# Patient Record
Sex: FEMALE | Race: WHITE | NOT HISPANIC OR LATINO | Employment: OTHER | URBAN - METROPOLITAN AREA
[De-identification: names, ages, dates, MRNs, and addresses within clinical notes are randomized per-mention and may not be internally consistent; named-entity substitution may affect disease eponyms.]

---

## 2017-10-30 ENCOUNTER — RECORDS - HEALTHEAST (OUTPATIENT)
Dept: LAB | Facility: CLINIC | Age: 57
End: 2017-10-30

## 2017-10-30 LAB
CHOLEST SERPL-MCNC: 169 MG/DL
FASTING STATUS PATIENT QL REPORTED: ABNORMAL
HDLC SERPL-MCNC: 45 MG/DL
LDLC SERPL CALC-MCNC: 106 MG/DL
TRIGL SERPL-MCNC: 90 MG/DL

## 2017-11-07 LAB
BKR LAB AP ABNORMAL BLEEDING: NO
BKR LAB AP BIRTH CONTROL/HORMONES: NORMAL
BKR LAB AP CERVICAL APPEARANCE: NORMAL
BKR LAB AP GYN ADEQUACY: NORMAL
BKR LAB AP GYN INTERPRETATION: NORMAL
BKR LAB AP GYN OTHER FINDINGS: NORMAL
BKR LAB AP HPV REFLEX: NO
BKR LAB AP LMP: 2000
BKR LAB AP PATIENT STATUS: NORMAL
BKR LAB AP PREVIOUS ABNORMAL: NORMAL
BKR LAB AP PREVIOUS NORMAL: 2014
HIGH RISK?: NO
PATH REPORT.COMMENTS IMP SPEC: NORMAL
RESULT FLAG (HE HISTORICAL CONVERSION): NORMAL

## 2018-09-14 ENCOUNTER — HOSPITAL ENCOUNTER (OUTPATIENT)
Dept: ULTRASOUND IMAGING | Facility: CLINIC | Age: 58
Discharge: HOME | End: 2018-09-14
Attending: FAMILY MEDICINE

## 2018-09-14 ENCOUNTER — RECORDS - HEALTHEAST (OUTPATIENT)
Dept: ADMINISTRATIVE | Facility: OTHER | Age: 58
End: 2018-09-14

## 2018-09-14 DIAGNOSIS — M79.89 LEFT LEG SWELLING: ICD-10-CM

## 2018-09-14 DIAGNOSIS — M79.605 LEFT LEG PAIN: ICD-10-CM

## 2018-09-24 ENCOUNTER — AMBULATORY - HEALTHEAST (OUTPATIENT)
Dept: LAB | Facility: HOSPITAL | Age: 58
End: 2018-09-24

## 2018-09-24 ENCOUNTER — HOSPITAL ENCOUNTER (OUTPATIENT)
Dept: PHYSICAL MEDICINE AND REHAB | Facility: CLINIC | Age: 58
Discharge: HOME | End: 2018-09-24
Attending: PHYSICAL MEDICINE & REHABILITATION

## 2018-09-24 DIAGNOSIS — M53.3 SACROILIAC JOINT PAIN: ICD-10-CM

## 2018-09-24 DIAGNOSIS — M25.562 LEFT KNEE PAIN: ICD-10-CM

## 2018-09-24 DIAGNOSIS — M79.18 MYOFASCIAL PAIN: ICD-10-CM

## 2018-09-24 DIAGNOSIS — M54.50 LUMBAR SPINE PAIN: ICD-10-CM

## 2018-09-24 LAB
BASOPHILS # BLD AUTO: 0 THOU/UL (ref 0–0.2)
BASOPHILS NFR BLD AUTO: 0 % (ref 0–2)
C REACTIVE PROTEIN LHE: 1.1 MG/DL (ref 0–0.8)
EOSINOPHIL # BLD AUTO: 0 THOU/UL (ref 0–0.4)
EOSINOPHIL NFR BLD AUTO: 0 % (ref 0–6)
ERYTHROCYTE [DISTWIDTH] IN BLOOD BY AUTOMATED COUNT: 13.1 % (ref 11–14.5)
ERYTHROCYTE [SEDIMENTATION RATE] IN BLOOD BY WESTERGREN METHOD: 14 MM/HR (ref 0–20)
HCT VFR BLD AUTO: 42.6 % (ref 35–47)
HGB BLD-MCNC: 14.3 G/DL (ref 12–16)
LYMPHOCYTES # BLD AUTO: 1.2 THOU/UL (ref 0.8–4.4)
LYMPHOCYTES NFR BLD AUTO: 14 % (ref 20–40)
MCH RBC QN AUTO: 30.2 PG (ref 27–34)
MCHC RBC AUTO-ENTMCNC: 33.6 G/DL (ref 32–36)
MCV RBC AUTO: 90 FL (ref 80–100)
MONOCYTES # BLD AUTO: 0.2 THOU/UL (ref 0–0.9)
MONOCYTES NFR BLD AUTO: 2 % (ref 2–10)
NEUTROPHILS # BLD AUTO: 7.1 THOU/UL (ref 2–7.7)
NEUTROPHILS NFR BLD AUTO: 84 % (ref 50–70)
PLATELET # BLD AUTO: 252 THOU/UL (ref 140–440)
PMV BLD AUTO: 10.4 FL (ref 8.5–12.5)
RBC # BLD AUTO: 4.74 MILL/UL (ref 3.8–5.4)
URATE SERPL-MCNC: 3.9 MG/DL (ref 2–7.5)
WBC: 8.5 THOU/UL (ref 4–11)

## 2018-09-24 RX ORDER — ASPIRIN 325 MG
325 TABLET ORAL DAILY
Status: SHIPPED | COMMUNITY
Start: 2018-09-24 | End: 2023-04-06

## 2018-09-24 RX ORDER — LOSARTAN POTASSIUM AND HYDROCHLOROTHIAZIDE 12.5; 5 MG/1; MG/1
TABLET ORAL
Status: SHIPPED | COMMUNITY
Start: 2018-04-23 | End: 2024-05-01

## 2018-09-24 RX ORDER — ATORVASTATIN CALCIUM 20 MG/1
20 TABLET, FILM COATED ORAL EVERY MORNING
Status: SHIPPED | COMMUNITY
Start: 2018-08-02 | End: 2024-05-01

## 2018-09-25 ENCOUNTER — AMBULATORY - HEALTHEAST (OUTPATIENT)
Dept: PHYSICAL MEDICINE AND REHAB | Facility: CLINIC | Age: 58
End: 2018-09-25

## 2018-09-25 ENCOUNTER — COMMUNICATION - HEALTHEAST (OUTPATIENT)
Dept: PHYSICAL MEDICINE AND REHAB | Facility: CLINIC | Age: 58
End: 2018-09-25

## 2018-09-25 DIAGNOSIS — M53.3 SACROILIAC JOINT PAIN: ICD-10-CM

## 2018-09-25 DIAGNOSIS — M25.562 LEFT KNEE PAIN: ICD-10-CM

## 2018-09-26 ENCOUNTER — RECORDS - HEALTHEAST (OUTPATIENT)
Dept: LAB | Facility: CLINIC | Age: 58
End: 2018-09-26

## 2018-09-27 LAB — BNP SERPL-MCNC: 30 PG/ML (ref 0–93)

## 2018-10-01 ENCOUNTER — OFFICE VISIT - HEALTHEAST (OUTPATIENT)
Dept: PHYSICAL THERAPY | Facility: REHABILITATION | Age: 58
End: 2018-10-01

## 2018-10-01 DIAGNOSIS — M79.671 BILATERAL FOOT PAIN: ICD-10-CM

## 2018-10-01 DIAGNOSIS — M79.672 BILATERAL FOOT PAIN: ICD-10-CM

## 2018-10-01 DIAGNOSIS — M54.50 CHRONIC RIGHT-SIDED LOW BACK PAIN WITHOUT SCIATICA: ICD-10-CM

## 2018-10-01 DIAGNOSIS — G89.29 UPPER BACK PAIN, CHRONIC: ICD-10-CM

## 2018-10-01 DIAGNOSIS — G89.29 CHRONIC RIGHT-SIDED LOW BACK PAIN WITHOUT SCIATICA: ICD-10-CM

## 2018-10-01 DIAGNOSIS — M54.9 UPPER BACK PAIN, CHRONIC: ICD-10-CM

## 2018-10-01 DIAGNOSIS — M79.662 PAIN OF LEFT LOWER LEG: ICD-10-CM

## 2018-10-10 ENCOUNTER — OFFICE VISIT - HEALTHEAST (OUTPATIENT)
Dept: PHYSICAL THERAPY | Facility: REHABILITATION | Age: 58
End: 2018-10-10

## 2018-10-10 DIAGNOSIS — M79.662 PAIN OF LEFT LOWER LEG: ICD-10-CM

## 2018-10-10 DIAGNOSIS — M54.9 UPPER BACK PAIN, CHRONIC: ICD-10-CM

## 2018-10-10 DIAGNOSIS — G89.29 CHRONIC RIGHT-SIDED LOW BACK PAIN WITHOUT SCIATICA: ICD-10-CM

## 2018-10-10 DIAGNOSIS — M79.671 BILATERAL FOOT PAIN: ICD-10-CM

## 2018-10-10 DIAGNOSIS — G89.29 UPPER BACK PAIN, CHRONIC: ICD-10-CM

## 2018-10-10 DIAGNOSIS — M79.672 BILATERAL FOOT PAIN: ICD-10-CM

## 2018-10-10 DIAGNOSIS — M54.50 CHRONIC RIGHT-SIDED LOW BACK PAIN WITHOUT SCIATICA: ICD-10-CM

## 2018-10-17 ENCOUNTER — OFFICE VISIT - HEALTHEAST (OUTPATIENT)
Dept: PHYSICAL THERAPY | Facility: REHABILITATION | Age: 58
End: 2018-10-17

## 2018-10-17 DIAGNOSIS — M54.50 CHRONIC RIGHT-SIDED LOW BACK PAIN WITHOUT SCIATICA: ICD-10-CM

## 2018-10-17 DIAGNOSIS — M79.662 PAIN OF LEFT LOWER LEG: ICD-10-CM

## 2018-10-17 DIAGNOSIS — G89.29 UPPER BACK PAIN, CHRONIC: ICD-10-CM

## 2018-10-17 DIAGNOSIS — M54.9 UPPER BACK PAIN, CHRONIC: ICD-10-CM

## 2018-10-17 DIAGNOSIS — M79.672 BILATERAL FOOT PAIN: ICD-10-CM

## 2018-10-17 DIAGNOSIS — G89.29 CHRONIC RIGHT-SIDED LOW BACK PAIN WITHOUT SCIATICA: ICD-10-CM

## 2018-10-17 DIAGNOSIS — M79.671 BILATERAL FOOT PAIN: ICD-10-CM

## 2018-10-24 ENCOUNTER — OFFICE VISIT - HEALTHEAST (OUTPATIENT)
Dept: PHYSICAL THERAPY | Facility: REHABILITATION | Age: 58
End: 2018-10-24

## 2018-10-24 DIAGNOSIS — G89.29 UPPER BACK PAIN, CHRONIC: ICD-10-CM

## 2018-10-24 DIAGNOSIS — M54.50 CHRONIC RIGHT-SIDED LOW BACK PAIN WITHOUT SCIATICA: ICD-10-CM

## 2018-10-24 DIAGNOSIS — M54.9 UPPER BACK PAIN, CHRONIC: ICD-10-CM

## 2018-10-24 DIAGNOSIS — M79.672 BILATERAL FOOT PAIN: ICD-10-CM

## 2018-10-24 DIAGNOSIS — M79.671 BILATERAL FOOT PAIN: ICD-10-CM

## 2018-10-24 DIAGNOSIS — G89.29 CHRONIC RIGHT-SIDED LOW BACK PAIN WITHOUT SCIATICA: ICD-10-CM

## 2018-10-24 DIAGNOSIS — M79.662 PAIN OF LEFT LOWER LEG: ICD-10-CM

## 2018-10-31 ENCOUNTER — OFFICE VISIT - HEALTHEAST (OUTPATIENT)
Dept: PHYSICAL THERAPY | Facility: REHABILITATION | Age: 58
End: 2018-10-31

## 2018-10-31 DIAGNOSIS — M79.662 PAIN OF LEFT LOWER LEG: ICD-10-CM

## 2018-10-31 DIAGNOSIS — M54.50 CHRONIC RIGHT-SIDED LOW BACK PAIN WITHOUT SCIATICA: ICD-10-CM

## 2018-10-31 DIAGNOSIS — M54.9 UPPER BACK PAIN, CHRONIC: ICD-10-CM

## 2018-10-31 DIAGNOSIS — G89.29 CHRONIC RIGHT-SIDED LOW BACK PAIN WITHOUT SCIATICA: ICD-10-CM

## 2018-10-31 DIAGNOSIS — G89.29 UPPER BACK PAIN, CHRONIC: ICD-10-CM

## 2018-12-17 ENCOUNTER — RECORDS - HEALTHEAST (OUTPATIENT)
Dept: LAB | Facility: CLINIC | Age: 58
End: 2018-12-17

## 2018-12-17 LAB
ANION GAP SERPL CALCULATED.3IONS-SCNC: 11 MMOL/L (ref 5–18)
BUN SERPL-MCNC: 15 MG/DL (ref 8–22)
CALCIUM SERPL-MCNC: 10.4 MG/DL (ref 8.5–10.5)
CHLORIDE BLD-SCNC: 104 MMOL/L (ref 98–107)
CHOLEST SERPL-MCNC: 199 MG/DL
CO2 SERPL-SCNC: 28 MMOL/L (ref 22–31)
CREAT SERPL-MCNC: 0.86 MG/DL (ref 0.6–1.1)
ERYTHROCYTE [SEDIMENTATION RATE] IN BLOOD BY WESTERGREN METHOD: 14 MM/HR (ref 0–20)
FASTING STATUS PATIENT QL REPORTED: YES
GFR SERPL CREATININE-BSD FRML MDRD: >60 ML/MIN/1.73M2
GLUCOSE BLD-MCNC: 128 MG/DL (ref 70–125)
HDLC SERPL-MCNC: 41 MG/DL
LDLC SERPL CALC-MCNC: 131 MG/DL
LIPASE SERPL-CCNC: 10 U/L (ref 0–52)
POTASSIUM BLD-SCNC: 4.3 MMOL/L (ref 3.5–5)
SODIUM SERPL-SCNC: 143 MMOL/L (ref 136–145)
TRIGL SERPL-MCNC: 137 MG/DL

## 2018-12-18 ENCOUNTER — RECORDS - HEALTHEAST (OUTPATIENT)
Dept: LAB | Facility: CLINIC | Age: 58
End: 2018-12-18

## 2018-12-19 LAB — HBA1C MFR BLD: 6.5 % (ref 4.2–6.1)

## 2019-06-05 ENCOUNTER — RECORDS - HEALTHEAST (OUTPATIENT)
Dept: LAB | Facility: CLINIC | Age: 59
End: 2019-06-05

## 2019-06-05 LAB
CHOLEST SERPL-MCNC: 161 MG/DL
FASTING STATUS PATIENT QL REPORTED: YES
HDLC SERPL-MCNC: 44 MG/DL
LDLC SERPL CALC-MCNC: 83 MG/DL
TRIGL SERPL-MCNC: 170 MG/DL

## 2019-07-10 ENCOUNTER — HOSPITAL ENCOUNTER (OUTPATIENT)
Dept: MAMMOGRAPHY | Facility: CLINIC | Age: 59
Discharge: HOME | End: 2019-07-10
Attending: FAMILY MEDICINE

## 2019-07-10 DIAGNOSIS — Z12.31 VISIT FOR SCREENING MAMMOGRAM: ICD-10-CM

## 2019-07-11 ENCOUNTER — RECORDS - HEALTHEAST (OUTPATIENT)
Dept: RADIOLOGY | Facility: CLINIC | Age: 59
End: 2019-07-11

## 2019-07-11 ENCOUNTER — RECORDS - HEALTHEAST (OUTPATIENT)
Dept: ADMINISTRATIVE | Facility: OTHER | Age: 59
End: 2019-07-11

## 2019-07-17 ENCOUNTER — HOSPITAL ENCOUNTER (OUTPATIENT)
Dept: MAMMOGRAPHY | Facility: CLINIC | Age: 59
Discharge: HOME | End: 2019-07-17
Attending: FAMILY MEDICINE

## 2019-07-17 DIAGNOSIS — N64.89 BREAST ASYMMETRY: ICD-10-CM

## 2019-08-14 ENCOUNTER — TRANSFERRED RECORDS (OUTPATIENT)
Dept: PULMONOLOGY | Facility: OTHER | Age: 59
End: 2019-08-14

## 2020-06-15 ENCOUNTER — RECORDS - HEALTHEAST (OUTPATIENT)
Dept: LAB | Facility: CLINIC | Age: 60
End: 2020-06-15

## 2020-06-15 ENCOUNTER — AMBULATORY - HEALTHEAST (OUTPATIENT)
Dept: VASCULAR SURGERY | Facility: CLINIC | Age: 60
End: 2020-06-15

## 2020-06-15 ENCOUNTER — AMBULATORY - HEALTHEAST (OUTPATIENT)
Dept: PHYSICAL MEDICINE AND REHAB | Facility: CLINIC | Age: 60
End: 2020-06-15

## 2020-06-15 ENCOUNTER — RECORDS - HEALTHEAST (OUTPATIENT)
Dept: ADMINISTRATIVE | Facility: OTHER | Age: 60
End: 2020-06-15

## 2020-06-15 DIAGNOSIS — M54.50 LUMBAR SPINE PAIN: ICD-10-CM

## 2020-06-15 DIAGNOSIS — I73.9 PERIPHERAL VASCULAR DISEASE, UNSPECIFIED (H): ICD-10-CM

## 2020-06-15 LAB
ANION GAP SERPL CALCULATED.3IONS-SCNC: 10 MMOL/L (ref 5–18)
BUN SERPL-MCNC: 14 MG/DL (ref 8–22)
CALCIUM SERPL-MCNC: 9.5 MG/DL (ref 8.5–10.5)
CHLORIDE BLD-SCNC: 105 MMOL/L (ref 98–107)
CHOLEST SERPL-MCNC: 168 MG/DL
CO2 SERPL-SCNC: 27 MMOL/L (ref 22–31)
CREAT SERPL-MCNC: 0.77 MG/DL (ref 0.6–1.1)
FASTING STATUS PATIENT QL REPORTED: ABNORMAL
GFR SERPL CREATININE-BSD FRML MDRD: >60 ML/MIN/1.73M2
GLUCOSE BLD-MCNC: 130 MG/DL (ref 70–125)
HDLC SERPL-MCNC: 39 MG/DL
LDLC SERPL CALC-MCNC: 100 MG/DL
POTASSIUM BLD-SCNC: 3.7 MMOL/L (ref 3.5–5)
SODIUM SERPL-SCNC: 142 MMOL/L (ref 136–145)
TRIGL SERPL-MCNC: 147 MG/DL

## 2020-06-19 ENCOUNTER — AMBULATORY - HEALTHEAST (OUTPATIENT)
Dept: VASCULAR SURGERY | Facility: CLINIC | Age: 60
End: 2020-06-19

## 2020-06-19 DIAGNOSIS — I73.9 PVD (PERIPHERAL VASCULAR DISEASE) (H): ICD-10-CM

## 2020-06-22 ENCOUNTER — HOSPITAL ENCOUNTER (OUTPATIENT)
Dept: PHYSICAL MEDICINE AND REHAB | Facility: CLINIC | Age: 60
Discharge: HOME | End: 2020-06-22
Attending: PHYSICAL MEDICINE & REHABILITATION

## 2020-06-22 ENCOUNTER — RECORDS - HEALTHEAST (OUTPATIENT)
Dept: VASCULAR ULTRASOUND | Facility: CLINIC | Age: 60
End: 2020-06-22

## 2020-06-22 DIAGNOSIS — Z98.1 S/P LUMBAR FUSION: ICD-10-CM

## 2020-06-22 DIAGNOSIS — M54.50 LUMBAR SPINE PAIN: ICD-10-CM

## 2020-06-22 DIAGNOSIS — I73.9 PERIPHERAL VASCULAR DISEASE, UNSPECIFIED (H): ICD-10-CM

## 2020-06-22 DIAGNOSIS — M79.18 GLUTEAL PAIN: ICD-10-CM

## 2020-06-22 RX ORDER — IPRATROPIUM BROMIDE AND ALBUTEROL SULFATE 2.5; .5 MG/3ML; MG/3ML
3 SOLUTION RESPIRATORY (INHALATION) EVERY 6 HOURS PRN
Status: SHIPPED | COMMUNITY
Start: 2020-03-18 | End: 2024-10-04

## 2020-06-30 ENCOUNTER — OFFICE VISIT - HEALTHEAST (OUTPATIENT)
Dept: VASCULAR SURGERY | Facility: CLINIC | Age: 60
End: 2020-06-30

## 2020-06-30 DIAGNOSIS — I73.9 PVD (PERIPHERAL VASCULAR DISEASE) (H): ICD-10-CM

## 2020-07-10 ENCOUNTER — HOSPITAL ENCOUNTER (OUTPATIENT)
Dept: MRI IMAGING | Facility: HOSPITAL | Age: 60
Discharge: HOME | End: 2020-07-10
Attending: PHYSICAL MEDICINE & REHABILITATION

## 2020-07-10 ENCOUNTER — COMMUNICATION - HEALTHEAST (OUTPATIENT)
Dept: PHYSICAL MEDICINE AND REHAB | Facility: CLINIC | Age: 60
End: 2020-07-10

## 2020-07-10 DIAGNOSIS — Z98.1 S/P LUMBAR FUSION: ICD-10-CM

## 2020-07-10 DIAGNOSIS — M54.50 LUMBAR SPINE PAIN: ICD-10-CM

## 2020-07-10 DIAGNOSIS — M79.18 GLUTEAL PAIN: ICD-10-CM

## 2020-07-13 ENCOUNTER — COMMUNICATION - HEALTHEAST (OUTPATIENT)
Dept: PHYSICAL MEDICINE AND REHAB | Facility: CLINIC | Age: 60
End: 2020-07-13

## 2020-07-13 DIAGNOSIS — M54.16 LUMBAR RADICULOPATHY: ICD-10-CM

## 2020-07-13 RX ORDER — NABUMETONE 500 MG/1
TABLET, FILM COATED ORAL
Qty: 120 TABLET | Refills: 0 | Status: SHIPPED | OUTPATIENT
Start: 2020-07-13 | End: 2021-11-01

## 2020-07-17 ENCOUNTER — HOSPITAL ENCOUNTER (OUTPATIENT)
Dept: CT IMAGING | Facility: HOSPITAL | Age: 60
Discharge: HOME | End: 2020-07-17
Attending: SURGERY

## 2020-07-17 ENCOUNTER — OFFICE VISIT - HEALTHEAST (OUTPATIENT)
Dept: VASCULAR SURGERY | Facility: CLINIC | Age: 60
End: 2020-07-17

## 2020-07-17 DIAGNOSIS — I73.9 PVD (PERIPHERAL VASCULAR DISEASE) (H): ICD-10-CM

## 2020-07-17 LAB
CREAT BLD-MCNC: 0.8 MG/DL (ref 0.6–1.1)
GFR SERPL CREATININE-BSD FRML MDRD: >60 ML/MIN/1.73M2

## 2020-07-17 ASSESSMENT — MIFFLIN-ST. JEOR: SCORE: 1453.54

## 2020-07-21 ENCOUNTER — HOSPITAL ENCOUNTER (OUTPATIENT)
Dept: PHYSICAL MEDICINE AND REHAB | Facility: CLINIC | Age: 60
Discharge: HOME | End: 2020-07-21
Attending: PAIN MEDICINE

## 2020-07-21 DIAGNOSIS — M54.16 LUMBAR RADICULOPATHY: ICD-10-CM

## 2020-08-17 ENCOUNTER — HOSPITAL ENCOUNTER (OUTPATIENT)
Dept: PHYSICAL MEDICINE AND REHAB | Facility: CLINIC | Age: 60
Discharge: HOME | End: 2020-08-17
Attending: PHYSICAL MEDICINE & REHABILITATION

## 2020-08-17 DIAGNOSIS — M79.18 MYOFASCIAL PAIN: ICD-10-CM

## 2020-08-17 DIAGNOSIS — M79.662 PAIN IN BOTH LOWER LEGS: ICD-10-CM

## 2020-08-17 DIAGNOSIS — M51.26 LUMBAR DISC HERNIATION: ICD-10-CM

## 2020-08-17 DIAGNOSIS — M79.661 PAIN IN BOTH LOWER LEGS: ICD-10-CM

## 2020-08-17 DIAGNOSIS — M53.3 SACROILIAC JOINT PAIN: ICD-10-CM

## 2020-08-17 DIAGNOSIS — Z98.1 S/P LUMBAR FUSION: ICD-10-CM

## 2020-08-17 DIAGNOSIS — M79.18 GLUTEAL PAIN: ICD-10-CM

## 2020-08-17 DIAGNOSIS — M54.16 LUMBAR RADICULOPATHY: ICD-10-CM

## 2020-08-21 ENCOUNTER — COMMUNICATION - HEALTHEAST (OUTPATIENT)
Dept: PHYSICAL MEDICINE AND REHAB | Facility: CLINIC | Age: 60
End: 2020-08-21

## 2020-08-31 ENCOUNTER — OFFICE VISIT - HEALTHEAST (OUTPATIENT)
Dept: PHYSICAL THERAPY | Facility: CLINIC | Age: 60
End: 2020-08-31

## 2020-08-31 DIAGNOSIS — M54.50 CHRONIC RIGHT-SIDED LOW BACK PAIN WITHOUT SCIATICA: ICD-10-CM

## 2020-08-31 DIAGNOSIS — M79.18 MYOFASCIAL PAIN SYNDROME: ICD-10-CM

## 2020-08-31 DIAGNOSIS — G89.29 CHRONIC RIGHT-SIDED LOW BACK PAIN WITHOUT SCIATICA: ICD-10-CM

## 2020-09-02 ENCOUNTER — OFFICE VISIT - HEALTHEAST (OUTPATIENT)
Dept: PHYSICAL THERAPY | Facility: CLINIC | Age: 60
End: 2020-09-02

## 2020-09-02 DIAGNOSIS — M79.18 MYOFASCIAL PAIN SYNDROME: ICD-10-CM

## 2020-09-02 DIAGNOSIS — M54.50 CHRONIC RIGHT-SIDED LOW BACK PAIN WITHOUT SCIATICA: ICD-10-CM

## 2020-09-02 DIAGNOSIS — G89.29 CHRONIC RIGHT-SIDED LOW BACK PAIN WITHOUT SCIATICA: ICD-10-CM

## 2020-09-10 ENCOUNTER — OFFICE VISIT - HEALTHEAST (OUTPATIENT)
Dept: PHYSICAL THERAPY | Facility: CLINIC | Age: 60
End: 2020-09-10

## 2020-09-10 DIAGNOSIS — G89.29 CHRONIC RIGHT-SIDED LOW BACK PAIN WITHOUT SCIATICA: ICD-10-CM

## 2020-09-10 DIAGNOSIS — M54.50 CHRONIC RIGHT-SIDED LOW BACK PAIN WITHOUT SCIATICA: ICD-10-CM

## 2020-09-10 DIAGNOSIS — M79.18 MYOFASCIAL PAIN SYNDROME: ICD-10-CM

## 2020-09-16 ENCOUNTER — OFFICE VISIT - HEALTHEAST (OUTPATIENT)
Dept: PHYSICAL THERAPY | Facility: CLINIC | Age: 60
End: 2020-09-16

## 2020-09-16 DIAGNOSIS — G89.29 CHRONIC RIGHT-SIDED LOW BACK PAIN WITHOUT SCIATICA: ICD-10-CM

## 2020-09-16 DIAGNOSIS — M54.50 CHRONIC RIGHT-SIDED LOW BACK PAIN WITHOUT SCIATICA: ICD-10-CM

## 2020-09-16 DIAGNOSIS — M79.18 MYOFASCIAL PAIN SYNDROME: ICD-10-CM

## 2020-09-17 ENCOUNTER — HOSPITAL ENCOUNTER (OUTPATIENT)
Dept: PHYSICAL MEDICINE AND REHAB | Facility: CLINIC | Age: 60
Discharge: HOME | End: 2020-09-17
Attending: NURSE PRACTITIONER

## 2020-09-17 DIAGNOSIS — Z98.1 S/P LUMBAR FUSION: ICD-10-CM

## 2020-09-17 DIAGNOSIS — M54.16 LUMBAR RADICULOPATHY: ICD-10-CM

## 2020-09-17 DIAGNOSIS — M53.3 SACROILIAC JOINT PAIN: ICD-10-CM

## 2020-09-17 DIAGNOSIS — M51.26 LUMBAR DISC HERNIATION: ICD-10-CM

## 2020-09-17 DIAGNOSIS — M79.18 GLUTEAL PAIN: ICD-10-CM

## 2020-09-17 DIAGNOSIS — M79.18 RIGHT BUTTOCK PAIN: ICD-10-CM

## 2020-09-17 DIAGNOSIS — M53.3 SACROILIAC JOINT DYSFUNCTION: ICD-10-CM

## 2020-09-21 ENCOUNTER — OFFICE VISIT - HEALTHEAST (OUTPATIENT)
Dept: PHYSICAL THERAPY | Facility: CLINIC | Age: 60
End: 2020-09-21

## 2020-09-21 DIAGNOSIS — M79.18 MYOFASCIAL PAIN SYNDROME: ICD-10-CM

## 2020-09-21 DIAGNOSIS — M54.50 CHRONIC RIGHT-SIDED LOW BACK PAIN WITHOUT SCIATICA: ICD-10-CM

## 2020-09-21 DIAGNOSIS — G89.29 CHRONIC RIGHT-SIDED LOW BACK PAIN WITHOUT SCIATICA: ICD-10-CM

## 2020-09-24 ENCOUNTER — OFFICE VISIT - HEALTHEAST (OUTPATIENT)
Dept: PHYSICAL THERAPY | Facility: CLINIC | Age: 60
End: 2020-09-24

## 2020-09-24 DIAGNOSIS — G89.29 CHRONIC RIGHT-SIDED LOW BACK PAIN WITHOUT SCIATICA: ICD-10-CM

## 2020-09-24 DIAGNOSIS — M54.50 CHRONIC RIGHT-SIDED LOW BACK PAIN WITHOUT SCIATICA: ICD-10-CM

## 2020-09-24 DIAGNOSIS — M79.18 MYOFASCIAL PAIN SYNDROME: ICD-10-CM

## 2020-09-30 ENCOUNTER — HOSPITAL ENCOUNTER (OUTPATIENT)
Dept: PHYSICAL MEDICINE AND REHAB | Facility: CLINIC | Age: 60
Discharge: HOME | End: 2020-09-30
Attending: PAIN MEDICINE

## 2020-09-30 DIAGNOSIS — M53.3 SACROILIAC JOINT DYSFUNCTION: ICD-10-CM

## 2020-09-30 DIAGNOSIS — M79.18 RIGHT BUTTOCK PAIN: ICD-10-CM

## 2020-09-30 DIAGNOSIS — M53.3 SACROILIAC JOINT PAIN: ICD-10-CM

## 2020-09-30 DIAGNOSIS — M79.18 GLUTEAL PAIN: ICD-10-CM

## 2020-10-05 ENCOUNTER — OFFICE VISIT - HEALTHEAST (OUTPATIENT)
Dept: PHYSICAL THERAPY | Facility: CLINIC | Age: 60
End: 2020-10-05

## 2020-10-05 DIAGNOSIS — M54.50 CHRONIC RIGHT-SIDED LOW BACK PAIN WITHOUT SCIATICA: ICD-10-CM

## 2020-10-05 DIAGNOSIS — M79.18 MYOFASCIAL PAIN SYNDROME: ICD-10-CM

## 2020-10-05 DIAGNOSIS — G89.29 CHRONIC RIGHT-SIDED LOW BACK PAIN WITHOUT SCIATICA: ICD-10-CM

## 2020-10-09 ENCOUNTER — OFFICE VISIT - HEALTHEAST (OUTPATIENT)
Dept: PHYSICAL THERAPY | Facility: CLINIC | Age: 60
End: 2020-10-09

## 2020-10-09 DIAGNOSIS — M54.50 CHRONIC RIGHT-SIDED LOW BACK PAIN WITHOUT SCIATICA: ICD-10-CM

## 2020-10-09 DIAGNOSIS — G89.29 CHRONIC RIGHT-SIDED LOW BACK PAIN WITHOUT SCIATICA: ICD-10-CM

## 2020-10-09 DIAGNOSIS — M79.18 MYOFASCIAL PAIN SYNDROME: ICD-10-CM

## 2020-10-12 ENCOUNTER — RECORDS - HEALTHEAST (OUTPATIENT)
Dept: VASCULAR ULTRASOUND | Facility: CLINIC | Age: 60
End: 2020-10-12

## 2020-10-12 DIAGNOSIS — I73.9 PERIPHERAL VASCULAR DISEASE, UNSPECIFIED (H): ICD-10-CM

## 2020-10-16 ENCOUNTER — COMMUNICATION - HEALTHEAST (OUTPATIENT)
Dept: VASCULAR SURGERY | Facility: CLINIC | Age: 60
End: 2020-10-16

## 2020-10-16 ENCOUNTER — OFFICE VISIT - HEALTHEAST (OUTPATIENT)
Dept: VASCULAR SURGERY | Facility: CLINIC | Age: 60
End: 2020-10-16

## 2020-10-16 DIAGNOSIS — I73.9 PVD (PERIPHERAL VASCULAR DISEASE) (H): ICD-10-CM

## 2020-10-16 DIAGNOSIS — Z20.822 COVID-19 RULED OUT: ICD-10-CM

## 2020-10-20 ENCOUNTER — OFFICE VISIT - HEALTHEAST (OUTPATIENT)
Dept: PHYSICAL THERAPY | Facility: REHABILITATION | Age: 60
End: 2020-10-20

## 2020-10-20 DIAGNOSIS — M79.662 PAIN OF LEFT LOWER LEG: ICD-10-CM

## 2020-10-20 DIAGNOSIS — I73.9 PVD (PERIPHERAL VASCULAR DISEASE) (H): ICD-10-CM

## 2020-10-20 DIAGNOSIS — M54.50 CHRONIC RIGHT-SIDED LOW BACK PAIN WITHOUT SCIATICA: ICD-10-CM

## 2020-10-20 DIAGNOSIS — M79.672 BILATERAL FOOT PAIN: ICD-10-CM

## 2020-10-20 DIAGNOSIS — G62.9 PERIPHERAL NEUROPATHY: ICD-10-CM

## 2020-10-20 DIAGNOSIS — M54.9 UPPER BACK PAIN, CHRONIC: ICD-10-CM

## 2020-10-20 DIAGNOSIS — M79.671 BILATERAL FOOT PAIN: ICD-10-CM

## 2020-10-20 DIAGNOSIS — M79.18 MYOFASCIAL PAIN SYNDROME: ICD-10-CM

## 2020-10-20 DIAGNOSIS — G89.29 UPPER BACK PAIN, CHRONIC: ICD-10-CM

## 2020-10-20 DIAGNOSIS — G89.29 CHRONIC RIGHT-SIDED LOW BACK PAIN WITHOUT SCIATICA: ICD-10-CM

## 2020-10-20 DIAGNOSIS — J44.9 COPD (CHRONIC OBSTRUCTIVE PULMONARY DISEASE) (H): ICD-10-CM

## 2020-10-23 ENCOUNTER — COMMUNICATION - HEALTHEAST (OUTPATIENT)
Dept: VASCULAR SURGERY | Facility: CLINIC | Age: 60
End: 2020-10-23

## 2020-10-27 ENCOUNTER — OFFICE VISIT - HEALTHEAST (OUTPATIENT)
Dept: PHYSICAL THERAPY | Facility: REHABILITATION | Age: 60
End: 2020-10-27

## 2020-10-27 DIAGNOSIS — M54.50 CHRONIC RIGHT-SIDED LOW BACK PAIN WITHOUT SCIATICA: ICD-10-CM

## 2020-10-27 DIAGNOSIS — M54.9 UPPER BACK PAIN, CHRONIC: ICD-10-CM

## 2020-10-27 DIAGNOSIS — G89.29 UPPER BACK PAIN, CHRONIC: ICD-10-CM

## 2020-10-27 DIAGNOSIS — M79.672 BILATERAL FOOT PAIN: ICD-10-CM

## 2020-10-27 DIAGNOSIS — G89.29 CHRONIC RIGHT-SIDED LOW BACK PAIN WITHOUT SCIATICA: ICD-10-CM

## 2020-10-27 DIAGNOSIS — G62.9 PERIPHERAL NEUROPATHY: ICD-10-CM

## 2020-10-27 DIAGNOSIS — J44.9 COPD (CHRONIC OBSTRUCTIVE PULMONARY DISEASE) (H): ICD-10-CM

## 2020-10-27 DIAGNOSIS — M79.18 MYOFASCIAL PAIN SYNDROME: ICD-10-CM

## 2020-10-27 DIAGNOSIS — I73.9 PVD (PERIPHERAL VASCULAR DISEASE) (H): ICD-10-CM

## 2020-10-27 DIAGNOSIS — M79.671 BILATERAL FOOT PAIN: ICD-10-CM

## 2020-10-27 DIAGNOSIS — M79.662 PAIN OF LEFT LOWER LEG: ICD-10-CM

## 2020-11-03 ENCOUNTER — COMMUNICATION - HEALTHEAST (OUTPATIENT)
Dept: PHYSICAL THERAPY | Facility: CLINIC | Age: 60
End: 2020-11-03

## 2020-11-03 ENCOUNTER — OFFICE VISIT - HEALTHEAST (OUTPATIENT)
Dept: PHYSICAL THERAPY | Facility: REHABILITATION | Age: 60
End: 2020-11-03

## 2020-11-03 DIAGNOSIS — G89.29 UPPER BACK PAIN, CHRONIC: ICD-10-CM

## 2020-11-03 DIAGNOSIS — M79.671 BILATERAL FOOT PAIN: ICD-10-CM

## 2020-11-03 DIAGNOSIS — J44.9 COPD (CHRONIC OBSTRUCTIVE PULMONARY DISEASE) (H): ICD-10-CM

## 2020-11-03 DIAGNOSIS — M79.662 PAIN OF LEFT LOWER LEG: ICD-10-CM

## 2020-11-03 DIAGNOSIS — G62.9 PERIPHERAL NEUROPATHY: ICD-10-CM

## 2020-11-03 DIAGNOSIS — M79.672 BILATERAL FOOT PAIN: ICD-10-CM

## 2020-11-03 DIAGNOSIS — M79.18 MYOFASCIAL PAIN SYNDROME: ICD-10-CM

## 2020-11-03 DIAGNOSIS — G89.29 CHRONIC RIGHT-SIDED LOW BACK PAIN WITHOUT SCIATICA: ICD-10-CM

## 2020-11-03 DIAGNOSIS — M54.50 CHRONIC RIGHT-SIDED LOW BACK PAIN WITHOUT SCIATICA: ICD-10-CM

## 2020-11-03 DIAGNOSIS — M54.9 UPPER BACK PAIN, CHRONIC: ICD-10-CM

## 2020-11-03 DIAGNOSIS — I73.9 PVD (PERIPHERAL VASCULAR DISEASE) (H): ICD-10-CM

## 2020-11-07 ENCOUNTER — AMBULATORY - HEALTHEAST (OUTPATIENT)
Dept: FAMILY MEDICINE | Facility: CLINIC | Age: 60
End: 2020-11-07

## 2020-11-07 DIAGNOSIS — Z20.822 COVID-19 RULED OUT: ICD-10-CM

## 2020-11-09 ENCOUNTER — COMMUNICATION - HEALTHEAST (OUTPATIENT)
Dept: SCHEDULING | Facility: CLINIC | Age: 60
End: 2020-11-09

## 2020-11-09 ENCOUNTER — COMMUNICATION - HEALTHEAST (OUTPATIENT)
Dept: VASCULAR SURGERY | Facility: CLINIC | Age: 60
End: 2020-11-09

## 2020-11-09 DIAGNOSIS — Z20.822 COVID-19 RULED OUT: ICD-10-CM

## 2020-11-09 DIAGNOSIS — I73.9 PVD (PERIPHERAL VASCULAR DISEASE) (H): ICD-10-CM

## 2020-11-11 ENCOUNTER — HOSPITAL ENCOUNTER (OUTPATIENT)
Dept: INTERVENTIONAL RADIOLOGY/VASCULAR | Facility: CLINIC | Age: 60
Discharge: HOME | End: 2020-11-11
Attending: SURGERY

## 2020-11-11 DIAGNOSIS — I73.9 PERIPHERAL VASCULAR DISEASE, UNSPECIFIED (H): ICD-10-CM

## 2020-11-11 LAB
CREAT SERPL-MCNC: 0.77 MG/DL (ref 0.6–1.1)
GFR SERPL CREATININE-BSD FRML MDRD: >60 ML/MIN/1.73M2
HGB BLD-MCNC: 14.1 G/DL (ref 12–16)
INR PPP: 0.99 (ref 0.9–1.1)
PLATELET # BLD AUTO: 250 THOU/UL (ref 140–440)
POTASSIUM BLD-SCNC: 3.6 MMOL/L (ref 3.5–5)

## 2020-11-11 ASSESSMENT — MIFFLIN-ST. JEOR: SCORE: 1445.21

## 2020-11-17 ENCOUNTER — OFFICE VISIT - HEALTHEAST (OUTPATIENT)
Dept: PHYSICAL THERAPY | Facility: REHABILITATION | Age: 60
End: 2020-11-17

## 2020-11-17 DIAGNOSIS — M54.50 CHRONIC RIGHT-SIDED LOW BACK PAIN WITHOUT SCIATICA: ICD-10-CM

## 2020-11-17 DIAGNOSIS — G89.29 CHRONIC RIGHT-SIDED LOW BACK PAIN WITHOUT SCIATICA: ICD-10-CM

## 2020-11-17 DIAGNOSIS — M79.18 MYOFASCIAL PAIN SYNDROME: ICD-10-CM

## 2020-11-17 DIAGNOSIS — M54.9 UPPER BACK PAIN, CHRONIC: ICD-10-CM

## 2020-11-17 DIAGNOSIS — G89.29 UPPER BACK PAIN, CHRONIC: ICD-10-CM

## 2020-11-17 DIAGNOSIS — M79.671 BILATERAL FOOT PAIN: ICD-10-CM

## 2020-11-17 DIAGNOSIS — M79.672 BILATERAL FOOT PAIN: ICD-10-CM

## 2020-11-17 DIAGNOSIS — G62.9 PERIPHERAL NEUROPATHY: ICD-10-CM

## 2020-11-17 DIAGNOSIS — J44.9 COPD (CHRONIC OBSTRUCTIVE PULMONARY DISEASE) (H): ICD-10-CM

## 2020-11-17 DIAGNOSIS — I73.9 PVD (PERIPHERAL VASCULAR DISEASE) (H): ICD-10-CM

## 2020-11-17 DIAGNOSIS — M79.662 PAIN OF LEFT LOWER LEG: ICD-10-CM

## 2020-11-21 ENCOUNTER — AMBULATORY - HEALTHEAST (OUTPATIENT)
Dept: FAMILY MEDICINE | Facility: CLINIC | Age: 60
End: 2020-11-21

## 2020-11-21 DIAGNOSIS — I73.9 PVD (PERIPHERAL VASCULAR DISEASE) (H): ICD-10-CM

## 2020-11-21 DIAGNOSIS — Z20.822 COVID-19 RULED OUT: ICD-10-CM

## 2020-11-23 ENCOUNTER — COMMUNICATION - HEALTHEAST (OUTPATIENT)
Dept: SCHEDULING | Facility: CLINIC | Age: 60
End: 2020-11-23

## 2020-11-23 ENCOUNTER — COMMUNICATION - HEALTHEAST (OUTPATIENT)
Dept: VASCULAR SURGERY | Facility: CLINIC | Age: 60
End: 2020-11-23

## 2020-11-25 ENCOUNTER — HOSPITAL ENCOUNTER (OUTPATIENT)
Dept: INTERVENTIONAL RADIOLOGY/VASCULAR | Facility: CLINIC | Age: 60
Discharge: HOME | End: 2020-11-25
Attending: SURGERY | Admitting: SURGERY

## 2020-11-25 ENCOUNTER — AMBULATORY - HEALTHEAST (OUTPATIENT)
Dept: VASCULAR SURGERY | Facility: CLINIC | Age: 60
End: 2020-11-25

## 2020-11-25 DIAGNOSIS — I73.9 PVD (PERIPHERAL VASCULAR DISEASE) (H): ICD-10-CM

## 2020-11-25 LAB
CREAT SERPL-MCNC: 0.73 MG/DL (ref 0.6–1.1)
GFR SERPL CREATININE-BSD FRML MDRD: >60 ML/MIN/1.73M2
GLUCOSE BLDC GLUCOMTR-MCNC: 101 MG/DL (ref 70–139)
GLUCOSE BLDC GLUCOMTR-MCNC: 128 MG/DL (ref 70–139)
HGB BLD-MCNC: 14.4 G/DL (ref 12–16)
INR PPP: 0.99 (ref 0.9–1.1)
PLATELET # BLD AUTO: 261 THOU/UL (ref 140–440)
POTASSIUM BLD-SCNC: 3.7 MMOL/L (ref 3.5–5)

## 2020-11-25 ASSESSMENT — MIFFLIN-ST. JEOR: SCORE: 1460.91

## 2020-11-27 ENCOUNTER — OFFICE VISIT - HEALTHEAST (OUTPATIENT)
Dept: PHYSICAL THERAPY | Facility: REHABILITATION | Age: 60
End: 2020-11-27

## 2020-11-27 DIAGNOSIS — M54.50 CHRONIC RIGHT-SIDED LOW BACK PAIN WITHOUT SCIATICA: ICD-10-CM

## 2020-11-27 DIAGNOSIS — M54.9 UPPER BACK PAIN, CHRONIC: ICD-10-CM

## 2020-11-27 DIAGNOSIS — M79.18 MYOFASCIAL PAIN SYNDROME: ICD-10-CM

## 2020-11-27 DIAGNOSIS — G89.29 CHRONIC RIGHT-SIDED LOW BACK PAIN WITHOUT SCIATICA: ICD-10-CM

## 2020-11-27 DIAGNOSIS — M79.662 PAIN OF LEFT LOWER LEG: ICD-10-CM

## 2020-11-27 DIAGNOSIS — J44.9 COPD (CHRONIC OBSTRUCTIVE PULMONARY DISEASE) (H): ICD-10-CM

## 2020-11-27 DIAGNOSIS — I73.9 PVD (PERIPHERAL VASCULAR DISEASE) (H): ICD-10-CM

## 2020-11-27 DIAGNOSIS — M79.671 BILATERAL FOOT PAIN: ICD-10-CM

## 2020-11-27 DIAGNOSIS — M79.672 BILATERAL FOOT PAIN: ICD-10-CM

## 2020-11-27 DIAGNOSIS — G62.9 PERIPHERAL NEUROPATHY: ICD-10-CM

## 2020-11-27 DIAGNOSIS — G89.29 UPPER BACK PAIN, CHRONIC: ICD-10-CM

## 2020-11-29 ENCOUNTER — HOSPITAL ENCOUNTER (OUTPATIENT)
Dept: CT IMAGING | Facility: CLINIC | Age: 60
Discharge: HOME | End: 2020-11-29
Attending: SURGERY

## 2020-11-29 DIAGNOSIS — I73.9 PVD (PERIPHERAL VASCULAR DISEASE) (H): ICD-10-CM

## 2020-11-30 ENCOUNTER — AMBULATORY - HEALTHEAST (OUTPATIENT)
Dept: VASCULAR SURGERY | Facility: CLINIC | Age: 60
End: 2020-11-30

## 2020-11-30 DIAGNOSIS — I73.9 PVD (PERIPHERAL VASCULAR DISEASE) (H): ICD-10-CM

## 2020-12-02 ENCOUNTER — RECORDS - HEALTHEAST (OUTPATIENT)
Dept: VASCULAR ULTRASOUND | Facility: CLINIC | Age: 60
End: 2020-12-02

## 2020-12-02 DIAGNOSIS — I73.9 PERIPHERAL VASCULAR DISEASE, UNSPECIFIED (H): ICD-10-CM

## 2020-12-04 ENCOUNTER — OFFICE VISIT - HEALTHEAST (OUTPATIENT)
Dept: VASCULAR SURGERY | Facility: CLINIC | Age: 60
End: 2020-12-04

## 2020-12-04 ENCOUNTER — COMMUNICATION - HEALTHEAST (OUTPATIENT)
Dept: PHYSICAL THERAPY | Facility: CLINIC | Age: 60
End: 2020-12-04

## 2020-12-04 ENCOUNTER — COMMUNICATION - HEALTHEAST (OUTPATIENT)
Dept: VASCULAR SURGERY | Facility: CLINIC | Age: 60
End: 2020-12-04

## 2020-12-04 DIAGNOSIS — Z20.822 COVID-19 RULED OUT: ICD-10-CM

## 2020-12-04 DIAGNOSIS — I73.9 PVD (PERIPHERAL VASCULAR DISEASE) (H): ICD-10-CM

## 2020-12-09 ENCOUNTER — RECORDS - HEALTHEAST (OUTPATIENT)
Dept: VASCULAR ULTRASOUND | Facility: CLINIC | Age: 60
End: 2020-12-09

## 2020-12-09 DIAGNOSIS — I73.9 PERIPHERAL VASCULAR DISEASE, UNSPECIFIED (H): ICD-10-CM

## 2020-12-12 ENCOUNTER — AMBULATORY - HEALTHEAST (OUTPATIENT)
Dept: FAMILY MEDICINE | Facility: CLINIC | Age: 60
End: 2020-12-12

## 2020-12-12 DIAGNOSIS — Z20.822 COVID-19 RULED OUT: ICD-10-CM

## 2020-12-13 LAB
SARS-COV-2 PCR COMMENT: NORMAL
SARS-COV-2 RNA SPEC QL NAA+PROBE: NEGATIVE
SARS-COV-2 VIRUS SPECIMEN SOURCE: NORMAL

## 2020-12-14 ENCOUNTER — COMMUNICATION - HEALTHEAST (OUTPATIENT)
Dept: SCHEDULING | Facility: CLINIC | Age: 60
End: 2020-12-14

## 2020-12-14 ENCOUNTER — COMMUNICATION - HEALTHEAST (OUTPATIENT)
Dept: VASCULAR SURGERY | Facility: CLINIC | Age: 60
End: 2020-12-14

## 2020-12-16 ENCOUNTER — HOSPITAL ENCOUNTER (OUTPATIENT)
Dept: INTERVENTIONAL RADIOLOGY/VASCULAR | Facility: CLINIC | Age: 60
Discharge: HOME | End: 2020-12-16
Attending: SURGERY | Admitting: SURGERY

## 2020-12-16 DIAGNOSIS — I73.9 PVD (PERIPHERAL VASCULAR DISEASE) (H): ICD-10-CM

## 2020-12-16 LAB
CREAT SERPL-MCNC: 0.69 MG/DL (ref 0.6–1.1)
FASTING STATUS PATIENT QL REPORTED: NORMAL
GFR SERPL CREATININE-BSD FRML MDRD: >60 ML/MIN/1.73M2
GLUCOSE BLD-MCNC: 122 MG/DL (ref 70–125)
GLUCOSE BLDC GLUCOMTR-MCNC: 101 MG/DL (ref 70–139)
HGB BLD-MCNC: 13.1 G/DL (ref 12–16)
INR PPP: 1.09 (ref 0.9–1.1)
PLATELET # BLD AUTO: 241 THOU/UL (ref 140–440)
POTASSIUM BLD-SCNC: 3.5 MMOL/L (ref 3.5–5)

## 2020-12-16 ASSESSMENT — MIFFLIN-ST. JEOR: SCORE: 1462.27

## 2020-12-18 ENCOUNTER — COMMUNICATION - HEALTHEAST (OUTPATIENT)
Dept: VASCULAR SURGERY | Facility: CLINIC | Age: 60
End: 2020-12-18

## 2020-12-18 ENCOUNTER — AMBULATORY - HEALTHEAST (OUTPATIENT)
Dept: VASCULAR SURGERY | Facility: CLINIC | Age: 60
End: 2020-12-18

## 2020-12-18 ENCOUNTER — COMMUNICATION - HEALTHEAST (OUTPATIENT)
Dept: PHYSICAL THERAPY | Facility: CLINIC | Age: 60
End: 2020-12-18

## 2020-12-18 DIAGNOSIS — I73.9 PVD (PERIPHERAL VASCULAR DISEASE) (H): ICD-10-CM

## 2020-12-29 ENCOUNTER — COMMUNICATION - HEALTHEAST (OUTPATIENT)
Dept: VASCULAR SURGERY | Facility: CLINIC | Age: 60
End: 2020-12-29

## 2020-12-30 ENCOUNTER — RECORDS - HEALTHEAST (OUTPATIENT)
Dept: VASCULAR ULTRASOUND | Facility: CLINIC | Age: 60
End: 2020-12-30

## 2020-12-30 DIAGNOSIS — I73.9 PERIPHERAL VASCULAR DISEASE, UNSPECIFIED (H): ICD-10-CM

## 2021-01-04 ENCOUNTER — OFFICE VISIT - HEALTHEAST (OUTPATIENT)
Dept: VASCULAR SURGERY | Facility: CLINIC | Age: 61
End: 2021-01-04

## 2021-01-04 DIAGNOSIS — I73.9 PVD (PERIPHERAL VASCULAR DISEASE) (H): ICD-10-CM

## 2021-01-08 ENCOUNTER — OFFICE VISIT - HEALTHEAST (OUTPATIENT)
Dept: PHYSICAL THERAPY | Facility: REHABILITATION | Age: 61
End: 2021-01-08

## 2021-01-08 ENCOUNTER — RECORDS - HEALTHEAST (OUTPATIENT)
Dept: LAB | Facility: CLINIC | Age: 61
End: 2021-01-08

## 2021-01-08 DIAGNOSIS — M54.50 CHRONIC RIGHT-SIDED LOW BACK PAIN WITHOUT SCIATICA: ICD-10-CM

## 2021-01-08 DIAGNOSIS — M79.18 MYOFASCIAL PAIN SYNDROME: ICD-10-CM

## 2021-01-08 DIAGNOSIS — G89.29 UPPER BACK PAIN, CHRONIC: ICD-10-CM

## 2021-01-08 DIAGNOSIS — G62.9 PERIPHERAL NEUROPATHY: ICD-10-CM

## 2021-01-08 DIAGNOSIS — I73.9 PVD (PERIPHERAL VASCULAR DISEASE) (H): ICD-10-CM

## 2021-01-08 DIAGNOSIS — M54.9 UPPER BACK PAIN, CHRONIC: ICD-10-CM

## 2021-01-08 DIAGNOSIS — J44.9 COPD (CHRONIC OBSTRUCTIVE PULMONARY DISEASE) (H): ICD-10-CM

## 2021-01-08 DIAGNOSIS — G89.29 CHRONIC RIGHT-SIDED LOW BACK PAIN WITHOUT SCIATICA: ICD-10-CM

## 2021-01-08 DIAGNOSIS — M79.662 PAIN OF LEFT LOWER LEG: ICD-10-CM

## 2021-01-08 DIAGNOSIS — M79.671 BILATERAL FOOT PAIN: ICD-10-CM

## 2021-01-08 DIAGNOSIS — M79.672 BILATERAL FOOT PAIN: ICD-10-CM

## 2021-01-11 LAB — BACTERIA SPEC CULT: ABNORMAL

## 2021-01-15 ENCOUNTER — OFFICE VISIT - HEALTHEAST (OUTPATIENT)
Dept: PHYSICAL THERAPY | Facility: REHABILITATION | Age: 61
End: 2021-01-15

## 2021-01-15 DIAGNOSIS — M79.18 MYOFASCIAL PAIN SYNDROME: ICD-10-CM

## 2021-01-15 DIAGNOSIS — J44.9 COPD (CHRONIC OBSTRUCTIVE PULMONARY DISEASE) (H): ICD-10-CM

## 2021-01-15 DIAGNOSIS — I73.9 PVD (PERIPHERAL VASCULAR DISEASE) (H): ICD-10-CM

## 2021-01-15 DIAGNOSIS — G89.29 CHRONIC RIGHT-SIDED LOW BACK PAIN WITHOUT SCIATICA: ICD-10-CM

## 2021-01-15 DIAGNOSIS — M79.671 BILATERAL FOOT PAIN: ICD-10-CM

## 2021-01-15 DIAGNOSIS — G89.29 UPPER BACK PAIN, CHRONIC: ICD-10-CM

## 2021-01-15 DIAGNOSIS — M79.662 PAIN OF LEFT LOWER LEG: ICD-10-CM

## 2021-01-15 DIAGNOSIS — M54.9 UPPER BACK PAIN, CHRONIC: ICD-10-CM

## 2021-01-15 DIAGNOSIS — G62.9 PERIPHERAL NEUROPATHY: ICD-10-CM

## 2021-01-15 DIAGNOSIS — M54.50 CHRONIC RIGHT-SIDED LOW BACK PAIN WITHOUT SCIATICA: ICD-10-CM

## 2021-01-15 DIAGNOSIS — M79.672 BILATERAL FOOT PAIN: ICD-10-CM

## 2021-01-22 ENCOUNTER — OFFICE VISIT - HEALTHEAST (OUTPATIENT)
Dept: PHYSICAL THERAPY | Facility: REHABILITATION | Age: 61
End: 2021-01-22

## 2021-01-22 DIAGNOSIS — M54.50 CHRONIC RIGHT-SIDED LOW BACK PAIN WITHOUT SCIATICA: ICD-10-CM

## 2021-01-22 DIAGNOSIS — G89.29 CHRONIC RIGHT-SIDED LOW BACK PAIN WITHOUT SCIATICA: ICD-10-CM

## 2021-01-22 DIAGNOSIS — M79.671 BILATERAL FOOT PAIN: ICD-10-CM

## 2021-01-22 DIAGNOSIS — M79.18 MYOFASCIAL PAIN SYNDROME: ICD-10-CM

## 2021-01-22 DIAGNOSIS — J44.9 COPD (CHRONIC OBSTRUCTIVE PULMONARY DISEASE) (H): ICD-10-CM

## 2021-01-22 DIAGNOSIS — G62.9 PERIPHERAL NEUROPATHY: ICD-10-CM

## 2021-01-22 DIAGNOSIS — I73.9 PVD (PERIPHERAL VASCULAR DISEASE) (H): ICD-10-CM

## 2021-01-22 DIAGNOSIS — M79.662 PAIN OF LEFT LOWER LEG: ICD-10-CM

## 2021-01-22 DIAGNOSIS — M54.9 UPPER BACK PAIN, CHRONIC: ICD-10-CM

## 2021-01-22 DIAGNOSIS — M79.672 BILATERAL FOOT PAIN: ICD-10-CM

## 2021-01-22 DIAGNOSIS — G89.29 UPPER BACK PAIN, CHRONIC: ICD-10-CM

## 2021-01-29 ENCOUNTER — OFFICE VISIT - HEALTHEAST (OUTPATIENT)
Dept: PHYSICAL THERAPY | Facility: REHABILITATION | Age: 61
End: 2021-01-29

## 2021-01-29 DIAGNOSIS — G89.29 UPPER BACK PAIN, CHRONIC: ICD-10-CM

## 2021-01-29 DIAGNOSIS — G62.9 PERIPHERAL NEUROPATHY: ICD-10-CM

## 2021-01-29 DIAGNOSIS — M54.9 UPPER BACK PAIN, CHRONIC: ICD-10-CM

## 2021-01-29 DIAGNOSIS — M79.662 PAIN OF LEFT LOWER LEG: ICD-10-CM

## 2021-01-29 DIAGNOSIS — M54.50 CHRONIC RIGHT-SIDED LOW BACK PAIN WITHOUT SCIATICA: ICD-10-CM

## 2021-01-29 DIAGNOSIS — M79.18 MYOFASCIAL PAIN SYNDROME: ICD-10-CM

## 2021-01-29 DIAGNOSIS — J44.9 COPD (CHRONIC OBSTRUCTIVE PULMONARY DISEASE) (H): ICD-10-CM

## 2021-01-29 DIAGNOSIS — G89.29 CHRONIC RIGHT-SIDED LOW BACK PAIN WITHOUT SCIATICA: ICD-10-CM

## 2021-01-29 DIAGNOSIS — M79.672 BILATERAL FOOT PAIN: ICD-10-CM

## 2021-01-29 DIAGNOSIS — I73.9 PVD (PERIPHERAL VASCULAR DISEASE) (H): ICD-10-CM

## 2021-01-29 DIAGNOSIS — M79.671 BILATERAL FOOT PAIN: ICD-10-CM

## 2021-02-04 ENCOUNTER — RECORDS - HEALTHEAST (OUTPATIENT)
Dept: VASCULAR ULTRASOUND | Facility: CLINIC | Age: 61
End: 2021-02-04

## 2021-02-04 ENCOUNTER — OFFICE VISIT - HEALTHEAST (OUTPATIENT)
Dept: VASCULAR SURGERY | Facility: CLINIC | Age: 61
End: 2021-02-04

## 2021-02-04 DIAGNOSIS — I73.9 PERIPHERAL VASCULAR DISEASE, UNSPECIFIED (H): ICD-10-CM

## 2021-02-04 DIAGNOSIS — I73.9 PVD (PERIPHERAL VASCULAR DISEASE) (H): ICD-10-CM

## 2021-02-04 RX ORDER — DULOXETIN HYDROCHLORIDE 60 MG/1
60 CAPSULE, DELAYED RELEASE ORAL DAILY
Status: SHIPPED | COMMUNITY
Start: 2021-02-04

## 2021-03-02 ENCOUNTER — COMMUNICATION - HEALTHEAST (OUTPATIENT)
Dept: PHYSICAL THERAPY | Facility: CLINIC | Age: 61
End: 2021-03-02

## 2021-03-24 ENCOUNTER — OFFICE VISIT - HEALTHEAST (OUTPATIENT)
Dept: PHYSICAL THERAPY | Facility: REHABILITATION | Age: 61
End: 2021-03-24

## 2021-03-24 DIAGNOSIS — J44.9 COPD (CHRONIC OBSTRUCTIVE PULMONARY DISEASE) (H): ICD-10-CM

## 2021-03-24 DIAGNOSIS — M54.9 UPPER BACK PAIN, CHRONIC: ICD-10-CM

## 2021-03-24 DIAGNOSIS — M54.50 CHRONIC RIGHT-SIDED LOW BACK PAIN WITHOUT SCIATICA: ICD-10-CM

## 2021-03-24 DIAGNOSIS — M79.672 BILATERAL FOOT PAIN: ICD-10-CM

## 2021-03-24 DIAGNOSIS — G89.29 CHRONIC RIGHT-SIDED LOW BACK PAIN WITHOUT SCIATICA: ICD-10-CM

## 2021-03-24 DIAGNOSIS — G62.9 PERIPHERAL NEUROPATHY: ICD-10-CM

## 2021-03-24 DIAGNOSIS — G89.29 UPPER BACK PAIN, CHRONIC: ICD-10-CM

## 2021-03-24 DIAGNOSIS — I73.9 PVD (PERIPHERAL VASCULAR DISEASE) (H): ICD-10-CM

## 2021-03-24 DIAGNOSIS — M79.18 MYOFASCIAL PAIN SYNDROME: ICD-10-CM

## 2021-03-24 DIAGNOSIS — M79.662 PAIN OF LEFT LOWER LEG: ICD-10-CM

## 2021-03-24 DIAGNOSIS — M79.671 BILATERAL FOOT PAIN: ICD-10-CM

## 2021-04-06 ENCOUNTER — OFFICE VISIT - HEALTHEAST (OUTPATIENT)
Dept: PHYSICAL THERAPY | Facility: REHABILITATION | Age: 61
End: 2021-04-06

## 2021-04-06 DIAGNOSIS — M79.18 MYOFASCIAL PAIN SYNDROME: ICD-10-CM

## 2021-04-06 DIAGNOSIS — G89.29 UPPER BACK PAIN, CHRONIC: ICD-10-CM

## 2021-04-06 DIAGNOSIS — I73.9 PVD (PERIPHERAL VASCULAR DISEASE) (H): ICD-10-CM

## 2021-04-06 DIAGNOSIS — M54.9 UPPER BACK PAIN, CHRONIC: ICD-10-CM

## 2021-04-06 DIAGNOSIS — G62.9 PERIPHERAL NEUROPATHY: ICD-10-CM

## 2021-04-06 DIAGNOSIS — M79.671 BILATERAL FOOT PAIN: ICD-10-CM

## 2021-04-06 DIAGNOSIS — M79.672 BILATERAL FOOT PAIN: ICD-10-CM

## 2021-04-06 DIAGNOSIS — J44.9 COPD (CHRONIC OBSTRUCTIVE PULMONARY DISEASE) (H): ICD-10-CM

## 2021-04-06 DIAGNOSIS — M79.662 PAIN OF LEFT LOWER LEG: ICD-10-CM

## 2021-04-06 DIAGNOSIS — M54.50 CHRONIC RIGHT-SIDED LOW BACK PAIN WITHOUT SCIATICA: ICD-10-CM

## 2021-04-06 DIAGNOSIS — G89.29 CHRONIC RIGHT-SIDED LOW BACK PAIN WITHOUT SCIATICA: ICD-10-CM

## 2021-04-13 ENCOUNTER — OFFICE VISIT - HEALTHEAST (OUTPATIENT)
Dept: PHYSICAL THERAPY | Facility: REHABILITATION | Age: 61
End: 2021-04-13

## 2021-04-13 DIAGNOSIS — M79.662 PAIN OF LEFT LOWER LEG: ICD-10-CM

## 2021-04-13 DIAGNOSIS — M54.50 CHRONIC RIGHT-SIDED LOW BACK PAIN WITHOUT SCIATICA: ICD-10-CM

## 2021-04-13 DIAGNOSIS — J44.9 COPD (CHRONIC OBSTRUCTIVE PULMONARY DISEASE) (H): ICD-10-CM

## 2021-04-13 DIAGNOSIS — G89.29 UPPER BACK PAIN, CHRONIC: ICD-10-CM

## 2021-04-13 DIAGNOSIS — M79.18 MYOFASCIAL PAIN SYNDROME: ICD-10-CM

## 2021-04-13 DIAGNOSIS — M79.671 BILATERAL FOOT PAIN: ICD-10-CM

## 2021-04-13 DIAGNOSIS — I73.9 PVD (PERIPHERAL VASCULAR DISEASE) (H): ICD-10-CM

## 2021-04-13 DIAGNOSIS — M54.9 UPPER BACK PAIN, CHRONIC: ICD-10-CM

## 2021-04-13 DIAGNOSIS — G62.9 PERIPHERAL NEUROPATHY: ICD-10-CM

## 2021-04-13 DIAGNOSIS — M79.672 BILATERAL FOOT PAIN: ICD-10-CM

## 2021-04-13 DIAGNOSIS — G89.29 CHRONIC RIGHT-SIDED LOW BACK PAIN WITHOUT SCIATICA: ICD-10-CM

## 2021-04-21 ENCOUNTER — OFFICE VISIT - HEALTHEAST (OUTPATIENT)
Dept: PHYSICAL THERAPY | Facility: REHABILITATION | Age: 61
End: 2021-04-21

## 2021-04-21 DIAGNOSIS — G89.29 CHRONIC RIGHT-SIDED LOW BACK PAIN WITHOUT SCIATICA: ICD-10-CM

## 2021-04-21 DIAGNOSIS — G62.9 PERIPHERAL NEUROPATHY: ICD-10-CM

## 2021-04-21 DIAGNOSIS — M79.662 PAIN OF LEFT LOWER LEG: ICD-10-CM

## 2021-04-21 DIAGNOSIS — M79.18 MYOFASCIAL PAIN SYNDROME: ICD-10-CM

## 2021-04-21 DIAGNOSIS — M79.671 BILATERAL FOOT PAIN: ICD-10-CM

## 2021-04-21 DIAGNOSIS — M79.672 BILATERAL FOOT PAIN: ICD-10-CM

## 2021-04-21 DIAGNOSIS — M54.50 CHRONIC RIGHT-SIDED LOW BACK PAIN WITHOUT SCIATICA: ICD-10-CM

## 2021-04-21 DIAGNOSIS — M54.9 UPPER BACK PAIN, CHRONIC: ICD-10-CM

## 2021-04-21 DIAGNOSIS — G89.29 UPPER BACK PAIN, CHRONIC: ICD-10-CM

## 2021-04-21 DIAGNOSIS — J44.9 COPD (CHRONIC OBSTRUCTIVE PULMONARY DISEASE) (H): ICD-10-CM

## 2021-04-21 DIAGNOSIS — I73.9 PVD (PERIPHERAL VASCULAR DISEASE) (H): ICD-10-CM

## 2021-04-26 ENCOUNTER — OFFICE VISIT - HEALTHEAST (OUTPATIENT)
Dept: PHYSICAL THERAPY | Facility: REHABILITATION | Age: 61
End: 2021-04-26

## 2021-04-26 DIAGNOSIS — G89.29 CHRONIC RIGHT-SIDED LOW BACK PAIN WITHOUT SCIATICA: ICD-10-CM

## 2021-04-26 DIAGNOSIS — J44.9 COPD (CHRONIC OBSTRUCTIVE PULMONARY DISEASE) (H): ICD-10-CM

## 2021-04-26 DIAGNOSIS — M79.671 BILATERAL FOOT PAIN: ICD-10-CM

## 2021-04-26 DIAGNOSIS — M79.18 MYOFASCIAL PAIN SYNDROME: ICD-10-CM

## 2021-04-26 DIAGNOSIS — M79.672 BILATERAL FOOT PAIN: ICD-10-CM

## 2021-04-26 DIAGNOSIS — M54.50 CHRONIC RIGHT-SIDED LOW BACK PAIN WITHOUT SCIATICA: ICD-10-CM

## 2021-04-26 DIAGNOSIS — M54.9 UPPER BACK PAIN, CHRONIC: ICD-10-CM

## 2021-04-26 DIAGNOSIS — I73.9 PVD (PERIPHERAL VASCULAR DISEASE) (H): ICD-10-CM

## 2021-04-26 DIAGNOSIS — M79.662 PAIN OF LEFT LOWER LEG: ICD-10-CM

## 2021-04-26 DIAGNOSIS — G62.9 PERIPHERAL NEUROPATHY: ICD-10-CM

## 2021-04-26 DIAGNOSIS — G89.29 UPPER BACK PAIN, CHRONIC: ICD-10-CM

## 2021-05-03 ENCOUNTER — OFFICE VISIT - HEALTHEAST (OUTPATIENT)
Dept: VASCULAR SURGERY | Facility: CLINIC | Age: 61
End: 2021-05-03

## 2021-05-03 ENCOUNTER — RECORDS - HEALTHEAST (OUTPATIENT)
Dept: VASCULAR ULTRASOUND | Facility: CLINIC | Age: 61
End: 2021-05-03

## 2021-05-03 DIAGNOSIS — I73.9 PVD (PERIPHERAL VASCULAR DISEASE) (H): ICD-10-CM

## 2021-05-03 DIAGNOSIS — I73.9 PERIPHERAL VASCULAR DISEASE, UNSPECIFIED (H): ICD-10-CM

## 2021-05-03 RX ORDER — CLOPIDOGREL BISULFATE 75 MG/1
75 TABLET ORAL DAILY
Qty: 90 TABLET | Refills: 1 | Status: SHIPPED | OUTPATIENT
Start: 2021-05-03 | End: 2022-06-21

## 2021-05-07 ENCOUNTER — OFFICE VISIT - HEALTHEAST (OUTPATIENT)
Dept: PHYSICAL THERAPY | Facility: REHABILITATION | Age: 61
End: 2021-05-07

## 2021-05-07 DIAGNOSIS — G89.29 UPPER BACK PAIN, CHRONIC: ICD-10-CM

## 2021-05-07 DIAGNOSIS — G89.29 CHRONIC RIGHT-SIDED LOW BACK PAIN WITHOUT SCIATICA: ICD-10-CM

## 2021-05-07 DIAGNOSIS — I73.9 PVD (PERIPHERAL VASCULAR DISEASE) (H): ICD-10-CM

## 2021-05-07 DIAGNOSIS — M79.671 BILATERAL FOOT PAIN: ICD-10-CM

## 2021-05-07 DIAGNOSIS — G62.9 PERIPHERAL NEUROPATHY: ICD-10-CM

## 2021-05-07 DIAGNOSIS — M79.662 PAIN OF LEFT LOWER LEG: ICD-10-CM

## 2021-05-07 DIAGNOSIS — J44.9 COPD (CHRONIC OBSTRUCTIVE PULMONARY DISEASE) (H): ICD-10-CM

## 2021-05-07 DIAGNOSIS — M79.672 BILATERAL FOOT PAIN: ICD-10-CM

## 2021-05-07 DIAGNOSIS — M79.18 MYOFASCIAL PAIN SYNDROME: ICD-10-CM

## 2021-05-07 DIAGNOSIS — M54.50 CHRONIC RIGHT-SIDED LOW BACK PAIN WITHOUT SCIATICA: ICD-10-CM

## 2021-05-07 DIAGNOSIS — M54.9 UPPER BACK PAIN, CHRONIC: ICD-10-CM

## 2021-05-14 ENCOUNTER — OFFICE VISIT - HEALTHEAST (OUTPATIENT)
Dept: PHYSICAL THERAPY | Facility: REHABILITATION | Age: 61
End: 2021-05-14

## 2021-05-14 DIAGNOSIS — M79.18 MYOFASCIAL PAIN SYNDROME: ICD-10-CM

## 2021-05-14 DIAGNOSIS — I73.9 PVD (PERIPHERAL VASCULAR DISEASE) (H): ICD-10-CM

## 2021-05-14 DIAGNOSIS — G89.29 CHRONIC RIGHT-SIDED LOW BACK PAIN WITHOUT SCIATICA: ICD-10-CM

## 2021-05-14 DIAGNOSIS — G89.29 UPPER BACK PAIN, CHRONIC: ICD-10-CM

## 2021-05-14 DIAGNOSIS — M54.50 CHRONIC RIGHT-SIDED LOW BACK PAIN WITHOUT SCIATICA: ICD-10-CM

## 2021-05-14 DIAGNOSIS — M79.662 PAIN OF LEFT LOWER LEG: ICD-10-CM

## 2021-05-14 DIAGNOSIS — M79.671 BILATERAL FOOT PAIN: ICD-10-CM

## 2021-05-14 DIAGNOSIS — M54.9 UPPER BACK PAIN, CHRONIC: ICD-10-CM

## 2021-05-14 DIAGNOSIS — J44.9 COPD (CHRONIC OBSTRUCTIVE PULMONARY DISEASE) (H): ICD-10-CM

## 2021-05-14 DIAGNOSIS — M79.672 BILATERAL FOOT PAIN: ICD-10-CM

## 2021-05-14 DIAGNOSIS — G62.9 PERIPHERAL NEUROPATHY: ICD-10-CM

## 2021-05-27 VITALS
DIASTOLIC BLOOD PRESSURE: 62 MMHG | RESPIRATION RATE: 16 BRPM | HEART RATE: 76 BPM | TEMPERATURE: 98.3 F | SYSTOLIC BLOOD PRESSURE: 120 MMHG

## 2021-06-02 VITALS — WEIGHT: 207 LBS

## 2021-06-04 VITALS
HEART RATE: 80 BPM | TEMPERATURE: 98.4 F | WEIGHT: 194.4 LBS | BODY MASS INDEX: 31.24 KG/M2 | DIASTOLIC BLOOD PRESSURE: 62 MMHG | SYSTOLIC BLOOD PRESSURE: 108 MMHG | HEIGHT: 66 IN | RESPIRATION RATE: 18 BRPM

## 2021-06-05 VITALS — BODY MASS INDEX: 31.69 KG/M2 | WEIGHT: 197.2 LBS | HEIGHT: 66 IN

## 2021-06-05 VITALS
BODY MASS INDEX: 31.97 KG/M2 | SYSTOLIC BLOOD PRESSURE: 126 MMHG | WEIGHT: 196.6 LBS | TEMPERATURE: 98.3 F | HEART RATE: 72 BPM | DIASTOLIC BLOOD PRESSURE: 80 MMHG

## 2021-06-05 VITALS — WEIGHT: 196.9 LBS | HEIGHT: 66 IN | BODY MASS INDEX: 31.64 KG/M2

## 2021-06-05 VITALS — BODY MASS INDEX: 32.66 KG/M2 | HEIGHT: 65 IN | WEIGHT: 196.06 LBS

## 2021-06-09 NOTE — PROGRESS NOTES
"Annette Shore is a 60 y.o. female who is being evaluated via a billable video visit.      The patient has been notified of following:     \"This video visit will be conducted via a call between you and your physician/provider. We have found that certain health care needs can be provided without the need for an in-person physical exam.  This service lets us provide the care you need with a video conversation.  If a prescription is necessary we can send it directly to your pharmacy.  If lab work is needed we can place an order for that and you can then stop by our lab to have the test done at a later time.    Video visits are billed at different rates depending on your insurance coverage. Please reach out to your insurance provider with any questions.    If during the course of the call the physician/provider feels a video visit is not appropriate, you will not be charged for this service.\"    Patient has given verbal consent to a Video visit? Yes  How would you like to obtain your AVS? AVS Preference: Mail a copy.  Patient would like the video invitation sent by: Text to cell phone: [phone]  Will anyone else be joining your video visit? No        Consult. Dr. Hamilton ([facility]) referring. US comp 6/22.  PAD, leg pain with walking, tingling, hx of aortofem bypass at . Smoker-smokes almost 1ppd. On asa, statin.      Video-Visit Details    Video Start Time: 9:40 AM  Type of service:  Video Visit    Video End Time (time video stopped): 10 AM  Originating Location (pt. Location): Home    Distant Location (provider location):  [facility]      Platform used for Video Visit: [facility]    VASCULAR SURGERY CLINIC CONSULTATION    VASCULAR SURGEON: Av Diaz MD    LOCATION:  [facility]    Annette Shore   Medical Record #:  [id number]  YOB: 1960  Age:  60 y.o.     Date of Service: 6/30/2020    PRIMARY CARE PROVIDER: Jo Hamilton MD      Reason for visit: " Bilateral lower extremity pain at rest.    IMPRESSION: Peripheral vascular disease with subsequent bilateral lower extremity rest pain.  Patient is status post open bifemoral bypass over 10 years ago.    RECOMMENDATION/RISKS: We will proceed with a CTA and a runoff to evaluate the anatomy.  Further recommendations to follow.    HPI:  Annette Shore is a 60 y.o. female who was seen today in consultation for bilateral lower extremity pain at rest.  She states that this pain wakes her up at night persistently.  She can only walk 100 to 200 yards without stopping.  She states that this problem has been getting progressively worse.  Denies any nonhealing wounds.  Patient has a history of aortobifemoral bypass many years back done at [facility].  Also states that she has had multiple endovascular interventions afterwards.  Admits to smoking half pack a day.  Denies any alcohol or drug abuse.  Denies any significant allergies to medications.  She describes her pain as combination of muscle cramps and sharp/shooting pain down to her feet.  States that sometimes this pain is positional.    REVIEW OF SYSTEMS:    A 12 point ROS was reviewed and is negative except for bilateral lower extremity pain.     PHH:    Past Medical History:   Diagnosis Date     COPD (chronic obstructive pulmonary disease) (H)      Depression      Hyperlipidemia      Hypertension      PAD (peripheral artery disease) (H)      Peripheral neuropathy           Past Surgical History:   Procedure Laterality Date     AORTA - FEMORAL ARTERY BYPASS GRAFT       HYSTEROSCOPY W/ ENDOMETRIAL ABLATION       spine fusion       tubal ligation         ALLERGIES:  Bupropion    MEDS:    Current Outpatient Medications:      ARIPiprazole (ABILIFY) 5 MG tablet, Take 5 mg by mouth., Disp: , Rfl:      aspirin 325 MG tablet, Take 325 mg by mouth., Disp: , Rfl:      atorvastatin (LIPITOR) 20 MG tablet, , Disp: , Rfl:      DULoxetine (CYMBALTA) 60 MG capsule, 60mg in am and  30mg at bedtime, Disp: , Rfl:      ipratropium-albuteroL (DUO-NEB) 0.5-2.5 mg/3 mL nebulizer, 3 mL, Disp: , Rfl:      losartan-hydrochlorothiazide (HYZAAR) 50-12.5 mg per tablet, Take 1 tablet by mouth daily, Disp: , Rfl:      tiotropium-olodaterol 2.5-2.5 mcg/actuation Mist, Inhale 2 puffs., Disp: , Rfl:     SOCIAL HABITS:    Social History     Tobacco Use   Smoking Status Current Every Day Smoker     Types: Cigarettes   Smokeless Tobacco Never Used       Social History     Substance and Sexual Activity   Alcohol Use No       Social History     Substance and Sexual Activity   Drug Use Not on file       FAMILY HISTORY:    Family History   Problem Relation Age of Onset     Diabetes Mother      Hypertension Mother      Cancer Father      Heart disease Father      Diabetes Father        PE:  There were no vitals taken for this visit.  Wt Readings from Last 1 Encounters:   05/27/19 190 lb (86.2 kg)     There is no height or weight on file to calculate BMI.    EXAM:     Unable to examine the patient due to COVID-19 restrictions.  Video  visit.      DIAGNOSTIC STUDIES:     Images:  Us Arterial Pressures With Exercise Legs Bilateral    Result Date: 6/23/2020  RESTING ANKLE-BRACHIAL INDICES (TOBIAS'S) AND SEGMENTAL PRESSURES Indication: Surveillance of bilateral PAD, AO-bifem BPG >10 y/o Previous: none History: Current Smoker, Hypertension, Diabetic, Hyperlipidemia, PAD, Angioplasty, Vascular Surgery and Claudication  SEGMENTAL ARTERIAL PRESSURE FINDINGS:         Right:  mmHg  Index    Brachial: 123   High Thigh: 126 1.02 Low Thigh: 97 0.79            Calf: 72 0.59 Ankle-(PT): 70 0.57 Ankle-(DP): 69 0.56          Digit: 43 0.35              Left: mmHg Index    Brachial: 122  High Thigh: 99 0.80 Low Thigh: 92 0.75            Calf: 72 0.59 Ankle (PT): 71 0.58 Ankle (DP): 57 0.46           Digit: 30 0.24 Resting ankle-brachial index of 0.57 on the right. Toe Pressures of 43 mmHg and TBI of 0.35 .35. Resting ankle-brachial index  of 0.58 on the left. Toe Pressures of 30 mmHg and TBI of 0.24. WAVEFORMS: The right dorsalis pedis and posterior tibial arteries show monophasic waveforms. The left dorsalis pedis and posterior tibial arteries show monophasic waveforms. Comments:      1. RIGHT LOWER EXTREMITY: Abnormal ankle-brachial index of 0.57 with abnormal toe pressures of 40 mmHg.  Note that this is concerning for wound healing.  The segmental pressures suggest SFA disease as well as popliteal disease  2. LEFT LOWER EXTREMITY: Abnormal ankle-brachial index of 0.58 with severely abnormal toe pressures of 30 mmHg which is concerning for wound healing.  Segmental pressures suggest inflow disease and possibly additional multilevel disease.       I personally reviewed the images and my interpretation is moderate peripheral vascular disease with ABIs 0.58 bilaterally.  Toe pressures are low.    LABS:      Sodium   Date Value Ref Range Status   06/15/2020 142 136 - 145 mmol/L Final   05/27/2019 142 136 - 145 mmol/L Final   12/17/2018 143 136 - 145 mmol/L Final     Potassium   Date Value Ref Range Status   06/15/2020 3.7 3.5 - 5.0 mmol/L Final   05/27/2019 3.3 (L) 3.5 - 5.0 mmol/L Final   12/17/2018 4.3 3.5 - 5.0 mmol/L Final     Chloride   Date Value Ref Range Status   06/15/2020 105 98 - 107 mmol/L Final   05/27/2019 105 98 - 107 mmol/L Final   12/17/2018 104 98 - 107 mmol/L Final     BUN   Date Value Ref Range Status   06/15/2020 14 8 - 22 mg/dL Final   05/27/2019 16 8 - 22 mg/dL Final   12/17/2018 15 8 - 22 mg/dL Final     Creatinine   Date Value Ref Range Status   06/15/2020 0.77 0.60 - 1.10 mg/dL Final   05/27/2019 0.84 0.60 - 1.10 mg/dL Final   12/17/2018 0.86 0.60 - 1.10 mg/dL Final     Hemoglobin   Date Value Ref Range Status   05/27/2019 13.3 12.0 - 16.0 g/dL Final   03/16/2019 13.9 12.0 - 16.0 g/dL Final   12/09/2018 14.4 12.0 - 16.0 g/dL Final     Platelets   Date Value Ref Range Status   05/27/2019 252 140 - 440 [facility] Final   03/16/2019  240 140 - 440 thou/uL Final   12/09/2018 245 140 - 440 thou/uL Final     BNP   Date Value Ref Range Status   09/26/2018 30 0 - 93 pg/mL Final     INR   Date Value Ref Range Status   05/27/2019 1.02 0.90 - 1.10 Final       Total time spent 20 minutes face to face with patient with more than 50% time spent in counseling and coordination of care.    Mehdi Gilliland MD  VASCULAR SURGERY

## 2021-06-09 NOTE — TELEPHONE ENCOUNTER
"PSP: Alexx Norman,    Last clinic visit:  6/22/20 video visit  Reason for call: worsening pain  Clinical information:  Call this AM \"I am in so much pain. I can't hold out much longer. I may need to go to the ER. They gave a shot the last time this happened and within 15 minutes the pain was getting better.\" Patient describes pain as throughout her low back area, into the right hip and \"covers my butt. During the night my feet felt like ice and they were cold, then my knees felt hot and then my butt was cold.\"   Patient states it is very painful trying to move her bowels. \"I am kind of constipated.\" She has not been taking anything for this. Pain is a 4/10 when just laying here, but 8-9/10 when getting up to do anything. I'm not even doing my nebs like I should; just going to the bathroom and back to bed.\"  Advice given to patient: Encouraged pt to alternate 2 Extra Strength Tylenol tablets with 400-600 mg Ibuprofen with food every 8 hours as she has not been taking anything for the pain. Also encouraged to increase her fluid intake for GI motility. Also encouraged her to take the scheduled nebs.  Provider to address: Request for something for pain. Also see results of MRI.  "

## 2021-06-09 NOTE — PROGRESS NOTES
Peripheral vascular disease with subsequent bilateral lower extremity rest pain.  Patient is status post open bifemoral bypass over 10 years ago. Here to discuss CTA runoff results. Asa, statin. Smoker 1ppd.

## 2021-06-09 NOTE — TELEPHONE ENCOUNTER
Patient called clinic with pain management concerns.    Results given and explained. Discussed the recommendations of Alexx Norman DO.     Explained PSP will be notified of the update/concern. Patient will be called if any further recommendations. Stated understanding.

## 2021-06-09 NOTE — TELEPHONE ENCOUNTER
----- Message from Alexx Norman DO sent at 7/10/2020  4:32 PM CDT -----  MRI lumbar spine reviewed showing degenerative changes above and below the fusion.  The most significant finding is a right sided disc bulge at L5-S1 which could contact the right L5 nerve be causing some of her right gluteal pain.    Options include starting physical therapy or she can also follow-up with me.  I encouraged her to follow-up after she sees vascular so we have full picture of her vascular and neurologic status.

## 2021-06-09 NOTE — PATIENT INSTRUCTIONS - HE
Ultrasound    Thank you for choosing [facility] as partners in your care.  Please read the following information before your appointment.  Feel free to ask questions.    An ultrasound is an exam that uses sound waves to create pictures.  The special camera that takes the pictures is called a transducer.  An ultrasound technologist will perform the exam under the direction of a physician.    Preparation  Ultrasound preps vary.  If you are scheduled for an Aorta Ultrasound, please do not eat or drink anything 8 hours before your exam.  You may take daily medications with a small sip of water.  There is no preparation required for any of the other ultrasound exams performed at [facility].    The Examination  You may or may not need to change clothes for your exam.  The technologist will explain the exam to you.  He or she will ask you a few questions and answer any questions you may have.    You will lie on a table and a gel will be applied to your skin.  A small handheld instrument called a transducer will be moved across the area we are looking at while pictures are taken.  Also, your exam may include measuring your blood pressure on your arms, legs and/or toes.    When the Exam Is Completed  Your physician will receive the results of the exam and explain them to you.  You may return to your normal diet and activities unless otherwise directed by your doctor.  Feel free to ask questions if something is not clear to you.  We welcome comments.    At [facility], we are dedicated to providing the best possible care.  Thank you for taking time to read these instructions.  We hope your experience is as pleasant as possible.      You are scheduled for the following exam(s):    []Carotid Duplex:  Evaluates the carotid arteries in the neck that feed the brain with blood.  Gel is applied to the skin of the neck.  A transducer will be placed on the gel covered areas to obtain images and evaluate  and listen to the blood flow in the arteries.  This exam takes approximately 30 minutes.    []Venous Duplex:  Evaluates the veins that carry blood to the heart from the arms and/or legs.  Gel is applied to the skin of the arms and/or legs.  A transducer will be placed on the gel covered areas to obtain images and evaluate flow in the veins.  This exam takes approximately 30 minutes per arm/leg.    [x]Arterial Duplex:  Evaluates the arteries that feed the arms and legs with blood.  Gel is applied to the skin of the arms and/or legs.  A transducer will be placed on the gel covered areas to obtain images and listen to the blood flow in the arms and/or legs.  This exam takes approximately 30 minutes per arm/leg.    [x]TOBIAS or Segmental Pressures:  Ultrasound and blood pressure cuffs are used to evaluate the arteries that supply the arms and legs with blood.  Several blood pressure cuffs will be place on your arms and legs.  When inflated, the cuffs will provide blood pressure readings that are used to determine where blockages in the arteries may be.  You may be asked to do a moderate level of exercise during this exam.  This exam takes approximately 30-60 minutes.    []Aorta:  Evaluates the abdominal aorta for blockages and aneurysm.  Gel is applied to the stomach.  A transducer will be placed on the gel covered areas to obtain images of the aorta.  This exam takes approximately 30 minutes.    Prep instructions:  Do not eat or drink anything 8 hours before procedure.  You may take daily medications with a small sip of water.       Home

## 2021-06-09 NOTE — PROGRESS NOTES
VASCULAR SURGERY PROGRESS NOTE    VASCULAR SURGEON: Mehdi Gilliland MD, RPVI     LOCATION:  [facility]    Annette Shore   Medical Record #:  [id number]  YOB: 1960  Age:  60 y.o.     Date of Service: 7/17/2020    PRIMARY CARE PROVIDER: Jo Hamilton MD      Reason for visit: Follow-up to discuss CTA results    IMPRESSION: Severe back pain with radiation to bilateral hips and thighs.  Hyper sensation involving bilateral ankles.  There is possible claudication bilateral lower extremities under the shadow of severe neurogenic pain.    RECOMMENDATION/RISKS: At this point I would recommend evaluation by spine surgeon to see if there is any intervention needed for her degenerative spinal disease.  Patient scheduled to see a spine surgeon next week for potential injection.  Based on patient presentation and her complaints the back pain is the primary issue for now.  The aortobifemoral bypass is widely open and I do think that the neck pain is related to aortoiliac disease.  The hyper sensation of her skin over bilateral lower extremities is also most likely neurogenic.  I will follow-up in 3 months with ABIs and ultrasound.    HPI:  Annette Shore is a 60 y.o. female who was seen today for follow-up regarding CTA results.  Patient symptoms are unchanged.  She denies any new complaints.    REVIEW OF SYSTEMS:    A 12 point ROS was reviewed and is negative except bilateral lower extremity pain.  Back pain    PHH:    Past Medical History:   Diagnosis Date     COPD (chronic obstructive pulmonary disease) (H)      Depression      Hyperlipidemia      Hypertension      PAD (peripheral artery disease) (H)      Peripheral neuropathy           Past Surgical History:   Procedure Laterality Date     AORTA - FEMORAL ARTERY BYPASS GRAFT       HYSTEROSCOPY W/ ENDOMETRIAL ABLATION       spine fusion       tubal ligation         ALLERGIES:  Bupropion    MEDS:    Current Outpatient Medications:       "aspirin 325 MG tablet, Take 325 mg by mouth., Disp: , Rfl:      atorvastatin (LIPITOR) 20 MG tablet, , Disp: , Rfl:      DULoxetine (CYMBALTA) 60 MG capsule, 60mg in am and 30mg at bedtime, Disp: , Rfl:      ipratropium-albuteroL (DUO-NEB) 0.5-2.5 mg/3 mL nebulizer, 3 mL, Disp: , Rfl:      losartan-hydrochlorothiazide (HYZAAR) 50-12.5 mg per tablet, Take 1 tablet by mouth daily, Disp: , Rfl:      nabumetone (RELAFEN) 500 MG tablet, Take 1-2 tablets twice a day as needed for pain., Disp: 120 tablet, Rfl: 0  No current facility-administered medications for this visit.     SOCIAL HABITS:    Social History     Tobacco Use   Smoking Status Current Every Day Smoker     Packs/day: 1.00     Types: Cigarettes     Start date: 7/17/1970   Smokeless Tobacco Never Used       Social History     Substance and Sexual Activity   Alcohol Use No       Social History     Substance and Sexual Activity   Drug Use Not on file       FAMILY HISTORY:    Family History   Problem Relation Age of Onset     Diabetes Mother      Hypertension Mother      Cancer Father      Heart disease Father      Diabetes Father        PE:  /62   Pulse 80   Temp 98.4  F (36.9  C)   Resp 18   Ht 5' 6\" (1.676 m)   Wt 194 lb 6.4 oz (88.2 kg)   BMI 31.38 kg/m    Wt Readings from Last 1 Encounters:   07/17/20 194 lb 6.4 oz (88.2 kg)     Body mass index is 31.38 kg/m .    EXAM:  GENERAL: This is a well-developed 60 y.o. female who appears her stated age  EYES: Grossly normal.  MOUTH: Buccal mucosa normal   CARDIAC:  Normal S1 and S2, no Murmur  CHEST/LUNG:  Clear lung fields bilaterally  GASTROINTESINAL (ABDOMEN):Soft, non-tender, B/S present, no pulsatile mass   MUSCULOSKELETAL: Grossly normal and both lower extremities are intact.  HEME/LYMPH: No lymphedema  NEUROLOGIC: Focally intact, Alert and oriented x 3.   PSYCH: appropriate affect  INTEGUMENT: No open lesions or ulcers  Pulse Exam:   Monophasic signal present bilaterally.  No signs of nonhealing " wounds.        DIAGNOSTIC STUDIES:     Images:  Mr Lumbar Spine Without Contrast    Result Date: 7/10/2020  EXAM: MR LUMBAR SPINE WO CONTRAST LOCATION: [facility] DATE/TIME: 7/10/2020 7:15 AM INDICATION: Back pain or radiculopathy, > 6 weeks. Low back pain/right gluteal pain. Status post fusion. Evaluate for stenosis. COMPARISON: 9/24/2018 plain films and 7/26/2011 CT. TECHNIQUE: Without IV contrast FINDINGS: Nomenclature is based on 5 lumbar type vertebral bodies. Normal vertebral body heights and marrow signal. 2 mm degenerative retrolisthesis of L3 on L4. 1 mm degenerative retrolisthesis of L2 on L3. Normal distal spinal cord and cauda equina with conus medullaris at L1. Postop changes of aortoiliac bypass graft. Moderate atrophy of the posterior paraspinal musculature, extending caudally from the level of the fusion. Unremarkable visualized bony pelvis. T12-L1: Normal disc height and signal. No herniation. Normal facets. No spinal canal or neural foraminal stenosis. L1-L2: Moderate loss of T2 disc signal and moderate loss of interspace height with mild adjacent Modic type I endplate degenerative changes. There is mild broad-based annular bulge and mild facet arthropathy. No central spinal canal or neural foraminal stenosis. L2-L3: Moderate loss of T2 disc signal, moderate loss of interspace height with mild adjacent Modic type II endplate changes. There is mild broad-based annular bulge and mild facet arthropathy. No central spinal canal or neural foraminal stenosis. L3-L4: Moderate loss of T2 disc signal and moderate loss of interspace height. There is a moderate broad-based annular bulge and mild facet arthropathy. Very mild central spinal canal stenosis and moderate bilateral neural foraminal stenosis. L4-L5: Status post L4-L5 posterior instrumented fusion with susan and pedicle screw fixation and interspace bone graft. This fusion appears solid. Status post decompressive laminectomies. Central spinal  canal and both neural foramina are widely patent. L5-S1: Normal T2 disc signal with preservation of the interspace height. There is mild broad-based annular bulge slightly eccentric to the right with mild facet arthropathy. No central spinal canal stenosis. There is moderate right-sided neural foraminal  stenosis. Left neural foramen is widely patent.     1.  Status post L4-L5 interbody fusion which appears solid. 2.  Moderate right-sided neural foraminal stenosis at L5-S1. 3.  At L3-L4, there is a very mild central spinal canal stenosis and moderate bilateral neural foraminal stenosis.    Us Arterial Pressures With Exercise Legs Bilateral    Result Date: 6/23/2020  RESTING ANKLE-BRACHIAL INDICES (TOBIAS'S) AND SEGMENTAL PRESSURES Indication: Surveillance of bilateral PAD, AO-bifem BPG >10 y/o Previous: none History: Current Smoker, Hypertension, Diabetic, Hyperlipidemia, PAD, Angioplasty, Vascular Surgery and Claudication  SEGMENTAL ARTERIAL PRESSURE FINDINGS:         Right:  mmHg  Index    Brachial: 123   High Thigh: 126 1.02 Low Thigh: 97 0.79            Calf: 72 0.59 Ankle-(PT): 70 0.57 Ankle-(DP): 69 0.56          Digit: 43 0.35              Left: mmHg Index    Brachial: 122  High Thigh: 99 0.80 Low Thigh: 92 0.75            Calf: 72 0.59 Ankle (PT): 71 0.58 Ankle (DP): 57 0.46           Digit: 30 0.24 Resting ankle-brachial index of 0.57 on the right. Toe Pressures of 43 mmHg and TBI of 0.35 .35. Resting ankle-brachial index of 0.58 on the left. Toe Pressures of 30 mmHg and TBI of 0.24. WAVEFORMS: The right dorsalis pedis and posterior tibial arteries show monophasic waveforms. The left dorsalis pedis and posterior tibial arteries show monophasic waveforms. Comments:      1. RIGHT LOWER EXTREMITY: Abnormal ankle-brachial index of 0.57 with abnormal toe pressures of 40 mmHg.  Note that this is concerning for wound healing.  The segmental pressures suggest SFA disease as well as popliteal disease  2. LEFT LOWER  EXTREMITY: Abnormal ankle-brachial index of 0.58 with severely abnormal toe pressures of 30 mmHg which is concerning for wound healing.  Segmental pressures suggest inflow disease and possibly additional multilevel disease.       I personally reviewed the images and my interpretation is bilateral SFA occlusions with reconstitution of distal popliteal arteries bilaterally.  Limited runof seen on CTA which  could be due to either small vessel caliber or poor contrast timing..    LABS:      Sodium   Date Value Ref Range Status   06/15/2020 142 136 - 145 mmol/L Final   05/27/2019 142 136 - 145 mmol/L Final   12/17/2018 143 136 - 145 mmol/L Final     Potassium   Date Value Ref Range Status   06/15/2020 3.7 3.5 - 5.0 mmol/L Final   05/27/2019 3.3 (L) 3.5 - 5.0 mmol/L Final   12/17/2018 4.3 3.5 - 5.0 mmol/L Final     Chloride   Date Value Ref Range Status   06/15/2020 105 98 - 107 mmol/L Final   05/27/2019 105 98 - 107 mmol/L Final   12/17/2018 104 98 - 107 mmol/L Final     BUN   Date Value Ref Range Status   06/15/2020 14 8 - 22 mg/dL Final   05/27/2019 16 8 - 22 mg/dL Final   12/17/2018 15 8 - 22 mg/dL Final     Creatinine   Date Value Ref Range Status   06/15/2020 0.77 0.60 - 1.10 mg/dL Final   05/27/2019 0.84 0.60 - 1.10 mg/dL Final   12/17/2018 0.86 0.60 - 1.10 mg/dL Final     Hemoglobin   Date Value Ref Range Status   05/27/2019 13.3 12.0 - 16.0 g/dL Final   03/16/2019 13.9 12.0 - 16.0 g/dL Final   12/09/2018 14.4 12.0 - 16.0 g/dL Final     Platelets   Date Value Ref Range Status   05/27/2019 252 140 - 440 thou/uL Final   03/16/2019 240 140 - 440 thou/uL Final   12/09/2018 245 140 - 440 thou/uL Final     BNP   Date Value Ref Range Status   09/26/2018 30 0 - 93 pg/mL Final     INR   Date Value Ref Range Status   05/27/2019 1.02 0.90 - 1.10 Final       Total time spent 20 minutes face to face with patient with more than 50% time spent in counseling and coordination of care.    Mehdi Gilliland MD,  RPVI  VASCULAR SURGERY

## 2021-06-09 NOTE — TELEPHONE ENCOUNTER
Call to patient to discuss options. Patient reports she has been on high doses of Gabapentin in the past without relief. She is willing to try Nabumetone. She is aware she should avoid Aleve, Advil. Ibuprofen or Motrin with this medication. Order prepped for PSP's review.     She would also like to get scheduled for injection. Injection requirements reviewed with patient. Stated understanding. Order placed in Epic.Transferred to scheduling to make appointment.

## 2021-06-09 NOTE — PROGRESS NOTES
"Annette Shore is a 60 y.o. female who is being evaluated via a billable video visit.      The patient has been notified of following:     \"This video visit will be conducted via a call between you and your physician/provider. We have found that certain health care needs can be provided without the need for an in-person physical exam.  This service lets us provide the care you need with a video conversation.  If a prescription is necessary we can send it directly to your pharmacy.  If lab work is needed we can place an order for that and you can then stop by our lab to have the test done at a later time.    Video visits are billed at different rates depending on your insurance coverage. Please reach out to your insurance provider with any questions.    If during the course of the call the physician/provider feels a video visit is not appropriate, you will not be charged for this service.\"    Patient has given verbal consent to a Video visit? Yes    How would you like to obtain your AVS? AVS Preference: Mail a copy.  Patient would like the video invitation sent by: Text to cell phone: [phone]    Will anyone else be joining your video visit? No        Video Start Time: 10:07 AM    Additional provider notes:   Assessment/Plan:      Diagnoses and all orders for this visit:    Lumbar spine pain  -     MR Lumbar Spine Without Contrast; Future; Expected date: 06/22/2020  -     baclofen (LIORESAL) 10 MG tablet; Take 0.5-1 tablets (5-10 mg total) by mouth 3 (three) times a day as needed (for muscle spasms).  Dispense: 30 tablet; Refill: 0    Gluteal pain  -     MR Lumbar Spine Without Contrast; Future; Expected date: 06/22/2020  -     baclofen (LIORESAL) 10 MG tablet; Take 0.5-1 tablets (5-10 mg total) by mouth 3 (three) times a day as needed (for muscle spasms).  Dispense: 30 tablet; Refill: 0    S/P lumbar fusion  -     MR Lumbar Spine Without Contrast; Future; Expected date: 06/22/2020        Assessment: Pleasant 60 " y.o. female with a past medical history of COPD, hyperlipidemia, hypertension, peripheral vascular disease status post femoropopliteal bypass after failed stenting, and polyneuropathy, as well as previous lumbar fusion with:    1.  Chronic worsening low back pain over the past month into the right gluteal region greater than left small likely related to proximal lumbar radicular pain.  She is a status post L4-5 fusion and I question degenerative changes or stenosis above or below that level.    Discussion:    1.  We discussed the diagnosis and treatment options.  Discussed options of imaging versus therapy versus medications.  We have no imaging of the lumbar spine other than prior CT scan of the abdomen and pelvis.    2.  MRI lumbar spine to evaluate for progression/spinal stenosis above or below her prior fusion.    3.  Will provide short course of baclofen to try to assist with myofascial pain.    4.  Follow-up with recommendations over the phone after MRI and will schedule follow-up if needed.    It was our pleasure caring for your patient today, if there any questions or concerns please do not hesitate to contact us.      Subjective:   Patient ID: Annette Shore is a 60 y.o. female.    History of Present Illness: *Patient presents for evaluation of right-sided low back pain gluteal pain.  Was last seen in clinic 2 years ago.  Had physical therapy at that time which did help her low back pain.  Has had a few episodes of pain but really over the past month this is worsened.  She has never gotten rid of her pain but is slowly progressed since last visit.  1 month ago without any new injury began having severe pain low back right gluteal region.  Pain is an 8/10 today worse with sitting or laying on her back.  Better with putting pillows behind her back.  Worse overnight and first thing in the morning getting out of bed.  Seems to be better with rest in general.  She has no groin pain but her gluteal region and low  back feels swollen.  She also has severe pain with sneezing.    She also has chronic vascular issues and has a vascular study pending.      Imaging: None available    Review of Systems: Feels weak in her back.  She has headaches.  She has no bowel or bladder incontinence.  No urinary retention.  No fevers, unintentional weight loss, balance changes  frequent falling, difficulty swallowing, or coordination difficulties.      Past Medical History:   Diagnosis Date     COPD (chronic obstructive pulmonary disease) (H)      Depression      Hyperlipidemia      Hypertension      PAD (peripheral artery disease) (H)      Peripheral neuropathy        The following portions of the patient's history were reviewed and updated as appropriate: allergies, current medications, past family history, past medical history, past social history, past surgical history and problem list.           Objective:   Physical Exam:    There were no vitals filed for this visit.  There is no height or weight on file to calculate BMI.      General:  Well-appearing female in no acute distress.  Obese, pleasant,   cooperative, and interactive throughout the examination and interview.  HEENT: Head atraumatic normocephalic, sclera clear.  Skin: No rashes or lesions seen over the face.  Respirations unlabored.  MSK: Gait is nonantalgic.  Able to heel-toe stand without difficulty.    Range of motion: Appears to have full range of motion of the lumbar spine in flexion       Neurologic exam: Mental status: Patient is alert and oriented with normal affect.  Attention, knowledge, memory, and language are intact.  Normal coordination throughout the examination.     Manual muscle testing :    Appears to have at least antigravity strength in the bilateral ankle dorsiflexors, EHL and anterior plantar flexors.           Video-Visit Details    Type of service:  Video Visit    Video End Time (time video stopped): 10:29 AM  Originating Location (pt. Location):  Home    Distant Location (provider location):  [facility]     Platform used for Video Visit: Betsy Norman DO

## 2021-06-09 NOTE — TELEPHONE ENCOUNTER
MRI was reviewed and she does have nerve compression on the right at L5-S1 level due to disc bulge which could be contributing to her pain.  We could trial gabapentin at this time as well as a prescription anti-inflammatory medication to see if we get relief today of her pain.  It would be reasonable to trial a Right L5-S1 transforaminal epidural steroid injection as well since her pain is severe.  If she is okay with these we can place the order for gabapentin 300 mg to titrate up to 3 tablets 3 times daily as well as nabumetone 500 mg 1-2 tablets twice daily as needed for pain, she would need to stop taking ibuprofen as nabumetone and ibuprofen are both NSAIDs.  She could continue taking Tylenol however as well as needed.

## 2021-06-09 NOTE — TELEPHONE ENCOUNTER
"Patient calling to report she is no longer taking Baclofen. \"I tried it for 3 days. All I did was sleep. I would get up in the morning , have a cup of coffee and then go back to bed. I did also note some itching in the palms of my hands. Rash appeared after a scratched at it.\"    Patient states she had her MRI this AM. Wondering about need for another appointment. Explained we would be calling with results and recommendations once reviewed.     Also wanted Alexx Norman, DO to know she is seeing Vascular MD next Friday and having a CTA same day.   "

## 2021-06-09 NOTE — TELEPHONE ENCOUNTER
Per Hattie Renner, CNP: MRI was reviewed and she does have nerve compression on the right at L5-S1 level due to disc bulge which could be contributing to her pain.  We could trial gabapentin at this time as well as a prescription anti-inflammatory medication to see if we get relief today of her pain.  It would be reasonable to trial a Right L5-S1 transforaminal epidural steroid injection as well since her pain is severe.  If she is okay with these we can place the order for gabapentin 300 mg to titrate up to 3 tablets 3 times daily as well as nabumetone 500 mg 1-2 tablets twice daily as needed for pain, she would need to stop taking ibuprofen as nabumetone and ibuprofen are both NSAIDs.  She could continue taking Tylenol however as well as needed.

## 2021-06-09 NOTE — PATIENT INSTRUCTIONS - HE
1.  We will get an MRI of your lumbar spine to evaluate the area above and below your prior fusion and evaluate for cause of your pain.    2.  Try baclofen up to 3 times a day as needed for muscle pain.    3.  We will call you with further recommendations after your MRIs been completed.

## 2021-06-09 NOTE — PATIENT INSTRUCTIONS - HE
Please follow up two to four weeks post procedure with Dr Norman to evaluate you plan of care.       DISCHARGE INSTRUCTIONS    During office hours (8:00 a.m.- 4:00 p.m.) questions or concerns may be answered  by calling [facility] Center Navigation Nurses at  [phone].  Messages received after hours will be returned the following business day.      In the case of an emergency, please dial 911 or seek assistance at the nearest Emergency Room/Urgent Care facility.     All Patients:    ? You may experience an increase in your symptoms for the first 2 days (It may take anywhere between 2 days- 2 weeks for the steroid to have maximum effect).    ? You may use ice on the injection site, as frequently as 20 minutes each hour if needed.    ? You may take your pain medicine.    ? You may continue taking your regular medication after your injection. If you have had a Medial Branch Block you may resume pain medication once your pain diary is completed.    ? You may shower. No swimming, tub bath or hot tub for 48 hours.  You may remove your bandaid/bandage as soon as you are home.    ? You may resume light activities, as tolerated.    ? Resume your usual diet as tolerated.    ? It is strongly advised that you do not drive for 1-3 hours post injection.    ? If you have had oral sedation:  Do not drive for 8 hours post injection.      ? If you have had IV sedation:  Do not drive for 24 hours post injection.  Do not operate hazardous machinery or make important personal/business decisions for 24 hours.      POSSIBLE STEROID SIDE EFFECTS (If steroid/cortisone was used for your procedure)    -If you experience these symptoms, it should only last for a short period      Swelling of the legs                Skin redness (flushing)       Mouth (oral) irritation     Blood sugar (glucose) levels              Sweats                      Mood changes    Headache    Sleeplessness         POSSIBLE PROCEDURE SIDE EFFECTS  -Call the Spine  Center if you are concerned    Increased Pain             Increased numbness/tingling        Nausea/Vomiting            Bruising/bleeding at site        Redness or swelling                                                Difficulty walking        Weakness             Fever greater than 100.5    *In the event of a severe headache after an epidural steroid injection that is relieved by lying down, please call the [facility] to speak with a clinical staff member*

## 2021-06-10 NOTE — TELEPHONE ENCOUNTER
Phone call to patient to discuss. She will not take the Lyrica any more. She will monitor her symptoms to see if improvement. Encouraged pt to call nurse navigation line if questions or concerns arise. Stated understanding.     Lyrica marked as an allergy in chart.

## 2021-06-10 NOTE — PATIENT INSTRUCTIONS - HE
1. Trial physical therapy for your gluteal pain    2. Try lyrica for your leg and gluteal pain    3. Your lower leg pain is likely not coming from your back.    4. Follow up 4 weeks with Hattie lezama

## 2021-06-10 NOTE — TELEPHONE ENCOUNTER
Patient calling to report she is experiencing itching on her ankles and feet and now today her hands. This began after starting the Lyrica. She has taken 1 every HS for last 3 nights. Instructed to not take any more. Explained PSP will be notified of the update/concern. Patient will be called if any further recommendations. Stated understanding.      She has been using Hydrocortisone cream as needed since yesterday. She does find this helpful.

## 2021-06-10 NOTE — PROGRESS NOTES
Assessment/Plan:      Diagnoses and all orders for this visit:    Gluteal pain  -     Ambulatory referral to Physical Therapy  -     pregabalin (LYRICA) 25 MG capsule; Take 1 capsule (25 mg total) by mouth 2 (two) times a day. 1 tab at bedtime x 3 days.  Then may increase to 1 cap  Twice daily.  Dispense: 60 capsule; Refill: 2    Sacroiliac joint pain  -     Ambulatory referral to Physical Therapy    Lumbar radiculopathy  -     Ambulatory referral to Physical Therapy  -     pregabalin (LYRICA) 25 MG capsule; Take 1 capsule (25 mg total) by mouth 2 (two) times a day. 1 tab at bedtime x 3 days.  Then may increase to 1 cap  Twice daily.  Dispense: 60 capsule; Refill: 2    Lumbar disc herniation    Myofascial pain    S/P lumbar fusion    Pain in both lower legs  -     pregabalin (LYRICA) 25 MG capsule; Take 1 capsule (25 mg total) by mouth 2 (two) times a day. 1 tab at bedtime x 3 days.  Then may increase to 1 cap  Twice daily.  Dispense: 60 capsule; Refill: 2        Assessment: Pleasant 60 y.o. female with a past medical history of COPD, hyperlipidemia, hypertension, peripheral vascular disease status post femoropopliteal bypass after failed stenting, and polyneuropathy, as well as previous lumbar fusion with:        1.  Improved but persistent right gluteal pain over the SI joint proximal gluteal region.  She has had improvement following lumbar epidural 50%.  This pain is likely related to proximal radicular pain from moderate foraminal stenosis on the right with a right disc bulge in the foramen at L5-S1.  Could also be SI mediated as she does have tenderness over the SI joint and myofascial he mediated as she has tenderness over the right gluteus medius.  She is status post L4-5 fusion which appears solid.  She has a moderate foraminal stenosis bilaterally above the fusion but this would only cause radicular pain in L3 distribution which she is not experiencing.    She has bilateral lower extremity pain in the  calves and distal legs along with intermittently in the anterior thighs nondermatomal..  She has a history of vascular disease and I suspect that her leg cramping and pain is related to vascular disease with superimposed neuropathy.I do not appreciate any stenosis in the lumbar spine that would be responsible for bilateral distal lower extremity pain.      Discussion:    1.  We discussed the diagnosis and treatment options.  We discussed medication options along with therapy and injections.  She is not interested in further injections although a right SI joint injection could be tried.    2.  Start physical therapy for SI stabilization and gluteal strengthening.  Nerve glides can also be helpful.  Order provided.    3.  Trial Lyrica 25 mg twice daily.  This can be slowly increased over time even up to 3 times a day which we can work with her on to see if we can help with some of her neuropathic pain from neuropathy along with any leg cramping from vascular phenomenon.    4.  Follow-up 3 to 4 weeks with Hattie Renner who can monitor dosage of Lyrica along with potentially Order a right SI joint injection if the pain returns or worsens.      30 minutes were spent with this patient in addition to any procedure with greater than 50% in counseling and coordination of care.        It was our pleasure caring for your patient today, if there any questions or concerns please do not hesitate to contact us.      Subjective:   Patient ID: Annette Shore is a 60 y.o. female.    History of Present Illness: Patient presents for follow-up of low back pain and bilateral lower extremity pain right greater than left.  Most significant pain is in low back pain lumbosacral junction right PSIS SI joint to the right gluteal region.  This pain is fairly constant.  50% improved following lumbar epidural steroid injection on the right TFESI L5-S1.  Pain is a 9/10 at worst 3/10 today 0/10 at best.  Feels some swelling along the right  gluteal region.  Worse with rolling over in bed in certain positions.  Better with ice.     She also has pain and throbbing in the low back only twice since the lumbar epidural.  The lower extremity pain however has been severe not related to the epidural.  She has pain and cramping with a spiderweb type pain throughout the anterior thighs and medial thighs.  She does get pain down the back of the legs in the calves and into the feet with any activity.  She has been on duloxetine 60 mg in the morning 30 mg at night.  Has had high doses of gabapentin in the past which has not been helpful.  Taking nabumetone 500 mg twice a day.  This is helpful but causes her to be drowsy and she does take naps.    She tells me that she was seen by vascular surgery since last visit.  She tells me that she does have peripheral arterial disease and I reviewed the CT scan showing occlusion of the superficial femoral arteries.  Her impression is that her back and leg symptoms are related to her low back.  This was the impression after seeing vascular surgery.    Imaging: MRI report and images were personally reviewed and discussed with the patient.  A plastic model was utilized during the discussion.  MRI of the lumbar spine personally reviewed.  L4-5 interbody fusion is solid.  Moderate right sided foraminal stenosis L5-S1 related to a disc bulge.  No left foraminal stenosis at L5-S1.  L4-5 shows no central canal or foraminal stenosis.  L3-4 moderate disc height loss of broad-based disc bulge mild central stenosis moderate foraminal stenosis bilaterally.  L2-3 no central canal or foraminal stenosis.    CT abdomen reveals patent aorto bifemoral bypass graft.  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    Abnormal ankle-brachial indexes bilaterally suggestive of inflow disease on the left with possible additional multilevel disease and suggestive of SFA disease on the right as well as popliteal  disease.    Review of Systems: *Pertinent positives: Feels numb tingling weakness in the lower extremities pain worse at night.  Has had some pain with bowel movements and constipation..  Pertinent negatives:    No bowel or bladder incontinence.  No urinary retention.  No fevers, unintentional weight loss, balance changes, headaches, frequent falling, difficulty swallowing,        Past Medical History:   Diagnosis Date     COPD (chronic obstructive pulmonary disease) (H)      Depression      Hyperlipidemia      Hypertension      PAD (peripheral artery disease) (H)      Peripheral neuropathy        The following portions of the patient's history were reviewed and updated as appropriate: allergies, current medications, past family history, past medical history, past social history, past surgical history and problem list.           Objective:   Physical Exam:    Vitals:    08/17/20 0743   BP: 127/64   Pulse: 84     There is no height or weight on file to calculate BMI.      General: Alert and oriented with normal affect. Attention, knowledge, memory, and language are intact. No acute distress.   Eyes: Sclerae are clear.  Respirations: Unlabored. CV: No lower extremity edema.  Skin: No rashes seen.    Gait:  Nonantalgic.  Decreased lumbar flexion finger to floor testing.  Tenderness over the right gluteal tissues gluteus medius SI joint.  Sensation is mildly decreased light touch bilateral lateral malleolus.  Reflexes are   negative Hoffmans. 2+ patellar and 0 Achilles      Manual muscle testing reveals:  Right /Left out of 5     5/5 hip flexors  5/5 knee flexors  5/5 knee extensors  5/5 ankle plantar flexors  5/5 ankle dorsiflexors  5/5  EHL

## 2021-06-10 NOTE — TELEPHONE ENCOUNTER
I agree with stopping Lyrica and monitoring of symptoms/side effects improved.  If not she should contact us

## 2021-06-11 NOTE — PROGRESS NOTES
[facility] Rehabilitation Daily Progress     Patient Name: Annette Shore  Date: 9/10/2020  Date of Initial Evaluation: 8/31/2020  Visit #: 3/16  Referral Diagnosis: Gluteal pain  Referring provider: Alexx Norman DO  Visit Diagnosis:     ICD-10-CM    1. Chronic right-sided low back pain without sciatica  M54.5     G89.29    2. Myofascial pain syndrome  M79.18      Patient is a 60 y.o. female that presents with signs and symptoms consistent with R gluteal pain with SIJ pain secondary to foraminal stenosis from L5-S1 disc bulge, h/o lumbar fusion. Patient demonstrates impairments including decreased lumbar range of motion and flexibility, with poor core stability and postural awareness, myofascial restrictions and TTP at/around R PSIS and gluteals, leading to impaired functional mobility. Patient's functional limitations include sitting or standing in one position for too long, walking longer distances, and rolling over in bed without increased pain or hypersensitivity. Today patient responded well to patient education, therapeutic exercise and use of TENS unit - patient didn't find immediate relief but was able to perform all exercises without increased pain symptoms while trialing TENS - will hold off on MEDX for right now until patient's hyperalgesia of R low back reduces enough to sit at roller pads.     Assessment:     HEP/POC compliance is  good .     Patient with pain coming into clinic that responded favorably to manual therapy. She has a lot of muscle and myofascial tightness, likely contributing to her SI and radicular pain. The patient had improved gait, transitional movements, and posture following session. Will continue to gradually work from passive to active treatment, including eventual Medx.    Goal Status:  Pt. will be independent with home exercise program in : 6 weeks    Pt will: be able to walk for longer than 10 minutes without increased low back symptoms by 6 weeks.  Pt will: be able  to sit in a stationary position without increased R glute/low back symptoms by 6 weeks.  Pt will: be able to have a bowel movement without increased low back pain by 6 weeks.      Plan / Patient Education:     Continue with initial plan of care.    Plan for next visit: review HEP, possible use of TENS again? Eventual transfer to MEDX - might not be appropriate right at this time as roller pads will hit at her sensitive spot and most likely increase her pain symptoms, progress to core/glute/low back strengthening HEP, sit to stands and mobility exercises for pain relief, TNE and manual therapy, HF stretching    Subjective:     Pain Rating: 3 today  Patient reports over the weekend she was in so much pain from being constipated. Patient can't really find a comfortable position when she sleeps, she ends up sleeping on her stomach. Patient feeling like her organs sit on more of her left side, it looks and feels like everything else has moved to the left.     Objective:     Slow, painful, guarded transitional movements.  Tenderness bilateral lumbar paraspinals, QL, and gluts.   Improved upright posture and gait following MT.    Exercises:  Exercise #1: supine knee rocks - 20 reps  Comment #1: SKTC, piriformis and lumbar rotation stretch - hold 30 sec  Exercise #2: supine nerve glides - 10 reps  Comment #2: seated hamstring stretch - hold 30 sec      Treatment Today     TREATMENT MINUTES COMMENTS   Evaluation     Self-care/ Home management     Manual therapy 15 Prone with pillows under trunk-  STM and MFR to lumbar paraspinals, QL, gluts bilaterally. Gentle offloading at pelvis.   Neuromuscular Re-education 10 Discussion of TNE   Therapeutic Activity     Therapeutic Exercises 5 Verbal review HEP  Nustep 3 minutes   Gait training     Modality__________________ Not done today Patient supine with bi set up on R gluteal and lumbar paraspinals, level 16-19, patient performed exercises during stimulation              Total 30     Blank areas are intentional and mean the treatment did not include these items.       Jordyn Ruiz, PT  9/10/2020

## 2021-06-11 NOTE — PROGRESS NOTES
[facility] Rehabilitation Daily Progress     Patient Name: Annette Shore  Date: 9/16/2020  Date of Initial Evaluation: 8/31/2020  Visit #: 3/16  Referral Diagnosis: Gluteal pain  Referring provider: Alexx Norman DO  Visit Diagnosis:     ICD-10-CM    1. Chronic right-sided low back pain without sciatica  M54.5     G89.29    2. Myofascial pain syndrome  M79.18    3. Upper back pain, chronic  M54.9     G89.29      Patient is a 60 y.o. female that presents with signs and symptoms consistent with R gluteal pain with SIJ pain secondary to foraminal stenosis from L5-S1 disc bulge, h/o lumbar fusion. Patient demonstrates impairments including decreased lumbar range of motion and flexibility, with poor core stability and postural awareness, myofascial restrictions and TTP at/around R PSIS and gluteals, leading to impaired functional mobility. Patient's functional limitations include sitting or standing in one position for too long, walking longer distances, and rolling over in bed without increased pain or hypersensitivity. Today patient responded well to patient education, therapeutic exercise and use of TENS unit - patient didn't find immediate relief but was able to perform all exercises without increased pain symptoms while trialing TENS - will hold off on MEDX for right now until patient's hyperalgesia of R low back reduces enough to sit at roller pads.     Assessment:     HEP/POC compliance is  good .     Patient slowly improving pain, wanting to trial Medx today. She was tested and shows below average strength per age matched norms with a high flexion to extension ratio. The patient did tolerate both testing and dynamic exercise well. The patient will be appropriate for use of Medx and will initiate rotary torso. Due to constipation and abdominal pain contributing to symptoms, PT will discuss case with a PT trained in visceral therapy to determine if this would be something they could treat as well.      Goal Status:  Pt. will be independent with home exercise program in : 6 weeks    Pt will: be able to walk for longer than 10 minutes without increased low back symptoms by 6 weeks.  Pt will: be able to sit in a stationary position without increased R glute/low back symptoms by 6 weeks.  Pt will: be able to have a bowel movement without increased low back pain by 6 weeks.      Plan / Patient Education:     Continue with initial plan of care.    Plan for next visit: review HEP, possible use of TENS again? Eventual transfer to MEDX - might not be appropriate right at this time as roller pads will hit at her sensitive spot and most likely increase her pain symptoms, progress to core/glute/low back strengthening HEP, sit to stands and mobility exercises for pain relief, TNE and manual therapy, HF stretching    PT to consult with visceral PT to discuss symptoms with constipation.    Subjective:     Pain Rating: 3 today  Patient reports over the weekend she was in so much pain from being constipated. Patient can't really find a comfortable position when she sleeps, she ends up sleeping on her stomach. Patient feeling like her organs sit on more of her left side, it looks and feels like everything else has moved to the left.     Was really sore after having a bowel movement yesterday. Having continued issues with constipation. Feeling relatively good tough and wants to push it.    Objective:     Lumbar Medx:  Enter Week / Visit #: Wk 1 V 1  Weight (lbs): 148# Max 87# Ave 70# Ex  Reps (#): 16  Time: 145  ROM (degrees): 0-48  Pain: Worked/sore  Flex:Ext ratio: 3.52:1    Exercises:  Exercise #1: supine knee rocks - 20 reps  Comment #1: SKTC, piriformis and lumbar rotation stretch - hold 30 sec  Exercise #2: supine nerve glides - 10 reps  Comment #2: seated hamstring stretch - hold 30 sec      Treatment Today     TREATMENT MINUTES COMMENTS   Evaluation     Self-care/ Home management     Manual therapy  Prone with pillows  under trunk-  STM and MFR to lumbar paraspinals, QL, gluts bilaterally. Gentle offloading at pelvis.   Neuromuscular Re-education  Discussion of TNE   Therapeutic Activity     Therapeutic Exercises 17 Verbal review HEP  Treadmill 3 minutes   Gait training     Modality__________________ Not done today Patient supine with bi set up on R gluteal and lumbar paraspinals, level 16-19, patient performed exercises during stimulation   Physical performance testing. 10 Medx Testing         Total 27    Blank areas are intentional and mean the treatment did not include these items.       Aryan SHELDON, PT  9/16/2020

## 2021-06-11 NOTE — PROGRESS NOTES
Assessment:     Diagnoses and all orders for this visit:    Lumbar radiculopathy    Lumbar disc herniation    S/P lumbar fusion    Gluteal pain  -     OPS Joint Injection Sacroiliac Joint Unilateral; Future; Expected date: 09/17/2020    Sacroiliac joint pain  -     OPS Joint Injection Sacroiliac Joint Unilateral; Future; Expected date: 09/17/2020    Sacroiliac joint dysfunction  -     OPS Joint Injection Sacroiliac Joint Unilateral; Future; Expected date: 09/17/2020    Right buttock pain  -     OPS Joint Injection Sacroiliac Joint Unilateral; Future; Expected date: 09/17/2020       Annette Shore is a 60 y.o. y.o. female with past medical history significant for COPD, hyperlipidemia, hypertension, peripheral vascular disease status post femoral-popliteal bypass after failed stenting, probably neuropathy as well as history of lumbar fusion who presents today for follow-up regarding:    -Right lumbar radiculitis with 50% relief with right L5-S1 TFESI.  MRI does show L5-S1 disc bulge with right foraminal stenosis.  Patient is neurologically intact on exam.  Working currently with [employer] program for intensive core strengthening.  There is bilateral nerve compression at L3-4 however pain currently is not radiating in an L3 dermatomal pattern.    -Bilateral gluteal pain over the SI proximal joint, increased pain with SI provocative maneuvers indicating sacroiliac joint dysfunction/pain as well.    -Dr. Norman primary spine provider    Plan:     A shared decision making plan was used. The patient's values and choices were respected. Prior medical records from 8/17/2020 to current were reviewed today. The following represents what was discussed and decided upon by the provider and the patient.        -DIAGNOSTIC TESTS: Images were personally reviewed and interpreted.   --Lumbar spine MRI 7/10/2020 shows post L4-5 spinal fusion which appears solid.  Moderate right foraminal stenosis L5-S1.  Very mild spinal stenosis with  moderate bilateral foraminal stenosis L3-4.    -INTERVENTIONS: Ordered a right sacroiliac joint steroid injection with Dr. Avila to see if we get further relief of her right buttock pain that localizes to the SI joints.  She received about 50% relief with right L5-S1 TFESI.    -MEDICATIONS: Encourage patient to continue with nabumetone as needed for pain and inflammation.  -Patient did not tolerate Lyrica therefore has discontinued this medication.  Discussed side effects of medications and proper use. Patient verbalized understanding.    -PHYSICAL THERAPY: Encourage patient to continue with home exercises from physical therapy as well as ongoing MedX program which she is tolerating and doing well with.  Discussed the importance of core strengthening, ROM, stretching exercises with the patient and how each of these entities is important in decreasing pain.  Explained to the patient that the purpose of physical therapy is to teach the patient a home exercise program.  These exercises need to be performed every day in order to decrease pain and prevent future occurrences of pain.        -PATIENT EDUCATION:  22 minutes of total visit time was spent face to face with the patient today, 60 % of the visit was spent on counseling, education, and coordinating care.   -5 minutes spent outside of visit time, non-face-to-face time, reviewing chart.  -Today we also discussed the issues related to the current COVID-19 pandemic, the pros and cons of the current treatment plan, the CDC guidelines such as social distancing, washing the hands, and covering the cough.    -FOLLOW UP: Follow-up for injection with Dr. Davila 2 weeks postinjection  Advised to contact clinic if symptoms worsen or change.    Subjective:     Annette Shore is a 60 y.o. female who presents today for follow-up regarding still improvements in right low back pain rating into the right posterior thigh and posterior calf with overall 50% relief with  lumbar ANTONIO on the right at L5-S1.  However her pain is most intense currently in the right buttock and is currently a 2/10 at its best but significant at times at its worst.  She is doing well with the MedX program and is encouraged by the room she has for improvement in her strength.  However the buttock pain is limiting her she feels at this time.  Worse in general at bedtime with position changes as well as generalized activity.  Patient denies numbness or tingling sensations, denies lower extremity weakness, denies left-sided symptoms, denies bowel or bladder loss control, denies saddle anesthesia.    Treatment to Date: History of lumbar fusion.  Physical therapy [facility] x3 sessions 9/16/2020 chronic right LBP    Right L5-S1 TFESI 7/21/2020 with with 50% relief.  Preprocedure pain 4/10, post 0/10.    Medications:  Nabumetone 500 mg  Cymbalta     Lyrica 25 mg - itching SE so DCd    Patient Active Problem List   Diagnosis     COPD (chronic obstructive pulmonary disease) (H)     Family history of diabetes mellitus     Foot pain     Idiopathic sensorimotor axonal neuropathy     Laryngitis, chronic     Major depressive disorder, recurrent episode, moderate (H)     Migraine without aura     Mixed hyperlipidemia     Obstructive sleep apnea     Other somatoform disorders     PVD (peripheral vascular disease) (H)     Screening for cervical cancer     Tobacco use disorder     Vitamin B12 deficiency       Current Outpatient Medications on File Prior to Encounter   Medication Sig Dispense Refill     aspirin 325 MG tablet Take 325 mg by mouth.       atorvastatin (LIPITOR) 20 MG tablet Take 20 mg by mouth every morning.        DULoxetine (CYMBALTA) 60 MG capsule 60mg in am and 30mg at bedtime       ipratropium-albuteroL (DUO-NEB) 0.5-2.5 mg/3 mL nebulizer 3 mL as needed.        losartan-hydrochlorothiazide (HYZAAR) 50-12.5 mg per tablet Take 1 tablet by mouth daily       nabumetone (RELAFEN) 500 MG tablet Take 1-2 tablets  twice a day as needed for pain. 120 tablet 0     [DISCONTINUED] pregabalin (LYRICA) 25 MG capsule Take 1 capsule (25 mg total) by mouth 2 (two) times a day. 1 tab at bedtime x 3 days.  Then may increase to 1 cap  Twice daily. 60 capsule 2     No current facility-administered medications on file prior to encounter.        Allergies   Allergen Reactions     Lyrica [Pregabalin] Itching     Bupropion Itching       Past Medical History:   Diagnosis Date     COPD (chronic obstructive pulmonary disease) (H)      Depression      Hyperlipidemia      Hypertension      PAD (peripheral artery disease) (H)      Peripheral neuropathy         Review of Systems  ROS:  Specifically negative for bowel/bladder dysfunction, balance changes, headache, dizziness, foot drop, fevers, chills, appetite changes, nausea/vomiting, unexplained weight loss. Otherwise 13 systems reviewed are negative. Please see the patient's intake questionnaire from today for details.    Reviewed Social, Family, Past Medical and Past Surgical history with patient, no significant changes noted since prior visit.     Objective:     /84 (Patient Site: Right Arm, Patient Position: Sitting)     PHYSICAL EXAMINATION:    --CONSTITUTIONAL: Well developed, well nourished, healthy appearing individual.  --PSYCHIATRIC: Appropriate mood and affect. No difficulty interacting due to temper, social withdrawal, or memory issues.  --SKIN: Lumbar region is dry and intact.   --RESPIRATORY: Normal rhythm and effort. No abnormal accessory muscle breathing patterns noted.   --MUSCULOSKELETAL:  Normal lumbar lordosis noted, no lateral shift.  --GROSS MOTOR: Easily arises from a seated position. Gait is non-antalgic  --LUMBAR SPINE:  Inspection reveals no evidence of deformity. Range of motion is not limited in lumbar flexion, extension, lateral rotation. No tenderness to palpation lumbar spine. Straight leg raising in the supine position is negative to radicular pain. Sciatic  notch non-tender on the left and significantly tender on the right.   --SACROILIAC JOINT: Positive right distraction.  Positive right Sonal's with reproduction of pain to affected extremity. One Finger point test positive right.  --LOWER EXTREMITY MOTOR TESTING:  Plantar flexion left 5/5, right 5/5   Dorsiflexion left 5/5, right 5/5   Great toe MTP extension left 5/5, right 5/5  Knee flexion left 5/5, right 5/5  Knee extension left 5/5, right 5/5   Hip flexion left 5/5, right 5/5  Hip abduction left 5/5, right 5/5  Hip adduction left 5/5, right 5/5   --HIPS: Full range of motion bilaterally.   --NEUROLOGIC: Bilateral patellar and achilles reflexes are physiologic and symmetric. Sensation to light touch is intact in the bilateral L4, L5, and S1 dermatomes.    RESULTS:   Imaging: Lumbar spine imaging was reviewed today. The images were shown to the patient and the findings were explained using a spine model.      MR LUMBAR SPINE WO CONTRAST  LOCATION: [facility]  DATE/TIME: 7/10/2020   INDICATION: Back pain or radiculopathy, > 6 weeks. Low back pain/right gluteal pain. Status post fusion. Evaluate for stenosis.  COMPARISON: 9/24/2018 plain films and 7/26/2011 CT.  TECHNIQUE: Without IV contrast  FINDINGS: Nomenclature is based on 5 lumbar type vertebral bodies. Normal vertebral body heights and marrow signal. 2 mm degenerative retrolisthesis of L3 on L4. 1 mm degenerative retrolisthesis of L2 on L3. Normal distal spinal cord and cauda equina   with conus medullaris at L1. Postop changes of aortoiliac bypass graft. Moderate atrophy of the posterior paraspinal musculature, extending caudally from the level of the fusion. Unremarkable visualized bony pelvis.  T12-L1: Normal disc height and signal. No herniation. Normal facets. No spinal canal or neural foraminal stenosis.   L1-L2: Moderate loss of T2 disc signal and moderate loss of interspace height with mild adjacent Modic type I endplate degenerative changes.  There is mild broad-based annular bulge and mild facet arthropathy. No central spinal canal or neural foraminal stenosis.  L2-L3: Moderate loss of T2 disc signal, moderate loss of interspace height with mild adjacent Modic type II endplate changes. There is mild broad-based annular bulge and mild facet arthropathy. No central spinal canal or neural foraminal stenosis.   L3-L4: Moderate loss of T2 disc signal and moderate loss of interspace height. There is a moderate broad-based annular bulge and mild facet arthropathy. Very mild central spinal canal stenosis and moderate bilateral neural foraminal stenosis.  L4-L5: Status post L4-L5 posterior instrumented fusion with susan and pedicle screw fixation and interspace bone graft. This fusion appears solid. Status post decompressive laminectomies. Central spinal canal and both neural foramina are widely patent.  L5-S1: Normal T2 disc signal with preservation of the interspace height. There is mild broad-based annular bulge slightly eccentric to the right with mild facet arthropathy. No central spinal canal stenosis. There is moderate right-sided neural foraminal stenosis. Left neural foramen is widely patent.  IMPRESSION:   1.  Status post L4-L5 interbody fusion which appears solid.  2.  Moderate right-sided neural foraminal stenosis at L5-S1.  3.  At L3-L4, there is a very mild central spinal canal stenosis and moderate bilateral neural foraminal stenosis.

## 2021-06-11 NOTE — PATIENT INSTRUCTIONS - HE
~Please call Nurse Navigation line [phone] with any questions or concerns about your treatment plan, if symptoms worsen and you would like to be seen urgently, or if you have problems controlling bladder and bowel function.

## 2021-06-11 NOTE — PATIENT INSTRUCTIONS - HE
DISCHARGE INSTRUCTIONS    During office hours (8:00 a.m.- 4:00 p.m.) questions or concerns may be answered  by calling [facility] Center Navigation Nurses at  [phone].  Messages received after hours will be returned the following business day.      In the case of an emergency, please dial 911 or seek assistance at the nearest Emergency Room/Urgent Care facility.     All Patients:    ? You may experience an increase in your symptoms for the first 2 days (It may take anywhere between 2 days- 2 weeks for the steroid to have maximum effect).    ? You may use ice on the injection site, as frequently as 20 minutes each hour if needed.    ? You may take your pain medicine.    ? You may continue taking your regular medication after your injection. If you have had a Medial Branch Block you may resume pain medication once your pain diary is completed.    ? You may shower. No swimming, tub bath or hot tub for 48 hours.  You may remove your bandaid/bandage as soon as you are home.    ? You may resume light activities, as tolerated.    ? Resume your usual diet as tolerated.    ? It is strongly advised that you do not drive for 1-3 hours post injection.    ? If you have had oral sedation:  Do not drive for 8 hours post injection.      ? If you have had IV sedation:  Do not drive for 24 hours post injection.  Do not operate hazardous machinery or make important personal/business decisions for 24 hours.      POSSIBLE STEROID SIDE EFFECTS (If steroid/cortisone was used for your procedure)    -If you experience these symptoms, it should only last for a short period      Swelling of the legs                Skin redness (flushing)       Mouth (oral) irritation     Blood sugar (glucose) levels              Sweats                      Mood changes    Headache    Sleeplessness         POSSIBLE PROCEDURE SIDE EFFECTS  -Call the Spine Center if you are concerned    Increased Pain             Increased numbness/tingling         Nausea/Vomiting            Bruising/bleeding at site        Redness or swelling                                                Difficulty walking        Weakness             Fever greater than 100.5    *In the event of a severe headache after an epidural steroid injection that is relieved by lying down, please call the [facility] to speak with a clinical staff member*

## 2021-06-11 NOTE — PROGRESS NOTES
[facility] Rehabilitation Daily Progress     Patient Name: Annette Shore  Date: 9/2/2020  Date of Initial Evaluation: 8/31/2020  Visit #: 2/16  PTA visit #:  -  Referral Diagnosis: Gluteal pain  Referring provider: Alexx Norman DO  Visit Diagnosis:     ICD-10-CM    1. Chronic right-sided low back pain without sciatica  M54.5     G89.29    2. Myofascial pain syndrome  M79.18      Patient is a 60 y.o. female that presents with signs and symptoms consistent with R gluteal pain with SIJ pain secondary to foraminal stenosis from L5-S1 disc bulge, h/o lumbar fusion. Patient demonstrates impairments including decreased lumbar range of motion and flexibility, with poor core stability and postural awareness, myofascial restrictions and TTP at/around R PSIS and gluteals, leading to impaired functional mobility. Patient's functional limitations include sitting or standing in one position for too long, walking longer distances, and rolling over in bed without increased pain or hypersensitivity. Today patient responded well to patient education, therapeutic exercise and use of TENS unit - patient didn't find immediate relief but was able to perform all exercises without increased pain symptoms while trialing TENS - will hold off on MEDX for right now until patient's hyperalgesia of R low back reduces enough to sit at roller pads.     Assessment:     HEP/POC compliance is  good .     Patient with pain coming into clinic that responded favorably to manual therapy. She has a lot of muscle and myofascial tightness, likely contributing to her SI and radicular pain. The patient had improved gait, transitional movements, and posture following session. Will continue to gradually work from passive to active treatment, including eventual Medx.    Goal Status:  Pt. will be independent with home exercise program in : 6 weeks    Pt will: be able to walk for longer than 10 minutes without increased low back symptoms by 6  weeks.  Pt will: be able to sit in a stationary position without increased R glute/low back symptoms by 6 weeks.  Pt will: be able to have a bowel movement without increased low back pain by 6 weeks.      Plan / Patient Education:     Continue with initial plan of care.    Plan for next visit: review HEP, possible use of TENS again? Eventual transfer to MEDX - might not be appropriate right at this time as roller pads will hit at her sensitive spot and most likely increase her pain symptoms, progress to core/glute/low back strengthening HEP, sit to stands and mobility exercises for pain relief, TNE and manual therapy, HF stretching    Subjective:     Pain Ratin    Tens seemed to help while on, not sure if it helped after. Very tender and sensitive in low back and gluts.     Objective:     Slow, painful, guarded transitional movements.  Tenderness bilateral lumbar paraspinals, QL, and gluts.   Improved upright posture and gait following MT.    Exercises:  Exercise #1: supine knee rocks - 20 reps  Comment #1: SKTC, piriformis and lumbar rotation stretch - hold 30 sec  Exercise #2: supine nerve glides - 10 reps  Comment #2: seated hamstring stretch - hold 30 sec      Treatment Today     TREATMENT MINUTES COMMENTS   Evaluation     Self-care/ Home management     Manual therapy 25 Prone with pillows under trunk-  STM and MFR to lumbar paraspinals, QL, gluts bilaterally. Gentle offloading at pelvis.   Neuromuscular Re-education     Therapeutic Activity     Therapeutic Exercises 5 Verbal review HEP   Gait training     Modality__________________ Not done today Patient supine with bi set up on R gluteal and lumbar paraspinals, level 16-19, patient performed exercises during stimulation              Total 30    Blank areas are intentional and mean the treatment did not include these items.       Aryan SHELDON, PT  2020

## 2021-06-11 NOTE — PROGRESS NOTES
[facility] Rehabilitation Daily Progress     Patient Name: Annette Shore  Date: 9/24/2020  Date of Initial Evaluation: 8/31/2020  Visit #: 6/16  Referral Diagnosis: Gluteal pain  Referring provider: Alexx Norman DO  Visit Diagnosis:     ICD-10-CM    1. Chronic right-sided low back pain without sciatica  M54.5     G89.29    2. Myofascial pain syndrome  M79.18      Patient is a 60 y.o. female that presents with signs and symptoms consistent with R gluteal pain with SIJ pain secondary to foraminal stenosis from L5-S1 disc bulge, h/o lumbar fusion. Patient demonstrates impairments including decreased lumbar range of motion and flexibility, with poor core stability and postural awareness, myofascial restrictions and TTP at/around R PSIS and gluteals, leading to impaired functional mobility. Patient's functional limitations include sitting or standing in one position for too long, walking longer distances, and rolling over in bed without increased pain or hypersensitivity. Today patient responded well to patient education, therapeutic exercise and use of TENS unit - patient didn't find immediate relief but was able to perform all exercises without increased pain symptoms while trialing TENS - will hold off on MEDX for right now until patient's hyperalgesia of R low back reduces enough to sit at roller pads.     Assessment:     HEP/POC compliance is  good .     Patient slowly improving pain, wanting to trial Medx today, however was not able to tolerate today. She was tested and shows below average strength per age matched norms with a high flexion to extension ratio. The patient did tolerate both testing and dynamic exercise well. The patient will be appropriate for use of Medx and will initiate rotary torso. Due to constipation and abdominal pain contributing to symptoms, PT will discuss case with a PT trained in visceral therapy to determine if this would be something they could treat as well. PT trialed  manual therapy today to help decrease pain symptoms. Patient will schedule with visceral therapist to address constipation concerns.     Goal Status:  Pt. will be independent with home exercise program in : 6 weeks    Pt will: be able to walk for longer than 10 minutes without increased low back symptoms by 6 weeks.  Pt will: be able to sit in a stationary position without increased R glute/low back symptoms by 6 weeks.  Pt will: be able to have a bowel movement without increased low back pain by 6 weeks.      Plan / Patient Education:     Continue with initial plan of care.    Plan for next visit: review HEP, possible use of TENS again, progress to core/glute/low back strengthening HEP, sit to stands and mobility exercises for pain relief, TNE and manual therapy, HF stretching    PT to consult with visceral PT to discuss symptoms with constipation.    Subjective:     Pain Rating: 3 today  Patient pretty sore after last visit. Patient tried MedX today, had to hold off today due to increased pain at R low lumbar spine.     Was really sore after having a bowel movement yesterday. Having continued issues with constipation. Feeling relatively good tough and wants to push it.    Objective:     Lumbar Medx:  Enter Week / Visit #: W1V2  Weight (lbs): 73#  Reps (#): 15  Time: 126s  ROM (degrees): 0-48  Pain: Worked/sore  Flex:Ext ratio: 3.52:1    Exercises:  Exercise #1: supine knee rocks - 20 reps  Comment #1: SKTC, piriformis and lumbar rotation stretch - hold 30 sec  Exercise #2: supine nerve glides - 10 reps  Comment #2: seated hamstring stretch - hold 30 sec  Exercise #3: rotary torso - 90s, starting to L, 20#  Comment #3: supine bridge with hip adduction - 10 reps  Exercise #4: sidelying clamshells - 15 reps max      Treatment Today     TREATMENT MINUTES COMMENTS   Evaluation     Self-care/ Home management     Manual therapy 20 SL MFR of R gluteal, ITB, piriformis and distal QL   Neuromuscular Re-education  Discussion  of TNE   Therapeutic Activity     Therapeutic Exercises 10 Verbal review HEP  Nustep 3 minutes  Attempted MedX   Gait training     Modality__________________ Not done today Patient supine with bi set up on R gluteal and lumbar paraspinals, level 16-19, patient performed exercises during stimulation   Physical performance testing.  Medx Testing         Total 30    Blank areas are intentional and mean the treatment did not include these items.       Jordyn Ruiz, PT  9/24/2020

## 2021-06-11 NOTE — PROGRESS NOTES
[facility] Rehabilitation Daily Progress     Patient Name: Annette Shore  Date: 9/21/2020  Date of Initial Evaluation: 8/31/2020  Visit #: 5/16  Referral Diagnosis: Gluteal pain  Referring provider: Alexx Norman DO  Visit Diagnosis:     ICD-10-CM    1. Chronic right-sided low back pain without sciatica  M54.5     G89.29    2. Myofascial pain syndrome  M79.18      Patient is a 60 y.o. female that presents with signs and symptoms consistent with R gluteal pain with SIJ pain secondary to foraminal stenosis from L5-S1 disc bulge, h/o lumbar fusion. Patient demonstrates impairments including decreased lumbar range of motion and flexibility, with poor core stability and postural awareness, myofascial restrictions and TTP at/around R PSIS and gluteals, leading to impaired functional mobility. Patient's functional limitations include sitting or standing in one position for too long, walking longer distances, and rolling over in bed without increased pain or hypersensitivity. Today patient responded well to patient education, therapeutic exercise and use of TENS unit - patient didn't find immediate relief but was able to perform all exercises without increased pain symptoms while trialing TENS - will hold off on MEDX for right now until patient's hyperalgesia of R low back reduces enough to sit at roller pads.     Assessment:     HEP/POC compliance is  good .     Patient slowly improving pain, wanting to trial Medx today. She was tested and shows below average strength per age matched norms with a high flexion to extension ratio. The patient did tolerate both testing and dynamic exercise well. The patient will be appropriate for use of Medx and will initiate rotary torso. Due to constipation and abdominal pain contributing to symptoms, PT will discuss case with a PT trained in visceral therapy to determine if this would be something they could treat as well.     Goal Status:  Pt. will be independent with home  exercise program in : 6 weeks    Pt will: be able to walk for longer than 10 minutes without increased low back symptoms by 6 weeks.  Pt will: be able to sit in a stationary position without increased R glute/low back symptoms by 6 weeks.  Pt will: be able to have a bowel movement without increased low back pain by 6 weeks.      Plan / Patient Education:     Continue with initial plan of care.    Plan for next visit: review HEP, possible use of TENS again, progress to core/glute/low back strengthening HEP, sit to stands and mobility exercises for pain relief, TNE and manual therapy, HF stretching    PT to consult with visceral PT to discuss symptoms with constipation.    Subjective:     Pain Rating: 3 today  Patient felt really good after last visit, she is feeling like she had more energy.       Patient reports over the weekend she was in so much pain from being constipated. Patient can't really find a comfortable position when she sleeps, she ends up sleeping on her stomach. Patient feeling like her organs sit on more of her left side, it looks and feels like everything else has moved to the left.     Was really sore after having a bowel movement yesterday. Having continued issues with constipation. Feeling relatively good tough and wants to push it.    Objective:     Lumbar Medx:  Enter Week / Visit #: W1V2  Weight (lbs): 73#  Reps (#): 15  Time: 126s  ROM (degrees): 0-48  Pain: Worked/sore  Flex:Ext ratio: 3.52:1    Exercises:  Exercise #1: supine knee rocks - 20 reps  Comment #1: SKTC, piriformis and lumbar rotation stretch - hold 30 sec  Exercise #2: supine nerve glides - 10 reps  Comment #2: seated hamstring stretch - hold 30 sec  Exercise #3: rotary torso - 90s, starting to L, 20#  Comment #3: supine bridge with hip adduction - 10 reps  Exercise #4: sidelying clamshells - 15 reps max      Treatment Today     TREATMENT MINUTES COMMENTS   Evaluation     Self-care/ Home management     Manual therapy  Prone with  pillows under trunk-  STM and MFR to lumbar paraspinals, QL, gluts bilaterally. Gentle offloading at pelvis.   Neuromuscular Re-education  Discussion of TNE   Therapeutic Activity     Therapeutic Exercises 27 Verbal review HEP  Nustep 3 minutes   Gait training     Modality__________________ Not done today Patient supine with bi set up on R gluteal and lumbar paraspinals, level 16-19, patient performed exercises during stimulation   Physical performance testing.  Medx Testing         Total 27    Blank areas are intentional and mean the treatment did not include these items.       Jordyn Ruiz, PT  9/21/2020

## 2021-06-12 NOTE — PROGRESS NOTES
[facility] Rehabilitation Daily Progress     Patient Name: Annette Shore  Date: 10/21/2020  Visit #:2  Referral Diagnosis: Gluteal Pain, SI pain, Lumbar Radicuolpathy  Referring provider: Dr. Alexx Norman  Visit Diagnosis:     ICD-10-CM    1. Chronic right-sided low back pain without sciatica  M54.5     G89.29    2. Myofascial pain syndrome  M79.18    3. Upper back pain, chronic  M54.9     G89.29    4. Pain of left lower leg  M79.662    5. Bilateral foot pain  M79.671     M79.672    6. PVD (peripheral vascular disease) (H)  I73.9    7. Peripheral neuropathy  G62.9    8. COPD (chronic obstructive pulmonary disease) (H)  J44.9      From 10/20/20 note:  Precaution: Abdominal and pelvic stents  Annette is a 60 year old female with severe sacral and bilateral leg pain, as well as severe abdominal pain with difficulty defecating. Patient is unable to give a time when this problem started due to multiple health issues occurring at the same time, but previous PT note lists 7/2020. She has severe PAD and also PN, with history of IBS. Patient has weakness of her core and LE's due to severe PAD and past history of what appears to be a type of slow transit disorder. Exam today shows limitation in trunk mobility diffusely, but no segmental LE numbness or weakness. Cranial fascial scanning does show abdominal and pelvic visceral and neural fascia dysfunction, which would correlate with pelvic floor dysfunction.  She did have US done 10/16/20 to left leg and is having angiogram and possible angioplasty on 11/11/20 which may change treatment and goal time. Past medical history significant for COPD, hyperlipidemia, hypertension, peripheral vascular disease status post femoral-popliteal bypass after failed stenting, and probable neuropathy. MRI does show L5-S1 disc bulge with right foraminal stenosis, and L3-4 foraminal stenosis. Patient is also participating in MedX program for core strengthening. Symptoms do not correlate  with a specific dermatomal numbness and weakness pattern, and the source of pain is complicated by PAD and PN which overlaps into the same area of pain as the LB.    Assessment:     PSIS is level today. Patient had increased lumbar pain since adding SI correction, and also we added lumbar extension biased exercises which she already knows reduces pain.  Patient is benefitting from skilled physical therapy and is making steady progress toward functional goals.    Goal Status:  Pt. will be independent with home exercise program in : 6 weeks  Pt. will have improved quality of sleep: in 12 weeks;Comment  Comment:: will wake 1x/night due to pain    Pt will: be able to walk for longer than 10 minutes without increased low back symptoms by 6 weeks.  Pt will: be able to sit in a stationary position without increased R glute/low back symptoms by 6 weeks.  Pt will: be able to have a bowel movement without increased low back pain by 6 weeks.  Pt will: have LBP less than 5/10 during defication, and will be able to deficate 3-4x/week in 12 or more weeks      Plan / Patient Education:     Continue with initial plan of care.  Start MT for abdominal and neural fascia of the abdomen and pelvis    Subjective:     Pain Ratin My stomach was horrible for a whole night after doing the exercises- and it felt raw. I have gas every time after doing the exercise. I have been using the ice more than I usually do, and I don't know what the reason is.    Objective:      Slight bruise look to lumbar spine scar.  PSIS is level and negative FB test  Patient able to do prone and extension exercises with decreased lumbar pain.      Treatment Today     TREATMENT MINUTES COMMENTS   Evaluation     Self-care/ Home management 25 Discussed lumbar disc pressures and use of lumbar roll   Manual therapy     Neuromuscular Re-education     Therapeutic Activity     Therapeutic Exercises 30 Reviewed SI correction and added standing and prone trunk arches  which she states already is doing with comfort. Reviewed gluteal stretch   Gait training     Modality__________________                Total 55    Blank areas are intentional and mean the treatment did not include these items.       Corrie Hernandez  10/21/2020

## 2021-06-12 NOTE — PROGRESS NOTES
[facility] Rehabilitation Daily Progress     Patient Name: Annette Shore  Date: 10/5/2020  Date of Initial Evaluation: 8/31/2020  Visit #: 7/16  Referral Diagnosis: Gluteal pain  Referring provider: Alexx Norman DO  Visit Diagnosis:     ICD-10-CM    1. Chronic right-sided low back pain without sciatica  M54.5     G89.29    2. Myofascial pain syndrome  M79.18      Patient is a 60 y.o. female that presents with signs and symptoms consistent with R gluteal pain with SIJ pain secondary to foraminal stenosis from L5-S1 disc bulge, h/o lumbar fusion. Patient demonstrates impairments including decreased lumbar range of motion and flexibility, with poor core stability and postural awareness, myofascial restrictions and TTP at/around R PSIS and gluteals, leading to impaired functional mobility. Patient's functional limitations include sitting or standing in one position for too long, walking longer distances, and rolling over in bed without increased pain or hypersensitivity. Today patient responded well to patient education, therapeutic exercise and use of TENS unit - patient didn't find immediate relief but was able to perform all exercises without increased pain symptoms while trialing TENS - will hold off on MEDX for right now until patient's hyperalgesia of R low back reduces enough to sit at roller pads.     Assessment:     HEP/POC compliance is  good .     Patient slowly improving pain, did not trial MedX today    Due to constipation and abdominal pain contributing to symptoms, PT will discuss case with a PT trained in visceral therapy to determine if this would be something they could treat as well. PT trialed manual therapy today to help decrease pain symptoms. Patient will schedule with visceral therapist to address constipation concerns.     Goal Status:  Pt. will be independent with home exercise program in : 6 weeks    Pt will: be able to walk for longer than 10 minutes without increased low back  symptoms by 6 weeks.  Pt will: be able to sit in a stationary position without increased R glute/low back symptoms by 6 weeks.  Pt will: be able to have a bowel movement without increased low back pain by 6 weeks.      Plan / Patient Education:     Continue with initial plan of care.    Plan for next visit: review HEP, possible use of TENS again, progress to core/glute/low back strengthening HEP, sit to stands and mobility exercises for pain relief, TNE and manual therapy, HF stretching    PT to consult with visceral PT to discuss symptoms with constipation.    Subjective:     Pain Rating: 3 today  Patient pretty sore after last visit. Patient tried MedX today, had to hold off today due to increased pain at R low lumbar spine.     Was really sore after having a bowel movement yesterday. Having continued issues with constipation. Feeling relatively good tough and wants to push it.    Objective:     Lumbar Medx:  Enter Week / Visit #: W1V2  Weight (lbs): 73#  Reps (#): 15  Time: 126s  ROM (degrees): 0-48  Pain: Worked/sore  Flex:Ext ratio: 3.52:1    Exercises:  Exercise #1: supine knee rocks - 20 reps  Comment #1: SKTC, piriformis and lumbar rotation stretch - hold 30 sec  Exercise #2: supine nerve glides - 10 reps  Comment #2: seated hamstring stretch - hold 30 sec  Exercise #3: rotary torso - 90s, starting to L, 20#  Comment #3: supine bridge with hip adduction - 10 reps  Exercise #4: sidelying clamshells - 15 reps max      Treatment Today     TREATMENT MINUTES COMMENTS   Evaluation     Self-care/ Home management     Manual therapy 20 SL MFR of R gluteal, ITB, piriformis and distal QL   Neuromuscular Re-education  Discussion of TNE   Therapeutic Activity     Therapeutic Exercises 10 Verbal review HEP  Nustep 3 minutes  Attempted MedX   Gait training     Modality__________________ Not done today Patient supine with bi set up on R gluteal and lumbar paraspinals, level 16-19, patient performed exercises during  stimulation   Physical performance testing.  Medx Testing         Total 30    Blank areas are intentional and mean the treatment did not include these items.       Jordyn Ruiz, PT  10/5/2020

## 2021-06-12 NOTE — PROGRESS NOTES
[facility] Rehabilitation Daily Progress     Patient Name: Annette Shore  Date: 10/27/2020  Visit #:3  Referral Diagnosis: Gluteal Pain, SI pain, Lumbar Radicuolpathy  Referring provider: Dr. Alexx Norman  Visit Diagnosis:     ICD-10-CM    1. Chronic right-sided low back pain without sciatica  M54.5     G89.29    2. Myofascial pain syndrome  M79.18    3. Upper back pain, chronic  M54.9     G89.29    4. Pain of left lower leg  M79.662    5. Bilateral foot pain  M79.671     M79.672    6. PVD (peripheral vascular disease) (H)  I73.9    7. Peripheral neuropathy  G62.9    8. COPD (chronic obstructive pulmonary disease) (H)  J44.9      From 10/20/20 note:  Precaution: Abdominal and pelvic stents  Annette is a 60 year old female with severe sacral and bilateral leg pain, as well as severe abdominal pain with difficulty defecating. Patient is unable to give a time when this problem started due to multiple health issues occurring at the same time, but previous PT note lists 7/2020. She has severe PAD and also PN, with history of IBS. Patient has weakness of her core and LE's due to severe PAD and past history of what appears to be a type of slow transit disorder. Exam today shows limitation in trunk mobility diffusely, but no segmental LE numbness or weakness. Cranial fascial scanning does show abdominal and pelvic visceral and neural fascia dysfunction, which would correlate with pelvic floor dysfunction.  She did have US done 10/16/20 to left leg and is having angiogram and possible angioplasty on 11/11/20 which may change treatment and goal time. Past medical history significant for COPD, hyperlipidemia, hypertension, peripheral vascular disease status post femoral-popliteal bypass after failed stenting, and probable neuropathy. MRI does show L5-S1 disc bulge with right foraminal stenosis, and L3-4 foraminal stenosis. Patient is also participating in MedX program for core strengthening. Symptoms do not correlate  with a specific dermatomal numbness and weakness pattern, and the source of pain is complicated by PAD and PN which overlaps into the same area of pain as the LB.    Assessment:     Patient having much more difficulty with visceral dysfunction/BM causing pain.   Patient is benefitting from skilled physical therapy and is making steady progress toward functional goals.    Goal Status:  Pt. will be independent with home exercise program in : 6 weeks  Pt. will have improved quality of sleep: in 12 weeks;Comment  Comment:: will wake 1x/night due to pain    Pt will: be able to walk for longer than 10 minutes without increased low back symptoms by 6 weeks.  Pt will: be able to sit in a stationary position without increased R glute/low back symptoms by 6 weeks.  Pt will: be able to have a bowel movement without increased low back pain by 6 weeks.  Pt will: have LBP less than 5/10 during defication, and will be able to deficate 3-4x/week in 12 or more weeks      Plan / Patient Education:     Continue with initial plan of care.  Continue MT for abdominal and neural fascia of the abdomen and pelvis- especially LS plexus- 95 N2    Subjective:     Pain Ratin Right buttock and tailbone.- it feels like it is going to explode. I have had a really bad week and cannot do the stomach exercises at all, I am trying to do the SI exercises but not as much as I should. The SI exercises used to help but they don't help anymore. I spent 2 days in bed this past weekend. I haven't had a BM for 3 days- sometimes it is really hard and some is like paste. This problem makes me not want to eat!    Objective:        PSIS is level and negative FB test  Cranial scan markedly positive entire abdomen and pelvic fascia    Treatment Today     TREATMENT MINUTES COMMENTS   Evaluation     Self-care/ Home management     Manual therapy 55 Supine MT/SCS to abdomen including: L MUL-V, L MR-V, GES-V, PYL-V, IVC-V, DJ-V, LOT-V, ANAL-V, L COCX-N, L RECT-N, R  PUD-N   Neuromuscular Re-education     Therapeutic Activity     Therapeutic Exercises     Gait training     Modality__________________                Total 55    Blank areas are intentional and mean the treatment did not include these items.       Corrie Hernandez  10/27/2020

## 2021-06-12 NOTE — TELEPHONE ENCOUNTER
Spoke with Rosa from IR to confirm left leg angio on 11/11/2020 at [facility] at 9am-11am

## 2021-06-12 NOTE — TELEPHONE ENCOUNTER
Pt called with questions about her upcoming angiogram.    Pt was wondering if it is just an angiogram or if Karlu would be stenting as well. Informed pt that according to Darshana's note, it is just the angiogram and no stents at this time.    Pt wanting her pharmacy changed to [facility] on [address]. Pharmacy updated    Pt was also wondering why she is having US and appt with Sergei Ornelas. Informed that after every angio we do a 2 week follow up with US of that leg and TOBIAS.

## 2021-06-12 NOTE — TELEPHONE ENCOUNTER
Patient reports that within 15 minutes of leaving PT this morning she was able to have a BM for the first time in 3 days, it was not as painful to defecate as she anticipated, and she feels abominable and back pain is much improved since BM.

## 2021-06-12 NOTE — TELEPHONE ENCOUNTER
Called pt and asked if she had any questions about upcoming angio. Pt was wondering if son should stay or leave after she is dropped off in the morning. Informed pt they would like family to drop off and come back when done.     Pt also wondering if she would get stents or not during angio, informed pt that she may or may not, unknown at this time. Pt also wondering if there is a limit to how many times an angio can be done through her ankle, informed her to ask Dr. Gilliland the day of angiogram.     No other questions at this time. Pt did start plavix and her covid is negative

## 2021-06-12 NOTE — PROGRESS NOTES
"Annette Shore is a 60 y.o. female who is being evaluated via a billable video visit.      The patient has been notified of following:     \"This video visit will be conducted via a call between you and your physician/provider. We have found that certain health care needs can be provided without the need for an in-person physical exam.  This service lets us provide the care you need with a video conversation.  If a prescription is necessary we can send it directly to your pharmacy.  If lab work is needed we can place an order for that and you can then stop by our lab to have the test done at a later time.    Video visits are billed at different rates depending on your insurance coverage. Please reach out to your insurance provider with any questions.    If during the course of the call the physician/provider feels a video visit is not appropriate, you will not be charged for this service.\"    Patient has given verbal consent to a Video visit? Yes  How would you like to obtain your AVS? AVS Preference: Mail a copy.      Video-Visit Details    Type of service:  Video Visit    Originating Location (pt. Location): Home    Distant Location (provider location):  [facility] [place]     Platform used for Video Visit: Doximity    3m f/u. US done on 10/12. PAD. She has had one spinal and hip injection. She stated that her leg pain is better. No vitals taken today.     Had a long discussion with Ms. Shore.  I saw her back in July when she was complaining of bilateral lower extremity severe claudication, lifestyle limiting.  The same time she was complaining of burning pain in bilateral feet and thighs which was most likely considered to be neurogenic.  She just had spinal injection which had helped with neurogenic component of her pain, however, she still has relatively pronounced claudication.  She can walk less than a block and she had quit her regular activities in the garden due to " claudication.  She is on aspirin, statin therapy.  Her blood pressure is relatively controlled.  Unfortunately, she continues to smoke a pack a day.  I encouraged her to quit or at least cut down on amount of cigarettes.  Given the failure of medical/exercise therapy and debilitating character of her claudication I would recommend an intervention.  She states that her left side is worse than right.  We will proceed with left extremity angiogram via pedal approach given the history of aortobifemoral bypass in the past.  We will also started on Plavix prior to the intervention.  Patient has a history of bilateral iliac stent placement which had failed over a short period of time.  I am wondering if she is resistant to Plavix.  So she will be started on Plavix 5 days prior to the procedure and will check Plavix resistance test on the day of the procedure.

## 2021-06-12 NOTE — PROGRESS NOTES
[facility] Rehabilitation Daily Progress     Patient Name: Annette Shore  Date: 10/9/2020  Date of Initial Evaluation: 8/31/2020  Visit #: 8/16  Referral Diagnosis: Gluteal pain  Referring provider: Alexx Norman DO  Visit Diagnosis:     ICD-10-CM    1. Chronic right-sided low back pain without sciatica  M54.5     G89.29    2. Myofascial pain syndrome  M79.18      Patient is a 60 y.o. female that presents with signs and symptoms consistent with R gluteal pain with SIJ pain secondary to foraminal stenosis from L5-S1 disc bulge, h/o lumbar fusion. Patient demonstrates impairments including decreased lumbar range of motion and flexibility, with poor core stability and postural awareness, myofascial restrictions and TTP at/around R PSIS and gluteals, leading to impaired functional mobility. Patient's functional limitations include sitting or standing in one position for too long, walking longer distances, and rolling over in bed without increased pain or hypersensitivity. Today patient responded well to patient education, therapeutic exercise and use of TENS unit - patient didn't find immediate relief but was able to perform all exercises without increased pain symptoms while trialing TENS - will hold off on MEDX for right now until patient's hyperalgesia of R low back reduces enough to sit at roller pads.     Assessment:     HEP/POC compliance is  good .     Patient slowly improving pain, did not trial MedX today, focused on continuation of manual therapy to reduced low back pain.    Due to constipation and abdominal pain contributing to symptoms, PT encouraged patient to see visceral therapist and she is set up for this. Will continue with current plan with manual therapy in the meantime.    Goal Status:  Pt. will be independent with home exercise program in : 6 weeks    Pt will: be able to walk for longer than 10 minutes without increased low back symptoms by 6 weeks.  Pt will: be able to sit in a  stationary position without increased R glute/low back symptoms by 6 weeks.  Pt will: be able to have a bowel movement without increased low back pain by 6 weeks.      Plan / Patient Education:     Continue with initial plan of care.    Plan for next visit: review HEP, possible use of TENS again, progress to core/glute/low back strengthening HEP, sit to stands and mobility exercises for pain relief, TNE and manual therapy, HF stretching    PT to consult with visceral PT to discuss symptoms with constipation.    Subjective:     Pain Rating: 3 today    Wedesday started having more abdominal discomfort and that really flared things up again. Yesterday things were just more sore. 1:30 this AM was having leg pain again. Right now back is not as bad.    Objective:     Lumbar Medx:  Enter Week / Visit #: W1V2  Weight (lbs): 73#  Reps (#): 15  Time: 126s  ROM (degrees): 0-48  Pain: Worked/sore  Flex:Ext ratio: 3.52:1    Exercises:  Exercise #1: supine knee rocks - 20 reps  Comment #1: SKTC, piriformis and lumbar rotation stretch - hold 30 sec  Exercise #2: supine nerve glides - 10 reps  Comment #2: seated hamstring stretch - hold 30 sec  Exercise #3: rotary torso - 90s, starting to L, 20#  Comment #3: supine bridge with hip adduction - 10 reps  Exercise #4: sidelying clamshells - 15 reps max      Treatment Today     TREATMENT MINUTES COMMENTS   Evaluation     Self-care/ Home management     Manual therapy 20 SL MFR of R gluteal, ITB, piriformis and distal QL   Neuromuscular Re-education  Discussion of TNE   Therapeutic Activity     Therapeutic Exercises 10 Verbal review HEP  Nustep 3 minutes  Attempted MedX   Gait training     Modality__________________ Not done today Patient supine with bi set up on R gluteal and lumbar paraspinals, level 16-19, patient performed exercises during stimulation   Physical performance testing.  Medx Testing         Total 30    Blank areas are intentional and mean the treatment did not include  these items.       Aryan SHELDON, PT  10/9/2020

## 2021-06-13 NOTE — TELEPHONE ENCOUNTER
Confirmed with Rosa from IR left leg angio 12/16 8am at [facility] with Dr. Gilliland

## 2021-06-13 NOTE — PROGRESS NOTES
[facility] Rehabilitation Daily Progress/Monthly Summary     Patient Name: Annette Shore  Date: 11/17/2020  Visit #:4  Referral Diagnosis: Gluteal Pain, SI pain, Lumbar Radicuolpathy  Referring provider: Dr. Alexx Norman  Visit Diagnosis:     ICD-10-CM    1. Chronic right-sided low back pain without sciatica  M54.5     G89.29    2. Myofascial pain syndrome  M79.18    3. Upper back pain, chronic  M54.9     G89.29    4. Pain of left lower leg  M79.662    5. Bilateral foot pain  M79.671     M79.672    6. PVD (peripheral vascular disease) (H)  I73.9    7. Peripheral neuropathy  G62.9    8. COPD (chronic obstructive pulmonary disease) (H)  J44.9      From 10/20/20 note:  Precaution: Abdominal and pelvic stents  Annette is a 60 year old female with severe sacral and bilateral leg pain, as well as severe abdominal pain with difficulty defecating. Patient is unable to give a time when this problem started due to multiple health issues occurring at the same time, but previous PT note lists 7/2020. She has severe PAD and also PN, with history of IBS. Patient has weakness of her core and LE's due to severe PAD and past history of what appears to be a type of slow transit disorder. Exam today shows limitation in trunk mobility diffusely, but no segmental LE numbness or weakness. Cranial fascial scanning does show abdominal and pelvic visceral and neural fascia dysfunction, which would correlate with pelvic floor dysfunction.  She did have US done 10/16/20 to left leg and is having angiogram and possible angioplasty on 11/11/20 which may change treatment and goal time. Past medical history significant for COPD, hyperlipidemia, hypertension, peripheral vascular disease status post femoral-popliteal bypass after failed stenting, and probable neuropathy. MRI does show L5-S1 disc bulge with right foraminal stenosis, and L3-4 foraminal stenosis. Patient is also participating in MedX program for core strengthening. Symptoms do  not correlate with a specific dermatomal numbness and weakness pattern, and the source of pain is complicated by PAD and PN which overlaps into the same area of pain as the LB.    Assessment:     Patient improved after last visit with BM and LBP  Patient is benefitting from skilled physical therapy and is making steady progress toward functional goals.    Goal Status:  Pt. will be independent with home exercise program in : 6 weeks  Pt. will have improved quality of sleep: in 12 weeks;Comment  Comment:: will wake 1x/night due to pain; Patient still wakes up alot, 4-5x/night largely due to pain    Pt will: be able to walk for longer than 10 minutes without increased low back symptoms by 6 weeks.: currently walks 5 minutes with 3-6/10 LBP  Pt will: be able to sit in a stationary position without increased R glute/low back symptoms by 6 weeks.  Pt will: be able to have a bowel movement without increased low back pain by 6 weeks.: currently relieved LBP after BM  Pt will: have LBP less than 5/10 during defication, and will be able to deficate 3-4x/week in 12 or more weeks: LBP during defication is 8-9/10, and decreases after to 4-5/10      Plan / Patient Education:     Continue with initial plan of care.  Continue MT for abdominal and neural fascia of the abdomen and pelvis- especially LS plexus- 95 N2    Subjective:     Pain Ratin/10 I am doing better. I ended up having a good BM after the last visit, and I felt so much better. I couldn't have the angiogram/angioplasty because I had to eat due to a bad stomach so they cancelled it until 20. I am decreasing smoking- now only 3-4 cigarettes per day.  My low back is feeling good for a change- able to get up and move easily. I have had an 8/10 in thee last 24 hours in my LB though, and I get shooting pain into the big toes of both feet when I sleep lasting 10-15 minutes.  My feet usually hurt, but they aren't too bad today- they also feel cold.  I still can't go  to the bathroom every day, and I haven't gone since 11/13/20 and it was small balls.  Off and on I get pain in my right ear, and it has been ringing. I was able to get some relief with direct pressure on the right.  I have used ice pack on my back and sacrum- it helps    Objective:      Trunk flexion is 12 inches fingertips to the floor ( gained 5 1/2 inches from evaluation)  Patient moving more easily sit to stand.  PSIS is level and negative FB test  Cranial scan + Neural and visceral  fascia    Treatment Today     TREATMENT MINUTES COMMENTS   Evaluation     Self-care/ Home management 15 Discussed use of magnesium to help bowels and pain level   Manual therapy 30 Supine MT/SCS to abdomen including: CRISTINA-V, R URT-V   Neuromuscular Re-education     Therapeutic Activity     Therapeutic Exercises 10 Brief discussion of exercises: she really likes counter back arches and they seem to help her pain   Gait training     Modality__________________                Total 55    Blank areas are intentional and mean the treatment did not include these items.       Corrie Hernandez  11/17/2020

## 2021-06-13 NOTE — TELEPHONE ENCOUNTER
Called to see if pt had any questions about upcoming angiogram. Pt wondering if ok to take a bath the night before. Informed pt ok to take bath. Reminded pt nothing to eat or drink after midnight the night before. No further questions

## 2021-06-13 NOTE — PRE-PROCEDURE
Procedure Name: Left lower extremity angiogram, possible intervention. Monitoring and sedation.  Date/Time: 12/16/2020 7:42 AM  Written consent obtained?: Yes  Risks and benefits: Risks, benefits and alternatives were discussed  Consent given by: patient  Expected level of sedation: moderate  ASA Class: Class 2- mild systemic disease, no acute problems, no functional limitations  Mallampati: Grade 1- soft palate, uvula, tonsillar pillars, and posterior pharyngeal wall visible and Grade 2- soft palate, base of uvula, tonsillar pillars, and portion of posterior pharyngeal wall visible  Patient states understanding of procedure being performed: Yes  Patient's understanding of procedure matches consent: Yes  Appropriately NPO: yes  Lungs: lungs clear with good breath sounds bilaterally  Heart: normal heart sounds and rate  History & Physical reviewed: History and physical reviewed and no updates needed  Statement of review: I have reviewed the lab findings, diagnostic data, medications, and the plan for sedation

## 2021-06-13 NOTE — BRIEF OP NOTE
[facility]    Brief Operative Note    Pre-operative diagnosis: PAD, L SFA occlusion  Post-operative diagnosis Same    Procedure: LLE angiogram with pedal access (posterior tibial)   Surgeon: Yusuf Gilliland  Anesthesia: Local, sedation  Estimated Blood Loss: 10cc    Drains:      Specimens: None  Findings:  Could not cannulate L limb of bypass ?occlusion    Plan:  F/U in 2 wks w/ CTA w/ runoff

## 2021-06-13 NOTE — PROGRESS NOTES
"Iglesia Sohre is a 60 y.o. female who is being evaluated via a billable video visit.      The patient has been notified of following:     \"This video visit will be conducted via a call between you and your physician/provider. We have found that certain health care needs can be provided without the need for an in-person physical exam.  This service lets us provide the care you need with a video conversation.  If a prescription is necessary we can send it directly to your pharmacy.  If lab work is needed we can place an order for that and you can then stop by our lab to have the test done at a later time.    Video visits are billed at different rates depending on your insurance coverage. Please reach out to your insurance provider with any questions.    If during the course of the call the physician/provider feels a video visit is not appropriate, you will not be charged for this service.\"    Patient has given verbal consent to a Video visit? Yes  How would you like to obtain your AVS? AVS Preference: Mail a copy.  If dropped by the video visit, the video invitation should be sent to: Text to cell phone: [phone]  Will anyone else be joining your video visit? No      Video Start Time: 1030am    Additional provider notes: 60-year-old female lifestyle limiting claudication left lower extremity.  Patient angiogram via pedal access.  Unfortunately, the angiogram was aborted due to concern of possible left limb of aortobifemoral bypass graft occlusion.  CTA was done and did show patent aortobifemoral graft.  At this point it would be safe to redo an angiogram but via radial approach.  We will order bilateral upper extremity ultrasound to make sure that the arterial tree is patent.  We will proceed with an angiogram soon as possible.      Video-Visit Details    Type of service:  Video Visit    Video End Time (time video stopped): 10:45 AM  Originating Location (pt. Location): Home    Distant Location (provider " location):  [facility] [place]     Platform used for Video Visit: Betsy

## 2021-06-13 NOTE — TELEPHONE ENCOUNTER
Reminded Iglesia of upcoming atngio 12/16 at 0800 at [facility]. Informed pt nothing to eat or drink after midnight 12/15. Pt states understanding. Pt wanting to let Dr. Gilliland know about 25 years ago she was diagnosed with RSDI (reflex sympathetic dystrophy) and had to get a shot 7 times to trick her blood flow into moving again and wanted to let Dr. Gilliland know. Messaged Dr. Gilliland via teams about Iglesia's concern. No further questions from Iglesia

## 2021-06-13 NOTE — PRE-PROCEDURE
Verbal consent obtained?: Yes  Written consent obtained?: Yes  Risks and benefits: Risks, benefits and alternatives were discussed  Consent given by: patient  Expected level of sedation: moderate  ASA Class: Class 2- mild systemic disease, no acute problems, no functional limitations  Mallampati: Grade 2- soft palate, base of uvula, tonsillar pillars, and portion of posterior pharyngeal wall visible  Patient states understanding of procedure being performed: Yes  Patient's understanding of procedure matches consent: Yes  Procedure consent matches procedure scheduled: Yes  Appropriately NPO: yes  Lungs: lungs clear with good breath sounds bilaterally  Heart: normal heart sounds and rate  History & Physical reviewed: History and physical reviewed and no updates needed  Statement of review: I have reviewed the lab findings, diagnostic data, medications, and the plan for sedation

## 2021-06-13 NOTE — PROGRESS NOTES
[facility] Rehabilitation Daily Progress     Patient Name: Annette Shore  Date: 11/17/2020  Visit #:5  Referral Diagnosis: Gluteal Pain, SI pain, Lumbar Radicuolpathy  Referring provider: Dr. Alexx Norman  Visit Diagnosis:     ICD-10-CM    1. Chronic right-sided low back pain without sciatica  M54.5     G89.29    2. Myofascial pain syndrome  M79.18    3. Upper back pain, chronic  M54.9     G89.29    4. Pain of left lower leg  M79.662    5. Bilateral foot pain  M79.671     M79.672    6. PVD (peripheral vascular disease) (H)  I73.9    7. Peripheral neuropathy  G62.9    8. COPD (chronic obstructive pulmonary disease) (H)  J44.9      From 10/20/20 note:  Precaution: Abdominal and pelvic stents  Annette is a 60 year old female with severe sacral and bilateral leg pain, as well as severe abdominal pain with difficulty defecating. Patient is unable to give a time when this problem started due to multiple health issues occurring at the same time, but previous PT note lists 7/2020. She has severe PAD and also PN, with history of IBS. Patient has weakness of her core and LE's due to severe PAD and past history of what appears to be a type of slow transit disorder. Exam today shows limitation in trunk mobility diffusely, but no segmental LE numbness or weakness. Cranial fascial scanning does show abdominal and pelvic visceral and neural fascia dysfunction, which would correlate with pelvic floor dysfunction.  She did have US done 10/16/20 to left leg and is having angiogram and possible angioplasty on 11/11/20 which may change treatment and goal time. Past medical history significant for COPD, hyperlipidemia, hypertension, peripheral vascular disease status post femoral-popliteal bypass after failed stenting, and probable neuropathy. MRI does show L5-S1 disc bulge with right foraminal stenosis, and L3-4 foraminal stenosis. Patient is also participating in [employer] program for core strengthening. Symptoms do not correlate  with a specific dermatomal numbness and weakness pattern, and the source of pain is complicated by PAD and PN which overlaps into the same area of pain as the LB.    Assessment:     Patient did fall on 20, and has been on Plavix for the past 3 weeks- unable to complete angioplasty to left LE. She did have an SI dysfunction- likely from her fall - which added to her pain.  Patient is benefitting from skilled physical therapy and is making steady progress toward functional goals.    Goal Status:  Pt. will be independent with home exercise program in : 6 weeks  Pt. will have improved quality of sleep: in 12 weeks;Comment  Comment:: will wake 1x/night due to pain; Patient still wakes up alot, 4-5x/night largely due to pain    Pt will: be able to walk for longer than 10 minutes without increased low back symptoms by 6 weeks.: currently walks 5 minutes with 3-6/10 LBP  Pt will: be able to sit in a stationary position without increased R glute/low back symptoms by 6 weeks.  Pt will: be able to have a bowel movement without increased low back pain by 6 weeks.: currently relieved LBP after BM  Pt will: have LBP less than 5/10 during defication, and will be able to deficate 3-4x/week in 12 or more weeks: LBP during defication is 8-9/10, and decreases after to 4-5/10      Plan / Patient Education:     Continue with initial plan of care.  Continue MT for abdominal and neural fascia of the abdomen and pelvis- especially LS plexus- 95 N2    Subjective:     Pain Ratin/10 LB, right buttock pain 1-2/10. They attempted to perform an angiography on 20 but stopped because of blockage in the left groin. He said I have changed so much since the last US on 10/12/20. They want to repeat the CT scan on 20,  ultrasound. Now he wants to go in through my arm. I fell on 20 walking [place], and fell down a 4 foot foot embankment on right hip. I am black and blue over the right outer hip..    I am still having trouble with BM, not as much pain, but it still takes awhile to evacuate (one hour).      Objective:      Right PSIS high, + right FB test  Cranial scan is + right pelvis/hip, general lower abdominal and pelvic viscera fascia, arterial fascia- especially LE's  3 inch area of bruising over left lateral trochanter.   Left foot is red, white foot color very pale- unable to palpate either Pedal pulse      From 11/17/20 note:  Trunk flexion is 12 inches fingertips to the floor ( gained 5 1/2 inches from evaluation)  Patient moving more easily sit to stand.  PSIS is level and negative FB test  Cranial scan + Neural and visceral  fascia    Treatment Today     TREATMENT MINUTES COMMENTS   Evaluation     Self-care/ Home management     Manual therapy 42 Corrected SI, right anterior dysfunction. Supine MT/SCS to Right pelvis and hip and abdomen including: R ALFC-LV, R DFEM-LV, R EIL-LV, R TLFC-LV, R DLFC-LV   Neuromuscular Re-education 12 Kinesiotape to left proximal lateral hip- X space correction, and basket weave. Patient instructed to remove tape with increased pain or skin irritation, but otherwise she can leave it on 2-3 days, and will remove it before US on Sunday 11/29.   Therapeutic Activity     Therapeutic Exercises     Gait training     Modality__________________                Total 54    Blank areas are intentional and mean the treatment did not include these items.       Corrie Hernandez  11/17/2020

## 2021-06-13 NOTE — TELEPHONE ENCOUNTER
Patient reports she had a pelvic angiogram on 12/16/20 and while the doctor was able to perform the balloon procendure he could not place a stent and will need to do a bypass. Pelvic US scheduled 1/4/21 and she intends to be in PT on 1/7/21

## 2021-06-13 NOTE — OR NURSING
Pt. admitted late in the admit process that she ate a biscuit at 0645 with her meds. MD in room, and will cancel the procedure today.

## 2021-06-13 NOTE — PROGRESS NOTES
"Report given to ANDI Todd  Sheath site assessed jointly  Site with gauze and kerlix. Dry and intact. Foot warm.  Pulses doppler.  /76   Pulse 64   Temp 98  F (36.7  C) (Oral)   Resp 27   Ht 5' 5.75\" (1.67 m)   Wt 196 lb 14.4 oz (89.3 kg)   SpO2 97%   BMI 32.02 kg/m          "

## 2021-06-13 NOTE — PRE-PROCEDURE
Procedure Name: Left lower extremity angiogram pedal approach, possible intervention. Monitoring and sedation.  Date/Time: 11/25/2020 10:47 AM  Risks and benefits: Risks, benefits and alternatives were discussed  Consent given by: patient  Expected level of sedation: moderate  ASA Class: Class 2- mild systemic disease, no acute problems, no functional limitations  Mallampati: Grade 2- soft palate, base of uvula, tonsillar pillars, and portion of posterior pharyngeal wall visible  Patient states understanding of procedure being performed: Yes  Patient's understanding of procedure matches consent: Yes  Procedure consent matches procedure scheduled: Yes  Appropriately NPO: yes  Lungs: lungs clear with good breath sounds bilaterally  Heart: normal heart sounds and rate  History & Physical reviewed: History and physical reviewed and no updates needed  Statement of review: I have reviewed the lab findings, diagnostic data, medications, and the plan for sedation

## 2021-06-13 NOTE — TELEPHONE ENCOUNTER
Patient states she needs to cancel PT in December due to work up by Vascular surgery as a result of PVD and possible repeat LE bypass. Patient has 2 appointments which she will call and cancel at our , and I suggested she schedule appointments in January.

## 2021-06-13 NOTE — TELEPHONE ENCOUNTER
Patient went to angio today but had eaten a biscuit this morning. Dr. Gilliland holding off on angio and will need to be rescheduled now.     Called patient and rescheduled it to 11/25/20. Patient apologized for eating this morning. She states that she was so nervous and got nauseous with taking her morning meds on a empty stomach that she forgot and had a biscuit. Advised it is ok to hold her medications the morning of procedure if she wanted.     Will mail out her appointments out to her as well.

## 2021-06-14 NOTE — PROGRESS NOTES
[facility] Rehabilitation Daily Progress     Patient Name: Iglesia Shore  Date: 1/8/2021  Visit #:8  Referral Diagnosis: Gluteal Pain, SI pain, Lumbar Radicuolpathy  Referring provider: Dr. Alexx Norman  Visit Diagnosis:     ICD-10-CM    1. Chronic right-sided low back pain without sciatica  M54.5     G89.29    2. Myofascial pain syndrome  M79.18    3. Upper back pain, chronic  M54.9     G89.29    4. Pain of left lower leg  M79.662    5. Bilateral foot pain  M79.671     M79.672    6. PVD (peripheral vascular disease) (H)  I73.9    7. Peripheral neuropathy  G62.9    8. COPD (chronic obstructive pulmonary disease) (H)  J44.9      From 10/20/20 note:  Precaution: Abdominal and pelvic stents  Iglesia is a 60 year old female with severe sacral and bilateral leg pain, as well as severe abdominal pain with difficulty defecating. Patient is unable to give a time when this problem started due to multiple health issues occurring at the same time, but previous PT note lists 7/2020. She has severe PAD and also PN, with history of IBS. Patient has weakness of her core and LE's due to severe PAD and past history of what appears to be a type of slow transit disorder. Exam today shows limitation in trunk mobility diffusely, but no segmental LE numbness or weakness. Cranial fascial scanning does show abdominal and pelvic visceral and neural fascia dysfunction, which would correlate with pelvic floor dysfunction.  She did have US done 10/16/20 to left leg and is having angiogram and possible angioplasty on 11/11/20 which may change treatment and goal time. Past medical history significant for COPD, hyperlipidemia, hypertension, peripheral vascular disease status post femoral-popliteal bypass after failed stenting, and probable neuropathy. MRI does show L5-S1 disc bulge with right foraminal stenosis, and L3-4 foraminal stenosis. Patient is also participating in [employer] program for core strengthening. Symptoms do not correlate  with a specific dermatomal numbness and weakness pattern, and the source of pain is complicated by PAD and PN which overlaps into the same area of pain as the LB.    Assessment:     Patient had a left pelvic/LE angioplasty on 20 - per surgeon too dangerous to put in stents and only had balloon done. Extensive bruising of left forearm access point, left inner thigh and calf pain during procedure.Pain has changed from shooting to a throbbing pain. Patient reports that PT has significantlly helped to reduce the constipation and LB/rectal pain she was experiencing.  Patient is benefitting from skilled physical therapy and is making steady progress toward functional goals.    Goal Status:  Pt. will have improved quality of sleep: in 12 weeks;Comment  Comment:: will wake 1x/night due to pain; Currently patient wakes up 2x/night, due to back and left leg pain and right gluteal.  She reports she moves alot at night , and does sleep during the day also    Pt will: be able to walk for longer than 10 minutes without increased low back symptoms by 6 weeks.: currently walks 5 minutes with 6-710 LBP and bilateral gluteal pain  Pt will: be able to have a bowel movement without increased low back pain by 6 weeks.: improved as noted above  Pt will: have LBP less than 5/10 during defecation, and will be able to deficate 3-4x/week in 12 weeks: currently the back pain is 2-4/10, and tearing pain  in rectal fissures has decreased to 2/10      Plan / Patient Education:     Continue with initial plan of care.   Treat distal perforators both LV and arerial fascia of LE'sespecially LS plexus- 95 N2, and for arterial fascia for shin, Check OV-A 79    Subjective:     Pain Ratin/10 LB, right buttock pain 2-4/10. My feet have been really sore/painful this week- 5/10 (no tingling, no throbbing or shooting) , and I think it is from the neuropathy and the weather. I have been able to have a BM 3x/day.  It often feels like my thigh and  shin bones are glass and will break- both legs    1/8/21 note:  They did a 2nd angiogram on the left groin through my left arm which took 2 1/2 hours and was very painful on 12/16/20. The left groin was 70% blocked and now after the angiogram it is 57% open. The want to wait to do the bypass until the emi closes again, with a Doppler US monthly to monitor.  I have been pretty good with the BM, and have been to do it with less pain (2/10).- still don't have a BM daily- every 3 days.  I am on an antibiotic, but had to switch to a different antibiotic    Objective:      Left LE is swollen and red, more than right  Cranial + LE arterial and LE neural fascia.- extremely tender anterior somatic neural fascia TP  She is limping today on the left foot walking into and out of treatment room.-before and after treatment    From 1/8/20 note:  Right PSIS high, + right FB test  Cranial scan is + right pelvis/hip, general lower abdominal and pelvic viscera fascia, arterial fascia- especially LE's  Left foot color is now pink entire foot and leg to the knee but is swollen. Patient now wears compression socks during the day.  Trunk flexion is 9 inches (was 11/17/20 12 inches fingertips to the floor , gained 5 1/2 inches from evaluation)  Patient moving more easily sit to stand., and is able to sit now in waiting room    Treatment Today     TREATMENT MINUTES COMMENTS   Evaluation     Self-care/ Home management     Manual therapy 55 Supine MT/SCS for abdomen and LE'sincluding: stacked anterior somatic nerves iincluding OBT-N, AOBT-N, SOBT-N, FEM-N, Bilat SAPH-N, R SAPH-N, L EPUD-A, L FEM-A   Neuromuscular Re-education     Therapeutic Activity     Therapeutic Exercises     Gait training     Modality__________________                Total 55    Blank areas are intentional and mean the treatment did not include these items.       Corrie Hernandez  1/8/2021

## 2021-06-14 NOTE — PROGRESS NOTES
"Iglesia Shore is a 60 y.o. female who is being evaluated via a billable video visit.      How would you like to obtain your AVS? Mail a copy.  If dropped from the video visit, the video invitation should be resent by: Text to cell phone: [phone]  Will anyone else be joining your video visit? No      Subjective     Iglesia Shore is 60 y.o. and presents to clinic today for the US 12/30 left angio 12/16. on ASA, statin, plavix. Smoker. Pt planning on quitting this Wednesday. Pt states bruising lasted a long time. Pt states middle 3 toes has bruising on left leg. Pt states has a lot of swelling and left leg is bigger than right leg. Pt states left shin is very sensitive. Pt has no shooting pains \"like I had before\"    Pt states urine smells funny since angio. Pt states no other urinary issues.       Objective       Vitals:  No vitals were obtained today due to virtual visit.      Video-Visit Details    Type of service:  Video Visit    Originating Location (pt. Location): Home    Distant Location (provider location):  [facility]     Platform used for Video Visit: [email or web address]  "

## 2021-06-14 NOTE — TELEPHONE ENCOUNTER
Patient continues to have pain, discoloration and swelling in lower leg and foot. She wanted to know if this is expected or a concern. Please call her [phone] to discuss.

## 2021-06-14 NOTE — TELEPHONE ENCOUNTER
Pt was updated that legs will be reviewed at her US tomorrow. She stated that she is having L foot swelling and throbbing of her foot. Pt has not been wearing her compression due to the swelling. She will apply them today. Also encouraged her to elevate when possible.

## 2021-06-14 NOTE — PROGRESS NOTES
[facility] Rehabilitation Daily Progress     Patient Name: Iglesia Shore  Date: 1/15/2021  Visit #:9  Referral Diagnosis: Gluteal Pain, SI pain, Lumbar Radicuolpathy  Referring provider: Dr. Alexx Norman  Visit Diagnosis:     ICD-10-CM    1. Chronic right-sided low back pain without sciatica  M54.5     G89.29    2. Myofascial pain syndrome  M79.18    3. Upper back pain, chronic  M54.9     G89.29    4. Pain of left lower leg  M79.662    5. Bilateral foot pain  M79.671     M79.672    6. PVD (peripheral vascular disease) (H)  I73.9    7. Peripheral neuropathy  G62.9    8. COPD (chronic obstructive pulmonary disease) (H)  J44.9      From 10/20/20 note:  Precaution: Abdominal and pelvic stents  Iglesia is a 60 year old female with severe sacral and bilateral leg pain, as well as severe abdominal pain with difficulty defecating. Patient is unable to give a time when this problem started due to multiple health issues occurring at the same time, but previous PT note lists 7/2020. She has severe PAD and also PN, with history of IBS. Patient has weakness of her core and LE's due to severe PAD and past history of what appears to be a type of slow transit disorder. Exam today shows limitation in trunk mobility diffusely, but no segmental LE numbness or weakness. Cranial fascial scanning does show abdominal and pelvic visceral and neural fascia dysfunction, which would correlate with pelvic floor dysfunction.  She did have US done 10/16/20 to left leg and is having angiogram and possible angioplasty on 11/11/20 which may change treatment and goal time. Past medical history significant for COPD, hyperlipidemia, hypertension, peripheral vascular disease status post femoral-popliteal bypass after failed stenting, and probable neuropathy. MRI does show L5-S1 disc bulge with right foraminal stenosis, and L3-4 foraminal stenosis. Patient is also participating in [employer] program for core strengthening. Symptoms do not correlate  with a specific dermatomal numbness and weakness pattern, and the source of pain is complicated by PAD and PN which overlaps into the same area of pain as the LB.    Assessment:     Patient had a left pelvic/LE angioplasty on 20 - per surgeon too dangerous to put in stents and only had balloon done. Extensive bruising of left forearm access point, left inner thigh and calf pain during procedure.Pain has changed from shooting to a throbbing pain. Patient reports that PT has significantlly helped to reduce the constipation and LB/rectal pain she was experiencing.  Patient is benefitting from skilled physical therapy and is making steady progress toward functional goals.    Goal Status:  Pt. will have improved quality of sleep: in 12 weeks;Comment  Comment:: will wake 1x/night due to pain; Currently patient wakes up 2x/night, due to back and left leg pain and right gluteal.  She reports she moves alot at night , and does sleep during the day also    Pt will: be able to walk for longer than 10 minutes without increased low back symptoms by 6 weeks.: currently walks 5 minutes with 6-710 LBP and bilateral gluteal pain  Pt will: be able to have a bowel movement without increased low back pain by 6 weeks.: improved as noted above  Pt will: have LBP less than 5/10 during defecation, and will be able to deficate 3-4x/week in 12 weeks: currently the back pain is 2-4/10, and tearing pain  in rectal fissures has decreased to 2/10      Plan / Patient Education:     Continue with initial plan of care.   Treat distal perforators both LV and arerial fascia of LE'sespecially LS plexus- 95 N2, and for SMED-A 59    Subjective:     Pain Ratin/10 LB, Pubic bone and perineal area. I had a UTI and they gave me an antibiotic, then they called to have me switch to a different antibiotic. This has caused a yeast infection! Since the first day of using Monistat  I have a skin rash on my neck and base of the skull. After the last  treatment I was really sore later in the day, but I have been doing really good since then. The feet are not really great, but not sore at the moment.  It often feels like my thigh and shin bones are glass and will break- both legs    1/8/21 note:  They did a 2nd angiogram on the left groin through my left arm which took 2 1/2 hours and was very painful on 12/16/20. The left groin was 70% blocked and now after the angiogram it is 57% open. The want to wait to do the bypass until the emi closes again, with a Doppler US monthly to monitor.  I have been pretty good with the BM, and have been to do it with less pain (2/10).- still don't have a BM daily- every 3 days.  I am on an antibiotic, but had to switch to a different antibiotic    Objective:      Mild redness on posterior neck and suboccipital area- mild little bumps at C1 area.  Left LE is swollen and red, more than right  Cranial + LE arterial and LE neural fascia.- extremely tender anterior somatic neural fascia TP  She is limping today on the left foot walking into and out of treatment room.-before and after treatment    From 1/8/20 note:  Right PSIS high, + right FB test  Cranial scan is + right pelvis/hip, general lower abdominal and pelvic viscera fascia, arterial fascia- especially LE's  Left foot color is now pink entire foot and leg to the knee but is swollen. Patient now wears compression socks during the day.  Trunk flexion is 9 inches (was 11/17/20 12 inches fingertips to the floor , gained 5 1/2 inches from evaluation)  Patient moving more easily sit to stand., and is able to sit now in waiting room    Treatment Today     TREATMENT MINUTES COMMENTS   Evaluation     Self-care/ Home management     Manual therapy 55 Supine MT/SCS for abdomen/pelvisand LE's including: Bilat OV-A, Bilat EPUD-A, Bilat IIL-A, Bilat REC-A,L REP-A   Neuromuscular Re-education     Therapeutic Activity     Therapeutic Exercises     Gait training     Modality__________________                 Total 55    Blank areas are intentional and mean the treatment did not include these items.       Corrie Hernandez  1/15/2021

## 2021-06-14 NOTE — PROGRESS NOTES
"VASCULAR SURGERY progress note via video visit s/p angiogram   VASCULAR SURGEON: Dr. Darshana Shore   Medical Record #:  [id number]    Date of Service: 1/4/2021    PRIMARY CARE PROVIDER: Jo Hamilton MD LoAnn M Silviano is a 60 y.o. female who was seen via video visit today s/p left lower extremity angiogram with successful balloon angioplasty of left SFA occlusion via radial approach, which was done for lifestyle limiting claudication specifically bilateral buttock pain along with thigh and calf pain.  Patient feels that her left side claudication may be slightly improved but she continues to have significant right buttock pain.  She is compliant with her aspirin, statin, and plavix, and importantly has decreased her smoking to 4 cigarettes a day and has a quit date of 1/6/20. She also notes that she has some mild bruising to her second toe and is intermittently \"achy\" - denies any purple or black color changes and denies any motor/sensory loss and feels this is improving with time. Denies calf pain or swelling, she does report some generalized swelling to the left leg.   Importantly she does have chronic back pain with hx of spinal injection and known neurogenic component to her pain which makes it difficult to treat and decipher cause of symptoms.  Her US today does show improvement in ABIs with left TOBIAS at 1.0 and toe pressure of 74, previously 0.50 and 62.      Patient also mentions she has noticed a malodor to her urine without any other s/s of urinary tract infection.     IMPRESSION/PLAN:    -Good initial outcome with improvement of symptoms and ABIs on the left leg.   -Continue aspirin and plavix  -Continues to have claudication on the right side and per patient discussion with Dr. Gilliland indicates likely angiointervention on the right at some point and possibly redo bypass if this intervention fails.    - Smoking cessation - patient has planned quit date of 1/6/21.   -Follow up with Dr" Tarbunou in 1 month with repeat imaging.     Follow up with PCP in regards to workup for potential UTI.   ______________________________________________________________________    Subjective:    ALLERGIES:  Lyrica [pregabalin] and Bupropion    MEDS:    Current Outpatient Medications:      aspirin 325 MG tablet, Take 325 mg by mouth daily. , Disp: , Rfl:      atorvastatin (LIPITOR) 20 MG tablet, Take 20 mg by mouth every morning. , Disp: , Rfl:      clopidogreL (PLAVIX) 75 mg tablet, Take 1 tablet (75 mg total) by mouth daily., Disp: 90 tablet, Rfl: 1     DULoxetine (CYMBALTA) 60 MG capsule, 60mg in at bedtime, Disp: , Rfl:      ipratropium-albuteroL (DUO-NEB) 0.5-2.5 mg/3 mL nebulizer, 3 mL as needed. , Disp: , Rfl:      losartan-hydrochlorothiazide (HYZAAR) 50-12.5 mg per tablet, Take 1 tablet by mouth daily, Disp: , Rfl:      nabumetone (RELAFEN) 500 MG tablet, Take 1-2 tablets twice a day as needed for pain., Disp: 120 tablet, Rfl: 0    REVIEW OF SYSTEMS:    A 12 point ROS was reviewed and except for what is listed in the HPI above, all others are negative    Objective:    PE:  There were no vitals taken for this visit.  Wt Readings from Last 1 Encounters:   12/16/20 197 lb 3.2 oz (89.4 kg)     There is no height or weight on file to calculate BMI.    EXAM:  Exam - limited d/t video visit  Left leg: patient able to show me via video visit - appears to have slight generalized edema, no redness noted, difficult to see discoloration/bruising patient described to 2nd toe as she states it already healing, motor function intact.     DIAGNOSTIC STUDIES:     Resting TOBIAS's              Right: mmHg Index       Brachial: 110     Ankle-(PT): 69 0.57   Ankle-(DP): 65 0.53             Digit: 44 0.36                      Left: mmHg Index       Brachial: 122     Ankle-(PT): 122 1.00   Ankle-(DP): 96 0.79             Digit: 74 0.61      Previous ABIs from 12/2/20, left leg 0.50 with toe pressures of 62.       LABS:      Sodium    Date Value Ref Range Status   06/15/2020 142 136 - 145 mmol/L Final   05/27/2019 142 136 - 145 mmol/L Final   12/17/2018 143 136 - 145 mmol/L Final     Potassium   Date Value Ref Range Status   12/16/2020 3.5 3.5 - 5.0 mmol/L Final   11/25/2020 3.7 3.5 - 5.0 mmol/L Final   11/11/2020 3.6 3.5 - 5.0 mmol/L Final     Chloride   Date Value Ref Range Status   06/15/2020 105 98 - 107 mmol/L Final   05/27/2019 105 98 - 107 mmol/L Final   12/17/2018 104 98 - 107 mmol/L Final     BUN   Date Value Ref Range Status   06/15/2020 14 8 - 22 mg/dL Final   05/27/2019 16 8 - 22 mg/dL Final   12/17/2018 15 8 - 22 mg/dL Final     Creatinine   Date Value Ref Range Status   12/16/2020 0.69 0.60 - 1.10 mg/dL Final   11/25/2020 0.73 0.60 - 1.10 mg/dL Final   11/11/2020 0.77 0.60 - 1.10 mg/dL Final     Hemoglobin   Date Value Ref Range Status   12/16/2020 13.1 12.0 - 16.0 g/dL Final   11/25/2020 14.4 12.0 - 16.0 g/dL Final   11/11/2020 14.1 12.0 - 16.0 g/dL Final     Platelets   Date Value Ref Range Status   12/16/2020 241 140 - 440 thou/uL Final   11/25/2020 261 140 - 440 thou/uL Final   11/11/2020 250 140 - 440 thou/uL Final     BNP   Date Value Ref Range Status   09/26/2018 30 0 - 93 pg/mL Final     INR   Date Value Ref Range Status   12/16/2020 1.09 0.90 - 1.10 Final   11/25/2020 0.99 0.90 - 1.10 Final   11/11/2020 0.99 0.90 - 1.10 Final       Total time spent 35 (15,25,35) minutes face to face with patient with more than 50% time spent in counseling and coordination of care.    Sergei Ornelas, CYNTHIA  VASCULAR SURGERY

## 2021-06-14 NOTE — PROGRESS NOTES
[facility] Rehabilitation Daily Progress/Monthly Summary    Patient Name: Iglesia Shore  Date: 1/22/2021  Visit #: [id number]  Referral Diagnosis: Gluteal Pain, SI pain, Lumbar Radicuolpathy  Referring provider: Dr. Alexx Norman  Visit Diagnosis:     ICD-10-CM    1. Chronic right-sided low back pain without sciatica  M54.5     G89.29    2. Myofascial pain syndrome  M79.18    3. Upper back pain, chronic  M54.9     G89.29    4. Pain of left lower leg  M79.662    5. Bilateral foot pain  M79.671     M79.672    6. PVD (peripheral vascular disease) (H)  I73.9    7. Peripheral neuropathy  G62.9    8. COPD (chronic obstructive pulmonary disease) (H)  J44.9      From 10/20/20 note:  Precaution: Abdominal and pelvic stents  Iglesia is a 60 year old female with severe sacral and bilateral leg pain, as well as severe abdominal pain with difficulty defecating. Patient is unable to give a time when this problem started due to multiple health issues occurring at the same time, but previous PT note lists 7/2020. She has severe PAD and also PN, with history of IBS. Patient has weakness of her core and LE's due to severe PAD and past history of what appears to be a type of slow transit disorder. Exam today shows limitation in trunk mobility diffusely, but no segmental LE numbness or weakness. Cranial fascial scanning does show abdominal and pelvic visceral and neural fascia dysfunction, which would correlate with pelvic floor dysfunction.  She did have US done 10/16/20 to left leg and is having angiogram and possible angioplasty on 11/11/20 which may change treatment and goal time. Past medical history significant for COPD, hyperlipidemia, hypertension, peripheral vascular disease status post femoral-popliteal bypass after failed stenting, and probable neuropathy. MRI does show L5-S1 disc bulge with right foraminal stenosis, and L3-4 foraminal stenosis. Patient is also participating in [employer] program for core strengthening. Symptoms  do not correlate with a specific dermatomal numbness and weakness pattern, and the source of pain is complicated by PAD and PN which overlaps into the same area of pain as the LB.    Assessment:     Patient has SI dysfunction again likely from stepping up on a chair 3 days ago. She has had increased right buttock pain and increased constipation the past week also.  Patient is benefitting from skilled physical therapy and is making steady progress toward functional goals.    Goal Status:  Pt. will have improved quality of sleep: in 12 weeks;Comment  Comment:: will wake 1x/night due to pain; Currently patient wakes up 2x/night, due to back and left leg pain and right gluteal.  She reports she moves alot at night , and does sleep during the day also    Pt will: be able to walk for longer than 10 minutes without increased low back symptoms by 6 weeks.: currently walks 5 minutes with 6-710 LBP and bilateral gluteal pain  Pt will: be able to have a bowel movement without increased low back pain by 6 weeks.: improved as noted above  Pt will: have LBP less than 5/10 during defecation, and will be able to deficate 3-4x/week in 12 weeks: currently the back pain is 2-4/10, and tearing pain  in rectal fissures has decreased to 2/10      Plan / Patient Education:     Continue with initial plan of care.   Check sacral neural fascia 48, and for SMED-A 59    Subjective:     Pain Ratin-6/10 LB, . The skin rash I was having at the base of the skull turned out to be lice- early stages. I have used treatment shampoo and coconut oil. My mother had lice and I probably got them from her one month ago. My UTI is better. I am more constipated, and feel more bloated.   I felt OK after last treatment. My right knee and right LB has been killing me    21 note:  They did a 2nd angiogram on the left groin through my left arm which took 2 1/2 hours and was very painful on 20. The left groin was 70% blocked and now after the  angiogram it is 57% open. The want to wait to do the bypass until the emi closes again, with a Doppler US monthly to monitor.  I have been pretty good with the BM, and have been to do it with less pain (2/10).- still don't have a BM daily- every 3 days.  I am on an antibiotic, but had to switch to a different antibiotic    Objective:      Patient is limping on the right leg  Right PSIS is high, and right +FB test  Mild redness on posterior neck and suboccipital area- mild little bumps at C1 area.  Left LE is swollen and red, more than right  Cranial + Bilateral pelvic visceral- severe sharp left groin pain during treatment of SIGL-V      From 1/8/20 note:  Right PSIS high, + right FB test  Cranial scan is + right pelvis/hip, general lower abdominal and pelvic viscera fascia, arterial fascia- especially LE's  Left foot color is now pink entire foot and leg to the knee but is swollen. Patient now wears compression socks during the day.  Trunk flexion is 9 inches (was 11/17/20 12 inches fingertips to the floor , gained 5 1/2 inches from evaluation)  Patient moving more easily sit to stand., and is able to sit now in waiting room    Treatment Today     TREATMENT MINUTES COMMENTS   Evaluation     Self-care/ Home management     Manual therapy 55 Supine MT/SCS for abdomen/pelvis and LE's including: MM energy to correct right ant SI- good correction obtained,SIGL-V, ICV-V, CECM-V, R SANDOVAL-V, R MCLUN-N, L ICLUN-N   Neuromuscular Re-education     Therapeutic Activity     Therapeutic Exercises     Gait training     Modality__________________                Total 55    Blank areas are intentional and mean the treatment did not include these items.       Corrie Hernandez  1/22/2021

## 2021-06-15 NOTE — PROGRESS NOTES
VASCULAR SURGERY PROGRESS NOTE    VASCULAR SURGEON: Mehdi Gilliland MD, RPVI     LOCATION:  [facility] [place]    Iglesia Shore   Medical Record #:  [id number]  YOB: 1960  Age:  61 y.o.     Date of Service: 2/4/2021    PRIMARY CARE PROVIDER: Jo Hamilton MD      Reason for visit: Follow-up    IMPRESSION: 61-year-old female status post left lower extremity angiogram via radial approach and balloon angioplasty of the SFA.  Patient is doing well and states that her symptoms have gotten better.  She still has neurogenic component of her bilateral lower extremity pain which is stable.  ABIs have improved dramatically compared to previous ABIs.  Ultrasound did show patent SFA with some increased velocities in the mid and proximal SFA    RECOMMENDATION/RISKS: To new aspirin and Plavix.  I do not think that intervention on the right side is necessary given the lack of the symptomatology.  I would like to survey this patient and follow-up in 3 months with repeat studies.    HPI:  Iglesia Shore is a 61 y.o. female who was seen today for follow-up.  Patient is doing well overall and denies any new complaints.  REVIEW OF SYSTEMS:    A 12 point ROS was reviewed and is negative except for burning sensation in bilateral lower extremities    PHH:    Past Medical History:   Diagnosis Date     Back pain     low back     COPD (chronic obstructive pulmonary disease) (H)      Depression      Diabetes mellitus (H)      Hyperlipidemia      Hypertension      PAD (peripheral artery disease) (H)      Peripheral neuropathy           Past Surgical History:   Procedure Laterality Date     AORTA - FEMORAL ARTERY BYPASS GRAFT       HYSTEROSCOPY W/ ENDOMETRIAL ABLATION       IR EXTREMITY ANGIOGRAM LEFT  11/25/2020     IR EXTREMITY ANGIOGRAM LEFT  12/16/2020     spine fusion       tubal ligation         ALLERGIES:  Lyrica [pregabalin] and Bupropion    MEDS:    Current Outpatient Medications:      aspirin 325 MG  tablet, Take 325 mg by mouth daily. , Disp: , Rfl:      atorvastatin (LIPITOR) 20 MG tablet, Take 20 mg by mouth every morning. , Disp: , Rfl:      clopidogreL (PLAVIX) 75 mg tablet, Take 1 tablet (75 mg total) by mouth daily., Disp: 90 tablet, Rfl: 1     DULoxetine (CYMBALTA) 60 MG capsule, 60mg in at bedtime, Disp: , Rfl:      ipratropium-albuteroL (DUO-NEB) 0.5-2.5 mg/3 mL nebulizer, 3 mL as needed. , Disp: , Rfl:      ipratropium-albuteroL (DUO-NEB) 0.5-2.5 mg/3 mL nebulizer, 3 mL, Disp: , Rfl:      losartan-hydrochlorothiazide (HYZAAR) 50-12.5 mg per tablet, Take 1 tablet by mouth daily, Disp: , Rfl:      nabumetone (RELAFEN) 500 MG tablet, Take 1-2 tablets twice a day as needed for pain., Disp: 120 tablet, Rfl: 0     clopidogreL (PLAVIX) 75 mg tablet, 1 tab(s), Disp: , Rfl:      DULoxetine (CYMBALTA) 60 MG capsule, 1 cap, Disp: , Rfl:      losartan-hydrochlorothiazide (HYZAAR) 50-12.5 mg per tablet, 1 tab(s), Disp: , Rfl:     SOCIAL HABITS:    Social History     Tobacco Use   Smoking Status Current Every Day Smoker     Types: Cigarettes     Start date: 7/17/1970   Smokeless Tobacco Never Used   Tobacco Comment    4 cigarettes a day       Social History     Substance and Sexual Activity   Alcohol Use No       Social History     Substance and Sexual Activity   Drug Use Not Currently       FAMILY HISTORY:    Family History   Problem Relation Age of Onset     Diabetes Mother      Hypertension Mother      Cancer Father      Heart disease Father      Diabetes Father        PE:  /62   Pulse 76   Temp 98.3  F (36.8  C)   Resp 16   Wt Readings from Last 1 Encounters:   12/16/20 197 lb 3.2 oz (89.4 kg)     There is no height or weight on file to calculate BMI.    EXAM:  GENERAL: This is a well-developed 61 y.o. female who appears her stated age  EYES: Grossly normal.  MOUTH: Buccal mucosa normal   CARDIAC:  Normal S1 and S2, no Murmur  CHEST/LUNG:  Clear lung fields bilaterally  GASTROINTESINAL (ABDOMEN):Soft,  non-tender, B/S present, no pulsatile mass   MUSCULOSKELETAL: Grossly normal and both lower extremities are intact.  HEME/LYMPH: No lymphedema  NEUROLOGIC: Focally intact, Alert and oriented x 3.   PSYCH: appropriate affect  INTEGUMENT: No open lesions or ulcers  Pulse Exam: Multiphasic signals present on the left.  Monophasic signals present on the right          DIAGNOSTIC STUDIES:     Images:  Us Tobias Doppler No Exercise, 1-2 Levels, Bilateral    Result Date: 2/4/2021  BILATERAL RESTING ANKLE-BRACHIAL INDICES (TOBIAS'S) (02/04/21) Indication: SURVEILLANCE  PVD, LEFT LEG PAIN AND SWELLING.  S/P LEFT SFA DISTAL TO PROXIMAL BALLOON ANGIOPLASTY 12/16/2020. HX: AORTA-BIFEMORAL BYPASS GRAFT (2/10/2006) Previous: 12/30/2020 History: Current Smoker, Hypertension, Diabetic, Hyperlipidemia, PAD, Angioplasty, Vascular Surgery, Rest Pain and Claudication  Resting TOBIAS's          Right: mmHg Index     Brachial: 128  Ankle-(PT): 90 0.69 Ankle-(DP): 87 0.66           Digit: 74 0.56               Left: mmHg Index     Brachial: 131  Ankle-(PT): 100 0.76 Ankle-(DP): 109 0.83           Digit: 89 0.68 Resting ankle-brachial index of 0.69 on the right. Toe Pressures of 74 mmHg and TBI of 0.56  Resting ankle-brachial index of 0.83 on the left. Toe Pressures of 89 mmHg and TBI of 0.68  WAVEFORMS: The right dorsalis pedis and posterior tibial arteries show monophasic waveforms. The left dorsalis pedis and posterior tibial arteries show monophasic waveforms. Comments:  Impression:  Right TOBIAS is  Abnormal with an TOBIAS of 0.69 indicating moderate PAD and Toe Pressures of 74 mmHg and TBI of 0.56 which carries a good potential for wound healing yet its abnormal. Left TOBIAS is Abnormal with an TOBIAS of 0.83 indicating moderate PAD and Toe Pressures of 89 mmHg and TBI of 0.68 which is considered almost normal with good potential of wound healing. Reference: Ankle Brachial Pressures TOBIAS Disease Category > 1.3  Likely vessel calcification with monophasic  waveforms, non-diagnostic 0.95-1.30 Normal with multiphasic waveforms 0.50-0.95 Single level disease 0.30-0.50 Multilevel disease < 0.30 Critical limb ischema Digit Pressures DBI Disease Category < 0.70 Normal > 0.70 Abnormal > 30 mmHg Potential wound healing < 30 mmHg Impaired wound healing Volume Plethysmography Recording (VPR) at all levels Normal Sharp systolic peak, fast upstroke, prominent dicrotic notch in wave Mild Sharp systolic peak, fast upstroke, absent dicrotic notch in wave Moderate Flattened systolic peak, slowed upstroke, absent dicrotic notch in wave Severe Low-amplitude wave with = upslope and down slope Occluded Flat Line     Us Arterial Legs Bilateral Complete    Result Date: 2/4/2021  Arterial Duplex Ultrasound (02/04/21) Lower Extremity Artery Evaluation Indication: SURVEILLANCE  PVD, LEFT LEG PAIN AND SWELLING.  S/P LEFT SFA DISTAL TO PROXIMAL BALLOON ANGIOPLASTY 12/16/2020. HX: AORTA-BIFEMORAL BYPASS GRAFT (2/10/2006) Previous: 12/30/2020 History: Current Smoker, Hypertension, Diabetic, Hyperlipidemia, PAD, Angioplasty, Vascular Surgery, Rest Pain and Claudication Technique: Duplex imaging is performed utilizing gray-scale, two-dimensional images, and color-flow imaging. Doppler waveform analysis and spectral Doppler imaging is also performed. LOWER EXTREMITY ARTERIAL DUPLEX EXAM WITH WAVEFORMS Right Leg:(cm/s) Location: Velocities Waveforms EIA:   3  B CFA:   48  B PFA:   119  B SFA Proximal:   82  M SFA Mid:   19  M SFA Distal:   30  M Popliteal Artery:   33  M PTA:   29   M DANK:   26  M DPA:   41  M Waveforms: T=Triphasic, M=Monophasic, B=Biphasic Left Leg:(cm/s) Location: Velocities Waveforms EIA: 69 B CFA:   245  M PFA:   203  B SFA Proximal:   228  M SFA Mid:   86  B SFA Distal:   102  B Popliteal Artery:   79  T PTA:   41   B DANK:   90  B DPA:   143  B Waveforms: T=Triphasic, M=Monophasic, B=Biphasic Stenotic Profile Ratio: 245 / 69 = 3.55 Comments: Patent aortobifemoral bypass  Impression: Right Lower Extremity: Patent aortobifemoral bypass, monophasic flow at the level of the proximal right SFA to the infrapopliteal vessels, no hemodynamically significant stenosis with diffuse atherosclerosis Left Lower Extremity: Patent aortobifemoral bypass with hemodynamically significant lesion at the left SFA with monophasic flow in the proximal SFA indicating and confirming hemodynamically significant lesion at the left SFA Infrapopliteal vessels showed biphasic flow. Reference: Category Normal 1-19% 20-49% 50-99% Occluded PSV <160 cm/sec without spectral broadening <160 cm/sec with spectral broadening Increased Increased Absent Flow Ratio N/A N/A < 2.0 >2.0 N/A Post-Stenotic Turbulence No No No Yes N/A       I personally reviewed the images and my interpretation is patent left SFA.  ABIs have improved.    LABS:      Sodium   Date Value Ref Range Status   06/15/2020 142 136 - 145 mmol/L Final   05/27/2019 142 136 - 145 mmol/L Final   12/17/2018 143 136 - 145 mmol/L Final     Potassium   Date Value Ref Range Status   12/16/2020 3.5 3.5 - 5.0 mmol/L Final   11/25/2020 3.7 3.5 - 5.0 mmol/L Final   11/11/2020 3.6 3.5 - 5.0 mmol/L Final     Chloride   Date Value Ref Range Status   06/15/2020 105 98 - 107 mmol/L Final   05/27/2019 105 98 - 107 mmol/L Final   12/17/2018 104 98 - 107 mmol/L Final     BUN   Date Value Ref Range Status   06/15/2020 14 8 - 22 mg/dL Final   05/27/2019 16 8 - 22 mg/dL Final   12/17/2018 15 8 - 22 mg/dL Final     Creatinine   Date Value Ref Range Status   12/16/2020 0.69 0.60 - 1.10 mg/dL Final   11/25/2020 0.73 0.60 - 1.10 mg/dL Final   11/11/2020 0.77 0.60 - 1.10 mg/dL Final     Hemoglobin   Date Value Ref Range Status   12/16/2020 13.1 12.0 - 16.0 g/dL Final   11/25/2020 14.4 12.0 - 16.0 g/dL Final   11/11/2020 14.1 12.0 - 16.0 g/dL Final     Platelets   Date Value Ref Range Status   12/16/2020 241 140 - 440 thou/uL Final   11/25/2020 261 140 - 440 thou/uL Final   11/11/2020  250 140 - 440 thou/uL Final     BNP   Date Value Ref Range Status   09/26/2018 30 0 - 93 pg/mL Final     INR   Date Value Ref Range Status   12/16/2020 1.09 0.90 - 1.10 Final   11/25/2020 0.99 0.90 - 1.10 Final   11/11/2020 0.99 0.90 - 1.10 Final       Total time spent 20 minutes face to face with patient with more than 50% time spent in counseling and coordination of care.    Mehdi Gilliland MD, [facility]  VASCULAR SURGERY

## 2021-06-15 NOTE — PATIENT INSTRUCTIONS - HE
Please continue your Aspirin and plavix      Ultrasound    Thank you for choosing [facility] as partners in your care.  Please read the following information before your appointment.  Feel free to ask questions.    An ultrasound is an exam that uses sound waves to create pictures.  The special camera that takes the pictures is called a transducer.  An ultrasound technologist will perform the exam under the direction of a physician.    Preparation  Ultrasound preps vary.  If you are scheduled for an Aorta Ultrasound, please do not eat or drink anything 8 hours before your exam.  You may take daily medications with a small sip of water.  There is no preparation required for any of the other ultrasound exams performed at [facility].    The Examination  You may or may not need to change clothes for your exam.  The technologist will explain the exam to you.  He or she will ask you a few questions and answer any questions you may have.    You will lie on a table and a gel will be applied to your skin.  A small handheld instrument called a transducer will be moved across the area we are looking at while pictures are taken.  Also, your exam may include measuring your blood pressure on your arms, legs and/or toes.    When the Exam Is Completed  Your physician will receive the results of the exam and explain them to you.  You may return to your normal diet and activities unless otherwise directed by your doctor.  Feel free to ask questions if something is not clear to you.  We welcome comments.    At [facility], we are dedicated to providing the best possible care.  Thank you for taking time to read these instructions.  We hope your experience is as pleasant as possible.      You are scheduled for the following exam(s):    []Carotid Duplex:  Evaluates the carotid arteries in the neck that feed the brain with blood.  Gel is applied to the skin of the neck.  A transducer will be placed on the gel  covered areas to obtain images and evaluate and listen to the blood flow in the arteries.  This exam takes approximately 30 minutes.    []Venous Duplex:  Evaluates the veins that carry blood to the heart from the arms and/or legs.  Gel is applied to the skin of the arms and/or legs.  A transducer will be placed on the gel covered areas to obtain images and evaluate flow in the veins.  This exam takes approximately 30 minutes per arm/leg.    [x]Arterial Duplex:  Evaluates the arteries that feed the arms and legs with blood.  Gel is applied to the skin of the arms and/or legs.  A transducer will be placed on the gel covered areas to obtain images and listen to the blood flow in the arms and/or legs.  This exam takes approximately 30 minutes per arm/leg.    [x]TOBIAS or Segmental Pressures:  Ultrasound and blood pressure cuffs are used to evaluate the arteries that supply the arms and legs with blood.  Several blood pressure cuffs will be place on your arms and legs.  When inflated, the cuffs will provide blood pressure readings that are used to determine where blockages in the arteries may be.  You may be asked to do a moderate level of exercise during this exam.  This exam takes approximately 30-60 minutes.    []Aorta:  Evaluates the abdominal aorta for blockages and aneurysm.  Gel is applied to the stomach.  A transducer will be placed on the gel covered areas to obtain images of the aorta.  This exam takes approximately 30 minutes.    Prep instructions:  Do not eat or drink anything 8 hours before procedure.  You may take daily medications with a small sip of water.

## 2021-06-15 NOTE — TELEPHONE ENCOUNTER
Called patient and left message that she had a PT appointment today. Told her of next PT appointment day and time, and left our  number to call with any questions.

## 2021-06-16 NOTE — PROGRESS NOTES
[facility] Daily Progress     Patient Name: Iglesia Shore  Date: 3/24/2021  Visit #: 11  Referral Diagnosis: Gluteal Pain, SI pain, Lumbar Radicuolpathy  Referring provider: Dr. Alexx Norman  Visit Diagnosis:     ICD-10-CM    1. Chronic right-sided low back pain without sciatica  M54.5     G89.29    2. Myofascial pain syndrome  M79.18    3. Upper back pain, chronic  M54.9     G89.29    4. Pain of left lower leg  M79.662    5. Bilateral foot pain  M79.671     M79.672    6. PVD (peripheral vascular disease) (H)  I73.9    7. Peripheral neuropathy  G62.9    8. COPD (chronic obstructive pulmonary disease) (H)  J44.9          Assessment:     From 10/20/20 note:  Precaution: Abdominal and pelvic stents  Iglesia is a 60 year old female with severe sacral and bilateral leg pain, as well as severe abdominal pain with difficulty defecating. Patient is unable to give a time when this problem started due to multiple health issues occurring at the same time, but previous PT note lists 7/2020. She has severe PAD and also PN, with history of IBS. Patient has weakness of her core and LE's due to severe PAD and past history of what appears to be a type of slow transit disorder. Exam today shows limitation in trunk mobility diffusely, but no segmental LE numbness or weakness. Cranial fascial scanning does show abdominal and pelvic visceral and neural fascia dysfunction, which would correlate with pelvic floor dysfunction.  She did have US done 10/16/20 to left leg and is having angiogram and possible angioplasty on 11/11/20 which may change treatment and goal time. Past medical history significant for COPD, hyperlipidemia, hypertension, peripheral vascular disease status post femoral-popliteal bypass after failed stenting, and probable neuropathy. MRI does show L5-S1 disc bulge with right foraminal stenosis, and L3-4 foraminal stenosis. Patient is also participating in [facility] program for core strengthening. Symptoms do not  correlate with a specific dermatomal numbness and weakness pattern, and the source of pain is complicated by PAD and PN which overlaps into the same area of pain as the LB.    HEP/POC compliance is  good .  Response to Intervention has had good response to previous treatment. She has regressed some in the last 2 months due to not receiving treatment.   Patient is appropriate to continue with skilled physical therapy intervention, as indicated by initial plan of care.    Goal Status:  Comment:: will wake 1x/night due to pain; Currently patient wakes up 2x/night, due to back and left leg pain and right gluteal.  She reports she moves alot at night , and does sleep during the day also    Pt will: be able to walk for longer than 10 minutes without increased low back symptoms by 6 weeks.: currently walks 5 minutes with 6-710 LBP and bilateral gluteal pain  Pt will: be able to have a bowel movement without increased low back pain by 6 weeks.: improved as noted above  Pt will: have LBP less than 5/10 during defecation, and will be able to deficate 3-4x/week in 12 weeks: currently the back pain is 2-4/10, and tearing pain  in rectal fissures has decreased to 2/10      Plan / Patient Education:     Continue with initial plan of care.  Progress with home program as tolerated.   Continue to assess and treat for multi system fascial dysfunction contributing to multiple pain and functional complaints.     Subjective:     Pain Ratin  Patient returns after about 7 weeks.   She reports bowel dysfunction. Reports bout of diarrhea about 1 1/2 weeks ago, then was unable to have a bowel movement until yesterday. Severe back/(R) post pelvic pain with bowel movement. History of anal fissures. She also reports pain along (R) mandible and jaw. She has been biting her cheek and tongue and bleeding. Using an OTC mouth guard. She reports benefit with PT interventions.       Objective:     Initial cranial scan is (+) for lower trunk  lymphatics and post visceral, sphincters.   Mod/major restrictions of colon, duodenum and sphincter motility.   Major tenderness of (B) SI joints, (R) post/lat rib cage.     Treatment Today     TREATMENT MINUTES COMMENTS   Evaluation     Self-care/ Home management     Manual therapy 55 Induction, indirect, direct techniques utilized as appropriate for optimal tissue release.   MFR/SCS - iliocecal valve, ICV-V, CRISTINA-V, DJ-V, pylorus, colon motility, duodenum motility, (R) LGI-LV, (R) MGI-LV, (R) UGI-LV   Neuromuscular Re-education     Therapeutic Activity     Therapeutic Exercises     Gait training     Modality__________________                Total 55    Blank areas are intentional and mean the treatment did not include these items.       Carmen Daugherty  3/24/2021

## 2021-06-16 NOTE — PROGRESS NOTES
[facility] Daily Progress     Patient Name: Iglesia Shore  Date: 4/26/2021  Visit #: 4/10+22 (cert to 7/3/21)  Referral Diagnosis: Gluteal Pain, SI pain, Lumbar Radicuolpathy  Referring provider: Dr. Alexx Norman  Visit Diagnosis:     ICD-10-CM    1. Chronic right-sided low back pain without sciatica  M54.5     G89.29    2. Myofascial pain syndrome  M79.18    3. Upper back pain, chronic  M54.9     G89.29    4. Pain of left lower leg  M79.662    5. Bilateral foot pain  M79.671     M79.672    6. PVD (peripheral vascular disease) (H)  I73.9    7. Peripheral neuropathy  G62.9    8. COPD (chronic obstructive pulmonary disease) (H)  J44.9      From 10/20/20 note:  Precaution: Abdominal and pelvic stents  Iglesia is a 60 year old female with severe sacral and bilateral leg pain, as well as severe abdominal pain with difficulty defecating. Patient is unable to give a time when this problem started due to multiple health issues occurring at the same time, but previous PT note lists 7/2020. She has severe PAD and also PN, with history of IBS. Patient has weakness of her core and LE's due to severe PAD and past history of what appears to be a type of slow transit disorder. Exam today shows limitation in trunk mobility diffusely, but no segmental LE numbness or weakness. Cranial fascial scanning does show abdominal and pelvic visceral and neural fascia dysfunction, which would correlate with pelvic floor dysfunction.  She did have US done 10/16/20 to left leg and is having angiogram and possible angioplasty on 11/11/20 which may change treatment and goal time. Past medical history significant for COPD, hyperlipidemia, hypertension, peripheral vascular disease status post femoral-popliteal bypass after failed stenting, and probable neuropathy. MRI does show L5-S1 disc bulge with right foraminal stenosis, and L3-4 foraminal stenosis. Patient is also participating in [facility] program for core strengthening.  Symptoms do not correlate with a specific dermatomal numbness and weakness pattern, and the source of pain is complicated by PAD and PN which overlaps into the same area of pain as the LB.    Assessment:     Patient feels sacral pain is better, and is having better bowel movements since last visit. She continues to have LE sharp pains which she feels is neuropathy. She has had more fatigue and slept all weekend.    Goal Status:  Comment:: will wake 1x/night due to pain in 12 weeks; Currently patient wakes up every 2 hours all night, unsure of the reason but sometimes due to pain from prolonged positions- discomfort,    Pt will: be able to walk for longer than 10 minutes without increased low back symptoms by 12 weeks.: currently walks 5 minutes with 6-7/10 bilateral buttock pain and leg pain ( more the right)  Pt will: be able to have a bowel movement without increased low back pain by 12 weeks:  LBP gets worse with BM- 6-9/10 but she hasn't passed out.  Pt will: have LBP less than 5/10 during defecation, and will be able to deficate 3-4x/week in 12 weeks: currently the back/sacral pain is 2-6/10. Patient has had continued problems with BM- longest has been 2 weeks. Bowel movements are stil very limited and 1-2x/week and small volume but formed.      Plan / Patient Education:     Continue with initial plan of care.  Progress with home program as tolerated.  Review Susan Petros Exercises in clinic- continue neural fascia and complete Pre ganglionic neural fascia SCT1-5 in sitting, remainder of Vagus nerves, and LE neurals    Subjective:     Pain Rating: 3 and it is mostly my feet. My back is feeling pretty good- sitting is not bad today. I spent the weekend in bed- I was so tired and had no energy. My left big toes has sharp pains- 8/10, and happens up to 4 x/day- I also get a sharp pain in the top of the right foot.  I notice a bruised feeling on my right tibia.  I had 2 good BM's since I saw you, and no pain with  BM.    Objective:     No noticeable bruising on the right tibia, but she does have rubor in both LE's when dependant. No heat or other sign of Cellulitis.  Cranial scan + in bilat feet and ankles, bilat Vagus nerve fascia      From 4/21/21 note:  PSIS level- right SI level.  Cranial scan is + right pelvis/hip, general lower abdominal and pelvic viscera fascia, arterial fascia- especially LE's  Left foot color is now pink entire foot and leg to the knee- mild swelling throughout limb.  Trunk flexion 11 1/4 inches (was 9 inches on 1/28/21,was 11/17/20 12 inches fingertips to the floor.  Hamstring length: Left 60 deg, right 35 deg and increased HS and buttock pain      Treatment Today     TREATMENT MINUTES COMMENTS   Evaluation     Self-care/ Home management     Manual therapy 30 Supine MT/SCS to abdomen and chest: Bilat VLUN3-N, R VLUN2-N, R VPHAR-N, stacked PGT2-T5-N   Neuromuscular Re-education     Therapeutic Activity     Therapeutic Exercises 25 Instructed pt in Buerger Petros exercises  Exercises:  Exercise #1: Supine knee rocks- daily  Comment #1: SKTC, piriformis, and lumbar rotation stretch  Exercise #2: Prone on elbows - 1 min daily  Comment #2: Seated HS stretch  Exercise #3: Buerger Petros or Ratschows exercises for LE- 15 min daily  Comment #3: Supine bridges      Gait training     Modality__________________                Total 55    Blank areas are intentional and mean the treatment did not include these items.       Corrie Hernandez  4/26/2021

## 2021-06-16 NOTE — PROGRESS NOTES
[facility] Daily Progress     Patient Name: Iglesia Shore  Date: 4/21/2021  Visit #: 13/10+12 (cert to 7/3/21)  Referral Diagnosis: Gluteal Pain, SI pain, Lumbar Radicuolpathy  Referring provider: Dr. Alexx Norman  Visit Diagnosis:     ICD-10-CM    1. Chronic right-sided low back pain without sciatica  M54.5     G89.29    2. Myofascial pain syndrome  M79.18    3. Upper back pain, chronic  M54.9     G89.29    4. Pain of left lower leg  M79.662    5. Bilateral foot pain  M79.671     M79.672    6. PVD (peripheral vascular disease) (H)  I73.9    7. Peripheral neuropathy  G62.9    8. COPD (chronic obstructive pulmonary disease) (H)  J44.9      From 10/20/20 note:  Precaution: Abdominal and pelvic stents  Iglesia is a 60 year old female with severe sacral and bilateral leg pain, as well as severe abdominal pain with difficulty defecating. Patient is unable to give a time when this problem started due to multiple health issues occurring at the same time, but previous PT note lists 7/2020. She has severe PAD and also PN, with history of IBS. Patient has weakness of her core and LE's due to severe PAD and past history of what appears to be a type of slow transit disorder. Exam today shows limitation in trunk mobility diffusely, but no segmental LE numbness or weakness. Cranial fascial scanning does show abdominal and pelvic visceral and neural fascia dysfunction, which would correlate with pelvic floor dysfunction.  She did have US done 10/16/20 to left leg and is having angiogram and possible angioplasty on 11/11/20 which may change treatment and goal time. Past medical history significant for COPD, hyperlipidemia, hypertension, peripheral vascular disease status post femoral-popliteal bypass after failed stenting, and probable neuropathy. MRI does show L5-S1 disc bulge with right foraminal stenosis, and L3-4 foraminal stenosis. Patient is also participating in [facility] program for core strengthening.  Symptoms do not correlate with a specific dermatomal numbness and weakness pattern, and the source of pain is complicated by PAD and PN which overlaps into the same area of pain as the LB.    Assessment:     Patient felt decreased sacral pain and feels like she could sit - 3-4/10.    Goal Status:  Comment:: will wake 1x/night due to pain in 12 weeks; Currently patient wakes up every 2 hours all night, unsure of the reason but sometimes due to pain from prolonged positions- discomfort,    Pt will: be able to walk for longer than 10 minutes without increased low back symptoms by 12 weeks.: currently walks 5 minutes with 6-7/10 bilateral buttock pain and leg pain ( more the right)  Pt will: be able to have a bowel movement without increased low back pain by 12 weeks:  LBP gets worse with BM- 6-9/10 but she hasn't passed out.  Pt will: have LBP less than 5/10 during defecation, and will be able to deficate 3-4x/week in 12 weeks: currently the back/sacral pain is 2-6/10. Patient has had continued problems with BM- longest has been 2 weeks. Bowel movements are stil very limited and 1-2x/week and small volume but formed.      Plan / Patient Education:     Continue with initial plan of care.  Progress with home program as tolerated.    Subjective:     Pain Ratin.5 Central sacrum. I am worse with sitting, 10 minutes. No radiation of pain into the legs. No numbness into the legs. My left big toes has sharp pains- 8/10, and happens up to 4 x/day.    Objective:       PSIS level- right SI level.  Cranial scan is + right pelvis/hip, general lower abdominal and pelvic viscera fascia, arterial fascia- especially LE's  Left foot color is now pink entire foot and leg to the knee- mild swelling throughout limb.  Trunk flexion 11 1/4 inches (was 9 inches on 21,was 20 12 inches fingertips to the floor.  Hamstring length: Left 60 deg, right 35 deg and increased HS and buttock pain      Treatment Today     TREATMENT MINUTES  COMMENTS   Evaluation     Self-care/ Home management     Manual therapy 55 Supine MT/SCS to abdomen and back including: L FOT10-V, R SANDOVAL-V, Edis MR-V, L MUL-V   Neuromuscular Re-education     Therapeutic Activity     Therapeutic Exercises     Gait training     Modality__________________                Total 55    Blank areas are intentional and mean the treatment did not include these items.       Corrie Hernandez  4/21/2021

## 2021-06-16 NOTE — PROGRESS NOTES
[facility] Daily Progress     Patient Name: Iglesia Shore  Date: 4/13/2021  Visit #: 13/10+12 (cert to 7/3/21)  Referral Diagnosis: Gluteal Pain, SI pain, Lumbar Radicuolpathy  Referring provider: Dr. Alexx Norman  Visit Diagnosis:     ICD-10-CM    1. Chronic right-sided low back pain without sciatica  M54.5     G89.29    2. Myofascial pain syndrome  M79.18    3. Upper back pain, chronic  M54.9     G89.29    4. Pain of left lower leg  M79.662    5. Bilateral foot pain  M79.671     M79.672    6. PVD (peripheral vascular disease) (H)  I73.9    7. Peripheral neuropathy  G62.9    8. COPD (chronic obstructive pulmonary disease) (H)  J44.9          Assessment:     From 10/20/20 note:  Precaution: Abdominal and pelvic stents  Iglesia is a 60 year old female with severe sacral and bilateral leg pain, as well as severe abdominal pain with difficulty defecating. Patient is unable to give a time when this problem started due to multiple health issues occurring at the same time, but previous PT note lists 7/2020. She has severe PAD and also PN, with history of IBS. Patient has weakness of her core and LE's due to severe PAD and past history of what appears to be a type of slow transit disorder. Exam today shows limitation in trunk mobility diffusely, but no segmental LE numbness or weakness. Cranial fascial scanning does show abdominal and pelvic visceral and neural fascia dysfunction, which would correlate with pelvic floor dysfunction.  She did have US done 10/16/20 to left leg and is having angiogram and possible angioplasty on 11/11/20 which may change treatment and goal time. Past medical history significant for COPD, hyperlipidemia, hypertension, peripheral vascular disease status post femoral-popliteal bypass after failed stenting, and probable neuropathy. MRI does show L5-S1 disc bulge with right foraminal stenosis, and L3-4 foraminal stenosis. Patient is also participating in [facility] program for core  strengthening. Symptoms do not correlate with a specific dermatomal numbness and weakness pattern, and the source of pain is complicated by PAD and PN which overlaps into the same area of pain as the LB.    HEP/POC compliance is  good .  Response to Intervention fair to good.    Patient is appropriate to continue with skilled physical therapy intervention, as indicated by initial plan of care.  Reassessment is ongoing and plan of care may be adjusted for patient needs and clinical presentation.     Goal Status:  Comment:: will wake 1x/night due to pain; Currently patient wakes up 2x/night, due to back and left leg pain and right gluteal.  She reports she moves alot at night , and does sleep during the day also  status: sleeping 2-3 hours at time    Pt will: be able to walk for longer than 10 minutes without increased low back symptoms by 6 weeks.: currently walks 5 minutes with 6-710 LBP and bilateral gluteal pain status: walking tolerance < 1 block  Pt will: be able to have a bowel movement without increased low back pain by 6 weeks.: improved as noted above  Pt will: have LBP less than 5/10 during defecation, and will be able to deficate 3-4x/weel in 12 weeks: currently the back pain is 2-4/10, and tearing pain  in rectal fissures has decreased to 2/10      Plan / Patient Education:     Continue with initial plan of care.  Progress with home program as tolerated.    Subjective:     Pain Ratin.5  Main complaint today is low back pain, mostly on the (R) post pelvis. Worse with sitting. Took several days for constipation to resolve. Has been eating an eggplant dish for the last few days, seems to have helped.    Objective:     Initial cranial scan is (+) for epidurals.   Tenderness of (R) lat thigh, (B) post sacrum.  Mod/major fascial restrictions of LS junction, sacrum, TL fascia.     Lumbar ROM:  Date:      *Indicate scale AROM AROM AROM   Lumbar Flexion 75-80%     Lumbar Extension 65-70%      Right Left Right  Left Right Left   Lumbar Sidebending         Lumbar Rotation         Thoracic Flexion      Thoracic Extension      Thoracic Sidebending         Thoracic Rotation                 Treatment Today     TREATMENT MINUTES COMMENTS   Evaluation     Self-care/ Home management     Manual therapy 55 Induction, indirect, direct techniques utilized as appropriate for optimal tissue release.   MFR/SCS - supine LS traction, (R) EPIL2-5-LV, (R) EPIS1-2-LV, (L) EPIS3-4-LV, sacral release, prone LS traction, (R) post pelvis, TL fascia,    Neuromuscular Re-education     Therapeutic Activity     Therapeutic Exercises     Gait training     Modality__________________                Total 55    Blank areas are intentional and mean the treatment did not include these items.       Carmen Daugherty  4/13/2021

## 2021-06-17 NOTE — PROGRESS NOTES
VASCULAR SURGERY PROGRESS NOTE    VASCULAR SURGEON: Mehdi Gilliland MD, RPVI     LOCATION:  [facility]    Iglesia Shore   Medical Record #:  [id number]  YOB: 1960  Age:  61 y.o.     Date of Service: 5/3/2021    PRIMARY CARE PROVIDER: Jo Hamilton MD      Reason for visit: Follow-up    IMPRESSION: 60-year-old female status post left lower extremity angiogram with SFA intervention including balloon angioplasty.  ABIs are slowly declining with normal toe pressures on the right and borderline toe pressures on the left.  Patient states that she still feels much better and the symptoms in the left lower extremity are relatively mild.  Continue best medical management therapy.  Follow-up in 6 months with repeat studies.  RECOMMENDATION/RISKS: Continue best medical management therapy and follow-up in 6 months with repeat vascular studies.    HPI:  Iglesia Shore is a 61 y.o. female who was seen today for follow-up.  Patient is doing well in all regards and denies any complaints.  She states that her left lower extremity is feeling much better.  She still has some residual neurogenic pain.    REVIEW OF SYSTEMS:    A 12 point ROS was reviewed and is negative except for bilateral extremity neurogenic pain    PHH:    Past Medical History:   Diagnosis Date     Back pain     low back     COPD (chronic obstructive pulmonary disease) (H)      Depression      Diabetes mellitus (H)      Hyperlipidemia      Hypertension      PAD (peripheral artery disease) (H)      Peripheral neuropathy           Past Surgical History:   Procedure Laterality Date     AORTA - FEMORAL ARTERY BYPASS GRAFT       HYSTEROSCOPY W/ ENDOMETRIAL ABLATION       IR EXTREMITY ANGIOGRAM LEFT  11/25/2020     IR EXTREMITY ANGIOGRAM LEFT  12/16/2020     spine fusion       tubal ligation         ALLERGIES:  Lyrica [pregabalin] and Bupropion    MEDS:    Current Outpatient Medications:      aspirin 325 MG tablet, Take 325 mg by  mouth daily. , Disp: , Rfl:      atorvastatin (LIPITOR) 20 MG tablet, Take 20 mg by mouth every morning. , Disp: , Rfl:      clopidogreL (PLAVIX) 75 mg tablet, Take 1 tablet (75 mg total) by mouth daily., Disp: 90 tablet, Rfl: 1     clopidogreL (PLAVIX) 75 mg tablet, 1 tab(s), Disp: , Rfl:      DULoxetine (CYMBALTA) 60 MG capsule, 60mg in at bedtime, Disp: , Rfl:      DULoxetine (CYMBALTA) 60 MG capsule, 1 cap, Disp: , Rfl:      ipratropium-albuteroL (DUO-NEB) 0.5-2.5 mg/3 mL nebulizer, 3 mL as needed. , Disp: , Rfl:      ipratropium-albuteroL (DUO-NEB) 0.5-2.5 mg/3 mL nebulizer, 3 mL, Disp: , Rfl:      losartan-hydrochlorothiazide (HYZAAR) 50-12.5 mg per tablet, Take 1 tablet by mouth daily, Disp: , Rfl:      losartan-hydrochlorothiazide (HYZAAR) 50-12.5 mg per tablet, 1 tab(s), Disp: , Rfl:      nabumetone (RELAFEN) 500 MG tablet, Take 1-2 tablets twice a day as needed for pain., Disp: 120 tablet, Rfl: 0    SOCIAL HABITS:    Social History     Tobacco Use   Smoking Status Current Every Day Smoker     Types: Cigarettes     Start date: 7/17/1970   Smokeless Tobacco Never Used   Tobacco Comment    4 cigarettes a day       Social History     Substance and Sexual Activity   Alcohol Use No       Social History     Substance and Sexual Activity   Drug Use Not Currently       FAMILY HISTORY:    Family History   Problem Relation Age of Onset     Diabetes Mother      Hypertension Mother      Cancer Father      Heart disease Father      Diabetes Father        PE:  There were no vitals taken for this visit.  Wt Readings from Last 1 Encounters:   12/16/20 197 lb 3.2 oz (89.4 kg)     There is no height or weight on file to calculate BMI.    EXAM:  GENERAL: This is a well-developed 61 y.o. female who appears her stated age  EYES: Grossly normal.  MOUTH: Buccal mucosa normal   CARDIAC:  Normal S1 and S2, no Murmur  CHEST/LUNG:  Clear lung fields bilaterally  GASTROINTESINAL (ABDOMEN):Soft, non-tender, B/S present, no pulsatile  mass   MUSCULOSKELETAL: Grossly normal and both lower extremities are intact.  HEME/LYMPH: No lymphedema  NEUROLOGIC: Focally intact, Alert and oriented x 3.   PSYCH: appropriate affect  INTEGUMENT: No open lesions or ulcers  Pulse Exam: Monophasic signals present bilaterally        DIAGNOSTIC STUDIES:     Images:  No results found.    I personally reviewed the images and my interpretation is ABIs consistent with moderate disease bilaterally.  Decreased toe pressures on the left but still good enough for wound healing potential.  Duplex did show patent left SFA but with monophasic flow  LABS:      Sodium   Date Value Ref Range Status   06/15/2020 142 136 - 145 mmol/L Final   05/27/2019 142 136 - 145 mmol/L Final   12/17/2018 143 136 - 145 mmol/L Final     Potassium   Date Value Ref Range Status   12/16/2020 3.5 3.5 - 5.0 mmol/L Final   11/25/2020 3.7 3.5 - 5.0 mmol/L Final   11/11/2020 3.6 3.5 - 5.0 mmol/L Final     Chloride   Date Value Ref Range Status   06/15/2020 105 98 - 107 mmol/L Final   05/27/2019 105 98 - 107 mmol/L Final   12/17/2018 104 98 - 107 mmol/L Final     BUN   Date Value Ref Range Status   06/15/2020 14 8 - 22 mg/dL Final   05/27/2019 16 8 - 22 mg/dL Final   12/17/2018 15 8 - 22 mg/dL Final     Creatinine   Date Value Ref Range Status   12/16/2020 0.69 0.60 - 1.10 mg/dL Final   11/25/2020 0.73 0.60 - 1.10 mg/dL Final   11/11/2020 0.77 0.60 - 1.10 mg/dL Final     Hemoglobin   Date Value Ref Range Status   12/16/2020 13.1 12.0 - 16.0 g/dL Final   11/25/2020 14.4 12.0 - 16.0 g/dL Final   11/11/2020 14.1 12.0 - 16.0 g/dL Final     Platelets   Date Value Ref Range Status   12/16/2020 241 140 - 440 thou/uL Final   11/25/2020 261 140 - 440 thou/uL Final   11/11/2020 250 140 - 440 thou/uL Final     BNP   Date Value Ref Range Status   09/26/2018 30 0 - 93 pg/mL Final     INR   Date Value Ref Range Status   12/16/2020 1.09 0.90 - 1.10 Final   11/25/2020 0.99 0.90 - 1.10 Final   11/11/2020 0.99 0.90 - 1.10  Final       Total time spent 20 minutes face to face with patient with more than 50% time spent in counseling and coordination of care.    Mehdi Gilliland MD, VI  VASCULAR SURGERY

## 2021-06-17 NOTE — PROGRESS NOTES
[facility] Daily Progress     Patient Name: Iglesia Shore  Date: 5/11/2021  Visit #: 6/10+22 (cert to 7/3/21)  Referral Diagnosis: Gluteal Pain, SI pain, Lumbar Radicuolpathy  Referring provider: Dr. Alexx Norman  Visit Diagnosis:     ICD-10-CM    1. Chronic right-sided low back pain without sciatica  M54.5     G89.29    2. Myofascial pain syndrome  M79.18    3. Upper back pain, chronic  M54.9     G89.29    4. Pain of left lower leg  M79.662    5. Bilateral foot pain  M79.671     M79.672    6. PVD (peripheral vascular disease) (H)  I73.9    7. Peripheral neuropathy  G62.9    8. COPD (chronic obstructive pulmonary disease) (H)  J44.9      From 10/20/20 note:  Precaution: Abdominal and pelvic stents  Iglesia is a 60 year old female with severe sacral and bilateral leg pain, as well as severe abdominal pain with difficulty defecating. Patient is unable to give a time when this problem started due to multiple health issues occurring at the same time, but previous PT note lists 7/2020. She has severe PAD and also PN, with history of IBS. Patient has weakness of her core and LE's due to severe PAD and past history of what appears to be a type of slow transit disorder. Exam today shows limitation in trunk mobility diffusely, but no segmental LE numbness or weakness. Cranial fascial scanning does show abdominal and pelvic visceral and neural fascia dysfunction, which would correlate with pelvic floor dysfunction.  She did have US done 10/16/20 to left leg and is having angiogram and possible angioplasty on 11/11/20 which may change treatment and goal time. Past medical history significant for COPD, hyperlipidemia, hypertension, peripheral vascular disease status post femoral-popliteal bypass after failed stenting, and probable neuropathy. MRI does show L5-S1 disc bulge with right foraminal stenosis, and L3-4 foraminal stenosis. Patient is also participating in [employer] program for core strengthening.  Symptoms do not correlate with a specific dermatomal numbness and weakness pattern, and the source of pain is complicated by PAD and PN which overlaps into the same area of pain as the LB.    Assessment:     Asked patient to scrutinize her medications, fiber and water daily intake levels, and caffeine levels for BM.  Patient feels sacral pain is better, and is having better bowel movements since last visit. She continues to have LE sharp pains which she feels is neuropathy. She has had more fatigue and slept all weekend.    Goal Status:  Comment:: will wake 1x/night due to pain in 12 weeks; Currently patient wakes up every 2 hours all night, unsure of the reason but sometimes due to pain from prolonged positions- discomfort,    Pt will: be able to walk for longer than 10 minutes without increased low back symptoms by 12 weeks.: currently walks 5 minutes with 6-7/10 bilateral buttock pain and leg pain ( more the right)  Pt will: be able to have a bowel movement without increased low back pain by 12 weeks:  LBP gets worse with BM- 6-9/10 but she hasn't passed out.  Pt will: have LBP less than 5/10 during defecation, and will be able to deficate 3-4x/week in 12 weeks: currently the back/sacral pain is 2-6/10. Patient has had continued problems with BM- longest has been 2 weeks. Bowel movements are stil very limited and 1-2x/week and small volume but formed.      Plan / Patient Education:     Continue with initial plan of care.  Progress with home program as tolerated.  Check and treatPre ganglionic neural fascia SCT1-5 in sitting, remainder of Vagus nerves, and LE neural's not already treated- mesentery and scar fascia as needed    Subjective:     Pain Rating: 3 in my sacrum. I have been getting more headaches- today located in the right frontal bone. I am aye my CPAP and accidentally biting my tongue or checks- blood all over the pillow ( I lost my oral appliance to prevent this).. My fatigue level is overall  better (I have been working in my garden), but I have to lay down in the afternoon for a couple hours  The big toes and feet are less overall sharp pain. I was doing good with the BM and then had a week where I had no BM, my stomach hurts/raw and as I do the BM my back hurts. I tried the Burger Petros exercises- not much change but I only did it a few times.    5/7/21 note: Had US of legs on 5/3/21, was told numbers are back to the same level as before the angiogram, so procedure didn't work. Not a stent candidate. Low back is bothering her today.     Objective:     Cranial scan + in bilat feet and ankles, bilat Vagus nerve fascia , visceral scarring   Patient able to tolerate sitting - actually sitting 10-15 minutes in waiting room now which never happened 4 months ago.      From 4/21/21 note:  PSIS level- right SI level.  Cranial scan is + right pelvis/hip, general lower abdominal and pelvic viscera fascia, arterial fascia- especially LE's  Left foot color is now pink entire foot and leg to the knee- mild swelling throughout limb.  Trunk flexion 11 1/4 inches (was 9 inches on 1/28/21,was 11/17/20 12 inches fingertips to the floor.  Hamstring length: Left 60 deg, right 35 deg and increased HS and buttock pain    Treatment Today     TREATMENT MINUTES COMMENTS   Evaluation     Self-care/ Home management     Manual therapy 55 Supine MT/SCS to abdomen, pelvis, and LE'S including: R SFIB-N, R DFIB-N, R CPLAN1-2-N, R MPL-N, R MGAS-N, R DSCI-N, R SCLUN, MCLUN, Bilat ICLUN-N, R DSAPH-N, R MONICA-LV, stacked MES3-4-V Bilat   Neuromuscular Re-education     Therapeutic Activity     Therapeutic Exercises      Gait training     Modality__________________                Total 55    Blank areas are intentional and mean the treatment did not include these items.       Corrie Hernandez, PT  5/11/2021

## 2021-06-17 NOTE — PROGRESS NOTES
"[facility] Daily Progress     Patient Name: Iglesia Shore  Date: 5/7/2021  Visit #: 5/10+22 (cert to 7/3/21)  Referral Diagnosis: Gluteal Pain, SI pain, Lumbar Radicuolpathy  Referring provider: Dr. Alexx Norman  Visit Diagnosis:     ICD-10-CM    1. Chronic right-sided low back pain without sciatica  M54.5     G89.29    2. Myofascial pain syndrome  M79.18    3. Upper back pain, chronic  M54.9     G89.29    4. Pain of left lower leg  M79.662    5. Bilateral foot pain  M79.671     M79.672    6. PVD (peripheral vascular disease) (H)  I73.9    7. Peripheral neuropathy  G62.9    8. COPD (chronic obstructive pulmonary disease) (H)  J44.9          Assessment:     Response to Intervention fair.   Patient is appropriate to continue with skilled physical therapy intervention, as indicated by initial plan of care.  Patient presents with multisystem fascial dysfunction, complicated by PAD and PN.     Goal Status:  Comment:: will wake 1x/night due to pain in 12 weeks; Currently patient wakes up every 2 hours all night, unsure of the reason but sometimes due to pain from prolonged positions- discomfort,    Pt will: be able to walk for longer than 10 minutes without increased low back symptoms by 12 weeks.: currently walks 5 minutes with 6-7/10 bilateral buttock pain and leg pain ( more the right)  Pt will: be able to have a bowel movement without increased low back pain by 12 weeks:  LBP gets worse with BM- 6-9/10 but she hasn't passed out.  Pt will: have LBP less than 5/10 during defecation, and will be able to deficate 3-4x/week in 12 weeks: currently the back/sacral pain is 2-6/10. Patient has had continued problems with BM- longest has been 2 weeks. Bowel movements are stil very limited and 1-2x/week and small volume but formed.      Plan / Patient Education:     Continue with initial plan of care.  Progress with home program as tolerated.    Subjective:     Pain Rating: 3.5  \"touch and go\" a few days in the " past week. Having a little more bowel movements. Had 1 today, but not for the last 3 days. Had US of legs on 5/3/21, was told numbers are back to the same level as before the angiogram, so procedure didn't work. Not a stent candidate. Low back is bothering her today.   Has been working on her exercises as able. Has been doing yard work (several hours), then had to spend day in bed due to back and leg pain yesterday.     Objective:     Initial cranial scan is (+) for cranial nerves, thoracic sympathetics,   Multiple tender pts L>R ant rib cage, sternum,   Mod fascial restrictions of LS junction, sacrum, pelvic floor.     Treatment Today     TREATMENT MINUTES COMMENTS   Evaluation     Self-care/ Home management NC Discussed pacing activities, I.e. limiting yard work to 15-20 min/day or as tolerated. Suggested setting a timer to avoid overdoing it.    Manual therapy 53 Induction, indirect, direct techniques utilized as appropriate for optimal tissue release.   MFR/SCS - (B) VHRT-N, (L) VPAN-N, (L) SCT1-2-N, (L) SCT3-N (scar), (L) SCT5-N, supine LS traction, pelvic diaphragm,    Neuromuscular Re-education     Therapeutic Activity     Therapeutic Exercises     Gait training     Modality__________________                Total 53    Blank areas are intentional and mean the treatment did not include these items.       Carmen Daugherty  5/7/2021

## 2021-06-17 NOTE — PROGRESS NOTES
Patient is in clinic today to see MD Mehdi Gilliland.      Patient is here for a 3 month follow up to discuss PAD    The patient does smoke.    Pt is currently taking aspirin, Plavix and statin.    Pt rates pain  located at back.    Pts symptoms include Leg Cramps or Discoloration of leg in Bilateral extremity    Patients condition is stable.Blank Single:[id number]::    Do you have specific questions you would like to be sure are addressed with the provider today?     Do I have to be on blood thinners for the rest of my life?

## 2021-06-18 NOTE — PATIENT INSTRUCTIONS - HE
Patient Instructions by Laura Mcgarry RN at 12/4/2020 10:00 AM     Author: Laura Mcgarry RN Service: -- Author Type: Registered Nurse    Filed: 12/4/2020 10:34 AM Encounter Date: 12/4/2020 Status: Addendum    : Laura Mcgarry RN (Registered Nurse)    Related Notes: Original Note by Laura Mcgarry RN (Registered Nurse) filed at 12/4/2020 10:33 AM       You have been scheduled for the following procedure:    Procedure: Left leg Angiogram    Date: 12/16    Arrival Time: 0630    Procedure Time: 0800    Location: [facility]    Please report to the information desk located in the [place] entrance on [address] at [facility].  Tell them you are scheduled for an appointment in Interventional Radiology.  They will then direct you to the Surgical Admit Unit or the Main Radiology desk.    Please arrive at the hospital by 0630 am on the day of your procedure.  (Note your scheduled arrival time will be earlier than your scheduled procedure start time to allow for your pre-procedure exam, lab work, preparation and consent.)    Other Instructions:    Do not eat or drink anything after midnight before your procedure.    You may take your normal medications on the morning of your procedure with a few sips of water (unless otherwise instructed)    Please inform us if you are taking insulin, Glucophage (Metformin), Coumadin, Arixtra, or Lovenox.    Please bring a current list of medications with    If you are having an angiogram:    You will need a responsible adult to stay with you at home your first night.    You will need a     It is ok  to stay on your aspirin and plavix        If you have any questions regarding your procedure, your instructions or should you need to reschedule or cancel your procedure, please contact Randa at the [facility].       Peripheral Angiography  Peripheral angiography is an outpatient procedure that makes a map of the vessels (arteries) in your lower body, legs, and  arms, using X-ray and dye.This map can show where blood flow may be blocked.  Talk with your healthcare provider about the risks and complications of angiography.   Before the procedure  Prepare for the peripheral angiography as follows:     Tell your healthcare provider about all medicines you take and any allergies you may have.    Follow any directions youre given for not eating or drinking before the procedure. If your provider says to take your normal medicines, swallow them with only small sips of water.    Arrange for a family member or friend to drive you home.  During the procedure  Here is what to expect:      You may get medicine through an IV (intravenous) line to relax you. Youre given an injection to numb the insertion site. Then, a tiny skin cut (incision) is made near an artery in your groin.    Your provider inserts a thin tube (catheter) through the incision. He or she then threads the catheter into an artery while looking at a video monitor.    Contrast dye is injected into the catheter to confirm position. You may feel warmth or pressure in your legs and back. You lie still as X-rays are taken. The catheter is then taken out.  After the procedure  Youll be taken to a recovery area. A healthcare provider will apply pressure to the site for about 10 minutes. Your healthcare provider will tell you how long to lie down and keep the insertion site still. Your healthcare provider will discuss the results with you soon after the procedure.  Back at home  On the day you get home, dont drive, dont exercise, avoid walking and taking stairs, and avoid bending and lifting. Your healthcare provider may give you other care instructions.  Call your healthcare provider  Call your healthcare provider right away if:    You notice a lump or bleeding at the insertion site    You feel pain at the insertion site    You become lightheaded or dizzy    You have leg pain or numbness    You do not urinate in 8 hours     Date Last Reviewed: 5/1/2016 2000-2017 [employer]. [address] [place] [address], [place] [address]. All rights reserved. This information is not intended as a substitute for professional medical care. Always follow your healthcare professional's instructions.    Angiogram Procedure Discharge Instructions:     1. If you received sedation for your procedure: Do not drive or operate heavy machinery for the rest of the day.     2. Avoid strenuous activity for 72 hours (3 days):                        - Do not lift greater than 10 pounds.                        - Excessive exercise                        - Straining                        - Return to your normal activities as you tolerate after the 3 days restriction     3. Avoid tub baths, Jacuzzis, hot tubs and pools for 72 hours (3 days) or until puncture site is will healed.     4. You may shower beginning tomorrow. Do not scrub puncture site(s) until well healed, pat dry.     5. You can expect to return to work 1-2 days after your procedure - depending on the nature of your profession.     6. It is normal to have some tenderness and minimal swelling at puncture site. A small area of discoloration may be present. Tenderness typically subsides in 24-48 hours. A small knot may also be present at puncture site for 6-8 weeks, this can be a normal part of the healing process.     After the angiogram If you:      1. Experience any bleeding or active swelling from puncture site: Lie down, firmly apply pressure to puncture site and CALL 9-1-1     2. Fever greater than 101 degrees Fahrenheit.     3. Redness, swelling, warmth to touch, or purulent (yellow/green/foul smelling) drainage from the puncture site.     4. Increasing pain, tenderness or swelling at puncture site OR of arm/leg near puncture site.     5. Feeling weak or faint.     6. Change in color, temperature, or sensation of arm/leg where puncture was made.     Call us with any other questions or  concerns after your procedure: [phone]    You will need to have an ultrasound 2-3 weeks after your angiogram and should be scheduled at the time of your follow up appt.  Further follow up will be based on ultrasound results. Typical follow up is every 3 months for the first year, then every 6 months to one year thereafter.

## 2021-06-18 NOTE — PATIENT INSTRUCTIONS - HE
Patient Instructions by Alber Chiu RN at 5/3/2021  9:00 AM     Author: Alber Chiu RN Service: -- Author Type: Registered Nurse    Filed: 5/3/2021  9:45 AM Encounter Date: 5/3/2021 Status: Signed    : Alber Chiu RN (Registered Nurse)             Peripheral Artery Disease (PAD)   Peripheral artery disease (PAD) occurs when the arteries that carry blood to the limbs are narrowed or blocked. This is usually due to a buildup of a fatty substance called plaque in the walls of the arteries.  PAD most often affects the arteries in the legs. When these arteries are narrowed or blocked, blood flow to the legs is reduced. This can cause leg and foot pain and other symptoms. If severe enough, reduced blood flow to the legs can lead to tissue death (gangrene) and the loss of a toe, foot, or leg. Having PAD also makes it more likely that arteries in other body areas are blocked. For instance, arteries that carry blood to the heart or brain may be affected. This raises the chances of heart attack and stroke.  Risk factors  Certain factors can make PAD more likely. They include:    Smoking    Diabetes    High blood pressure    Unhealthy cholesterol levels    Obesity    Inactive lifestyle    Older age    Family history of PAD  Symptoms  Many people with PAD have no symptoms. If symptoms do occur, they can include:    Pain in the muscles of the calves, thighs or hips that gets worse with activity and better with rest (intermittent claudication)    Achy, tired, or heavy feeling in the legs    Weakness, numbness, tingling, or loss of feeling in the legs    Changes in skin color of the legs    Sores on the legs and feet    Cold leg, feet, or toes    Pain the feet or toes even when lying down (rest pain)  Home care  PAD is a chronic (lifelong) condition. Treatment is focused on managing your condition and lowering your health risks. This may include doing the following:    If you smoke, quit. This helps prevent further damage to  your arteries and lowers your health risks. Ask your provider about medicines or products that can help you quit smoking. Also consider joining a stop-smoking program or support group.    Be more active. This helps you lose weight and manage problems such as high blood pressure and unhealthy cholesterol levels. Start a walking program if advised to by your provider. Your provider may also help you form a safe exercise program that is right for your needs.    Make healthy eating changes. This includes eating less fat, salt, and sugar.    Take medicines for high blood pressure, unhealthy cholesterol levels, and diabetes as directed.    Have your blood pressure and cholesterol levels checked as often as directed.    If you have diabetes, try to keep your blood sugar well controlled. Test your blood sugar as directed.    If you are overweight, talk to your provider about forming a weight-loss plan.    Watch for cuts, scrapes, or open sores on your feet. Poor blood flow to the feet may slow healing and increase the risk of infection from these problems.   Follow-up care  Follow up with your healthcare provider, or as advised. If imaging tests such as ultrasound were done, they will be reviewed by a doctor. You will be told the results and any new findings that may affect your care.  **When to seek medical advice**  Call your healthcare provider right away if any of these occur:    Sudden severe pain in the legs or feet    Sudden cold, pale or blue color in the legs or feet    Weakness or numbness in the legs or feet that worsens    Any sore or wound in the legs or feet that wont heal    Weak pulse in your legs or feet  Know the signs of heart attack and stroke  People with PAD are at high risk for heart attack and stroke. Knowing the signs of these problems can help you protect your health and get help when you need it. Call 911 right away if you have any of the following:  Signs of a heart attack    Chest discomfort,  such as pain, aching, tightness, or pressure that lasts more than a few minutes, or that comes and goes    Pain or discomfort in the arms, back, shoulders, neck, or jaw    Shortness of breath    Sweating (often a cold, clammy sweat)    Nausea    Lightheadedness  Signs of a stroke    Sudden numbness or weakness of the face, arms, or legs, especially on one side    Sudden confusion or trouble speaking or understanding    Sudden trouble seeing in one or both eyes    Sudden trouble walking, dizziness, or loss of balance    Sudden, severe headache with no known cause   Date Last Reviewed: 9/21/2015 2000-2017 . [address] [place] [address]. All rights reserved. This information is not intended as a substitute for professional medical care. Always follow your healthcare professional's instructions.

## 2021-06-18 NOTE — PATIENT INSTRUCTIONS - HE
Patient Instructions by Laura Mcgarry RN at 1/4/2021 10:40 AM     Author: Laura Mcgarry RN Service: -- Author Type: Registered Nurse    Filed: 1/4/2021 11:58 AM Encounter Date: 1/4/2021 Status: Signed    : Laura Mcgarry RN (Registered Nurse)       Lower Extremity Arterial Ultrasound    Description  Ultrasound examinations are painless and easy for the patient. The vascular laboratory will contain a bed and just two or three pieces of equipment. You will be asked to remove pants or shorts and gowns will be provided. It usually takes about 30 minutes.  The technician will tuck a towel under your underpants in the groin. The gel is water-soluble and will not stain your skin or clothes.  Ultrasound gel, usually warmed for your comfort, will be placed on the inner side of your legs.    Through the gel, the technician will apply to your legs a small hand-held device that emits sound waves.  When the test is completed, the technician will remove excess gel from your legs.    Risks  There are typically no side effects or complications associated with a lower extremity arterial duplex ultrasound.  How to Prepare  Eat and take medications as usual.  There is no preparation required for a lower extremity arterial duplex ultrasound.  What Can I Expect After the Test?  The technician will send the ultrasound images to your vascular surgeon for evaluation. Typically, a report is available in 2-3 days. If anything critical is found, it is standard practice to notify the vascular surgeon immediately.  Reference: https://vascular.org/patient-resources/vascular-tests  Ankle-Brachial Index (TOBIAS) or Physiologic Test    Description  An ankle-brachial index test is relatively pain free. Blood pressure cuffs of various sizes are placed on your thigh, calf, foot and toes.  Similar to having your blood pressure checked with an arm cuff, as the technician inflates the cuffs, they progressively tighten and are then quickly released.   You may feel some discomfort, but generally for less than 60 seconds for each measurement. You will be asked to remove your socks and shoes and possibly your pants or shorts. Gowns will be provided. It usually takes about 30-60 minutes.   Depending on the initial readings and patient symptoms, you may be asked to perform a light walk on a treadmill.  The technician will apply ultrasound gel, usually warmed for your comfort, to your ankles and wrists. Through the gel, the technician will use a small hand-held device that emits sound waves.  Risks  There are typically no side effects or complications associated with a physiologic study.  How to Prepare  Eat and take medications as usual.  There is no preparation required for an ankle-brachial index (TOBIAS) or physiologic exam.  What Can I Expect After the Test?  The technician will send the ultrasound images to your vascular surgeon for evaluation. Typically, a report is available in 2-3 days. If anything critical is found, it is standard practice to notify the vascular surgeon immediately.  Reference: https://vascular.org/patient-resources/vascular-tests

## 2021-06-18 NOTE — PATIENT INSTRUCTIONS - HE
Patient Instructions by Randa Beebe RN at 6/30/2020  9:15 AM     Author: Randa Beebe RN Service: -- Author Type: Registered Nurse    Filed: 6/30/2020 10:34 AM Encounter Date: 6/30/2020 Status: Addendum    : Randa Beebe RN (Registered Nurse)    Related Notes: Original Note by Randa Beebe RN (Registered Nurse) filed at 6/30/2020 10:29 AM         [facility] Virtual Check-In Number    [phone]    When you arrive for your IN OFFICE visit. Please call this number from the parking lot.      The staff will then virtually check you in and meet you at the door to escort you to a room.    If you arrive early and a room is not yet ready for you staff may have you wait can call you back when they are ready.     Please remember to wear a mask into your appointment     No Visitors Allowed    If you are having any symptoms- cough, fever, rash, loss of taste and smell or shortness of breath we ask that you please call and reschedule your appointment.   I have you scheduled for your CTA 7/17 at 12pm at [facility] and then to see Dr Gilliland after the CTA. You must be fasting 2 hours before the CT scan.     Computed Tomography Angiography (CTA)     CTA can make 3D images, such as the carotid arteries shown here.     Computed tomography angiography (CTA) is an imaging test. It uses X-rays and computer technology to make detailed pictures of your arteries. Before the test, an X-ray dye (contrast medium) is injected into your vein. The dye makes it easier to see your blood vessels on the X-ray. Pictures are then taken with the CT scanner. A computer turns the images into 2-D and 3-D pictures.  Why CTA is done  CTA may be used to:    Check arteries in your belly, neck, lungs, pelvis, kidneys, or brain.    Look for a ballooning of the blood vessel wall (aneurysm) or a tear (dissection).    Check if a tube (stent) used to keep an artery open is working well.    Find damage to your arteries due to  injuries.    Collect details on blood vessels that supply blood to tumors.  Getting ready for your test  Tell your health care provider if you:    Have diabetes    Have kidney disease    Are allergic to X-ray dye or other medicines    Are pregnant or think you may be pregnant    Are taking any medicines, herbs, or supplements, including prescription drugs, street drugs, and over-the-counter medicines such as aspirin or ibuprofen    You may be told not to eat or drink anything for a few hours before the CTA. Follow any other instructions from your health care provider.  During your test    You will be asked to remove any hair clips, jewelry, false teeth, or other metal items that could show up on the X-ray.    You will lie down on the scanning table. An IV line will be put in a vein in your arm or hand.    The scanning table will be properly placed. The part of your body being checked will be inside the doughnut-shaped CT scanner.    One image may be taken first to be sure you are in the proper position for the test.    The IV will be hooked up to an automatic injection machine. This controls how often and how fast the X-ray dye is injected. The injection may continue during part of the exam.    The dye will be put into your vein through the IV line. You may feel warmth through your body when the dye is injected.    You cant move while the X-rays are being taken. Pillows and foam pads may be used to help you stay still. You will be told to hold your breath for 10 to 25 seconds at a time.    The whole procedure may take 10 to 25 minutes.    After your test    Drink plenty of fluids to help flush the X-ray dye from your body.    You may eat as soon as you want to.    All procedures have some risks. A CTA has some possible minor risks. These include:    Problems due to the X-ray dye, such as an allergic reaction or kidney damage    Skin damage from leaking X-ray dye near where the IV was put in  Date Last Reviewed:  10/1/2017    9478-6952 [employer]. [address] [place] [address], [place] [address]. All rights reserved. This information is not intended as a substitute for professional medical care. Always follow your healthcare professional's instructions.

## 2021-06-18 NOTE — PATIENT INSTRUCTIONS - HE
Patient Instructions by Laura Mcgarry RN at 10/16/2020  9:00 AM     Author: Laura Mcgarry RN Service: -- Author Type: Registered Nurse    Filed: 10/16/2020 11:17 AM Encounter Date: 10/16/2020 Status: Addendum    : Laura Mcgarry RN (Registered Nurse)    Related Notes: Original Note by Laura Mcgarry RN (Registered Nurse) filed at 10/16/2020 10:50 AM       You have been scheduled for the following procedure:    Procedure: Angiogram    Date: 11/11    Arrival Time:0800    Procedure Time: 0900    Location: [facility]    Please report to the information desk located in the [place] entrance on [address] at [facility].  Tell them you are scheduled for an appointment in Interventional Radiology.  They will then direct you to the Surgical Admit Unit or the Main Radiology desk.    Please arrive at the hospital by 0800 am on the day of your procedure.  (Note your scheduled arrival time will be earlier than your scheduled procedure start time to allow for your pre-procedure exam, lab work, preparation and consent.)    Other Instructions:    Do not eat or drink anything after midnight before your procedure.    You may take your normal medications on the morning of your procedure with a few sips of water (unless otherwise instructed)    Please inform us if you are taking insulin, Glucophage (Metformin), Coumadin, Arixtra, or Lovenox.    Please bring a current list of medications with    If you are having an angiogram:    You will need a responsible adult to stay with you at home your first night.    You will need a     You will need to hold your Coumadin 5 days before your angiogram and should consult with your primary regarding this.    Hold your Metformin the night before and morning of angiogram    Take only 1/2 of your bedtime insulin dose and hold your morning dose but bring it with you to the angiogram.    It is ok  to stay on your aspirin.    Please start Plavix 5 days prior on 11/6    A Preop  physical will be done the day of.        If you have any questions regarding your procedure, your instructions or should you need to reschedule or cancel your procedure, please contact Randa at the [facility].       Peripheral Angiography  Peripheral angiography is an outpatient procedure that makes a map of the vessels (arteries) in your lower body, legs, and arms, using X-ray and dye.This map can show where blood flow may be blocked.  Talk with your healthcare provider about the risks and complications of angiography.   Before the procedure  Prepare for the peripheral angiography as follows:     Tell your healthcare provider about all medicines you take and any allergies you may have.    Follow any directions youre given for not eating or drinking before the procedure. If your provider says to take your normal medicines, swallow them with only small sips of water.    Arrange for a family member or friend to drive you home.  During the procedure  Here is what to expect:      You may get medicine through an IV (intravenous) line to relax you. Youre given an injection to numb the insertion site. Then, a tiny skin cut (incision) is made near an artery in your groin.    Your provider inserts a thin tube (catheter) through the incision. He or she then threads the catheter into an artery while looking at a video monitor.    Contrast dye is injected into the catheter to confirm position. You may feel warmth or pressure in your legs and back. You lie still as X-rays are taken. The catheter is then taken out.  After the procedure  Youll be taken to a recovery area. A healthcare provider will apply pressure to the site for about 10 minutes. Your healthcare provider will tell you how long to lie down and keep the insertion site still. Your healthcare provider will discuss the results with you soon after the procedure.  Back at home  On the day you get home, dont drive, dont exercise, avoid walking and taking  stairs, and avoid bending and lifting. Your healthcare provider may give you other care instructions.  Call your healthcare provider  Call your healthcare provider right away if:    You notice a lump or bleeding at the insertion site    You feel pain at the insertion site    You become lightheaded or dizzy    You have leg pain or numbness    You do not urinate in 8 hours    Date Last Reviewed: 5/1/2016 2000-2017 [employer]. [address] [place] [address]. All rights reserved. This information is not intended as a substitute for professional medical care. Always follow your healthcare professional's instructions.    Angiogram Procedure Discharge Instructions:     1. If you received sedation for your procedure: Do not drive or operate heavy machinery for the rest of the day.     2. Avoid strenuous activity for 72 hours (3 days):                        - Do not lift greater than 10 pounds.                        - Excessive exercise                        - Straining                        - Return to your normal activities as you tolerate after the 3 days restriction     3. Avoid tub baths, Jacuzzis, hot tubs and pools for 72 hours (3 days) or until puncture site is will healed.     4. You may shower beginning tomorrow. Do not scrub puncture site(s) until well healed, pat dry.     5. You can expect to return to work 1-2 days after your procedure - depending on the nature of your profession.     6. It is normal to have some tenderness and minimal swelling at puncture site. A small area of discoloration may be present. Tenderness typically subsides in 24-48 hours. A small knot may also be present at puncture site for 6-8 weeks, this can be a normal part of the healing process.     After the angiogram If you:      1. Experience any bleeding or active swelling from puncture site: Lie down, firmly apply pressure to puncture site and CALL 9-1-1     2. Fever greater than 101 degrees Fahrenheit.      3. Redness, swelling, warmth to touch, or purulent (yellow/green/foul smelling) drainage from the puncture site.     4. Increasing pain, tenderness or swelling at puncture site OR of arm/leg near puncture site.     5. Feeling weak or faint.     6. Change in color, temperature, or sensation of arm/leg where puncture was made.     Call us with any other questions or concerns after your procedure: [phone]    You will need to have an ultrasound 2-3 weeks after your angiogram and should be scheduled at the time of your follow up appt.  Further follow up will be based on ultrasound results. Typical follow up is every 3 months for the first year, then every 6 months to one year thereafter.     Instructions for Patients Scheduled for Vascular or Podiatry Procedures During the COVID 19 Pandemic    Your Provider has determined that your condition warrants going ahead with your procedure at this time.  You will need to be tested for COVID19 within 72 hours of your procedure. We highly encourage patients to get tested for COVID-19 at one of our designated [facility] testing sites. We process the tests in our lab, which allows us to get the results quickly. If you choose to get tested at a non-[facility] location, you will need to contact your primary care provider to make those arrangements and ensure the results are available to your surgeon before you are arriving for your procedure. If we do not receive the results in time, your procedure may be postponed or canceled.  Please make sure your test is collected 3-days prior to your procedure date.  The results will need to get faxed to [phone].  After testing, you will need to remain in self-quarantine until your procedure.  If you are notified of a positive COVID-19 result; you will need to call your provider for further recommendations.    Please call [phone] and press option [phone] to speak to a nurse.  If the test is positive; DO NOT PRESENT FOR  YOUR PROCEDURE until you have been given further instructions.  You will not be called with negative results so arrive as instructed unless otherwise notified.  Don't:    Don't invite visitors or friends into your home.    Don't leave your home unless absolutely necessary.    Don't share utensils and other household items with others in the home.  Do:    Wash your hands regularly with soap and water and use hand  with at least 60% alcohol if you don't have easy access to soap and water.     Disinfect surface areas daily, including doorknobs, electronics - especially phones, laptops and other devices.     Wash utensils and other items thoroughly.     Separate yourself from others in the household as best you can, including pets.    Keep your hands away from your face.     Practice all other prevention tips the CDC recommends, including covering your coughs and sneezes with a tissue or your sleeve and immediately throwing the tissue into the trash and washing your hands.    Call your hospital if you develop the following signs before your surgery:    Fever.    Cough.    Shortness of breath.    Sore throat.    Runny or stuffy nose.    Muscle or body aches.    Headaches.    Fatigue.    Vomiting and diarrhea.    These steps will help keep you & your family, other patients, and hospital staff safe from COVID19.

## 2021-06-20 NOTE — PROGRESS NOTES
Assessment/Plan:      Diagnoses and all orders for this visit:    Lumbar spine pain  -     XR Lumbar Spine 2 or 3 VWS; Future; Expected date: 9/24/18  -     XR Lumbar Spine 2 or 3 VWS; Standing  -     XR Lumbar Spine 2 or 3 VWS    Sacroiliac joint pain  -     Erythrocyte Sedimentation Rate; Future  -     C-Reactive Protein; Future  -     CBC and differential; Future  -     Uric acid; Future    Myofascial pain  -     Erythrocyte Sedimentation Rate; Future  -     C-Reactive Protein; Future    Left knee pain  -     Erythrocyte Sedimentation Rate; Future  -     C-Reactive Protein; Future  -     CBC and differential; Future  -     Uric acid; Future  -     XR Knee Left 1 or 2 VWS; Future; Expected date: 9/24/18  -     XR Knee Left 1 or 2 VWS; Standing  -     XR Knee Left 1 or 2 VWS        Assessment: Pleasant 58-year-old female with a past medical history of COPD, hyperlipidemia, hypertension, peripheral vascular disease status post femoropopliteal bypass after failed stenting, and polyneuropathy, as well as previous lumbar fusion with:    1.  One-week history of right-sided lumbar spine pain at the lumbosacral junction and SI joint.  This is consistent with sacroiliac joint pain however she has had previous fusion is been on prednisone for COPD.  Also compression fracture is on the differential.    2.  Left knee pain with edema.  Likely related to knee osteoarthritis but gout is on the differential.  No erythema today only mild knee swelling.  3.  Myofascial pain lumbar spine gluteal region.  4.  Equivocal positive Smith's/mild positive Smith's bilaterally.  Unknown significance or etiology.  Will monitor .    Discussion:    1.  I discussed the diagnoses and treatment options.  She has had a flare of her pain over the past 2 weeks and is difficult to know the exact cause.  She is a low back pain in the left knee pain.  Will evaluate for causes of both such as gout along with 2 separate issues such as knee  osteoarthritis along with SI pain and fracture.  2.  Labs as above to evaluate for any signs of infection along with gout.  3.  Plain films of the lumbar spine evaluate for fracture.  4.  Plain films left knee to evaluate the extent of osteoarthritis.    5.  Can consider physical therapy after labs and imaging return.  Can also consider SI joint injection.  6.  She does still have some oxycodone remaining from her ER visit.  Would like to get a better clue as to what the cause of her pain is at this time before giving her more medications but she is clearly in pain today.  She has enough medications for now.      7.  She also has a equivocal Smith's/mild positive Smith's bilaterally.  Will monitor.     8. I Will contact her by phone with results of imaging and labs when available and further recommendations.    It was our pleasure caring for your patient today, if there any questions or concerns please do not hesitate to contact us.      Subjective:   Patient ID: Annette Shore is a 58 y.o. female.    History of Present Illness: Patient presents at the request in the emergency department for evaluation of right-sided low back pain with left knee pain.  Patient has a very complicated past medical history including peripheral vascular disease status post stenting which failed and subsequent femoropopliteal bypass.  She also complains of polyneuropathy and COPD.  She has had some new low back pain over the past week.  This began with left knee pain and swelling and she was instructed to present to the emergency department.  Ultrasound has been negative for DVT but then she also began developing severe low back pain approximately 5 days ago.  No trauma for the low back.  This over the right lumbar spine at the lumbosacral junction over the SI region gluteal region.  Worse with any prolonged sitting.  Standing is difficult for her.  No radiation of numbness or tingling down the right leg other than her normal pain  "below the knees and the numbness and pain in her feet.    She also continues to have left knee swelling although it is much improved.  She does get some pain into the left calf.  She has noticed some generalized \"puffiness\" of her chest as well.  At the emergency room she was given prednisone and is on day 3 which is improving her pain and she is oxycodone which she can takes occasionally as well.  Cyclobenzaprine is helpful for some of her pain.  She has a few oxycodone remaining.    Imaging: Ultrasound of the left leg negative for DVT.      No other  available.  No labs available.    Review of systems: She complains of chest pressure cough, swelling of her feet shortness of breath, wheezing, constipation, easy bruising, poor sleep, back pain, leg pain, headaches, depression and color changes in her feet. Remainder of 12 point review systems negative unless listed above.    Past Medical History:   Diagnosis Date     COPD (chronic obstructive pulmonary disease) (H)      Depression      Hyperlipidemia      Hypertension      PAD (peripheral artery disease) (H)      Peripheral neuropathy (H)    History of lumbar fusion 20 years ago.    Social history: .  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  Continues to smoke cigarettes.    The following portions of the patient's history were reviewed and updated as appropriate: allergies, current medications, past family history, past medical history, past social history, past surgical history and problem list.      WHO 5: 4    CRISTINA Score: 60      Objective:   Physical Exam:    Vitals:    09/24/18 1510   BP: 175/80   Pulse: 80   Temp: 98.3  F (36.8  C)       General:  Well-appearing female in no acute distress.  Pleasant, cooperative, and interactive throughout the examination and interview.  Respirations: Nonlabored but does cough frequently.  CV: Compression stockings in place difficult to remove.  She does have some edema in the lower extremities " bilaterally 1+..  Lymphatics: No cervical lymphadenopathy palpated.  Some subcutaneous puffiness over the sternum level of rib 2.  Eyes: sclera clear. Skin: No rashes or lesions seen over the head/neck, hairline, arms, knees/thighs or lumbar spine.  Respirations unlabored.  MSK: Gait is nonantalgic, cautious.  Able to heel-toe walk without difficulty.  Negative Romberg.  Spine: normal AP curves of the C, T, and L spine.  Generalized reduced range of motion lumbar spine all planes.  Palpation: Tenderness to palpation over right SI joint and gluteal tissues.* Extremities: Full range of motion of the elbows, and wrists with no effusions or tenderness to palpation.   Full range of motion of the hips and knees from seated.  Difficult to assess ankles given lower extremity pain and tenderness to palpation left greater than right,.  There is no erythema of the left knee.  There is mild effusion left greater than right knee and Baker's cyst..  Negative scour maneuver a.  Pain with Milan's position right SI joint mild positive thigh thrust maneuver and gains lungs on the right. No hypermobility of the upper or lower extremities.  Neurologic exam: Mental status: Patient is alert and oriented with normal affect.  Attention, knowledge, memory, and language are intact.  Normal coordination throughout the examination.  Reflexes are 2+ and symmetric biceps, triceps, brachioradialis, patellar, and 0 Achilles with down-going toes and equivocal to mild positive Gisela's.  Sensation is intact to light touch throughout the upper and proximal lower extremities bilaterally.  Manual muscle testing reveals 5 out of 5 in the hip flexors, knee flexors/extensors, ankle plantar flexors, ankle  dorsiflexors, and EHL.  Upper extremities: Grossly normal strength . Normal muscle bulk and tone in the arms and legs.    Negative seated and supine straight leg raise bilaterally.

## 2021-06-20 NOTE — PROGRESS NOTES
[facility] Daily Progress     Patient Name: Annette Shore  Date: 10/10/2018  Visit #: 2/10  PTA visit #:  0  Date of evaluation: 10/1/2018  Referral Diagnosis: Sacroiliac joint pain; left knee pain  Referring provider: Alexx Norman, DO         Precautions / Restrictions : COPD, smoker     Initial Assessment: This 58 year old female presents with multiple areas of pain today.  She states she is having a bad day with pain in the right low back, left leg, bilateral feet and bilateral upper back.  She attributes the foot pain to peripheral neuropathy and the upper back pain to coughing due to COPD.  The right low back pain is consistent with right SI dysfunction.  The left knee has limitation in ROM and weakness consistent with degenerative changes.  Pt concerned about her changing/increasing pain.  Impairments in  pain, posture, ROM, joint mobility, strength  Prognosis to achieve goals is  good   Pt. is appropriate for skilled PT intervention as outlined in the Plan of Care (POC).    Visit Diagnosis:     ICD-10-CM    1. Chronic right-sided low back pain without sciatica M54.5     G89.29    2. Upper back pain, chronic M54.9     G89.29    3. Pain of left lower leg M79.662    4. Bilateral foot pain M79.671     M79.672             Assessment:   Pain less after treatment.  Pt walking with greater ease.  Patient is benefitting from skilled physical therapy and is making steady progress toward functional goals.  Patient is appropriate to continue with skilled physical therapy intervention, as indicated by initial plan of care.    Goal Status:  Pt. will demonstrate/verbalize independence in self-management of condition in : 6 weeks;12 weeks;Progressing toward  Pt. will be independent with home exercise program in : 6 weeks;12 weeks;Progressing toward  Pt. will report decreased intensity, frequency of : Pain;in 6 weeks;in 12 weeks;Progressing toward  Pt. will have improved quality of sleep: waking less  times/night;in 6 weeks;in 12 weeks;Progressing toward  Pt. will be able to walk : 10 minutes;with less difficulty;for community mobility;in 6 weeks;in 12 weeks;Progressing toward  Patient will ascend / descend: stairs;with less pain;in 6 weeks;in 12 weeks;with less difficulty;Progressing toward      Plan for next visits:     Continue with initial plan of care.  Progress with home program as tolerated.    Subjective:   Nausea since last night due to increased pain; no known reason.  Sat in the bath tub yesterday but doesn't think that was the cause.  Tried ice this morning.  Felt Ok after first visit.  Pain Ratin        Patient Outcome Measures: Initial 10/1 2018  Modified Oswestry Low Back Pain Disablity Questionnaire  in %: 60   Scores range from 0-100%, where a score of 0% represents minimal pain and maximal function. The minimal clinically important difference is a score reduction of 12%.    Objective:        LE ROM: Hypomobility in the hips bilaterally and in left knee flexion.     Lumbar ROM:     Date: 10/1/2018 10/10/2018      *Indicate scale\  MAL= Maximally limited  MOL=Moderately limited  MIL= Minimally limited  - = no pain  += minimal pain  ++= moderate pain  +++= maximal pain AROM AROM AROM   Lumbar Flexion MOL++ WNL-     Lumbar Extension MOL+ MIL-        Right Left Right Left Right Left   Lumbar Sidebending MOL- MOL+           Lumbar Rotation MOL++ MOL+ MIL-  MIL-           Lower Extremity Strength:   WNL  Core:  Abdominals 3/5              Treatment Today      TREATMENT MINUTES COMMENTS   Evaluation     Self-care/ Home management     Manual therapy 15 AAROM to hips; SI correction for left anterior ilium;METs   Neuromuscular Re-education     Therapeutic Activity     Therapeutic Exercises 8     Exercise #1: Pelvic clock 12-6  Comment #1: HEP  Exercise #2: Abd stab with hip flexion resistance  Comment #2: Omit  Exercise #3: Bridges  Comment #3: HEP  Exercise #4: LTR  Comment #4: HEP  See flowsheet for  date performed.   Gait training     Modality__________________                Total 23    Blank areas are intentional and mean the treatment did not include these items.     Veda Manzano PT, CLT  10/10/2018

## 2021-06-21 NOTE — LETTER
Letter by Laura Mcgarry RN at      Author: Laura Mcgarry RN Service: -- Author Type: --    Filed:  Encounter Date: 11/9/2020 Status: (Other)       11/11/2020        Henry Mars have been scheduled for the following procedure:    Procedure: Left leg Angiogram    Date: 11/25/2020    Arrival Time:  8:30AM    Procedure Time:  10:00AM    Location:  [facility]     Please report to the information desk located in the [place] entrance on [address] at [facility] or the Main Radiology desk on the ground level at [facility].  Tell them you are scheduled for an appointment in Interventional Radiology.  They will then direct you to the Surgical Admit Unit or the Main Radiology desk.    Please arrive at the hospital by 8:30 am on the day of your procedure.  (Note your scheduled arrival time will be earlier than your scheduled procedure start time to allow for your pre-procedure exam, lab work, preparation and consent.)    Other Instructions:    Do not eat or drink anything after midnight before your procedure.    You may take your normal medications on the morning of your procedure with a few sips of water (unless otherwise instructed)    Please inform us if you are taking insulin, Glucophage (Metformin), Coumadin, Arixtra, or Lovenox.    Please bring a current list of medications with    If you are having an angiogram:    You will need a responsible adult to stay with you at home your first night.    You will need a     You will need to hold your Coumadin 5 days before your angiogram and should consult with your primary regarding this.    Hold your Metformin the night before and morning of angiogram    Take only 1/2 of your bedtime insulin dose and hold your morning dose but bring it with you to the angiogram.    It is ok  to stay on your aspirin and Plavix unless the Vascular surgery directs you differently.    If you have any questions regarding your procedure, your instructions or should you need  to reschedule or cancel your procedure, please contact Randa at the [facility].   Peripheral Angiography  Peripheral angiography is an outpatient procedure that makes a map of the vessels (arteries) in your lower body, legs, and arms, using X-ray and dye.This map can show where blood flow may be blocked.  Talk with your healthcare provider about the risks and complications of angiography.   Before the procedure  Prepare for the peripheral angiography as follows:     Tell your healthcare provider about all medicines you take and any allergies you may have.    Follow any directions youre given for not eating or drinking before the procedure. If your provider says to take your normal medicines, swallow them with only small sips of water.    Arrange for a family member or friend to drive you home.  During the procedure  Here is what to expect:      You may get medicine through an IV (intravenous) line to relax you. Youre given an injection to numb the insertion site. Then, a tiny skin cut (incision) is made near an artery in your groin.    Your provider inserts a thin tube (catheter) through the incision. He or she then threads the catheter into an artery while looking at a video monitor.    Contrast dye is injected into the catheter to confirm position. You may feel warmth or pressure in your legs and back. You lie still as X-rays are taken. The catheter is then taken out.  After the procedure  Youll be taken to a recovery area. A healthcare provider will apply pressure to the site for about 10 minutes. Your healthcare provider will tell you how long to lie down and keep the insertion site still. Your healthcare provider will discuss the results with you soon after the procedure.  Back at home  On the day you get home, dont drive, dont exercise, avoid walking and taking stairs, and avoid bending and lifting. Your healthcare provider may give you other care instructions.  Call your healthcare  provider  Call your healthcare provider right away if:    You notice a lump or bleeding at the insertion site    You feel pain at the insertion site    You become lightheaded or dizzy    You have leg pain or numbness    You do not urinate in 8 hours    Date Last Reviewed: 5/1/2016 2000-2017 [employer]. [address] [place] [address]. All rights reserved. This information is not intended as a substitute for professional medical care. Always follow your healthcare professional's instructions.    Angiogram Procedure Discharge Instructions:     1. If you received sedation for your procedure: Do not drive or operate heavy machinery for the rest of the day.     2. Avoid strenuous activity for 72 hours (3 days):                        - Do not lift greater than 10 pounds.                        - Excessive exercise                        - Straining                        - Return to your normal activities as you tolerate after the 3 days restriction     3. Avoid tub baths, Jacuzzis, hot tubs and pools for 72 hours (3 days) or until puncture site is will healed.     4. You may shower beginning tomorrow. Do not scrub puncture site(s) until well healed, pat dry.     5. You can expect to return to work 1-2 days after your procedure - depending on the nature of your profession.     6. It is normal to have some tenderness and minimal swelling at puncture site. A small area of discoloration may be present. Tenderness typically subsides in 24-48 hours. A small knot may also be present at puncture site for 6-8 weeks, this can be a normal part of the healing process.     After the angiogram If you:      1. Experience any bleeding or active swelling from puncture site: Lie down, firmly apply pressure to puncture site and CALL 9-1-1     2. Fever greater than 101 degrees Fahrenheit.     3. Redness, swelling, warmth to touch, or purulent (yellow/green/foul smelling) drainage from the puncture site.     4.  Increasing pain, tenderness or swelling at puncture site OR of arm/leg near puncture site.     5. Feeling weak or faint.     6. Change in color, temperature, or sensation of arm/leg where puncture was made.     Call us with any other questions or concerns after your procedure: [phone]    You will need to have an ultrasound 2-3 weeks after your angiogram and should be scheduled at the time of your follow up appt.  Further follow up will be based on ultrasound results. Typical follow up is every 3 months for the first year, then every 6 months to one year thereafter.

## 2021-06-22 NOTE — PROGRESS NOTES
[facility] Discharge Summary  Patient Name: Annette Shore  Date: 12/17/2018  PTA visit #:  0  Date of evaluation: 10/1/2018  Referral Diagnosis: Sacroiliac joint pain; left knee pain  Referring provider: Alexx Norman DO         Precautions / Restrictions : COPD, smoker      Visit Diagnosis:   1. Chronic right-sided low back pain without sciatica     2. Upper back pain, chronic     3. Pain of left lower leg         Goals:  Pt. will demonstrate/verbalize independence in self-management of condition in : 6 weeks;12 weeks;Progressing toward  Pt. will be independent with home exercise program in : 6 weeks;12 weeks;Progressing toward  Pt. will report decreased intensity, frequency of : Pain;in 6 weeks;in 12 weeks;Met  Pt. will have improved quality of sleep: waking less times/night;in 6 weeks;in 12 weeks;Met  Pt. will be able to walk : 10 minutes;with less difficulty;for community mobility;in 6 weeks;in 12 weeks;Met  Patient will ascend / descend: stairs;with less pain;in 6 weeks;in 12 weeks;with less difficulty;Progressing toward  5/10    Patient was seen for 5/10 visits from 10/1 2018 to 10/31/2018 with 0 missed appointments.  The patient met or partially met goals and has demonstrated understanding of and independence in the home program for self-care, and progression to next steps.  She will initiate contact if questions or concerns arise.  Patient received a home program    The patient discontinued therapy, did not return.    Therapy will be discontinued at this time.  The patient will need a new referral to resume.    Thank you for your referral.  Veda Cabrera  12/17/2018  2:47 PM

## 2021-06-25 ENCOUNTER — OFFICE VISIT - HEALTHEAST (OUTPATIENT)
Dept: PHYSICAL THERAPY | Facility: REHABILITATION | Age: 61
End: 2021-06-25

## 2021-06-25 DIAGNOSIS — M79.671 BILATERAL FOOT PAIN: ICD-10-CM

## 2021-06-25 DIAGNOSIS — J44.9 COPD (CHRONIC OBSTRUCTIVE PULMONARY DISEASE) (H): ICD-10-CM

## 2021-06-25 DIAGNOSIS — M79.672 BILATERAL FOOT PAIN: ICD-10-CM

## 2021-06-25 DIAGNOSIS — I73.9 PVD (PERIPHERAL VASCULAR DISEASE) (H): ICD-10-CM

## 2021-06-25 DIAGNOSIS — M54.9 UPPER BACK PAIN, CHRONIC: ICD-10-CM

## 2021-06-25 DIAGNOSIS — M54.50 CHRONIC RIGHT-SIDED LOW BACK PAIN WITHOUT SCIATICA: ICD-10-CM

## 2021-06-25 DIAGNOSIS — M79.18 MYOFASCIAL PAIN SYNDROME: ICD-10-CM

## 2021-06-25 DIAGNOSIS — G89.29 UPPER BACK PAIN, CHRONIC: ICD-10-CM

## 2021-06-25 DIAGNOSIS — G89.29 CHRONIC RIGHT-SIDED LOW BACK PAIN WITHOUT SCIATICA: ICD-10-CM

## 2021-06-25 DIAGNOSIS — G62.9 PERIPHERAL NEUROPATHY: ICD-10-CM

## 2021-06-25 DIAGNOSIS — M79.662 PAIN OF LEFT LOWER LEG: ICD-10-CM

## 2021-06-26 NOTE — PROGRESS NOTES
Progress Notes by Veda Cabrera PT at 10/17/2018 10:00 AM     Author: Veda Cabrera PT Service: -- Author Type: Physical Therapist    Filed: 10/17/2018 11:00 AM Encounter Date: 10/17/2018 Status: Signed    : Veda Cabrera PT (Physical Therapist)       [facility] Daily Progress     Patient Name: Annette Shore  Date: 10/17/2018  Visit #: 3/10  PTA visit #:  0  Date of evaluation: 10/1/2018  Referral Diagnosis: Sacroiliac joint pain; left knee pain  Referring provider: Alexx Norman, DO         Precautions / Restrictions : COPD, smoker     Initial Assessment: This 58 year old female presents with multiple areas of pain today.  She states she is having a bad day with pain in the right low back, left leg, bilateral feet and bilateral upper back.  She attributes the foot pain to peripheral neuropathy and the upper back pain to coughing due to COPD.  The right low back pain is consistent with right SI dysfunction.  The left knee has limitation in ROM and weakness consistent with degenerative changes.  Pt concerned about her changing/increasing pain.  Impairments in  pain, posture, ROM, joint mobility, strength  Prognosis to achieve goals is  good   Pt. is appropriate for skilled PT intervention as outlined in the Plan of Care (POC).    Visit Diagnosis:     ICD-10-CM    1. Chronic right-sided low back pain without sciatica M54.5     G89.29    2. Upper back pain, chronic M54.9     G89.29    3. Pain of left lower leg M79.662    4. Bilateral foot pain M79.671     M79.672             Assessment:    Continued decrease in pain and improved function.  Patient is benefitting from skilled physical therapy and is making steady progress toward functional goals.  Patient is appropriate to continue with skilled physical therapy intervention, as indicated by initial plan of care.    Goal Status:  Pt. will demonstrate/verbalize independence in self-management of condition in : 6 weeks;12  weeks;Progressing toward  Pt. will be independent with home exercise program in : 6 weeks;12 weeks;Progressing toward  Pt. will report decreased intensity, frequency of : Pain;in 6 weeks;in 12 weeks;Met  Pt. will have improved quality of sleep: waking less times/night;in 6 weeks;in 12 weeks;Progressing toward  Pt. will be able to walk : 10 minutes;with less difficulty;for community mobility;in 6 weeks;in 12 weeks;Met  Patient will ascend / descend: stairs;with less pain;in 6 weeks;in 12 weeks;with less difficulty;Progressing toward      Plan for next visits:   CRISTINA  Check thoracic spine  Continue MT  Add step ups       Subjective:   Feeling less pain; more soreness.   Bowel movements help decrease pain.  Feet are the same. Wants to stay feeling good.  Will be doing more walking today. Walking tolerance is 15 minutes.   Pain Ratin/10        Patient Outcome Measures: Initial 10/1 2018  Modified Oswestry Low Back Pain Disablity Questionnaire  in %: 60   Scores range from 0-100%, where a score of 0% represents minimal pain and maximal function. The minimal clinically important difference is a score reduction of 12%.    Objective:        LE ROM: Hypomobility in the hips bilaterally and in left knee flexion.     Lumbar ROM:     Date: 10/1/2018 10/10/2018      *Indicate scale\  MAL= Maximally limited  MOL=Moderately limited  MIL= Minimally limited  - = no pain  += minimal pain  ++= moderate pain  +++= maximal pain AROM AROM AROM   Lumbar Flexion MOL++ WNL-     Lumbar Extension MOL+ MIL-        Right Left Right Left Right Left   Lumbar Sidebending MOL- MOL+           Lumbar Rotation MOL++ MOL+ MIL-  MIL-           Lower Extremity Strength:   WNL  Core:  Abdominals 3/5              Treatment Today      TREATMENT MINUTES COMMENTS   Evaluation     Self-care/ Home management 5 Discussed use of rice bucket for feet to reduce burning sensation.   Manual therapy 10 AAROM to hips;     Neuromuscular Re-education     Therapeutic  Activity     Therapeutic Exercises 10     Exercise #1: Pelvic clock 12-6 x10 x2  Comment #1: HEP  Exercise #2: Abd stab with hip flexion resistance  Comment #2: Omit  Exercise #3: Bridges x10 x2  Comment #3: HEP  Exercise #4: LTR x 10  Comment #4: HEP  Exercise #5: Seated flexion  Comment #5: HEP  See flowsheet for date performed.   Gait training     Modality__________________                Total 25    Blank areas are intentional and mean the treatment did not include these items.     Veda Manzano PT, CLT  10/17/2018

## 2021-06-26 NOTE — PROGRESS NOTES
Progress Notes by Veda Cabrera PT at 10/24/2018  8:00 AM     Author: Veda Cabrera PT Service: -- Author Type: Physical Therapist    Filed: 10/24/2018 11:27 AM Encounter Date: 10/24/2018 Status: Signed    : Veda Cabrera PT (Physical Therapist)       [facility] Daily Progress     Patient Name: Annette Shore  Date: 10/24/2018  Visit #: 4/10  PTA visit #:  0  Date of evaluation: 10/1/2018  Referral Diagnosis: Sacroiliac joint pain; left knee pain  Referring provider: Alexx Norman, DO         Precautions / Restrictions : COPD, smoker     Initial Assessment: This 58 year old female presents with multiple areas of pain today.  She states she is having a bad day with pain in the right low back, left leg, bilateral feet and bilateral upper back.  She attributes the foot pain to peripheral neuropathy and the upper back pain to coughing due to COPD.  The right low back pain is consistent with right SI dysfunction.  The left knee has limitation in ROM and weakness consistent with degenerative changes.  Pt concerned about her changing/increasing pain.  Impairments in  pain, posture, ROM, joint mobility, strength  Prognosis to achieve goals is  good   Pt. is appropriate for skilled PT intervention as outlined in the Plan of Care (POC).    Visit Diagnosis:     ICD-10-CM    1. Chronic right-sided low back pain without sciatica M54.5     G89.29    2. Upper back pain, chronic M54.9     G89.29    3. Pain of left lower leg M79.662    4. Bilateral foot pain M79.671     M79.672             Assessment:   Continued decrease in pain and improved function.  Pt close to d/c. CRISTINA improved from 60% to 36%.  Patient is benefitting from skilled physical therapy and is making steady progress toward functional goals.  Patient is appropriate to continue with skilled physical therapy intervention, as indicated by initial plan of care.    Goal Status:  Pt. will demonstrate/verbalize independence in  self-management of condition in : 6 weeks;12 weeks;Progressing toward  Pt. will be independent with home exercise program in : 6 weeks;12 weeks;Progressing toward  Pt. will report decreased intensity, frequency of : Pain;in 6 weeks;in 12 weeks;Met  Pt. will have improved quality of sleep: waking less times/night;in 6 weeks;in 12 weeks;Met  Pt. will be able to walk : 10 minutes;with less difficulty;for community mobility;in 6 weeks;in 12 weeks;Met  Patient will ascend / descend: stairs;with less pain;in 6 weeks;in 12 weeks;with less difficulty;Progressing toward      Plan for next visits:    Continue MT  Add step ups  Ball exercises       Subjective:   Better but has bad days; bad days may have to do with high blood pressure.  Discomfort in the low back.        Pain Ratin-2/10        Patient Outcome Measures: Initial 10/1 2018  Modified Oswestry Low Back Pain Disablity Questionnaire  in %: 36   Scores range from 0-100%, where a score of 0% represents minimal pain and maximal function. The minimal clinically important difference is a score reduction of 12%.        Objective:     LE ROM: WNL     Lumbar ROM:     Date: 10/1/2018 10/10/2018      *Indicate scale\  MAL= Maximally limited  MOL=Moderately limited  MIL= Minimally limited  - = no pain  += minimal pain  ++= moderate pain  +++= maximal pain AROM AROM AROM   Lumbar Flexion MOL++ WNL-     Lumbar Extension MOL+ MIL-        Right Left Right Left Right Left   Lumbar Sidebending MOL- MOL+           Lumbar Rotation MOL++ MOL+ MIL-  MIL-           Lower Extremity Strength:   WNL  Core:  Abdominals 3/5              Treatment Today      TREATMENT MINUTES COMMENTS   Evaluation     Self-care/ Home management   .   Manual therapy 10 AAROM to hips   Neuromuscular Re-education     Therapeutic Activity     Therapeutic Exercises 20     Exercise #1: Pelvic clock 12-6 x10 x2  Comment #1: HEP  Exercise #2: Abd stab with hip flexion resistance  Comment #2: Omit  Exercise #3:  Bridges x10 x2  Comment #3: HEP  Exercise #4: LTR x 10  Comment #4: HEP  Exercise #5: Seated flexion  Comment #5: HEP  Exercise #6: ABD stab with SLR  Comment #6: HEP  Exercise #7: Quadriped arm lift, leg lift (separately)  Comment #7: HEP  Exercise #8: Sit to stand  Comment #8: HEP  See flowsheet for date performed.   Gait training     Modality__________________                Total 30    Blank areas are intentional and mean the treatment did not include these items.     Veda Manzano PT, CLT  10/24/2018

## 2021-06-26 NOTE — PROGRESS NOTES
Progress Notes by Veda Cabrera PT at 10/1/2018  2:30 PM     Author: Veda Cabrera PT Service: -- Author Type: Physical Therapist    Filed: 10/1/2018  5:01 PM Encounter Date: 10/1/2018 Status: Signed    : Veda Cabrera PT (Physical Therapist)       [facility]   [facility] Initial Evaluation    Patient Name: Annette Shore  Date of evaluation: 10/1/2018  Referral Diagnosis: Sacroiliac joint pain; left knee pain  Referring provider: Alexx Norman DO     Visit Diagnosis:     ICD-10-CM    1. Chronic right-sided low back pain without sciatica M54.5     G89.29    2. Upper back pain, chronic M54.9     G89.29    3. Pain of left lower leg M79.662    4. Bilateral foot pain M79.671     M79.672        Assessment:    This 58 year old female presents with multiple areas of pain today.  She states she is having a bad day with pain in the right low back, left leg, bilateral feet and bilateral upper back.  She attributes the foot pain to peripheral neuropathy and the upper back pain to coughing due to COPD.  The right low back pain is consistent with right SI dysfunction.  The left knee has limitation in ROM and weakness consistent with degenerative changes.  Pt concerned about her changing/increasing pain.  Impairments in  pain, posture, ROM, joint mobility, strength  Prognosis to achieve goals is  good   Pt. is appropriate for skilled PT intervention as outlined in the Plan of Care (POC).    Goals:  Pt. will demonstrate/verbalize independence in self-management of condition in : 6 weeks;12 weeks  Pt. will be independent with home exercise program in : 6 weeks;12 weeks  Pt. will report decreased intensity, frequency of : Pain;in 6 weeks;in 12 weeks  Pt. will have improved quality of sleep: waking less times/night;in 6 weeks;in 12 weeks  Pt. will be able to walk : 10 minutes;with less difficulty;for community mobility;in 6 weeks;in 12 weeks  Patient will ascend / descend: stairs;with less  pain;in 6 weeks;in 12 weeks;with less difficulty      Patient's expectations/goals are realistic.    Barriers to Learning or Achieving Goals:  Non- adherence to the home exercise program.       Plan / Patient Instructions:        Plan of Care:   Communication with: Referral Source  Patient Related Instruction: Nature of Condition;Treatment plan and rationale;Self Care instruction;Basis of treatment;Body mechanics;Posture;Precautions;Next steps;Expected outcome  Times per Week: 1  Number of Visits: 10  Discharge Planning: to home program  Precautions / Restrictions : COPD, smoker  Therapeutic Exercise: ROM;Stretching;Strengthening  Neuromuscular Reeducation: kinesio tape;posture;TNE  Manual Therapy: soft tissue mobilization;myofascial release;joint mobilization;muscle energy  Functional Training (ADL's): self care  Equipment: theraband    Plan for next visit:    MT for hip ROM  Add  Hip ROM exercises/ hamstring stretch, step ups  Consider K tape for left knee and/or right SI  Consider transferring to MED EX after a few visits here     Subjective:         Social information:   Living Situation:single family home, stairs  with railing and has assistance Yes   Occupation:unemployed   Work Status:NA   Equipment Available: None    History of Present Illness:    Annette is a 58 y.o. female who presents to therapy today with complaints of a flare up of low back pain.  She had a flare up of left leg swelling three weeks ago and a week later the LBP started.  Pt has peripheral neuropathy with foot pain; COPD with lots of coughing and upper back/rib pain; degenerative disc in the spine; fusion of L4-5; PAD with stenting and blockage; h/o back pain for 22+ years. Date of onset/duration of symptoms is September, 2018 (exacerbation). Onset was sudden. Symptoms are intermittent. She reports  an episodic  history of similar symptoms. She describes their previous level of function as limited with feet aned back pain..  She had PT  years ago and it helped; including MED EX.  Pt has a therapeutic ball.  She forgot her exercises from before.  She feels her back is weak.    Pain Ratin  Pain rating at best: 3  Pain rating at worst: 9  Pain description: aching    Functional limitations are described as occurring with:   ascending and descending stairs or curbs  sleeping  walking > short distances    Patient reports benefit from:  pain medication, heat, physical therapy         Objective:      Note: Items left blank indicates the item was not performed or not indicated at the time of the evaluation.    Patient Outcome Measures :    Modified Oswestry Low Back Pain Disablity Questionnaire  in %: 60   Scores range from 0-100%, where a score of 0% represents minimal pain and maximal function. The minimal clinically important difference is a score reduction of 12%.    Involved side: Left leg; right SI area  Posture Observation:      General sitting posture is  fair/ poor.  General standing posture is fair.  Lumbopelvic complex: Moderately increased lumbar lordosis  Hips: level     LE ROM: Hypomobility in the hips bilaterally and in left knee flexion.    Lumbar ROM:     Date: 10/1/2018     *Indicate scale\  MAL= Maximally limited  MOL=Moderately limited  MIL= Minimally limited  - = no pain  += minimal pain  ++= moderate pain  +++= maximal pain AROM AROM AROM   Lumbar Flexion MOL++     Lumbar Extension MOL+      Right Left Right Left Right Left   Lumbar Sidebending MOL- MOL+       Lumbar Rotation MOL++ MOL+         Lower Extremity Strength:   WNL  Core:  Abdominals 3/5          Palpation: right SI and lower lumbar tenderness    Lumbar Special Tests:   Bilaterally tight hamstrings; SLR appears negative bilaterally; Quadrant test negative         Treatment Today      TREATMENT MINUTES COMMENTS   Evaluation 35 Moderate   Self-care/ Home management     Manual therapy     Neuromuscular Re-education     Therapeutic Activity     Therapeutic Exercises 25  Discussed nature of condition, basis of treatment, POC, precautions.  Pt instructed in HEP:  Exercises:  Exercise #1: Pelvic clock 12-6  Comment #1: HEP  Exercise #2: Abd stab with hip flexion resistance  Comment #2: HEP  Exercise #3: Bridges  Comment #3: HEP  Exercise #4: LTR  Comment #4: HEP     Gait training     Modality__________________                Total 60    Blank areas are intentional and mean the treatment did not include these items.     PT Evaluation Code: (Please list factors)  Patient History/Comorbidities: 3  Examination: 3  Clinical Presentation: evolving  Clinical Decision Making: moderate    Patient History/  Comorbidities Examination  (body structures and functions, activity limitations, and/or participation restrictions) Clinical Presentation Clinical Decision Making (Complexity)   No documented Comorbidities or personal factors 1-2 Elements Stable and/or uncomplicated Low   1-2 documented comorbidities or personal factor 3 Elements Evolving clinical presentation with changing characteristics Moderate   3-4 documented comorbidities or personal factors 4 or more Unstable and unpredictable High              Veda Cabrera PT  10/1/2018  2:42 PM

## 2021-06-26 NOTE — PROGRESS NOTES
Progress Notes by Veda Cabrera PT at 10/31/2018  9:00 AM     Author: Veda Cabrera PT Service: -- Author Type: Physical Therapist    Filed: 10/31/2018 10:17 AM Encounter Date: 10/31/2018 Status: Signed    : Veda Cabrera PT (Physical Therapist)       [facility] Daily Progress     Patient Name: Annette Shore  Date: 10/31/2018  Visit #: 5/10  PTA visit #:  0  Date of evaluation: 10/1/2018  Referral Diagnosis: Sacroiliac joint pain; left knee pain  Referring provider: Alexx Norman, DO         Precautions / Restrictions : COPD, smoker     Initial Assessment: This 58 year old female presents with multiple areas of pain today.  She states she is having a bad day with pain in the right low back, left leg, bilateral feet and bilateral upper back.  She attributes the foot pain to peripheral neuropathy and the upper back pain to coughing due to COPD.  The right low back pain is consistent with right SI dysfunction.  The left knee has limitation in ROM and weakness consistent with degenerative changes.  Pt concerned about her changing/increasing pain.  Impairments in  pain, posture, ROM, joint mobility, strength  Prognosis to achieve goals is  good   Pt. is appropriate for skilled PT intervention as outlined in the Plan of Care (POC).    Visit Diagnosis:     ICD-10-CM    1. Chronic right-sided low back pain without sciatica M54.5     G89.29    2. Upper back pain, chronic M54.9     G89.29    3. Pain of left lower leg M79.662             Assessment:   Continued decrease in pain and improved function.  Pt close to d/c. CRISTINA improved from 60% to 36%.  Patient is benefitting from skilled physical therapy and is making steady progress toward functional goals.  Patient is appropriate to continue with skilled physical therapy intervention, as indicated by initial plan of care.    Goal Status:  Pt. will demonstrate/verbalize independence in self-management of condition in : 6 weeks;12  weeks;Progressing toward  Pt. will be independent with home exercise program in : 6 weeks;12 weeks;Progressing toward  Pt. will report decreased intensity, frequency of : Pain;in 6 weeks;in 12 weeks;Met  Pt. will have improved quality of sleep: waking less times/night;in 6 weeks;in 12 weeks;Met  Pt. will be able to walk : 10 minutes;with less difficulty;for community mobility;in 6 weeks;in 12 weeks;Met  Patient will ascend / descend: stairs;with less pain;in 6 weeks;in 12 weeks;with less difficulty;Progressing toward      Plan for next visits:    Continue MT   Ball exercises       Subjective:   Had a bad night last night and is more sore today.  Pain is central and in the low back.  Signing up for [employer] at the gym.   Wants to hold on the ball exercises until she feels better.  Pain Ratin-3/10 today        Patient Outcome Measures: Initial 10/1 2018  Modified Oswestry Low Back Pain Disablity Questionnaire  in %: 36   Scores range from 0-100%, where a score of 0% represents minimal pain and maximal function. The minimal clinically important difference is a score reduction of 12%.        Objective:     LE ROM: WNL     Lumbar ROM:     Date: 10/1/2018 10/10/2018      *Indicate scale\  MAL= Maximally limited  MOL=Moderately limited  MIL= Minimally limited  - = no pain  += minimal pain  ++= moderate pain  +++= maximal pain AROM AROM AROM   Lumbar Flexion MOL++ WNL-     Lumbar Extension MOL+ MIL-        Right Left Right Left Right Left   Lumbar Sidebending MOL- MOL+           Lumbar Rotation MOL++ MOL+ MIL-  MIL-           Lower Extremity Strength:   WNL  Core:  Abdominals 3/5              Treatment Today      TREATMENT MINUTES COMMENTS   Evaluation     Self-care/ Home management   .   Manual therapy 20 AAROM to hips;SI correction for right anterior iliium with METS and stretch; Prone STM/MFR to lumbo sacral area   Neuromuscular Re-education     Therapeutic Activity     Therapeutic Exercises 10  Demo'd  exercises; pt instructed in SI correction exercise to use as needed.     Exercise #1: Pelvic clock 12-6 x10 x2  Comment #1: HEP  Exercise #2: Abd stab with hip flexion resistance  Comment #2: Omit  Exercise #3: Bridges x10 x2  Comment #3: HEP  Exercise #4: LTR x 10  Comment #4: HEP  Exercise #5: Seated flexion  Comment #5: HEP  Exercise #6: ABD stab with SLR  Comment #6: HEP  Exercise #7: Quadriped arm lift, leg lift (separately)  Comment #7: HEP  Exercise #8: Sit to stand  Comment #8: HEP  Exercise #9: SI correction  Comment #9: Use as needed  See flowsheet for date performed.   Gait training     Modality__________________                Total 30    Blank areas are intentional and mean the treatment did not include these items.     Veda Manzano PT, CLT  10/31/2018

## 2021-06-29 ENCOUNTER — COMMUNICATION - HEALTHEAST (OUTPATIENT)
Dept: PHYSICAL THERAPY | Facility: CLINIC | Age: 61
End: 2021-06-29

## 2021-06-29 NOTE — PROGRESS NOTES
Progress Notes by Jordyn Ruiz PT at 8/31/2020  8:15 AM     Author: Jordyn Ruiz PT Service: -- Author Type: Physical Therapist    Filed: 8/31/2020 12:11 PM Encounter Date: 8/31/2020 Status: Attested    : Jordyn Ruiz PT (Physical Therapist) Cosigner: Alexx Norman DO at 8/31/2020 12:23 PM    Attestation signed by Alexx Norman DO at 8/31/2020 12:23 PM    Follow the therapist's recommendation                    [facility] Certification Request    August 31, 2020      Patient: Annette Shore  MR Number: [id number]  YOB: 1960  Date of Visit: 8/31/2020      Dear Dr. Norman,    Thank you for this referral.   We are seeing Annette Shore for Physical Therapy of low back/gluteal pain.    Medicare and/or Medicaid requires physician review and approval of the treatment plan. Please review the plan of care and verify that you agree with the therapy plan of care by co-signing this note.      Plan of Care  Authorization / Certification Start Date: 08/31/20  Authorization / Certification End Date: 11/30/20  Authorization / Certification Number of Visits: 16 MEDX  Communication with: Referral Source  Patient Related Instruction: Nature of Condition;Treatment plan and rationale;Self Care instruction;Basis of treatment;Body mechanics;Posture;Next steps;Expected outcome  Times per Week: 1-2  Number of Weeks: 6-8  Number of Visits: 16 MEDX  Discharge Planning: independent HEP and/or plateau of progress  Precautions / Restrictions : COPD, PAD  Therapeutic Exercise: ROM;Stretching;Strengthening  Neuromuscular Reeducation: kinesio tape;posture;TNE;core  Manual Therapy: soft tissue mobilization;myofascial release;joint mobilization;muscle energy  Modalities: TENS  Gait Training: as indicated      Goals:  Pt. will be independent with home exercise program in : 6 weeks    Pt will: be able to walk for longer than 10 minutes without increased low back symptoms by 6 weeks.  Pt will: be able to  sit in a stationary position without increased R glute/low back symptoms by 6 weeks.  Pt will: be able to have a bowel movement without increased low back pain by 6 weeks.        If you have any questions or concerns, please don't hesitate to call.    Sincerely,      Jordyn DAVIS Ruiz, PT  [phone]      Physician recommendation:     ___ Follow therapist's recommendation        ___ Modify therapy      *Physician co-signature indicates they certify the need for these services furnished within this plan and while under their care.        [facility]   [facility] Initial Evaluation    Patient Name: Annette Shore  Date of evaluation: 8/31/2020  Referral Diagnosis: Gluteal pain  Referring provider: Alexx Norman DO  Visit Diagnosis:     ICD-10-CM    1. Chronic right-sided low back pain without sciatica  M54.5     G89.29    2. Myofascial pain syndrome  M79.18        Assessment:        Patient is a 60 y.o. female that presents with signs and symptoms consistent with R gluteal pain with SIJ pain secondary to foraminal stenosis from L5-S1 disc bulge, h/o lumbar fusion. Patient demonstrates impairments including decreased lumbar range of motion and flexibility, with poor core stability and postural awareness, myofascial restrictions and TTP at/around R PSIS and gluteals, leading to impaired functional mobility. Patient's functional limitations include sitting or standing in one position for too long, walking longer distances, and rolling over in bed without increased pain or hypersensitivity. Today patient responded well to patient education, therapeutic exercise and use of TENS unit - patient didn't find immediate relief but was able to perform all exercises without increased pain symptoms while trialing TENS - will hold off on MEDX for right now until patient's hyperalgesia of R low back reduces enough to sit at roller pads.     Patient educated, demonstrated understanding and is in agreement  with nature of impairment, plan of care, patient role and HEP. Patient compliant with PT and prognosis is good. Patient would benefit from skilled PT to progress and improve above impairments.    The POC is dynamic and will be modified on an ongoing basis.  Patient will return to clinic if symptoms persist.  Barriers to achieving goals as noted in the assessment section may affect outcome.  Prognosis to achieve goals is  fair   Pt. is appropriate for skilled PT intervention as outlined in the Plan of Care (POC).  Pt. is a good candidate for skilled PT services to improve pain levels and function.    Goals:  Pt. will be independent with home exercise program in : 6 weeks    Pt will: be able to walk for longer than 10 minutes without increased low back symptoms by 6 weeks.  Pt will: be able to sit in a stationary position without increased R glute/low back symptoms by 6 weeks.  Pt will: be able to have a bowel movement without increased low back pain by 6 weeks.      Patient's expectations/goals are realistic.    Barriers to Learning or Achieving Goals:  Chronicity of the problem.       Plan / Patient Instructions:        Plan of Care:   Authorization / Certification Start Date: 08/31/20  Authorization / Certification End Date: 11/30/20  Authorization / Certification Number of Visits: 16 MEDX  Communication with: Referral Source  Patient Related Instruction: Nature of Condition;Treatment plan and rationale;Self Care instruction;Basis of treatment;Body mechanics;Posture;Next steps;Expected outcome  Times per Week: 1-2  Number of Weeks: 6-8  Number of Visits: 16 MEDX  Discharge Planning: independent HEP and/or plateau of progress  Precautions / Restrictions : COPD, PAD  Therapeutic Exercise: ROM;Stretching;Strengthening  Neuromuscular Reeducation: kinesio tape;posture;TNE;core  Manual Therapy: soft tissue mobilization;myofascial release;joint mobilization;muscle energy  Modalities: TENS  Gait Training: as  indicated      POC and pathology of condition were reviewed with patient.  Pt. is in agreement with the Plan of Care  A Home Exercise Program (HEP) was initiated today.  Pt. was instructed in exercises by PT and patient was given a handout with detailed instructions.    Plan for next visit: review HEP, possible use of TENS again? Eventual transfer to MEDX - might not be appropriate right at this time as roller pads will hit at her sensitive spot and most likely increase her pain symptoms, progress to core/glute/low back strengthening HEP, sit to stands and mobility exercises for pain relief, TNE and manual therapy, HF stretching     Subjective:         History of Present Illness:    Annette is a 60 y.o. female who presents to therapy today with complaints of R glute/low back pain. Date of onset is 2020 and onset was gradual. Symptoms are constant, getting worse and not improving. Patient reports she had gotten an injection in July which helped for a few weeks, but now she is experiencing a different pain, starting between her buttocks and travels up, a throbbing pain. She wakes up in the middle of the night because of this pain and she can feel it cracking. Patient doesn't have regular bowel movements and when she does have a bowel movement it hurts tremendously. She reports  an episodic  history of similar symptoms. She describes their previous level of function as not limited. Patient has been waking up mostly with pain, she sleeps on her side towards her back. Patient is wanting to try therapy before having another injection.     Pain Ratin  Pain rating at best: 3  Pain rating at worst: 9  Pain description: shooting and pain travels up, constant ache    Functional limitations are described as occurring with:   bending  performing routine daily activities  sitting for longer periods of time  sleeping  walking cramping into the legs  feet hurt all the time    Patient reports benefit from:  rest  , injection  had helped, ice         Objective:      Note: Items left blank indicates the item was not performed or not indicated at the time of the evaluation.      Examination  1. Chronic right-sided low back pain without sciatica     2. Myofascial pain syndrome       Involved side: Right and Bilateral  Posture Observation:      General sitting posture is  normal.  General standing posture is fair.    Lumbar ROM:    Date: 8/31/2020     *Indicate scale AROM AROM AROM   Lumbar Flexion Finger to shins with hamstring tightness     Lumbar Extension Limited with P      Right Left Right Left Right Left   Lumbar Sidebending P on R        Lumbar Rotation         Thoracic Flexion      Thoracic Extension      Thoracic Sidebending         Thoracic Rotation           Lower Extremity Strength:   WFL no pain    Sensation : diminished at bilateral ankles due to peripheral neuropathy      Lumbar Special Tests:     Lumbar Special Tests Right Left SI Tests Right  Left   Quadrant test   SI Compression     Straight leg raise - - SI Distraction     Crossover response   POSH Test     Slump + in leg + in leg < R Sacral Thrust     Sit-up test  FADIR - -   Trunk extensor endurance test  Resisted Abduction     Prone instability test  Other:     Pubic shotgun  Other:       Passive Mobility - Joint Integrity:  Hypomobile    Palpation: TTP and hyperalgesic at R>L PSIS and gluteal region  Flexibility: decreased of HF, hamstrings      Treatment Today       Patient Instruction:  EDU on activity modification and exercise consistency in order to see progress and change    Exercises:  Exercise #1: supine knee rocks - 20 reps  Comment #1: SKTC, piriformis and lumbar rotation stretch - hold 30 sec  Exercise #2: supine nerve glides - 10 reps  Comment #2: seated hamstring stretch - hold 30 sec          TREATMENT MINUTES COMMENTS   Evaluation 20    Self-care/ Home management     Manual therapy     Neuromuscular Re-education     Therapeutic Activity     Therapeutic  Exercises 25 See flowsheet   Gait training     Modality____TENS_________ 15 Patient supine with bi set up on R gluteal and lumbar paraspinals, level 16-19, patient performed exercises during stimulation              Total 60    Blank areas are intentional and mean the treatment did not include these items.     PT Evaluation Code: (Please list factors)  Patient History/Comorbidities:   Patient Active Problem List   Diagnosis   ? COPD (chronic obstructive pulmonary disease) (H)   ? Family history of diabetes mellitus   ? Foot pain   ? Idiopathic sensorimotor axonal neuropathy   ? Laryngitis, chronic   ? Major depressive disorder, recurrent episode, moderate (H)   ? Migraine without aura   ? Mixed hyperlipidemia   ? Obstructive sleep apnea   ? Other somatoform disorders   ? PVD (peripheral vascular disease) (H)   ? Screening for cervical cancer   ? Tobacco use disorder   ? Vitamin B12 deficiency       Examination: decreased mobility increased pain  Clinical Presentation: stable  Clinical Decision Making: low    Patient History/  Comorbidities Examination  (body structures and functions, activity limitations, and/or participation restrictions) Clinical Presentation Clinical Decision Making (Complexity)   No documented Comorbidities or personal factors 1-2 Elements Stable and/or uncomplicated Low   1-2 documented comorbidities or personal factor 3 Elements Evolving clinical presentation with changing characteristics Moderate   3-4 documented comorbidities or personal factors 4 or more Unstable and unpredictable High                Jordyn Ruiz, PT  8/31/2020  7:08 AM

## 2021-06-29 NOTE — PROGRESS NOTES
Progress Notes by Corrie Hernandez PT at 10/20/2020  9:30 AM     Author: Corrie Hernandez PT Service: -- Author Type: Physical Therapist    Filed: 10/21/2020  2:11 PM Encounter Date: 10/20/2020 Status: Attested    : Corrie Hernandez PT (Physical Therapist) Cosigner: Alexx Norman DO at 10/21/2020  3:46 PM    Attestation signed by Alexx Norman DO at 10/21/2020  3:46 PM    Follow the therapist's recommendation                                                                                                     [facility] Rehabilitation Certification Request    October 20, 2020      Patient: Annette Shore  MR Number: [id number]  YOB: 1960  Date of Visit: 10/20/2020      Dear Dr. Alexx Norman:    Thank you for this referral.   We are seeing Annette Shore for Physical Therapy of Gluteal pain, SI pain, and Lumbar Radiulopathy.    Medicare and/or Medicaid requires physician review and approval of the treatment plan. Please review the plan of care and verify that you agree with the therapy plan of care by co-signing this note.      Plan of Care  Authorization / Certification Start Date: 10/20/20  Authorization / Certification End Date: 01/15/21  Authorization / Certification Number of Visits: 10  Communication with: Referral Source  Patient Related Instruction: Nature of Condition;Treatment plan and rationale;Self Care instruction;Basis of treatment;Body mechanics;Posture;Next steps;Expected outcome;Precautions  Times per Week: 1  Number of Weeks: 12  Number of Visits: 10  Discharge Planning: self management  Precautions / Restrictions : COPD, PAD, severe constipation  Therapeutic Exercise: ROM;Stretching;Strengthening  Neuromuscular Reeducation: kinesio tape;posture;TNE;core  Manual Therapy: soft tissue mobilization;myofascial release;joint mobilization;muscle energy      Goals:    Patient will wake 1x/night due to pain in 12 weeks  Patient will have LBP less than 5/10 during defecation, and  will be able to defecate 3-4x/week in 12 or more weeks.    If you have any questions or concerns, please don't hesitate to call.    Sincerely,      Corrie Hernandez, PT        Physician recommendation:     ___ Follow therapist's recommendation        ___ Modify therapy      *Physician co-signature indicates they certify the need for these services furnished within this plan and while under their care.    [facility]  [place]-Pelvic Initial Evaluation    Patient Name: Annette Shore  Date of evaluation: 10/20/2020  Referral Diagnosis: Gluteal pain, SI pain, Lumbar radiculopathy   Referring provider: Dr. Alexx Norman  Visit Diagnosis:     ICD-10-CM    1. Chronic right-sided low back pain without sciatica  M54.5     G89.29    2. Myofascial pain syndrome  M79.18    3. Upper back pain, chronic  M54.9     G89.29    4. Pain of left lower leg  M79.662    5. Bilateral foot pain  M79.671     M79.672        Assessment:       Precaution: Abdominal and pelvic stents  Annette is a 60 year old female with severe sacral and bilateral leg pain, as well as severe abdominal pain with difficulty defecating. Patient is unable to give a time when this problem started due to multiple health issues occurring at the same time, but previus PT note lists 7/2020. She has severe PAD and also PN, with history of IBS. Patient has weakness of her core and LE's due to severe PAD and past history of what appears to be a type of slow transit disorder. Exam today shows limitation in trunk mobility diffusely, but no segmental LE numbness or weakness. Cranial fascial scanning does show abdominal and pelvic visceral and neural fascia dysfunction, which would correlate with pelvic floor dysfunction.  She did have US done 10/16/20 to left leg and is having angiogram and possible angioplasty on 11/11/20 which may change treatment and goal time. Past medical history significant for COPD, hyperlipidemia, hypertension, peripheral vascular disease  status post femoral-popliteal bypass after failed stenting, probably neuropathy. MRI does show L5-S1 disc bulge with right foraminal stenosis, and L3-4 foraminal stenosis. Patient is also participating in [facility] program for core strengthening. Symptoms do not correlate with a specific dermatomal numbness and weakness pattern, and the source of pain is complicated by PAD and PN which overlaps into the same area of pain as the LB.  Pt. is appropriate for skilled PT intervention as outlined in the Plan of Care (POC).    Goals:    Patient will wake 1x/night due to pain in 12 weeks  Patient will have LBP less than 5/10 during defecation, and will be able to defecate 3-4x/week in 12 or more weeks.    Patient's expectations/goals are challenging but obtainable    Barriers to Learning or Achieving Goals:  Chronicity of the problem.  Co-morbidities or other medical factors.  havoing further surgery    Goals and plan of care were set in collaboration with patient. Plan of care is dynamic and will be modified on an ongoing basis.       Plan / Patient Instructions:        Plan of Care:   Authorization / Certification Start Date: 10/20/20  Authorization / Certification End Date: 01/15/21  Authorization / Certification Number of Visits: 10  Communication with: Referral Source  Patient Related Instruction: Nature of Condition;Treatment plan and rationale;Self Care instruction;Basis of treatment;Body mechanics;Posture;Next steps;Expected outcome;Precautions  Times per Week: 1  Number of Weeks: 12  Number of Visits: 10  Discharge Planning: self management  Precautions / Restrictions : COPD, PAD, severe constipation  Therapeutic Exercise: ROM;Stretching;Strengthening  Neuromuscular Reeducation: kinesio tape;posture;TNE;core  Manual Therapy: soft tissue mobilization;myofascial release;joint mobilization;muscle energy      Plan for next visit: Start MT for abdominal and neural fascia of the abdomen and pelvis     Subjective:          Social information:   Occupation:retired   Work Status:NA   Equipment Available: None    History of Present Illness:    Annette is a 60 y.o. female who presents to therapy today with complaints of bilateral gluteal, groin, and SI pain. Date of onset/duration of symptoms is at least since 7/2020 but likely was before this time. Onset was gradual. Symptoms improve slightly with injections and mobility, but still cause considerable pain and dysfunction. . She describes their previous level of function as limited due to prior back surgery, PAD, PN and COPD.    Pain Rating:3  Pain rating at best: 3  Pain rating at worst: 9  Pain description: Bilateral gluteal and sacral pain, right more than left, central spine, bilateral lower abdominal and groin, and also pain down the front thigh and back thigh to the feet. Patient notes the tingling is worse at night. Patient has severe PAD -80% blocked left, and right 70% blocked- left leg having 11/11 angiogram and possible angioplasty.    Past Surgical History:   Procedure Laterality Date   ? AORTA - FEMORAL ARTERY BYPASS GRAFT     ? HYSTEROSCOPY W/ ENDOMETRIAL ABLATION     ? spine fusion     ? tubal ligation      L4-5 ant/post ;laminectomy with fusion with instrumentation around 2000  Stents were placed in abdominal aorta and unable to be removed- (completely full), also stents placed in pelvis (iliac arteries)  Aortafemoral bypass graft put in around 10 years ago.(2010)    Past Medical History:   Diagnosis Date   ? COPD (chronic obstructive pulmonary disease) (H)    ? Depression    ? Hyperlipidemia    ? Hypertension    ? PAD (peripheral artery disease) (H)    ? Peripheral neuropathy    Recently diagnosed with Diabetes in 2019- controlled by diet  Pat diagnosis include: IBS, spastic colon, diverticulitis, some polyp removal,  Sleep apnea with CPAP    From 6/22/20 MRI   IMPRESSION:   1.  Status post L4-L5 interbody fusion which appears solid.     2.  Moderate right-sided neural  foraminal stenosis at L5-S1.     3.  At L3-L4, there is a very mild central spinal canal stenosis and moderate bilateral neural foraminal stenosis.    Functional limitations are described as occurring with:   Constipation- patient has fissures and has had colonic wall tears before. Has formed and hard BM 1-2 x/week, and will often need to do manual manipulation to empty.  Wakes 2-3 x/night due to pain  Patient does use suppositories which don't work  LBP with urination  Up/down stairs   Get in/out/car  Walking- very slowly  Standing for hours, but has to move  Kneel/squat  Yard and house work  Wash/bath/shower  Carry laundry/groceries      Patient reports she has had a lumbar injection (right L5-S1 TFESI, and SI injections). and a right hip injection within the last month, which helped slightly. [facility] - still has weakness in the spine but improved mobility.         Objective:      Note: Items left blank indicates the item was not performed or not indicated at the time of the evaluation.      Examination  1. Chronic right-sided low back pain without sciatica     2. Myofascial pain syndrome     3. Upper back pain, chronic     4. Pain of left lower leg     5. Bilateral foot pain       Involved side: Bilateral  Posture Observation: Right pelvis and hip higher in standing, and right shoulder is markedly higher. Knees are windswept to the left, with marked right knee valgus  3 episodes of sharp neck pain a week, otherwise 0/10        Lumbar ROM:    Date:      *Indicate scale AROM AROM AROM   Lumbar Flexion 17 1/2 inches fingertips to the floor     Lumbar Extension No tested but limited in standing      Right Left Right Left Right Left   Lumbar Sidebending         Lumbar Rotation         Thoracic Flexion      Thoracic Extension      Thoracic Sidebending         Thoracic Rotation           Lower Extremity Strength:     Date:      LE strength/5 Right Left Right  Left Right Left   Hip Flexion (L1-3)         Hip Extension (L5-S1)         Hip Abduction (L4-5)         Hip Adduction (L2-3)         Hip External Rotation         Hip Internal Rotation         Knee Extension (L3-4)         Knee Flexion         Ankle Dorsiflexion (L4-5) 5 5       Great Toe Extension (L5) 5 5       Ankle Plantar flexion (S1)         Abdominals        Sensation           Reflex Testing  Lumbar Dermatomes Right Left UE Reflexes Right Left   Iliac Crest and Groin (L1)   Biceps (C5-6)     Anterior Medial Thigh (L2)   Brachioradialis (C5-6)     Anterior Thigh, Medial Epicondyle Femur (L3)   Triceps (C7-8)     Lateral Thigh, Anterior Knee, Medial Leg/Malleolus (L4) nl nl Hoffmanns test     Lateral Leg, Dorsal Foot (L5) nl nl LE Reflexes     Lateral Foot (S1) nl nl Patellar (L3-4)     Posterior Leg (S2) nl nl Achilles (S1-2)     Other:   Babinski Response       Palpation: No palpable left posterior tibial and pedal pulse, normal capillary refill  Extensive abdominal scarring from previous aortic stents and anterior lumbar fusion, and also lumbar scar posteriorly    Lumbar Special Tests:     Lumbar Special Tests Right Left SI Tests Right  Left   Quadrant test   SI Compression     Straight leg raise neg neg SI Distraction     Crossover response   POSH Test     Slump   Sacral Thrust     Sit-up test  FADIR     Trunk extensor endurance test  Resisted Abduction     Prone instability test  Other:     Pubic shotgun  Other:             Treatment Today     TREATMENT MINUTES COMMENTS   Evaluation 30 SI/LB   Self-care/ Home management     Manual therapy     Neuromuscular Re-education     Therapeutic Activity     Therapeutic Exercises 25 Patient has previous exercises issued by [facility] therapists, and I have added 2 extension biased exercises:   Exercises:  Exercise #1: supine knee rocks - 20 reps  Comment #1: SKTC, piriformis and lumbar rotation stretch - hold 30 sec  Exercise #2: supine nerve glides - 10  reps  Comment #2: seated hamstring stretch - hold 30 sec  Exercise #3: rotary torso - 90s, starting to L, 20#  Comment #3: supine bridge with hip adduction - 10 reps  Exercise #4: sidelying clamshells - 15 reps max  Comment #4: EXtension in standing- 1x/hour  Exercise #5: Prone lie on elbows: 1 minute 2x/day      Gait training     Modality__________________                Total 55    Blank areas are intentional and mean the treatment did not include these items.       PT Evaluation Code: (Please list factors)  Patient History/Comorbidities: s/p back lumbar fusion with instrumentation, PN, severe PAD with stenting and failed fem/pop bypass surgery, COPD  Examination: LB, SI, fascia  Clinical Presentation: unstable  Clinical Decision Making: Moderate    Patient History/  Comorbidities Examination  (body structures and functions, activity limitations, and/or participation restrictions) Clinical Presentation Clinical Decision Making (Complexity)   No documented Comorbidities or personal factors 1-2 Elements Stable and/or uncomplicated Low   1-2 documented comorbidities or personal factor 3 Elements Evolving clinical presentation with changing characteristics Moderate   3-4 documented comorbidities or personal factors 4 or more Unstable and unpredictable High                    Corrie Hernandez  10/20/2020  9:28 AM

## 2021-06-30 NOTE — PROGRESS NOTES
Progress Notes by Carmen Daugherty PT at 4/6/2021  2:30 PM     Author: Carmen Daugherty PT Service: -- Author Type: Physical Therapist    Filed: 4/6/2021  4:22 PM Encounter Date: 4/6/2021 Status: Attested    : Carmen Daugherty PT (Physical Therapist) Cosigner: Alexx Norman DO at 4/7/2021  8:27 AM    Attestation signed by Alexx Norman DO at 4/7/2021  8:27 AM    Follow the therapist's recommendation                    [facility] Re-Certification Request    April 6, 2021      Patient: Iglesia Shore  MR Number: [id number]  YOB: 1960  Date of Visit: 4/6/2021  Onset Date:  7/2020  Date of Eval: 8/31/20    Dear :    As you may recall, we have been seeing Iglesia Shore for Physical Therapy of Gluteal Pain, SI pain, Lumbar Radicuolpathy.    For therapy to continue, Medicare and/or Medicaid requires periodic physician review of the treatment plan. Please review the summary of the patient's progress and our plan for continued therapy, and verify  that you agree therapy should continue by entering certification dates at the bottom of this note and co-signing this note.    Plan of Care  Authorization / Certification Start Date: 04/05/21  Authorization / Certification End Date: 07/03/21  Authorization / Certification Number of Visits: Medicare/BC  cert required  Communication with: Referral Source  Patient Related Instruction: Nature of Condition;Treatment plan and rationale;Self Care instruction;Basis of treatment;Body mechanics;Posture;Next steps;Expected outcome;Precautions  Times per Week: 1  Number of Weeks: 12  Number of Visits: 12  Discharge Planning: self management  Precautions / Restrictions : COPD, PAD, severe constipation  Therapeutic Exercise: ROM;Stretching;Strengthening  Neuromuscular Reeducation: kinesio tape;posture;TNE;core  Manual Therapy: soft tissue mobilization;myofascial release;joint mobilization;muscle energy;strain counterstrain      Goal  Comment:: will wake  1x/night due to pain; Currently patient wakes up 2x/night, due to back and left leg pain and right gluteal.  She reports she moves alot at night , and does sleep during the day also  status: sleeping 2-3 hours at time    Pt will: be able to walk for longer than 10 minutes without increased low back symptoms by 6 weeks.: currently walks 5 minutes with 6-710 LBP and bilateral gluteal pain status: walking tolerance < 1 block  Pt will: be able to have a bowel movement without increased low back pain by 6 weeks.: improved as noted above  Pt will: have LBP less than 5/10 during defecation, and will be able to deficate 3-4x/weel in 12 weeks: currently the back pain is 2-4/10, and tearing pain  in rectal fissures has decreased to 2/10        If you have any questions or concerns, please don't hesitate to call.    Sincerely,      Carmen Daugherty, PT        Physician recommendation:     ___ Follow therapist's recommendation        ___ Modify therapy      *Physician co-signature indicates they certify the need for these services furnished within this plan and while under their care.      [facility] Daily Progress     Patient Name: Iglesia Shore  Date: 4/6/2021  Visit #: 12/10+12 (cert to 7/3/21)  Referral Diagnosis: Gluteal Pain, SI pain, Lumbar Radicuolpathy  Referring provider: Dr. Alexx Norman  Visit Diagnosis:     ICD-10-CM    1. Chronic right-sided low back pain without sciatica  M54.5     G89.29    2. Myofascial pain syndrome  M79.18    3. Upper back pain, chronic  M54.9     G89.29    4. Pain of left lower leg  M79.662    5. Bilateral foot pain  M79.671     M79.672    6. PVD (peripheral vascular disease) (H)  I73.9    7. Peripheral neuropathy  G62.9    8. COPD (chronic obstructive pulmonary disease) (H)  J44.9          Assessment:     From 10/20/20 note:  Precaution: Abdominal and pelvic stents  Iglesia is a 60 year old female with severe sacral and bilateral leg pain, as well as severe abdominal pain with  difficulty defecating. Patient is unable to give a time when this problem started due to multiple health issues occurring at the same time, but previous PT note lists 7/2020. She has severe PAD and also PN, with history of IBS. Patient has weakness of her core and LE's due to severe PAD and past history of what appears to be a type of slow transit disorder. Exam today shows limitation in trunk mobility diffusely, but no segmental LE numbness or weakness. Cranial fascial scanning does show abdominal and pelvic visceral and neural fascia dysfunction, which would correlate with pelvic floor dysfunction.  She did have US done 10/16/20 to left leg and is having angiogram and possible angioplasty on 11/11/20 which may change treatment and goal time. Past medical history significant for COPD, hyperlipidemia, hypertension, peripheral vascular disease status post femoral-popliteal bypass after failed stenting, and probable neuropathy. MRI does show L5-S1 disc bulge with right foraminal stenosis, and L3-4 foraminal stenosis. Patient is also participating in [facility] program for core strengthening. Symptoms do not correlate with a specific dermatomal numbness and weakness pattern, and the source of pain is complicated by PAD and PN which overlaps into the same area of pain as the LB.     HEP/POC compliance is  good .  Response to Intervention has had good response to previous treatment.   Patient is appropriate to continue with skilled physical therapy intervention, as indicated by initial plan of care.  Reassessment is ongoing and plan of care may be adjusted for patient needs and clinical presentation.     Goal Status:  Comment:: will wake 1x/night due to pain; Currently patient wakes up 2x/night, due to back and left leg pain and right gluteal.  She reports she moves alot at night , and does sleep during the day also  status: sleeping 2-3 hours at time    Pt will: be able to walk for longer than 10 minutes without increased low  back symptoms by 6 weeks.: currently walks 5 minutes with 6-710 LBP and bilateral gluteal pain status: walking tolerance < 1 block  Pt will: be able to have a bowel movement without increased low back pain by 6 weeks.: improved as noted above  Pt will: have LBP less than 5/10 during defecation, and will be able to deficate 3-4x/weel in 12 weeks: currently the back pain is 2-4/10, and tearing pain  in rectal fissures has decreased to 2/10      Plan / Patient Education:     Continue with initial plan of care.  Progress with home program as tolerated.   Continue to assess and treat for multi system fascial dysfunction contributing to multiple pain and functional complaints  Recert sent for signature and POC updated today.     Authorization / Certification Start Date: 21  Authorization / Certification End Date: 21  Authorization / Certification Number of Visits: Medicare/BC  cert required  Communication with: Referral Source  Patient Related Instruction: Nature of Condition;Treatment plan and rationale;Self Care instruction;Basis of treatment;Body mechanics;Posture;Next steps;Expected outcome;Precautions  Times per Week: 1  Number of Weeks: 12  Number of Visits: 12  Discharge Planning: self management  Precautions / Restrictions : COPD, PAD, severe constipation  Therapeutic Exercise: ROM;Stretching;Strengthening  Neuromuscular Reeducation: kinesio tape;posture;TNE;core  Manual Therapy: soft tissue mobilization;myofascial release;joint mobilization;muscle energy;strain counterstrain      Subjective:     Pain Ratin  Was able to have a bowel movement after the last session. Has been constipated for a couple days again. Having pain in lower back, fullness and pressure in midline upper abdomen. Not sleeping due to hip pain and neuropathy.     Objective:     Mod fascial restrictions of diaphragm, sternum, mediastinum, colon, duodenum, sphincters, mesenteric root.   Moderately restricted colon motility.      Treatment Today     TREATMENT MINUTES COMMENTS   Evaluation     Self-care/ Home management     Manual therapy 53 Induction, indirect, direct techniques utilized as appropriate for optimal tissue release.   MFR/SCS - respiratory diaphragm, mediastinum, colon motility, splenic flexure, duodenum/D2/D3, pylorus, iliacecal valve, mesenteric root/small intestine, asc colon, pelvic diaphragm,    Neuromuscular Re-education     Therapeutic Activity     Therapeutic Exercises     Gait training     Modality__________________                Total 53    Blank areas are intentional and mean the treatment did not include these items.       Carmen Daugherty  4/6/2021

## 2021-06-30 NOTE — PROGRESS NOTES
Progress Notes by Corrie Hernandez PT at 1/8/2021 11:30 AM     Author: Corrie Hernandez PT Service: -- Author Type: Physical Therapist    Filed: 1/8/2021  1:09 PM Encounter Date: 1/8/2021 Status: Attested    : Corrie Hernandez PT (Physical Therapist) Cosigner: Alexx Norman DO at 1/8/2021  3:18 PM    Attestation signed by Alexx Norman DO at 1/8/2021  3:18 PM    Follow the therapist's recommendation                [facility] Re-Certification Request    January 8, 2021      Patient: Iglesia Shore  MR Number: [id number]  YOB: 1960  Date of Visit: 1/8/2021  Onset Date:  7/2020  Date of Eval: 10/21/20    Dear Dr. Alexx Norman:    As you may recall, we have been seeing Iglesia Shore for Physical Therapy of Bilateral gluteal pain, SI pain, and lumbar radiulopathy    For therapy to continue, Medicare and/or Medicaid requires periodic physician review of the treatment plan. Please review the summary of the patient's progress and our plan for continued therapy, and verify  that you agree therapy should continue by entering certification dates at the bottom of this note and co-signing this note.    Plan of Care  Authorization / Certification Start Date: 01/08/21  Authorization / Certification End Date: 04/05/21  Authorization / Certification Number of Visits: 10  Communication with: Referral Source  Patient Related Instruction: Nature of Condition;Treatment plan and rationale;Self Care instruction;Basis of treatment;Body mechanics;Posture;Next steps;Expected outcome;Precautions  Times per Week: 1  Number of Weeks: 12  Number of Visits: 10  Discharge Planning: self management  Precautions / Restrictions : COPD, PAD, severe constipation  Therapeutic Exercise: ROM;Stretching;Strengthening  Neuromuscular Reeducation: kinesio tape;posture;TNE;core  Manual Therapy: soft tissue mobilization;myofascial release;joint mobilization;muscle energy;strain counterstrain      Goal  Pt. will have  improved quality of sleep: in 12 weeks;Comment  Comment:: will wake 1x/night due to pain; Currently patient wakes up 2x/night, due to back and left leg pain and right gluteal.  She reports she moves alot at night , and does sleep during the day also    Pt will: be able to walk for longer than 10 minutes without increased low back symptoms by 6 weeks.: currently walks 5 minutes with 6-710 LBP and bilateral gluteal pain  Pt will: be able to have a bowel movement without increased low back pain by 6 weeks.: improved as noted above  Pt will: have LBP less than 5/10 during defecation, and will be able to deficate 3-4x/week in 12 weeks: currently the back pain is 2-4/10, and tearing pain  in rectal fissures has decreased to 2/10        If you have any questions or concerns, please don't hesitate to call.    Sincerely,      Corrie Hernandez, PT        Physician recommendation:     ___ Follow therapist's recommendation        ___ Modify therapy      *Physician co-signature indicates they certify the need for these services furnished within this plan and while under their care.    [facility] Daily Progress /Monthly Summary    Patient Name: Iglesia Shore  Date: 1/8/2021  Visit #:7  Referral Diagnosis: Gluteal Pain, SI pain, Lumbar Radicuolpathy  Referring provider: Dr. Alexx Norman  Visit Diagnosis:     ICD-10-CM    1. Chronic right-sided low back pain without sciatica  M54.5     G89.29    2. Myofascial pain syndrome  M79.18    3. Upper back pain, chronic  M54.9     G89.29    4. Pain of left lower leg  M79.662    5. Bilateral foot pain  M79.671     M79.672    6. PVD (peripheral vascular disease) (H)  I73.9    7. Peripheral neuropathy  G62.9    8. COPD (chronic obstructive pulmonary disease) (H)  J44.9      From 10/20/20 note:  Precaution: Abdominal and pelvic stents  Iglesia is a 60 year old female with severe sacral and bilateral leg pain, as well as severe abdominal pain with difficulty defecating. Patient  is unable to give a time when this problem started due to multiple health issues occurring at the same time, but previous PT note lists 7/2020. She has severe PAD and also PN, with history of IBS. Patient has weakness of her core and LE's due to severe PAD and past history of what appears to be a type of slow transit disorder. Exam today shows limitation in trunk mobility diffusely, but no segmental LE numbness or weakness. Cranial fascial scanning does show abdominal and pelvic visceral and neural fascia dysfunction, which would correlate with pelvic floor dysfunction.  She did have US done 10/16/20 to left leg and is having angiogram and possible angioplasty on 11/11/20 which may change treatment and goal time. Past medical history significant for COPD, hyperlipidemia, hypertension, peripheral vascular disease status post femoral-popliteal bypass after failed stenting, and probable neuropathy. MRI does show L5-S1 disc bulge with right foraminal stenosis, and L3-4 foraminal stenosis. Patient is also participating in [employer] program for core strengthening. Symptoms do not correlate with a specific dermatomal numbness and weakness pattern, and the source of pain is complicated by PAD and PN which overlaps into the same area of pain as the LB.    Assessment:     Patient had a left pelvic/LE angioplasty on 12/16/20 - per surgeon too dangerous to put in stents and only had balloon done. Extensive bruising of left forearm access point, left inner thigh and calf pain during procedure.Pain has changed from shooting to a throbbing pain. Patient reports that PT has significantlly helped to reduce the constipation and LB/rectal pain she was experiencing.  Patient is benefitting from skilled physical therapy and is making steady progress toward functional goals.    Goal Status:  Pt. will have improved quality of sleep: in 12 weeks;Comment  Comment:: will wake 1x/night due to pain; Currently patient wakes up 2x/night, due to back  and left leg pain and right gluteal.  She reports she moves alot at night , and does sleep during the day also    Pt will: be able to walk for longer than 10 minutes without increased low back symptoms by 6 weeks.: currently walks 5 minutes with 6-710 LBP and bilateral gluteal pain  Pt will: be able to have a bowel movement without increased low back pain by 6 weeks.: improved as noted above  Pt will: have LBP less than 5/10 during defecation, and will be able to deficate 3-4x/week in 12 weeks: currently the back pain is 2-4/10, and tearing pain  in rectal fissures has decreased to 2/10      Plan / Patient Education:     Continue with initial plan of care.  Continue MT for abdominal and neural fascia of the abdomen and pelvis- especially LS plexus- 95 N2    Subjective:     Pain Ratin/10 LB, right buttock pain 1-2/10. They did a 2nd angiogram on the left groin through my left arm which took 2 1/2 hours and was very painful on 20. The left groin was 70% blocked and now after the angiogram it is 57% open. The want to wait to do the bypass until the emi closes again, with a Doppler US monthly to monitor.  I have been pretty good with the BM, and have been to do it with less pain (2/10).- still don;t have a BM daily- every 3 days.    Objective:      Right PSIS high, + right FB test  Cranial scan is + right pelvis/hip, general lower abdominal and pelvic viscera fascia, arterial fascia- especially LE's  Left foot color is now pink entire foot and leg to the knee but is swollen. Patient now wears compression socks during the day.  Trunk flexion is 9 inches (was 20 12 inches fingertips to the floor , gained 5 1/2 inches from evaluation)  Patient moving more easily sit to stand., and is able to sit now in waiting room    Treatment Today     TREATMENT MINUTES COMMENTS   Evaluation     Self-care/ Home management     Manual therapy 55 Supine MT/SCS for abdomen and neck including: ICV-V, L MONICA, internal  jugular-LV, L MAS-LV, Bilat MAS-LV, Stacked MESL1-5-V with scar on right L5   Neuromuscular Re-education     Therapeutic Activity     Therapeutic Exercises     Gait training     Modality__________________                Total 55    Blank areas are intentional and mean the treatment did not include these items.       Corrie Hernandez  1/8/2021

## 2021-07-03 NOTE — ADDENDUM NOTE
Addendum Note by Fabián Wing RN at 12/4/2020 10:00 AM     Author: Fabián Wing RN Service: -- Author Type: Registered Nurse    Filed: 12/4/2020  3:53 PM Encounter Date: 12/4/2020 Status: Signed    : Fabián Wing RN (Registered Nurse)    Addended by: FABIÁN WING on: 12/4/2020 03:53 PM        Modules accepted: Orders

## 2021-07-03 NOTE — ADDENDUM NOTE
Addendum Note by Jonatan Kelly RN at 11/11/2020 11:19 AM     Author: Jonatan Kelly RN Service: -- Author Type: Registered Nurse    Filed: 11/11/2020 11:19 AM Encounter Date: 11/9/2020 Status: Signed    : Jonatan Kelly RN (Registered Nurse)    Addended by: JONATAN KELLY on: 11/11/2020 11:19 AM        Modules accepted: Orders

## 2021-07-03 NOTE — ADDENDUM NOTE
Addendum Note by Fabián Wing RN at 10/16/2020  9:00 AM     Author: Fabián Wing RN Service: -- Author Type: Registered Nurse    Filed: 10/16/2020 11:17 AM Encounter Date: 10/16/2020 Status: Signed    : Fabián Wing RN (Registered Nurse)    Addended by: FABIÁN WING on: 10/16/2020 11:17 AM        Modules accepted: Orders

## 2021-07-03 NOTE — ADDENDUM NOTE
Addendum Note by Fabián Wing RN at 10/16/2020  9:00 AM     Author: Fabián Wing RN Service: -- Author Type: Registered Nurse    Filed: 10/16/2020 11:07 AM Encounter Date: 10/16/2020 Status: Signed    : Fabián Wing RN (Registered Nurse)    Addended by: FABIÁN WING on: 10/16/2020 11:07 AM        Modules accepted: Orders

## 2021-07-22 NOTE — PROGRESS NOTES
Progress Notes by Corrie Hernandez PT at 6/25/2021  2:30 PM     Author: Corrie Hernandez PT Service: -- Author Type: Physical Therapist    Filed: 6/25/2021  4:37 PM Encounter Date: 6/25/2021 Status: Attested    : Corrie Hernandez PT (Physical Therapist) Cosigner: Alexx Norman DO at 6/28/2021  7:35 AM    Attestation signed by Alexx Norman DO at 6/28/2021  7:35 AM    Follow the therapist's recommendation                [facility] Re-Certification Request    June 25, 2021      Patient: Iglesia Shore  MR Number: [id number]  YOB: 1960  Date of Visit: 6/25/2021  Onset Date:  7/2020  Date of Eval:  10/21/20    Dear Dr. Alexx Norman:    As you may recall, we have been seeing Iglesia Shore for Physical Therapy of  Gluteal pain, SI pain, Lumbar radiculopathy.    For therapy to continue, Medicare and/or Medicaid requires periodic physician review of the treatment plan. Please review the summary of the patient's progress and our plan for continued therapy, and verify  that you agree therapy should continue by entering certification dates at the bottom of this note and co-signing this note.    Plan of Care  Authorization / Certification Start Date: 06/25/21  Authorization / Certification End Date: 09/22/21  Authorization / Certification Number of Visits: Medicare/BC  cert required  Communication with: Referral Source  Patient Related Instruction: Nature of Condition;Treatment plan and rationale;Self Care instruction;Basis of treatment;Body mechanics;Posture;Next steps;Expected outcome;Precautions  Times per Week: 1  Number of Weeks: 12  Number of Visits: 12  Discharge Planning: self management  Precautions / Restrictions : COPD, PAD, severe constipation  Therapeutic Exercise: ROM;Stretching;Strengthening  Neuromuscular Reeducation: kinesio tape;posture;TNE;core  Manual Therapy: soft tissue mobilization;myofascial release;joint mobilization;muscle energy;strain  counterstrain      Goal  Comment:: will wake 1x/night due to pain in 12 weeks; Currently patient wakes up every 2 -3 hours all night long, gets up 2-3x/night due to pain. She gets up and has coffee and a cigarette- knows this is counter productive.    Pt will: be able to walk for longer than 10 minutes without increased low back symptoms by 12 weeks.: currently walks3-5 minutes with 6-7/10 bilateral buttock pain and leg pain (more the right leg) and her difficulty breathing  Pt will: be able to have a bowel movement without increased low back pain by 12 weeks:  LBP gets worse with BM- 5-8/10 but she hasn't passed out.  Pt will: have LBP less than 5/10 during defecation, and will be able to deficate 3-4x/week in 12 weeks: currently the back/sacral pain is 5-8/10. Patient has had continued problems with BM- longest has been 2 weeks. Bowel movements are stil very limited and 1x/wk        If you have any questions or concerns, please don't hesitate to call.    Sincerely,      Corrie Hernandez, PT        Physician recommendation:     ___ Follow therapist's recommendation        ___ Modify therapy      *Physician co-signature indicates they certify the need for these services furnished within this plan and while under their care.    [facility] Daily Progress /Monthly Summary    Patient Name: Iglesia Shore  Date: 6/25/2021  Visit #: 7/10+22 (cert to 7/3/21)  Referral Diagnosis: Gluteal Pain, SI pain, Lumbar Radicuolpathy  Referring provider: Dr. Alexx Norman  Visit Diagnosis:     ICD-10-CM    1. Chronic right-sided low back pain without sciatica  M54.5     G89.29    2. Myofascial pain syndrome  M79.18    3. Upper back pain, chronic  M54.9     G89.29    4. Pain of left lower leg  M79.662    5. Bilateral foot pain  M79.671     M79.672    6. PVD (peripheral vascular disease) (H)  I73.9    7. Peripheral neuropathy  G62.9    8. COPD (chronic obstructive pulmonary disease) (H)  J44.9      From 10/20/20  note:  Precaution: Abdominal and pelvic stents  Annette is a 60 year old female with severe sacral and bilateral leg pain, as well as severe abdominal pain with difficulty defecating. Patient is unable to give a time when this problem started due to multiple health issues occurring at the same time, but previous PT note lists 7/2020. She has severe PAD and also PN, with history of IBS. Patient has weakness of her core and LE's due to severe PAD and past history of what appears to be a type of slow transit disorder. Exam today shows limitation in trunk mobility diffusely, but no segmental LE numbness or weakness. Cranial fascial scanning does show abdominal and pelvic visceral and neural fascia dysfunction, which would correlate with pelvic floor dysfunction.  She did have US done 10/16/20 to left leg and is having angiogram and possible angioplasty on 11/11/20 which may change treatment and goal time. Past medical history significant for COPD, hyperlipidemia, hypertension, peripheral vascular disease status post femoral-popliteal bypass after failed stenting, and probable neuropathy. MRI does show L5-S1 disc bulge with right foraminal stenosis, and L3-4 foraminal stenosis. Patient is also participating in [employer] program for core strengthening. Symptoms do not correlate with a specific dermatomal numbness and weakness pattern, and the source of pain is complicated by PAD and PN which overlaps into the same area of pain as the LB.    Assessment:     Patient feels sacral pain is better and manageable, but she is still having difficulty in having BM 1x/week. When her intestines are full she does have more LBP. She is walking, continuing water and fiber intake, monitoring magnesium intake in foods she eats. She continues to have bilateral LE sharp pains which she feels is neuropathy.  Patient is able to sit for 30 minutes now where she was not able to sit more than 2 minutes 6 months ago. Both LE's have improved color today,  and seemed to have improved after last visit in which we have focused on LE neural fascia.    Goal Status:  Comment:: will wake 1x/night due to pain in 12 weeks; Currently patient wakes up every 2 -3 hours all night long, gets up 2-3x/night due to pain. She gets up and has coffee and a cigarette- knows this is counter productive.    Pt will: be able to walk for longer than 10 minutes without increased low back symptoms by 12 weeks.: currently walks3-5 minutes with 6-7/10 bilateral buttock pain and leg pain (more the right leg) and her difficulty breathing  Pt will: be able to have a bowel movement without increased low back pain by 12 weeks:  LBP gets worse with BM- 5-8/10 but she hasn't passed out.  Pt will: have LBP less than 5/10 during defecation, and will be able to deficate 3-4x/week in 12 weeks: currently the back/sacral pain is 5-8/10. Patient has had continued problems with BM- longest has been 2 weeks. Bowel movements are stil very limited and 1x/wk      Plan / Patient Education:     Continue to treat Pelviic floor issues- especially right SI area pain.  Continue with initial plan of care.  Progress with home program as tolerated.    Subjective:     Pain Rating: 3 in my sacrum/Right SI. I am overall doing very well and generally I am 3/10 most of the time. I still have days where I am 8/10- 0-2/7 days /week. I am still sleeping a lot. And I am still battling the CPAP machine. I am having a BM generally 1x/week- and on 1 occasion it was 2 weeks.    5/7/21 note: Had US of legs on 5/3/21, was told numbers are back to the same level as before the angiogram, so procedure didn't work. Not a stent candidate. Low back is bothering her today.     Objective:     Cranial scan +  visceral fascia and scarring   Pelvis is level  Trunk flexion is 8/1/4 inches ( was 11 1/4 inches on 4/221)  Both LE are slightly dusky but near normal color pink from feet up to the knees. No real obvious swelling . She reports burning pain  in both feet.  Unable to obtain pedal or posterior tibial pulse bilaterally.  Hamstring length: Left 60 deg, right 35 deg and increased HS and buttock pain    Treatment Today     TREATMENT MINUTES COMMENTS   Evaluation     Self-care/ Home management     Manual therapy 55 Supine MT/SCS to abdomen, pelvis including: stacked [id number]-H, L BRD-V, ICV-V, CECL-V, SIGL-V, R PER-V   Neuromuscular Re-education     Therapeutic Activity     Therapeutic Exercises      Gait training     Modality__________________                Total 55    Blank areas are intentional and mean the treatment did not include these items.       Corrie Hernandez, PT  6/25/2021

## 2021-08-03 NOTE — PROGRESS NOTES
Progress Notes by Corrie Hernandez PT at 6/25/2021  2:30 PM     Author: Corrie Hernandez PT Service: -- Author Type: Physical Therapist    Filed: 8/3/2021 10:31 AM Encounter Date: 6/25/2021 Status: Signed    : Corrie Hernandez PT (Physical Therapist)       [facility] Discharge Summary  Patient Name: Iglesia Shore  Date: 8/3/2021  Referral Diagnosis: [unfilled]  Referring provider: Jo Hamilton MD  Visit Diagnosis:   1. Chronic right-sided low back pain without sciatica     2. Myofascial pain syndrome     3. Upper back pain, chronic     4. Pain of left lower leg     5. Bilateral foot pain     6. PVD (peripheral vascular disease) (H)     7. Peripheral neuropathy     8. COPD (chronic obstructive pulmonary disease) (H)         Goals:  Comment:: will wake 1x/night due to pain in 12 weeks; Currently patient wakes up every 2 -3 hours all night long, gets up 2-3x/night due to pain. She gets up and has coffee and a cigarette- knows this is counter productive.    Pt will: be able to walk for longer than 10 minutes without increased low back symptoms by 12 weeks.: currently walks3-5 minutes with 6-7/10 bilateral buttock pain and leg pain (more the right leg) and her difficulty breathing  Pt will: be able to have a bowel movement without increased low back pain by 12 weeks:  LBP gets worse with BM- 5-8/10 but she hasn't passed out.  Pt will: have LBP less than 5/10 during defecation, and will be able to deficate 3-4x/week in 12 weeks: currently the back/sacral pain is 5-8/10. Patient has had continued problems with BM- longest has been 2 weeks. Bowel movements are stil very limited and 1x/wk      Patient was seen for 29 visits from 10/20/2021 to 6/25/21 with 2 missed appointments.    Pt feels she is functional and doing much better overall. She had wanted to leave her PT chart open, but has not been in for PT in 5 weeks and has no future PT appointments scheduled.    Therapy will be discontinued at this  time.  The patient will need a new referral to resume.    Thank you for your referral.  Corrie Hernandez  8/3/2021  10:29 AM

## 2021-08-27 ENCOUNTER — HOSPITAL ENCOUNTER (EMERGENCY)
Facility: HOSPITAL | Age: 61
Discharge: HOME | End: 2021-08-27
Attending: EMERGENCY MEDICINE | Admitting: EMERGENCY MEDICINE
Payer: MEDICARE

## 2021-08-27 ENCOUNTER — APPOINTMENT (OUTPATIENT)
Dept: RADIOLOGY | Facility: HOSPITAL | Age: 61
End: 2021-08-27
Attending: EMERGENCY MEDICINE
Payer: MEDICARE

## 2021-08-27 VITALS
HEART RATE: 118 BPM | TEMPERATURE: 98 F | BODY MASS INDEX: 32.53 KG/M2 | OXYGEN SATURATION: 95 % | DIASTOLIC BLOOD PRESSURE: 71 MMHG | RESPIRATION RATE: 24 BRPM | SYSTOLIC BLOOD PRESSURE: 155 MMHG | WEIGHT: 200 LBS

## 2021-08-27 DIAGNOSIS — R06.02 SHORTNESS OF BREATH: ICD-10-CM

## 2021-08-27 DIAGNOSIS — J44.1 COPD EXACERBATION (H): ICD-10-CM

## 2021-08-27 LAB — SARS-COV-2 RNA RESP QL NAA+PROBE: NEGATIVE

## 2021-08-27 PROCEDURE — C9803 HOPD COVID-19 SPEC COLLECT: HCPCS

## 2021-08-27 PROCEDURE — 96374 THER/PROPH/DIAG INJ IV PUSH: CPT

## 2021-08-27 PROCEDURE — 94640 AIRWAY INHALATION TREATMENT: CPT

## 2021-08-27 PROCEDURE — 99284 EMERGENCY DEPT VISIT MOD MDM: CPT | Mod: 25

## 2021-08-27 PROCEDURE — 250N000011 HC RX IP 250 OP 636: Performed by: EMERGENCY MEDICINE

## 2021-08-27 PROCEDURE — 87635 SARS-COV-2 COVID-19 AMP PRB: CPT | Performed by: EMERGENCY MEDICINE

## 2021-08-27 PROCEDURE — 71045 X-RAY EXAM CHEST 1 VIEW: CPT

## 2021-08-27 PROCEDURE — 250N000009 HC RX 250: Performed by: EMERGENCY MEDICINE

## 2021-08-27 RX ORDER — ALBUTEROL SULFATE 5 MG/ML
2.5 SOLUTION RESPIRATORY (INHALATION) EVERY 6 HOURS PRN
Status: DISCONTINUED | OUTPATIENT
Start: 2021-08-27 | End: 2021-08-28 | Stop reason: HOSPADM

## 2021-08-27 RX ORDER — METHYLPREDNISOLONE SODIUM SUCCINATE 125 MG/2ML
125 INJECTION, POWDER, LYOPHILIZED, FOR SOLUTION INTRAMUSCULAR; INTRAVENOUS ONCE
Status: COMPLETED | OUTPATIENT
Start: 2021-08-27 | End: 2021-08-27

## 2021-08-27 RX ORDER — IPRATROPIUM BROMIDE AND ALBUTEROL SULFATE 2.5; .5 MG/3ML; MG/3ML
3 SOLUTION RESPIRATORY (INHALATION) ONCE
Status: COMPLETED | OUTPATIENT
Start: 2021-08-27 | End: 2021-08-27

## 2021-08-27 RX ORDER — PREDNISONE 50 MG/1
50 TABLET ORAL DAILY
Qty: 6 TABLET | Refills: 0 | Status: SHIPPED | OUTPATIENT
Start: 2021-08-28 | End: 2021-09-03

## 2021-08-27 RX ADMIN — ALBUTEROL SULFATE 2.5 MG: 2.5 SOLUTION RESPIRATORY (INHALATION) at 21:14

## 2021-08-27 RX ADMIN — IPRATROPIUM BROMIDE AND ALBUTEROL SULFATE 3 ML: .5; 3 SOLUTION RESPIRATORY (INHALATION) at 19:58

## 2021-08-27 RX ADMIN — METHYLPREDNISOLONE SODIUM SUCCINATE 125 MG: 125 INJECTION, POWDER, FOR SOLUTION INTRAMUSCULAR; INTRAVENOUS at 20:14

## 2021-08-28 NOTE — ED TRIAGE NOTES
Pt has COPD. Comes in today with acute onset of coughing and sob. States that this feels like all of her other exacerbations that she has had in the past. Asked her PCP for prednisone because this has helped in the past but he refused and said that she needs to go to there ER. Denies fever or chills.

## 2021-08-28 NOTE — DISCHARGE INSTRUCTIONS
You were seen in the Emergency Department today for evaluation of shortness of breath.  Your lab work showed no Covid.   Your imaging studies showed no pneumonia.  I think this is all COPD exacerbation you were prescribed prednisone for 6 more days. You should take all medications as prescribed.  Follow up with your primary care physician to ensure resolution of symptoms. Return if you have new or worsening symptoms.

## 2021-08-28 NOTE — ED PROVIDER NOTES
EMERGENCY DEPARTMENT ENCOUNTER      NAME: Annette Drake  AGE: 61 year old female  YOB: 1960  MRN: [id number]  EVALUATION DATE & TIME: 8/27/2021  7:34 PM    PCP: Jo Hamilton    ED PROVIDER: Megan Gutierres M.D.      Chief Complaint   Patient presents with     Shortness of Breath     FINAL IMPRESSION:  1. COPD exacerbation (H)    2. Shortness of breath      ED COURSE & MEDICAL DECISION MAKING:    Pertinent Labs & Imaging studies reviewed. (See chart for details)  7:50 PM I met with the patient to gather history and to perform my initial exam. We discussed plans for the ED course, including diagnostic testing and treatment. PPE worn: n95 mask, gloves.  ED Course as of Aug 27 2204   Fri Aug 27, 2021   1960 Patient is a 61-year-old female who comes in today with shortness of breath.  It hit h she is a history of COPD.  And she er fairly suddenly this afternoon.  Has been feeling very short of breath.  She has decreased breath sounds bilaterally and faint wheezing bilaterally.  She sounds pretty tight and appear short of breath.  She is also tachycardic.  She has not gotten her Covid vaccine.  She says she does not come into contact with anybody so does not think she is at risk.  I recommended that she get it given her COPD history and the potential need for protection if she ever were to get it.  We will give her some stacked nebs and some Solu-Medrol.  We will check a chest x-ray.  A Covid swab is already pending.  She has not had any known fevers.  She did try to leave a message with her regular doctor who did not feel comfortable calling in a prescription for her at this time.  I suspect this is a COPD exacerbation but x-ray will help make sure there is not something more concerning going on.  She agrees with the plan.      2045 No pneumonia was seen on x-ray.  Heart rate is 111.  I will check back in on her shortly.      2128 Covid swab came back negative.  Patient's heart rate is down to 100.   I will check on her shortly and hopefully we can get her discharged home with a diagnosis of COPD exacerbation.      2143 Went back and spoke to the patient.  She is feeling a lot better.  I did encourage her to get the vaccine because I think if she were to get Covid she could have some pretty significant effects from it.  She is going to think about it.  She will follow-up with her primary care doctor.  We will discharge her home with some prednisone.          At the conclusion of the encounter I discussed  the results of all of the tests and the disposition with patient.   All questions were answered.  The patient acknowledged understanding and was involved in the decision making regarding the overall care plan.      I discussed with patient the utility, limitations and findings of the exam/interventions/studies done during this visit as well as the list of differential diagnosis and symptoms to monitor/return to ER for.  Additional verbal discharge instructions were provided.     MEDICATIONS GIVEN IN THE EMERGENCY:  Medications   albuterol (PROVENTIL) neb solution 2.5 mg (2.5 mg Nebulization Not Given 8/27/21 2151)   albuterol (PROVENTIL) neb solution 2.5 mg (2.5 mg Nebulization Not Given 8/27/21 2151)   ipratropium - albuterol 0.5 mg/2.5 mg/3 mL (DUONEB) neb solution 3 mL (3 mLs Nebulization Given 8/1960)   methylPREDNISolone sodium succinate (solu-MEDROL) injection 125 mg (125 mg Intravenous Given 8/27/21 2014)       NEW PRESCRIPTIONS STARTED AT TODAY'S ER VISIT  New Prescriptions    PREDNISONE (DELTASONE) 50 MG TABLET    Take 1 tablet (50 mg) by mouth daily for 6 days     =================================================================    HPI    Triage Note: Pt has COPD. Comes in today with acute onset of coughing and sob. States that this feels like all of her other exacerbations that she has had in the past. Asked her PCP for prednisone because this has helped in the past but he refused and said that  she needs to go to there ER. Denies fever or chills.          Patient information was obtained from: patient     Use of : N/A       Annette Drake is a 61 year old female with a pertinent past medical history of COPD, ANDREW, diabetes mellitus, HTN, HLD who presents via wak-in for evaluation of shortness of breath.    Patient reports sudden onset shortness of breath which began this afternoon. States this feels like prior COPD exacerbations but notes it came on much quicker than usual. She took 3 nebs today without adequate relief. Reports associating nonproductive cough and diaphoresis. States it has been about 2.5 years since her most recent COPD exacerbation. She is not vaccinated against covid. No recent sick contacts. No pain related complaints at this time. Denies fever and chills.       REVIEW OF SYSTEMS   Except as stated in the HPI all other systems reviewed and are negative.    PAST MEDICAL HISTORY:  Past Medical History:   Diagnosis Date     Back pain     low back     COPD (chronic obstructive pulmonary disease) (H)      Depression      Diabetes mellitus (H)      Hyperlipidemia      Hypertension      PAD (peripheral artery disease) (H)      Peripheral neuropathy        PAST SURGICAL HISTORY:  Past Surgical History:   Procedure Laterality Date     AORTA - FEMORAL ARTERY BYPASS GRAFT       HYSTEROSCOPY W/ ENDOMETRIAL ABLATION       IR EXTREMITY ANGIOGRAM LEFT  11/25/2020     IR EXTREMITY ANGIOGRAM LEFT  12/16/2020     IR LOWER EXTREMITY ANGIOGRAM LEFT  11/25/2020     IR LOWER EXTREMITY ANGIOGRAM LEFT  12/16/2020     OTHER SURGICAL HISTORY      tubal ligation     OTHER SURGICAL HISTORY      spine fusion       CURRENT MEDICATIONS:      Current Facility-Administered Medications:      albuterol (PROVENTIL) neb solution 2.5 mg, 2.5 mg, Nebulization, Q6H PRN, Megan Gutierres MD     albuterol (PROVENTIL) neb solution 2.5 mg, 2.5 mg, Nebulization, Q6H PRN, Megan Gutierres MD, 2.5 mg at  08/27/21 2114    Current Outpatient Medications:      [START ON 8/28/2021] predniSONE (DELTASONE) 50 MG tablet, Take 1 tablet (50 mg) by mouth daily for 6 days, Disp: 6 tablet, Rfl: 0     aspirin 325 MG tablet, [ASPIRIN 325 MG TABLET] Take 325 mg by mouth daily. , Disp: , Rfl:      atorvastatin (LIPITOR) 20 MG tablet, [ATORVASTATIN (LIPITOR) 20 MG TABLET] Take 20 mg by mouth every morning. , Disp: , Rfl:      clopidogreL (PLAVIX) 75 mg tablet, [CLOPIDOGREL (PLAVIX) 75 MG TABLET] Take 1 tablet (75 mg total) by mouth daily., Disp: 90 tablet, Rfl: 1     DULoxetine (CYMBALTA) 60 MG capsule, [DULOXETINE (CYMBALTA) 60 MG CAPSULE] 1 cap, Disp: , Rfl:      ipratropium-albuteroL (DUO-NEB) 0.5-2.5 mg/3 mL nebulizer, [IPRATROPIUM-ALBUTEROL (DUO-NEB) 0.5-2.5 MG/3 ML NEBULIZER] 3 mL as needed. , Disp: , Rfl:      losartan-hydrochlorothiazide (HYZAAR) 50-12.5 mg per tablet, [LOSARTAN-HYDROCHLOROTHIAZIDE (HYZAAR) 50-12.5 MG PER TABLET] Take 1 tablet by mouth daily, Disp: , Rfl:      nabumetone (RELAFEN) 500 MG tablet, [NABUMETONE (RELAFEN) 500 MG TABLET] Take 1-2 tablets twice a day as needed for pain., Disp: 120 tablet, Rfl: 0    ALLERGIES:  Allergies   Allergen Reactions     Lyrica [Pregabalin] Itching     Bupropion Itching       FAMILY HISTORY:  Family History   Problem Relation Age of Onset     Diabetes Mother      Hypertension Mother      Cancer Father      Heart Disease Father      Diabetes Father        SOCIAL HISTORY:   Social History     Socioeconomic History     Marital status: Single     Spouse name: Not on file     Number of children: Not on file     Years of education: Not on file     Highest education level: Not on file   Occupational History     Not on file   Tobacco Use     Smoking status: Current Every Day Smoker     Types: Cigarettes     Start date: 7/17/1970     Smokeless tobacco: Never Used     Tobacco comment: 4 cigarettes a day   Substance and Sexual Activity     Alcohol use: No     Drug use: Not Currently      Sexual activity: Not on file   Other Topics Concern     Not on file   Social History Narrative     Not on file     Social Determinants of Health     Financial Resource Strain:      Difficulty of Paying Living Expenses:    Food Insecurity:      Worried About Running Out of Food in the Last Year:      Ran Out of Food in the Last Year:    Transportation Needs:      Lack of Transportation (Medical):      Lack of Transportation (Non-Medical):    Physical Activity:      Days of Exercise per Week:      Minutes of Exercise per Session:    Stress:      Feeling of Stress :    Social Connections:      Frequency of Communication with Friends and Family:      Frequency of Social Gatherings with Friends and Family:      Attends Religion Services:      Active Member of Clubs or Organizations:      Attends Club or Organization Meetings:      Marital Status:    Intimate Partner Violence:      Fear of Current or Ex-Partner:      Emotionally Abused:      Physically Abused:      Sexually Abused:        PHYSICAL EXAM    VITAL SIGNS: /61   Pulse 103   Temp 98  F (36.7  C)   Resp 24   Wt 90.7 kg (200 lb)   SpO2 99%   BMI 32.53 kg/m     GENERAL: Awake, Alert, answering questions, appears short of breath and speaking in short sentences, Well nourished  HEENT: Normal cephalic, Atraumatic, bilateral external ears normal, No scleral icterus, mask in place  NECK: No obvious swelling or abnormality, No stridor  PULMONARY: Decreased breath sounds bilaterally, faint wheezing bilaterally  CARDIOVASCULAR: Tachycardic, Regular rhythm, Distal pulses present and normal.  ABDOMINAL: Soft, Nondistended, Nontender, No flank tenderness, No palpable masses  BACK: No bruising or tenderness.  EXTREMITIES: Moves all extremities spontaneously, warm, no edema, No major deformities  NEURO: No facial droop, normal motor function, Normal speech   PSYCH: Normal mood and affect  SKIN: No rashes on visualized skin, dry, warm     LAB:  All pertinent  labs reviewed and interpreted.  Results for orders placed or performed during the hospital encounter of 08/27/21   XR Chest Port 1 View    Impression    IMPRESSION: Negative chest.   Symptomatic COVID-19 Virus (Coronavirus) by PCR Nasopharyngeal    Specimen: Nasopharyngeal; Swab   Result Value Ref Range    SARS CoV2 PCR Negative Negative       RADIOLOGY:    XR Chest Port 1 View   Final Result   IMPRESSION: Negative chest.        I, Deyvi Hall, am serving as a scribe to document services personally performed by Dr. Gutierres based on my observation and the provider's statements to me. I, Megan Gutierres MD attest that Deyvi Hall is acting in a scribe capacity, has observed my performance of the services and has documented them in accordance with my direction.    Megan Gutierres M.D.  Emergency Medicine  [facility] EMERGENCY DEPARTMENT  [facility] [facility] [address]  [phone]  Dept: [phone]     Megan Gutierres MD  08/27/21 2673

## 2021-08-31 ENCOUNTER — LAB REQUISITION (OUTPATIENT)
Dept: LAB | Facility: CLINIC | Age: 61
End: 2021-08-31
Payer: MEDICARE

## 2021-08-31 DIAGNOSIS — J02.9 ACUTE PHARYNGITIS, UNSPECIFIED: ICD-10-CM

## 2021-08-31 PROCEDURE — 87070 CULTURE OTHR SPECIMN AEROBIC: CPT | Mod: ORL | Performed by: PHYSICIAN ASSISTANT

## 2021-09-02 LAB — BACTERIA SPEC CULT: NORMAL

## 2021-10-19 ENCOUNTER — TRANSFERRED RECORDS (OUTPATIENT)
Dept: HEALTH INFORMATION MANAGEMENT | Facility: CLINIC | Age: 61
End: 2021-10-19
Payer: MEDICARE

## 2021-10-19 ENCOUNTER — LAB REQUISITION (OUTPATIENT)
Dept: LAB | Facility: CLINIC | Age: 61
End: 2021-10-19
Payer: MEDICARE

## 2021-10-19 DIAGNOSIS — E11.9 TYPE 2 DIABETES MELLITUS WITHOUT COMPLICATIONS (H): ICD-10-CM

## 2021-10-19 LAB
ALBUMIN SERPL-MCNC: 3.7 G/DL (ref 3.5–5)
ALP SERPL-CCNC: 61 U/L (ref 45–120)
ALT SERPL W P-5'-P-CCNC: 13 U/L (ref 0–45)
ANION GAP SERPL CALCULATED.3IONS-SCNC: 15 MMOL/L (ref 5–18)
AST SERPL W P-5'-P-CCNC: 10 U/L (ref 0–40)
BILIRUB SERPL-MCNC: 0.4 MG/DL (ref 0–1)
BUN SERPL-MCNC: 8 MG/DL (ref 8–22)
CALCIUM SERPL-MCNC: 9.4 MG/DL (ref 8.5–10.5)
CHLORIDE BLD-SCNC: 108 MMOL/L (ref 98–107)
CHOLEST SERPL-MCNC: 178 MG/DL
CO2 SERPL-SCNC: 21 MMOL/L (ref 22–31)
CREAT SERPL-MCNC: 0.73 MG/DL (ref 0.6–1.1)
FASTING STATUS PATIENT QL REPORTED: ABNORMAL
GFR SERPL CREATININE-BSD FRML MDRD: 88 ML/MIN/1.73M2
GLUCOSE BLD-MCNC: 131 MG/DL (ref 70–125)
HBA1C MFR BLD: 6.3 % (ref 4.2–6.1)
HDLC SERPL-MCNC: 43 MG/DL
LDLC SERPL CALC-MCNC: 110 MG/DL
POTASSIUM BLD-SCNC: 3.9 MMOL/L (ref 3.5–5)
PROT SERPL-MCNC: 6.5 G/DL (ref 6–8)
SODIUM SERPL-SCNC: 144 MMOL/L (ref 136–145)
TRIGL SERPL-MCNC: 127 MG/DL

## 2021-10-19 PROCEDURE — 80053 COMPREHEN METABOLIC PANEL: CPT | Mod: ORL | Performed by: FAMILY MEDICINE

## 2021-10-19 PROCEDURE — 80061 LIPID PANEL: CPT | Mod: ORL | Performed by: FAMILY MEDICINE

## 2021-10-27 NOTE — PATIENT INSTRUCTIONS
Stop Plavix and continue with taking baby aspirin (81 mg) once daily.    Patients/customers wanting copies of their medical records can request and receive copies of their medical records via         [employer]- You an see most of their records in [employer] but can also send a request for records from within [employer] directly to HIM. When we process these, we publish that set of records directly to [employer].      Email- [email or web address]. Patients aren t required to use our formal authorization form but if they find it easiest to do that, here is a link to the [facility] website that contains the form: [email or web address]       US Mail- Send a written request including their full name, address, and   to [address] [place] [address]. We can send their records to them through secure email.      Fax- Send a written request including their full name, address, and  by fax ([phone])      Call for an appointment to  your records at [facility]: [phone]. This allows us to coordinate with a . If a patient needs their records sent somewhere for an appointment, they can request that over the phone and we process those requests same day.       Ankle-Brachial Index (TOBIAS) or Physiologic Test    Description  An ankle-brachial index test is relatively pain free. Blood pressure cuffs of various sizes are placed on your thigh, calf, foot and toes.  Similar to having your blood pressure checked with an arm cuff, as the technician inflates the cuffs, they progressively tighten and are then quickly released.  You may feel some discomfort, but generally for less than 60 seconds for each measurement. You will be asked to remove your socks and shoes and possibly your pants or shorts. Gowns will be provided. It usually takes about 30-60 minutes.   Depending on the initial readings and patient symptoms, you may be asked to perform a light walk on a  treadmill.  The technician will apply ultrasound gel, usually warmed for your comfort, to your ankles and wrists. Through the gel, the technician will use a small hand-held device that emits sound waves.  Risks  There are typically no side effects or complications associated with a physiologic study.  How to Prepare  Eat and take medications as usual.  There is no preparation required for an ankle-brachial index (TOBIAS) or physiologic exam.  What Can I Expect After the Test?  The technician will send the ultrasound images to your vascular surgeon for evaluation. Typically, a report is available in 2-3 days. If anything critical is found, it is standard practice to notify the vascular surgeon immediately.  Reference: [email or web address]

## 2021-11-01 ENCOUNTER — ANCILLARY PROCEDURE (OUTPATIENT)
Dept: VASCULAR ULTRASOUND | Facility: CLINIC | Age: 61
End: 2021-11-01
Attending: SURGERY
Payer: MEDICARE

## 2021-11-01 ENCOUNTER — OFFICE VISIT (OUTPATIENT)
Dept: VASCULAR SURGERY | Facility: CLINIC | Age: 61
End: 2021-11-01
Attending: SURGERY
Payer: MEDICARE

## 2021-11-01 VITALS — DIASTOLIC BLOOD PRESSURE: 78 MMHG | SYSTOLIC BLOOD PRESSURE: 138 MMHG | TEMPERATURE: 98.1 F | HEART RATE: 68 BPM

## 2021-11-01 DIAGNOSIS — I73.9 PVD (PERIPHERAL VASCULAR DISEASE) (H): ICD-10-CM

## 2021-11-01 DIAGNOSIS — I73.9 PERIPHERAL VASCULAR DISEASE, UNSPECIFIED (H): ICD-10-CM

## 2021-11-01 DIAGNOSIS — I73.9 PVD (PERIPHERAL VASCULAR DISEASE) (H): Primary | ICD-10-CM

## 2021-11-01 PROCEDURE — 99213 OFFICE O/P EST LOW 20 MIN: CPT | Performed by: SURGERY

## 2021-11-01 PROCEDURE — 93923 UPR/LXTR ART STDY 3+ LVLS: CPT

## 2021-11-01 PROCEDURE — 93925 LOWER EXTREMITY STUDY: CPT

## 2021-11-01 PROCEDURE — 93925 LOWER EXTREMITY STUDY: CPT | Mod: 26 | Performed by: SURGERY

## 2021-11-01 PROCEDURE — 93923 UPR/LXTR ART STDY 3+ LVLS: CPT | Mod: 26 | Performed by: SURGERY

## 2021-11-01 ASSESSMENT — PAIN SCALES - GENERAL: PAINLEVEL: NO PAIN (0)

## 2021-11-01 NOTE — NURSING NOTE
[facility]      Patient is here for a 6 month follow up  to discuss Peripheral artery disease (PAD). Symptoms include claudicaton: right buttocks, left buttocks, right thigh, left thigh, right calf and left calf at distance of varies in feet (sometimes can walk far, and sometimes only into the next room) in both lower extremities.    Pt is currently taking Aspirin, Statin and Plavix.    She does smoke at least 4 cigarettes per day; sometimes more    /78   Pulse 68   Temp 98.1  F (36.7  C) (Oral)     The provider has been notified that the patient has no concerns.     Questions patient would like addressed today are: N/A.    Refills are needed: N/A    Has homecare services and agency name:  Michelle Medina RN

## 2021-11-01 NOTE — PROGRESS NOTES
VASCULAR SURGERY CLINIC CONSULTATION     VASCULAR SURGEON: Mehdi Gilliland MD, RPVI     LOCATION:  [facility]     Annette Shore  Medical Record #:  [id number]  YOB: 1960  Age:  61 year old     Date of Service: 11/1/2021    PRIMARY CARE PROVIDER: Jo Hamilton      Reason for visit: peripheral vascular disease 6 month follow up    IMPRESSION: nonlifestyle limiting right buttock claudication without calf claudication on either side, no tissue loss or rest pain; stable disease from a symptomatic standpoint    RECOMMENDATION/RISKS:    - increase to high intensity statin, recommend increasing dose to atorvastatin 80mg  - okay to stop plavix  - reduce aspirin dose to 81mg daily  - follow up in one year with repeat ABIs or sooner if she develops limb threatening ischemia noted by rest pain and tissue loss    HPI:  Annette Shore is a 61 year old female who was seen today in consultation for follow up of her peripheral vascular disease. She is s/p aortobifemoral bypass in 2006 and left SFA balloon angioplasty for L SFA occlusion in December 2020 with Dr. Gilliland.     She reports that she has pain right buttock claudication with pain and tingling that occurs with walking about 100 feet, sometimes more. She denies thigh and calf claudication.    She reports that after the previous left SFA balloon angioplasty she did not have significant improvement in her symptoms. She is not interested in repeat angioplasty.    She is still smoking about 6 cigarettes per day. She is not interested in quitting at this time. She has diabetes mellitus controlled with diet. Last A1c was 6.2 per her report (not visible in Epic). Last LDL was 110. She remains on atorvastatin 20mg. She is taking aspirin 325mg and plavix daily.     REVIEW OF SYSTEMS:    A 12 point ROS was reviewed and is negative except as noted in HPI.     PHH:    Past Medical History:   Diagnosis Date     Back pain     low back     COPD  (chronic obstructive pulmonary disease) (H)      Depression      Diabetes mellitus (H)      Hyperlipidemia      Hypertension      PAD (peripheral artery disease) (H)      Peripheral neuropathy           Past Surgical History:   Procedure Laterality Date     AORTA - FEMORAL ARTERY BYPASS GRAFT       HYSTEROSCOPY W/ ENDOMETRIAL ABLATION       IR EXTREMITY ANGIOGRAM LEFT  11/25/2020     IR EXTREMITY ANGIOGRAM LEFT  12/16/2020     IR LOWER EXTREMITY ANGIOGRAM LEFT  11/25/2020     IR LOWER EXTREMITY ANGIOGRAM LEFT  12/16/2020     OTHER SURGICAL HISTORY      tubal ligation     OTHER SURGICAL HISTORY      spine fusion       ALLERGIES:  Lyrica [pregabalin] and Bupropion    MEDS:    Current Outpatient Medications:      aspirin 325 MG tablet, [ASPIRIN 325 MG TABLET] Take 325 mg by mouth daily. , Disp: , Rfl:      atorvastatin (LIPITOR) 20 MG tablet, [ATORVASTATIN (LIPITOR) 20 MG TABLET] Take 20 mg by mouth every morning. , Disp: , Rfl:      clopidogreL (PLAVIX) 75 mg tablet, [CLOPIDOGREL (PLAVIX) 75 MG TABLET] Take 1 tablet (75 mg total) by mouth daily., Disp: 90 tablet, Rfl: 1     DULoxetine (CYMBALTA) 60 MG capsule, [DULOXETINE (CYMBALTA) 60 MG CAPSULE] 1 cap, Disp: , Rfl:      ipratropium-albuteroL (DUO-NEB) 0.5-2.5 mg/3 mL nebulizer, [IPRATROPIUM-ALBUTEROL (DUO-NEB) 0.5-2.5 MG/3 ML NEBULIZER] 3 mL as needed. , Disp: , Rfl:      losartan-hydrochlorothiazide (HYZAAR) 50-12.5 mg per tablet, [LOSARTAN-HYDROCHLOROTHIAZIDE (HYZAAR) 50-12.5 MG PER TABLET] Take 1 tablet by mouth daily, Disp: , Rfl:     SOCIAL HABITS:    History   Smoking Status     Current Every Day Smoker     Types: Cigarettes     Start date: 7/17/1970   Smokeless Tobacco     Never Used     Comment: 4 cigarettes a day     Social History    Substance and Sexual Activity      Alcohol use: No      History   Drug Use Unknown       FAMILY HISTORY:    Family History   Problem Relation Age of Onset     Diabetes Mother      Hypertension Mother      Cancer Father       Heart Disease Father      Diabetes Father        PE:  /78   Pulse 68   Temp 98.1  F (36.7  C) (Oral)   Wt Readings from Last 1 Encounters:   08/27/21 200 lb (90.7 kg)     There is no height or weight on file to calculate BMI.    EXAM:  GENERAL: This is a well-developed 61 year old female who appears her stated age  EYES: Grossly normal.   CARDIAC: pulses regular  CHEST/LUNG: Clear to auscultation bilaterally  GASTROINTESINAL: soft nontender nondistended, well healed midline laparotomy scar  MUSCULOSKELETAL: Grossly normal and both lower extremities are intact.  HEME/LYMPH: No lymphedema  NEUROLOGIC: Focally intact, Alert and oriented x 3. Decreased sensation to light touch in a stocking distribution bilaterally  PSYCH: appropriate affect  INTEGUMENT: No open lesions or ulcers  Pulse Exam: 2+ radial and femoral pulses bilaterally      DIAGNOSTIC STUDIES:     Images:  US Lower Extremity Arterial Duplex Bilateral    Result Date: 11/1/2021  Arterial Duplex Ultrasound (Date: 11/01/21) Lower Extremity Artery Evaluation Indication: Surveillance of left leg angio 12/16/20, Hx of AO bifem BPG 2/10/06  Previous: 5/3/21 History: Current Smoker, Hypertension, Diabetic, Hyperlipidemia, PAD, Angioplasty, Vascular Surgery, Rest Pain and Claudication Technique: Duplex imaging is performed utilizing gray-scale, two-dimensional images, and color-flow imaging. Doppler waveform analysis and spectral Doppler imaging is also performed. LOWER EXTREMITY ARTERIAL DUPLEX EXAM WITH WAVEFORMS Right Leg:(cm/s) Location: Velocities Waveforms EIA: DST AO BIFEM BPG   44  B CFA: DST ANAST   96  B PFA:   103/117  B SFA Proximal:   87/42  B SFA Mid:   16  M SFA Distal:  31  M Popliteal Artery:   33  B PTA:   30   B DANK:   30  B DPA:   35  B Waveforms: T=Triphasic, M=Monophasic, B=Biphasic Left Leg:(cm/s) Location: Velocities Waveforms EIA:  DST AO BIFEM BPG   51  T CFA:  DST ANAST   112  T PFA:   164  T SFA Proximal:   121/16  B/M SFA Mid:    24  M SFA Distal:   38  M Popliteal Artery:   33  M PTA:   16   M DANK:   27  M DPA:   18  M Waveforms: T=Triphasic, M=Monophasic, B=Biphasic  Impression: Right Lower Extremity: Waveforms are triphasic in the right common femoral artery suggesting no inflow disease.  Transition to monophasic flow at the level of mid SFA with very blunted waveforms and reduced velocities consistent with possible stenosis.  The flow becomes more prominent in the tibial vessels probably from robust collateralization triphasic Left Lower Extremity: Waveforms are triphasic in the left common femoral artery suggesting no inflow disease.  Transition to monophasic flow in the level of mid SFA with very blunted waveforms distally along with reduced velocities concerning for restenosis of previously treated SFA. Reference: Category Normal 1-19% 20-49% 50-99% Occluded PSV <160 cm/sec without spectral broadening <160 cm/sec with spectral broadening Increased Increased Absent Flow Ratio N/A N/A < 2.0 >2.0 N/A Post-Stenotic Turbulence No No No Yes N/A     US L Ext Art Doppler Seg Pressures Homer 3 Lvl    Result Date: 11/1/2021  BILATERAL RESTING ANKLE-BRACHIAL INDICES (TOBIAS'S) (Date: 11/01/21) Indication: Surveillance of left leg angio 12/16/20, Hx of AO bifem BPG 2/10/06 Previous: 5/3/21 History: Current Smoker, Hypertension, Diabetic, Hyperlipidemia, PAD, Angioplasty, Vascular Surgery, Rest Pain and Claudication  Resting TOBIAS's          Right: mmHg Index     Brachial: 138  Ankle-(PT): 101 0.73 Ankle-(DP): 78 0.57          Digit Room Temp. 83.7 F*: 71 0.51   Digit Post Heat Temp. 96.8 F*: 67 0.49               Left: mmHg Index     Brachial: 131  Ankle-(PT): 84 0.61 Ankle-(DP): 84 0.61          Digit Room Temp. 95.0 F*: 48 0.35    Digit Post Heat Temp. 97 F*: 51 0.37 Resting ankle-brachial index of 0.73 on the right. Toe Pressures of 71 mmHg and TBI of 0.51  Resting ankle-brachial index of 0.61 on the left. Toe Pressures of 48 mmHg and TBI of 0.35   WAVEFORMS: The right dorsalis pedis and posterior tibial arteries show Biphasic waveforms. The left dorsalis pedis and posterior tibial arteries show monophasic waveforms.  Impression:  1. RIGHT LOWER EXTREMITY: TOBIAS is Abnormal with an TOBIAS of 0.73. and Mildly abnormal, but adequate for healing toe pressures of 67 mmHg. 2. LEFT LOWER EXTREMITY: TOBIAS is Abnormal with an TOBIAS of 0.61. and Abnormal toe pressures of 51 given history of diabetes, concern for wound healing potential Reference: Wound classification Grade TOBIAS Ankle Systolic Pressure Toe Pressures 0 > 0.80 > 100 mmHg > 60 mmHg 1 0.6 - 0.79 70 - 100 mmHg 40 - 59 mmHg 2 0.4 - 0.59 50-70 mmHg 30 - 39 mmHg 3 < 0.39 < 50 mmHg < 30 mmHg Digit Pressures DBI Disease Category > 0.70 Normal < 0.70 Abnormal > 30 mmHg Potential wound healing < 30 mmHg Impaired wound healing Ankle Brachial Pressures TOBIAS Disease Category > 1.3  Likely vessel calcification with monophasic waveforms, non-diagnostic 0.95-1.30 Normal with multiphasic waveforms 0.50-0.95 Single level disease 0.30-0.50 Multilevel disease < 0.30 Critical limb ischema Volume Plethysmography Recording (VPR) at all levels Normal Sharp systolic peak, fast upstroke, prominent dicrotic notch in wave Mild Sharp systolic peak, fast upstroke, absent dicrotic notch in wave Moderate Flattened systolic peak, slowed upstroke, absent dicrotic notch in wave Severe Low-amplitude wave with = upslope and down slope Occluded Flat Line Multiple Level Segmental Pressures  Normal Abnormal Upper Thigh > 1.2 pressure indices, <30 mmHg between lateral and horizontal cuffs < 0.8 pressure indices suggest proximal occlusion, 0.8-1.2 pressure indices suggest inflow disease, > 30 mmHg between lateral and horizontal cuffs  Lower Thigh < 30 mmHg between lateral and horizontal cuffs > 30 mmHg between lateral and horizontal cuffs Upper Calf < 30 mmHg between lateral and horizontal cuffs > 30 mmHg between lateral and horizontal cuffs Note: As limb  diameter increases, pressure measurements also increase.      I personally reviewed the images and my interpretation is monophasic waveforms in the proximal left SFA and post stenotic waveforms more distally. Left toe pressure concerning for impaired wound healing potential (<50 in this diabetic patient)    LABS:      Sodium   Date Value Ref Range Status   10/19/2021 144 136 - 145 mmol/L Final   06/15/2020 142 136 - 145 mmol/L Final   05/27/2019 142 136 - 145 mmol/L Final     Urea Nitrogen   Date Value Ref Range Status   10/19/2021 8 8 - 22 mg/dL Final   06/15/2020 14 8 - 22 mg/dL Final   05/27/2019 16 8 - 22 mg/dL Final     Hemoglobin   Date Value Ref Range Status   12/16/2020 13.1 12.0 - 16.0 g/dL Final   11/25/2020 14.4 12.0 - 16.0 g/dL Final   11/11/2020 14.1 12.0 - 16.0 g/dL Final     Platelet Count   Date Value Ref Range Status   12/16/2020 241 140 - 440 thou/uL Final   11/25/2020 261 140 - 440 thou/uL Final   11/11/2020 250 140 - 440 thou/uL Final     BNP   Date Value Ref Range Status   09/26/2018 30 0 - 93 pg/mL Final     INR   Date Value Ref Range Status   12/16/2020 1.09 0.90 - 1.10 Final   11/25/2020 0.99 0.90 - 1.10 Final   11/11/2020 0.99 0.90 - 1.10 Final       Total time spent 30 minutes face to face with patient with more than 50% time spent in counseling and coordination of care.    Bree Ballesteros MD  VASCULAR SURGERY FELLOW

## 2021-11-29 ENCOUNTER — LAB REQUISITION (OUTPATIENT)
Dept: LAB | Facility: CLINIC | Age: 61
End: 2021-11-29
Payer: MEDICARE

## 2021-11-29 DIAGNOSIS — Z03.818 ENCOUNTER FOR OBSERVATION FOR SUSPECTED EXPOSURE TO OTHER BIOLOGICAL AGENTS RULED OUT: ICD-10-CM

## 2021-11-29 PROCEDURE — U0005 INFEC AGEN DETEC AMPLI PROBE: HCPCS | Mod: ORL | Performed by: STUDENT IN AN ORGANIZED HEALTH CARE EDUCATION/TRAINING PROGRAM

## 2021-11-30 LAB — SARS-COV-2 RNA RESP QL NAA+PROBE: NEGATIVE

## 2022-02-03 ENCOUNTER — LAB REQUISITION (OUTPATIENT)
Dept: LAB | Facility: CLINIC | Age: 62
End: 2022-02-03
Payer: MEDICARE

## 2022-02-03 DIAGNOSIS — R30.0 DYSURIA: ICD-10-CM

## 2022-02-03 DIAGNOSIS — K04.7 PERIAPICAL ABSCESS WITHOUT SINUS: ICD-10-CM

## 2022-02-03 LAB
ALBUMIN SERPL-MCNC: 3.4 G/DL (ref 3.5–5)
ALP SERPL-CCNC: 69 U/L (ref 45–120)
ALT SERPL W P-5'-P-CCNC: <9 U/L (ref 0–45)
ANION GAP SERPL CALCULATED.3IONS-SCNC: 15 MMOL/L (ref 5–18)
AST SERPL W P-5'-P-CCNC: 7 U/L (ref 0–40)
BILIRUB SERPL-MCNC: 0.9 MG/DL (ref 0–1)
BUN SERPL-MCNC: 16 MG/DL (ref 8–22)
CALCIUM SERPL-MCNC: 9.8 MG/DL (ref 8.5–10.5)
CHLORIDE BLD-SCNC: 103 MMOL/L (ref 98–107)
CO2 SERPL-SCNC: 23 MMOL/L (ref 22–31)
CREAT SERPL-MCNC: 0.83 MG/DL (ref 0.6–1.1)
GFR SERPL CREATININE-BSD FRML MDRD: 78 ML/MIN/1.73M2
GLUCOSE BLD-MCNC: 96 MG/DL (ref 70–125)
POTASSIUM BLD-SCNC: 3.9 MMOL/L (ref 3.5–5)
PROT SERPL-MCNC: 6.7 G/DL (ref 6–8)
SODIUM SERPL-SCNC: 141 MMOL/L (ref 136–145)

## 2022-02-03 PROCEDURE — 80053 COMPREHEN METABOLIC PANEL: CPT | Mod: ORL | Performed by: FAMILY MEDICINE

## 2022-02-03 PROCEDURE — 87186 SC STD MICRODIL/AGAR DIL: CPT | Mod: ORL | Performed by: FAMILY MEDICINE

## 2022-02-04 ENCOUNTER — HOSPITAL ENCOUNTER (EMERGENCY)
Facility: HOSPITAL | Age: 62
Discharge: HOME | End: 2022-02-04
Admitting: PHYSICIAN ASSISTANT
Payer: MEDICARE

## 2022-02-04 VITALS
RESPIRATION RATE: 12 BRPM | OXYGEN SATURATION: 94 % | HEIGHT: 66 IN | SYSTOLIC BLOOD PRESSURE: 167 MMHG | TEMPERATURE: 98.9 F | DIASTOLIC BLOOD PRESSURE: 70 MMHG | BODY MASS INDEX: 32.14 KG/M2 | WEIGHT: 200 LBS | HEART RATE: 65 BPM

## 2022-02-04 DIAGNOSIS — E86.0 DEHYDRATION: ICD-10-CM

## 2022-02-04 DIAGNOSIS — N39.0 URINARY TRACT INFECTION WITHOUT HEMATURIA, SITE UNSPECIFIED: ICD-10-CM

## 2022-02-04 LAB
ALBUMIN SERPL-MCNC: 3.1 G/DL (ref 3.5–5)
ALBUMIN UR-MCNC: 50 MG/DL
ALP SERPL-CCNC: 65 U/L (ref 45–120)
ALT SERPL W P-5'-P-CCNC: 11 U/L (ref 0–45)
ANION GAP SERPL CALCULATED.3IONS-SCNC: 10 MMOL/L (ref 5–18)
APPEARANCE UR: ABNORMAL
AST SERPL W P-5'-P-CCNC: 10 U/L (ref 0–40)
BACTERIA #/AREA URNS HPF: ABNORMAL /HPF
BACTERIA UR CULT: ABNORMAL
BASOPHILS # BLD AUTO: 0.1 10E3/UL (ref 0–0.2)
BASOPHILS NFR BLD AUTO: 1 %
BILIRUB SERPL-MCNC: 0.7 MG/DL (ref 0–1)
BILIRUB UR QL STRIP: NEGATIVE
BUN SERPL-MCNC: 13 MG/DL (ref 8–22)
CALCIUM SERPL-MCNC: 9.4 MG/DL (ref 8.5–10.5)
CHLORIDE BLD-SCNC: 102 MMOL/L (ref 98–107)
CO2 SERPL-SCNC: 28 MMOL/L (ref 22–31)
COLOR UR AUTO: YELLOW
CREAT SERPL-MCNC: 0.86 MG/DL (ref 0.6–1.1)
EOSINOPHIL # BLD AUTO: 0.1 10E3/UL (ref 0–0.7)
EOSINOPHIL NFR BLD AUTO: 1 %
ERYTHROCYTE [DISTWIDTH] IN BLOOD BY AUTOMATED COUNT: 13.1 % (ref 10–15)
GFR SERPL CREATININE-BSD FRML MDRD: 75 ML/MIN/1.73M2
GLUCOSE BLD-MCNC: 106 MG/DL (ref 70–125)
GLUCOSE UR STRIP-MCNC: NEGATIVE MG/DL
HCT VFR BLD AUTO: 43.5 % (ref 35–47)
HGB BLD-MCNC: 14.3 G/DL (ref 11.7–15.7)
HGB UR QL STRIP: ABNORMAL
IMM GRANULOCYTES # BLD: 0 10E3/UL
IMM GRANULOCYTES NFR BLD: 1 %
KETONES UR STRIP-MCNC: NEGATIVE MG/DL
LEUKOCYTE ESTERASE UR QL STRIP: ABNORMAL
LYMPHOCYTES # BLD AUTO: 1.7 10E3/UL (ref 0.8–5.3)
LYMPHOCYTES NFR BLD AUTO: 20 %
MCH RBC QN AUTO: 29.1 PG (ref 26.5–33)
MCHC RBC AUTO-ENTMCNC: 32.9 G/DL (ref 31.5–36.5)
MCV RBC AUTO: 88 FL (ref 78–100)
MONOCYTES # BLD AUTO: 1.2 10E3/UL (ref 0–1.3)
MONOCYTES NFR BLD AUTO: 13 %
NEUTROPHILS # BLD AUTO: 5.8 10E3/UL (ref 1.6–8.3)
NEUTROPHILS NFR BLD AUTO: 64 %
NITRATE UR QL: POSITIVE
NRBC # BLD AUTO: 0 10E3/UL
NRBC BLD AUTO-RTO: 0 /100
PH UR STRIP: 6 [PH] (ref 5–7)
PLATELET # BLD AUTO: 233 10E3/UL (ref 150–450)
POTASSIUM BLD-SCNC: 3.7 MMOL/L (ref 3.5–5)
PROT SERPL-MCNC: 7.1 G/DL (ref 6–8)
RBC # BLD AUTO: 4.92 10E6/UL (ref 3.8–5.2)
RBC URINE: 4 /HPF
SODIUM SERPL-SCNC: 140 MMOL/L (ref 136–145)
SP GR UR STRIP: 1.02 (ref 1–1.03)
SQUAMOUS EPITHELIAL: 1 /HPF
UROBILINOGEN UR STRIP-MCNC: 4 MG/DL
WBC # BLD AUTO: 8.8 10E3/UL (ref 4–11)
WBC CLUMPS #/AREA URNS HPF: PRESENT /HPF
WBC URINE: >182 /HPF

## 2022-02-04 PROCEDURE — 87086 URINE CULTURE/COLONY COUNT: CPT | Performed by: PHYSICIAN ASSISTANT

## 2022-02-04 PROCEDURE — 81001 URINALYSIS AUTO W/SCOPE: CPT | Performed by: PHYSICIAN ASSISTANT

## 2022-02-04 PROCEDURE — 250N000011 HC RX IP 250 OP 636: Performed by: PHYSICIAN ASSISTANT

## 2022-02-04 PROCEDURE — 258N000003 HC RX IP 258 OP 636: Performed by: PHYSICIAN ASSISTANT

## 2022-02-04 PROCEDURE — 96366 THER/PROPH/DIAG IV INF ADDON: CPT

## 2022-02-04 PROCEDURE — 99284 EMERGENCY DEPT VISIT MOD MDM: CPT | Mod: 25

## 2022-02-04 PROCEDURE — 85025 COMPLETE CBC W/AUTO DIFF WBC: CPT | Performed by: PHYSICIAN ASSISTANT

## 2022-02-04 PROCEDURE — 96365 THER/PROPH/DIAG IV INF INIT: CPT

## 2022-02-04 PROCEDURE — 36415 COLL VENOUS BLD VENIPUNCTURE: CPT | Performed by: PHYSICIAN ASSISTANT

## 2022-02-04 PROCEDURE — 96361 HYDRATE IV INFUSION ADD-ON: CPT

## 2022-02-04 PROCEDURE — 80053 COMPREHEN METABOLIC PANEL: CPT | Performed by: PHYSICIAN ASSISTANT

## 2022-02-04 RX ORDER — CEFTRIAXONE 1 G/1
1 INJECTION, POWDER, FOR SOLUTION INTRAMUSCULAR; INTRAVENOUS ONCE
Status: COMPLETED | OUTPATIENT
Start: 2022-02-04 | End: 2022-02-04

## 2022-02-04 RX ORDER — CEPHALEXIN 500 MG/1
500 CAPSULE ORAL 3 TIMES DAILY
Qty: 21 CAPSULE | Refills: 0 | Status: SHIPPED | OUTPATIENT
Start: 2022-02-04 | End: 2022-02-04 | Stop reason: ALTCHOICE

## 2022-02-04 RX ORDER — CEFDINIR 300 MG/1
300 CAPSULE ORAL 2 TIMES DAILY
Qty: 14 CAPSULE | Refills: 0 | Status: SHIPPED | OUTPATIENT
Start: 2022-02-04 | End: 2022-02-11

## 2022-02-04 RX ADMIN — CEFTRIAXONE SODIUM 1 G: 1 INJECTION, POWDER, FOR SOLUTION INTRAMUSCULAR; INTRAVENOUS at 15:42

## 2022-02-04 RX ADMIN — SODIUM CHLORIDE 1000 ML: 9 INJECTION, SOLUTION INTRAVENOUS at 11:49

## 2022-02-04 ASSESSMENT — ENCOUNTER SYMPTOMS
VOMITING: 1
FEVER: 0
CHILLS: 1
APPETITE CHANGE: 1
ABDOMINAL PAIN: 1
HEMATURIA: 0
SHORTNESS OF BREATH: 0
DYSURIA: 1
HEADACHES: 1
BLOOD IN STOOL: 0
COUGH: 0
DIARRHEA: 0
CONSTIPATION: 1
NAUSEA: 1
FREQUENCY: 1

## 2022-02-04 ASSESSMENT — MIFFLIN-ST. JEOR: SCORE: 1473.94

## 2022-02-04 NOTE — ED PROVIDER NOTES
EMERGENCY DEPARTMENT ENCOUNTER      NAME: Annette Shore  AGE: 62 year old female  YOB: 1960  MRN: [id number]  EVALUATION DATE & TIME: No admission date for patient encounter.    PCP: Jo Hamilton    ED PROVIDER: Nu Crawford PA-C      Chief Complaint   Patient presents with     UTI         FINAL IMPRESSION:  1. Urinary tract infection without hematuria, site unspecified    2. Dehydration          MEDICAL DECISION MAKING:    Pertinent Labs & Imaging studies reviewed. (See chart for details)  62 year old female presents to the Emergency Department for evaluation of chills, headache, nausea, vomiting, dysuria, and lower abdominal pain since Monday (1/31/22) 5 days ago. Was diagnosed with UTI yesterday at [facility] and was sent to urgency room for antibiotics and IVF. At [facility], they were unable to establish an IV so patient was given ODT zofran and discharged home on Keflex. Patient did not  her prescription for antibiotic yet. She denies any fevers, diarrhea, chest pain, shortness of breath, cough. Patient feels dehydrated and reports poor oral intake.     Vitals reviewed and notable for elevated blood pressure throughout time in ED. Afebrile. Normal heart rate. On exam, she is non-toxic appearing. Dry mucous membranes. Mild suprapubic tenderness with no rebound or guarding. No flank or CVA tenderness. Differential diagnosis includes but not limited to UTI, pyelonephritis, ureterolithiasis, diverticulitis, appendicitis, dehydration, ISHAN, electrolyte derangement.     IV established and blood obtained. Patient was given IVF and rocephin for presumed UTI though I am not able to view the results from [facility]. Repeat UA today is certainly consistent with infection. Positive nitrites and many bacteria, leukocyte esterase and WBCs. Reassuringly white count and renal function WNL. No electrolyte derangements. Urine culture is pending. Patient feeling significantly better after IVF. She is  tolerating oral intake here. She feels like she can discharge home. I have prescribed Cefdinir for her and instructed her to take this instead of the antibiotics prescribed yesterday since she has not picked those up anyway. We discussed return precautions and close follow up which she already has scheduled for on Monday with her PCP. Patient discharged home in stable condition.     0 minutes of critical care time     ED COURSE:  11:04 AM I met with the patient, obtained history, performed an initial exam, and discussed options and plan for diagnostics and treatment here in the ED.  3:36 PM Patient unable to given urine sample. Will straight cath for urine.   4:25 PM Discussed results. Patient discharged after being provided with extensive anticipatory guidance and given return precautions, importance of PCP follow-up emphasized.    At the conclusion of the encounter I discussed the results of all of the tests and the disposition. The questions were answered. The patient acknowledged understanding and was agreeable with the care plan.     MEDICATIONS GIVEN IN THE EMERGENCY:  Medications   0.9% sodium chloride BOLUS (0 mLs Intravenous Stopped 2/4/22 1250)   cefTRIAXone (ROCEPHIN) 1 g vial to attach to  mL bag for ADULTS or NS 50 mL bag for PEDS (0 g Intravenous Stopped 2/4/22 9038)       NEW PRESCRIPTIONS STARTED AT TODAY'S ER VISIT  Discharge Medication List as of 2/4/2022  5:39 PM      START taking these medications    Details   cefdinir (OMNICEF) 300 MG capsule Take 1 capsule (300 mg) by mouth 2 times daily for 7 days, Disp-14 capsule, R-0, Local Print                  =================================================================    HPI:    Patient information was obtained from: Patient    Use of Interpretor: N/A       Ashleyjosie Shore is a 62 year old female with a pertinent history of DM Type II, HTN, COPD, ANDREW, PVD, depression, who presents to this ED walk in for evaluation of dysuria.     Per Chart  "Review,  2/3/2022 Patient seen at [facility] in [place] after being seen by PCP at hospitals where she was diagnosed with a UTI. \"Her results were sent here showing nitrate positive, leukocytosis positive.\" At [facility], they were unable to establish an IV. She was given ODT Zofran and prescribed Keflex for UTI.     Patient has been experiencing chills, headache, nausea, vomiting, dysuria, and lower abdominal pain since Monday (1/31/22) 5 days ago. She notes that her last bowel movement was about two weeks ago which she attributes to not eating. Patient recently had a tooth extracted and has been on two different antibiotics for a dry socket (per chart review azithromycin and amoxicillin). Patient denies taking any antibiotics today (1/4/22). She did not  the antibiotics she was prescribed yesterday at [facility] for UTI. She denies any blood in her urine but notes that urine has been dark. Patient denies recorded fevers, cough, chest pain, shortness of breath or any COVID-19 like symptoms. She has no other complaints at this time.     REVIEW OF SYSTEMS:  Review of Systems   Constitutional: Positive for appetite change and chills. Negative for fever.   Respiratory: Negative for cough and shortness of breath.    Cardiovascular: Negative for chest pain.   Gastrointestinal: Positive for abdominal pain, constipation, nausea and vomiting. Negative for blood in stool and diarrhea.   Genitourinary: Positive for dysuria, frequency and urgency. Negative for hematuria.   Neurological: Positive for headaches.   All other systems reviewed and are negative.      PAST MEDICAL HISTORY:  Past Medical History:   Diagnosis Date     Back pain     low back     COPD (chronic obstructive pulmonary disease) (H)      Depression      Diabetes mellitus (H)      Hyperlipidemia      Hypertension      PAD (peripheral artery disease) (H)      Peripheral neuropathy        PAST SURGICAL HISTORY:  Past Surgical History:   Procedure " Laterality Date     AORTA - FEMORAL ARTERY BYPASS GRAFT       HYSTEROSCOPY W/ ENDOMETRIAL ABLATION       IR EXTREMITY ANGIOGRAM LEFT  11/25/2020     IR EXTREMITY ANGIOGRAM LEFT  12/16/2020     IR LOWER EXTREMITY ANGIOGRAM LEFT  11/25/2020     IR LOWER EXTREMITY ANGIOGRAM LEFT  12/16/2020     OTHER SURGICAL HISTORY      tubal ligation     OTHER SURGICAL HISTORY      spine fusion           CURRENT MEDICATIONS:    No current facility-administered medications for this encounter.    Current Outpatient Medications:      cefdinir (OMNICEF) 300 MG capsule, Take 1 capsule (300 mg) by mouth 2 times daily for 7 days, Disp: 14 capsule, Rfl: 0     aspirin 325 MG tablet, [ASPIRIN 325 MG TABLET] Take 325 mg by mouth daily. , Disp: , Rfl:      atorvastatin (LIPITOR) 20 MG tablet, [ATORVASTATIN (LIPITOR) 20 MG TABLET] Take 20 mg by mouth every morning. , Disp: , Rfl:      clopidogreL (PLAVIX) 75 mg tablet, [CLOPIDOGREL (PLAVIX) 75 MG TABLET] Take 1 tablet (75 mg total) by mouth daily., Disp: 90 tablet, Rfl: 1     DULoxetine (CYMBALTA) 60 MG capsule, [DULOXETINE (CYMBALTA) 60 MG CAPSULE] 1 cap, Disp: , Rfl:      ipratropium-albuteroL (DUO-NEB) 0.5-2.5 mg/3 mL nebulizer, [IPRATROPIUM-ALBUTEROL (DUO-NEB) 0.5-2.5 MG/3 ML NEBULIZER] 3 mL as needed. , Disp: , Rfl:      losartan-hydrochlorothiazide (HYZAAR) 50-12.5 mg per tablet, [LOSARTAN-HYDROCHLOROTHIAZIDE (HYZAAR) 50-12.5 MG PER TABLET] Take 1 tablet by mouth daily, Disp: , Rfl:       ALLERGIES:  Allergies   Allergen Reactions     Lyrica [Pregabalin] Itching     Bupropion Itching       FAMILY HISTORY:  Family History   Problem Relation Age of Onset     Diabetes Mother      Hypertension Mother      Cancer Father      Heart Disease Father      Diabetes Father        SOCIAL HISTORY:   Social History     Socioeconomic History     Marital status: Single     Spouse name: Not on file     Number of children: Not on file     Years of education: Not on file     Highest education level: Not on  "file   Occupational History     Not on file   Tobacco Use     Smoking status: Current Every Day Smoker     Types: Cigarettes     Start date: 7/17/1970     Smokeless tobacco: Never Used     Tobacco comment: 4 cigarettes a day   Substance and Sexual Activity     Alcohol use: No     Drug use: Not Currently     Sexual activity: Not on file   Other Topics Concern     Not on file   Social History Narrative     Not on file     Social Determinants of Health     Financial Resource Strain: Not on file   Food Insecurity: Not on file   Transportation Needs: Not on file   Physical Activity: Not on file   Stress: Not on file   Social Connections: Not on file   Intimate Partner Violence: Not on file   Housing Stability: Not on file       VITALS:  Patient Vitals for the past 24 hrs:   BP Temp Temp src Pulse Resp SpO2 Height Weight   02/04/22 1742 -- -- -- 65 -- 94 % -- --   02/04/22 1730 (!) 167/70 -- -- 63 -- 94 % -- --   02/04/22 1515 (!) 157/72 -- -- 69 -- 94 % -- --   02/04/22 1500 (!) 152/68 -- -- 63 -- 95 % -- --   02/04/22 1445 135/63 -- -- 73 -- 93 % -- --   02/04/22 1430 136/68 -- -- 67 -- 94 % -- --   02/04/22 1415 (!) 142/66 -- -- 64 -- 93 % -- --   02/04/22 1400 (!) 156/61 -- -- 63 -- 93 % -- --   02/04/22 1345 (!) 150/69 -- -- 64 -- 96 % -- --   02/04/22 1330 (!) 153/73 -- -- 62 -- 94 % -- --   02/04/22 1315 (!) 169/73 -- -- 67 -- 92 % -- --   02/04/22 1300 (!) 161/75 -- -- 66 -- 95 % -- --   02/04/22 1230 (!) 147/67 -- -- 64 -- 95 % -- --   02/04/22 1200 138/66 -- -- 65 -- 94 % -- --   02/04/22 1131 120/65 -- -- 71 -- 93 % -- --   02/04/22 1124 120/65 98.9  F (37.2  C) Oral -- -- -- -- --   02/04/22 1053 112/68 (!) 95.5  F (35.3  C) Tympanic 91 12 95 % 1.676 m (5' 6\") 90.7 kg (200 lb)       PHYSICAL EXAM  Constitutional: Well developed, Well nourished, NAD  HENT: Normocephalic, Atraumatic, Bilateral external ears normal, Oropharynx normal, dry mucous membranes. Dental extraction site in right upper quadrant with mild " surrounding erythema. No fluctuance or drainage. No facial swelling or malocclusion. No submandibular edema or tenderness. Nose normal.   Neck: Normal range of motion, No tenderness, Supple, No stridor.  Eyes: Conjunctiva normal, No discharge.   Respiratory: Normal breath sounds, No respiratory distress, No wheezing, Speaks full sentences easily. No cough.  Cardiovascular: Normal heart rate, Regular rhythm, No murmurs, No rubs, No gallops. Chest wall nontender.  GI: Soft, mild suprapubic tenderness, No masses, No flank or CVA tenderness. No rebound or guarding.  Musculoskeletal: 2+ DP pulses. No edema. No cyanosis, No clubbing. Good range of motion in all major joints. No tenderness to palpation or major deformities noted. No tenderness of the CTLS spine.   Integument: Warm, Dry, No erythema, No rash. No petechiae.  Neurologic: Alert & oriented x 3, Normal motor function, Normal sensory function, No focal deficits noted. Normal gait.  Psychiatric: Affect normal, Judgment normal, Mood normal. Cooperative.    LAB:  All pertinent labs reviewed and interpreted.  Recent Results (from the past 24 hour(s))   Comprehensive metabolic panel    Collection Time: 02/04/22 11:48 AM   Result Value Ref Range    Sodium 140 136 - 145 mmol/L    Potassium 3.7 3.5 - 5.0 mmol/L    Chloride 102 98 - 107 mmol/L    Carbon Dioxide (CO2) 28 22 - 31 mmol/L    Anion Gap 10 5 - 18 mmol/L    Urea Nitrogen 13 8 - 22 mg/dL    Creatinine 0.86 0.60 - 1.10 mg/dL    Calcium 9.4 8.5 - 10.5 mg/dL    Glucose 106 70 - 125 mg/dL    Alkaline Phosphatase 65 45 - 120 U/L    AST 10 0 - 40 U/L    ALT 11 0 - 45 U/L    Protein Total 7.1 6.0 - 8.0 g/dL    Albumin 3.1 (L) 3.5 - 5.0 g/dL    Bilirubin Total 0.7 0.0 - 1.0 mg/dL    GFR Estimate 75 >60 mL/min/1.73m2   CBC with platelets and differential    Collection Time: 02/04/22 11:48 AM   Result Value Ref Range    WBC Count 8.8 4.0 - 11.0 10e3/uL    RBC Count 4.92 3.80 - 5.20 10e6/uL    Hemoglobin 14.3 11.7 - 15.7  g/dL    Hematocrit 43.5 35.0 - 47.0 %    MCV 88 78 - 100 fL    MCH 29.1 26.5 - 33.0 pg    MCHC 32.9 31.5 - 36.5 g/dL    RDW 13.1 10.0 - 15.0 %    Platelet Count 233 150 - 450 10e3/uL    % Neutrophils 64 %    % Lymphocytes 20 %    % Monocytes 13 %    % Eosinophils 1 %    % Basophils 1 %    % Immature Granulocytes 1 %    NRBCs per 100 WBC 0 <1 /100    Absolute Neutrophils 5.8 1.6 - 8.3 10e3/uL    Absolute Lymphocytes 1.7 0.8 - 5.3 10e3/uL    Absolute Monocytes 1.2 0.0 - 1.3 10e3/uL    Absolute Eosinophils 0.1 0.0 - 0.7 10e3/uL    Absolute Basophils 0.1 0.0 - 0.2 10e3/uL    Absolute Immature Granulocytes 0.0 <=0.4 10e3/uL    Absolute NRBCs 0.0 10e3/uL   UA with Microscopic reflex to Culture    Collection Time: 02/04/22  3:36 PM    Specimen: Urine, Catheter   Result Value Ref Range    Color Urine Yellow Colorless, Straw, Light Yellow, Yellow    Appearance Urine Turbid (A) Clear    Glucose Urine Negative Negative mg/dL    Bilirubin Urine Negative Negative    Ketones Urine Negative Negative mg/dL    Specific Gravity Urine 1.016 1.001 - 1.030    Blood Urine 0.1 mg/dL (A) Negative    pH Urine 6.0 5.0 - 7.0    Protein Albumin Urine 50  (A) Negative mg/dL    Urobilinogen Urine 4.0 (A) <2.0 mg/dL    Nitrite Urine Positive (A) Negative    Leukocyte Esterase Urine 500 Frank/uL (A) Negative    Bacteria Urine Many (A) None Seen /HPF    WBC Clumps Urine Present (A) None Seen /HPF    RBC Urine 4 (H) <=2 /HPF    WBC Urine >182 (H) <=5 /HPF    Squamous Epithelials Urine 1 <=1 /HPF       I, Neftali Lr, am serving as a scribe to document services personally performed by Nu Crawford PA-C based on my observation and the provider's statements to me. I, Nu Crawford PA-C attest that Neftali Lr is acting in a scribe capacity, has observed my performance of the services and has documented them in accordance with my direction.    Nu Crawford PA-C  Emergency Medicine  [facility]  2/4/2022      Nu Crawford PA-C  02/04/22  [id number]

## 2022-02-04 NOTE — DISCHARGE INSTRUCTIONS
Complete entire course of antibiotics unless told otherwise by your doctor. Follow up with your doctor on Monday. If you develop any new/worsening symptoms including fevers, vomiting, inability to urinate, severe abdominal pain or any other concerning symptoms, return to ED.

## 2022-02-04 NOTE — ED NOTES
"Pt reports recent dry heaves and prior vomiting of \"just bile\".  Also reports lower R) jaw pain where tooth was recently removed, Jan 21st.  Appears difficult for pt to talk at times, swollen.  "

## 2022-02-05 LAB — BACTERIA UR CULT: ABNORMAL

## 2022-02-08 ENCOUNTER — HOSPITAL ENCOUNTER (EMERGENCY)
Facility: HOSPITAL | Age: 62
Discharge: HOME | End: 2022-02-08
Attending: FAMILY MEDICINE | Admitting: FAMILY MEDICINE
Payer: MEDICARE

## 2022-02-08 VITALS
SYSTOLIC BLOOD PRESSURE: 190 MMHG | RESPIRATION RATE: 20 BRPM | HEIGHT: 66 IN | OXYGEN SATURATION: 96 % | DIASTOLIC BLOOD PRESSURE: 97 MMHG | HEART RATE: 81 BPM | BODY MASS INDEX: 27.32 KG/M2 | WEIGHT: 170 LBS | TEMPERATURE: 98.2 F

## 2022-02-08 DIAGNOSIS — R51.9 FACE PAIN: ICD-10-CM

## 2022-02-08 DIAGNOSIS — G50.0 TRIGEMINAL NEURALGIA: ICD-10-CM

## 2022-02-08 PROCEDURE — 99284 EMERGENCY DEPT VISIT MOD MDM: CPT | Mod: 25

## 2022-02-08 PROCEDURE — 96374 THER/PROPH/DIAG INJ IV PUSH: CPT

## 2022-02-08 PROCEDURE — 250N000011 HC RX IP 250 OP 636: Performed by: FAMILY MEDICINE

## 2022-02-08 RX ORDER — HYDROCODONE BITARTRATE AND ACETAMINOPHEN 5; 325 MG/1; MG/1
1 TABLET ORAL EVERY 6 HOURS PRN
Qty: 8 TABLET | Refills: 0 | Status: SHIPPED | OUTPATIENT
Start: 2022-02-08 | End: 2022-02-11

## 2022-02-08 RX ORDER — OXCARBAZEPINE 300 MG/1
300 TABLET, FILM COATED ORAL 2 TIMES DAILY
Qty: 20 TABLET | Refills: 0 | Status: SHIPPED | OUTPATIENT
Start: 2022-02-08 | End: 2022-07-22

## 2022-02-08 RX ORDER — KETOROLAC TROMETHAMINE 30 MG/ML
30 INJECTION, SOLUTION INTRAMUSCULAR; INTRAVENOUS ONCE
Status: COMPLETED | OUTPATIENT
Start: 2022-02-08 | End: 2022-02-08

## 2022-02-08 RX ADMIN — KETOROLAC TROMETHAMINE 30 MG: 30 INJECTION, SOLUTION INTRAMUSCULAR at 02:42

## 2022-02-08 ASSESSMENT — MIFFLIN-ST. JEOR: SCORE: 1337.86

## 2022-02-08 NOTE — ED TRIAGE NOTES
"Patient reports 4 days of right facial/scalp \"nerve pain\". Pain a 10. Seen in our ER on 2/4/22. She states she can't eat or drink. She is A&O, ambulatory. She states she stopped taking her hypertension med.  "

## 2022-02-08 NOTE — ED PROVIDER NOTES
"EMERGENCY DEPARTMENT ENCOUNTER      NAME: Annette Shore  AGE: 62 year old female  YOB: 1960  MRN: [id number]  EVALUATION DATE & TIME: No admission date for patient encounter.    PCP: Jo Hamilton    ED PROVIDER: Shen Avendano M.D.    Chief Complaint   Patient presents with     right facial \"nerve pain\"       FINAL IMPRESSION:  1. Face pain    2. Trigeminal neuralgia        ED COURSE & MEDICAL DECISION MAKING:    Pertinent Labs & Imaging studies personally reviewed and interpreted by me. (See chart for details)  2:20 AM Patient seen and examined, prior records reviewed.  Differential diagnosis includes but not limited to temporal arteritis, trigeminal neuralgia, jaw osteomyelitis, periapical abscess, mastoiditis, malignant otitis externa.  Patient presents with paroxysms of right jaw pain that she describes as shocklike.  This starts just anterior to the ear and radiates into the jaw.  Symptoms are most consistent with trigeminal neuralgia.  She also complains of scalp tenderness although this is more diffuse and occurs on both sides of the scalp, not consistent with her trigeminal neuralgia.  No redness, warmth, or swelling of the jaw to suggest facial cellulitis or periapical abscess.  No throat pain or difficulty swallowing to suggest peritonsillar abscess or tonsillitis.  Patient recently started gabapentin, will add Trileptal.  Toradol given in the emergency department.  Close follow-up with primary care.  She is hypertensive today and has not been taking her hypertensive medication.  She is asymptomatic with this.  Mucous membranes are moist, no tachycardia or hypotension to suggest dehydration and no indication for intravenous fluids at this time.  No temporal tenderness to suggest temporal arteritis         At the conclusion of the encounter I discussed the results of all of the tests and the disposition. The questions were answered. The patient or family acknowledged understanding " "and was agreeable with the care plan.     PROCEDURES:   Procedures    MEDICATIONS GIVEN IN THE EMERGENCY:  Medications   ketorolac (TORADOL) injection 30 mg (30 mg Intravenous Given 2/8/22 0242)       NEW PRESCRIPTIONS STARTED AT TODAY'S ER VISIT  New Prescriptions    HYDROCODONE-ACETAMINOPHEN (NORCO) 5-325 MG TABLET    Take 1 tablet by mouth every 6 hours as needed for severe pain    OXCARBAZEPINE (TRILEPTAL) 300 MG TABLET    Take 1 tablet (300 mg) by mouth 2 times daily for 10 days       =================================================================    HPI    Patient information was obtained from: patient      Annette Shore is a 62 year old female with a pertinent history of polyneuropathy, T2DM, who presents to this ED ambulatory for evaluation of right facial nerve pain.    Patient reports a history of neuropathy. She complains of 4 days of ongoing right facial and left scalp \"nerve\" pain. Pain more severe today prompting ED visit. The right facial pain is sharp and radiates down the jaw line into the neck and across the right cheek. She states she has had similar pain before, and has previously been prescribed gabapentin to no relief. She has not taken pain medication today, including tylenol or ibuprofen, stating that these have not provided relief in the past. Patient has been applying an ice pack to little relief. She denies any known allergies; denies any other complaints at this time.    REVIEW OF SYSTEMS   Review of Systems   HENT:        Positive for facial pain (right), scalp pain (left).   All other systems reviewed and are negative.    PAST MEDICAL HISTORY:  Past Medical History:   Diagnosis Date     Back pain     low back     COPD (chronic obstructive pulmonary disease) (H)      Depression      Diabetes mellitus (H)      Hyperlipidemia      Hypertension      PAD (peripheral artery disease) (H)      Peripheral neuropathy        PAST SURGICAL HISTORY:  Past Surgical History:   Procedure Laterality " Date     AORTA - FEMORAL ARTERY BYPASS GRAFT       HYSTEROSCOPY W/ ENDOMETRIAL ABLATION       IR EXTREMITY ANGIOGRAM LEFT  11/25/2020     IR EXTREMITY ANGIOGRAM LEFT  12/16/2020     IR LOWER EXTREMITY ANGIOGRAM LEFT  11/25/2020     IR LOWER EXTREMITY ANGIOGRAM LEFT  12/16/2020     OTHER SURGICAL HISTORY      tubal ligation     OTHER SURGICAL HISTORY      spine fusion       CURRENT MEDICATIONS:    No current facility-administered medications for this encounter.     Current Outpatient Medications   Medication     HYDROcodone-acetaminophen (NORCO) 5-325 MG tablet     OXcarbazepine (TRILEPTAL) 300 MG tablet     aspirin 325 MG tablet     atorvastatin (LIPITOR) 20 MG tablet     cefdinir (OMNICEF) 300 MG capsule     clopidogreL (PLAVIX) 75 mg tablet     DULoxetine (CYMBALTA) 60 MG capsule     ipratropium-albuteroL (DUO-NEB) 0.5-2.5 mg/3 mL nebulizer     losartan-hydrochlorothiazide (HYZAAR) 50-12.5 mg per tablet       ALLERGIES:  Allergies   Allergen Reactions     Lyrica [Pregabalin] Itching     Bupropion Itching       FAMILY HISTORY:  Family History   Problem Relation Age of Onset     Diabetes Mother      Hypertension Mother      Cancer Father      Heart Disease Father      Diabetes Father        SOCIAL HISTORY:   Social History     Socioeconomic History     Marital status: Single     Spouse name: Not on file     Number of children: Not on file     Years of education: Not on file     Highest education level: Not on file   Occupational History     Not on file   Tobacco Use     Smoking status: Current Every Day Smoker     Types: Cigarettes     Start date: 7/17/1970     Smokeless tobacco: Never Used     Tobacco comment: 4 cigarettes a day   Substance and Sexual Activity     Alcohol use: No     Drug use: Not Currently     Sexual activity: Not on file   Other Topics Concern     Not on file   Social History Narrative     Not on file     Social Determinants of Health     Financial Resource Strain: Not on file   Food Insecurity:  "Not on file   Transportation Needs: Not on file   Physical Activity: Not on file   Stress: Not on file   Social Connections: Not on file   Intimate Partner Violence: Not on file   Housing Stability: Not on file       VITALS:  BP (!) 190/97   Pulse 81   Temp 98.2  F (36.8  C) (Oral)   Resp 20   Ht 1.676 m (5' 6\")   Wt 77.1 kg (170 lb)   SpO2 96%   BMI 27.44 kg/m      PHYSICAL EXAM:  Physical Exam  Vitals and nursing note reviewed.   Constitutional:       Appearance: Normal appearance.      Comments: Appears uncomfortable. Holding ice pack to her face.   HENT:      Head: Normocephalic and atraumatic.      Comments: No jaw or facial swelling. No redness or induration of mastoid.     Right Ear: External ear normal.      Left Ear: External ear normal.      Nose: Nose normal.   Eyes:      Extraocular Movements: Extraocular movements intact.      Conjunctiva/sclera: Conjunctivae normal.   Pulmonary:      Effort: Pulmonary effort is normal.   Musculoskeletal:         General: Normal range of motion.      Cervical back: Normal range of motion.      Right lower leg: No edema.      Left lower leg: No edema.   Skin:     General: Skin is warm and dry.   Neurological:      General: No focal deficit present.      Mental Status: She is alert and oriented to person, place, and time. Mental status is at baseline.   Psychiatric:         Mood and Affect: Mood normal.         Behavior: Behavior normal.         Thought Content: Thought content normal.          LAB:  All pertinent labs reviewed and interpreted.       RADIOLOGY:  Reviewed all pertinent imaging. Please see official radiology report.  No orders to display       I, Ru Salmon, am serving as a scribe to document services personally performed by Dr. Avendano based on my observation and the provider's statements to me. I, Shen Avendano MD attest that Ru Salmon is acting in a scribe capacity, has observed my performance of the services and has documented them in " accordance with my direction.    Shen Avendano M.D.  Emergency Medicine  [facility] EMERGENCY DEPARTMENT  [facility] [id number] [phone]  Dept: [phone]     Shen Avendano MD  02/08/22 0313

## 2022-02-09 DIAGNOSIS — Z71.89 OTHER SPECIFIED COUNSELING: ICD-10-CM

## 2022-02-10 ENCOUNTER — PATIENT OUTREACH (OUTPATIENT)
Dept: CARE COORDINATION | Facility: CLINIC | Age: 62
End: 2022-02-10
Payer: MEDICARE

## 2022-02-10 NOTE — PROGRESS NOTES
Clinic Care Coordination Contact  Care Team Conversations    Patient identified for care management outreach, however patient is not on a value based contract so cannot complete outreach. Will escalate to clinic staff if specific needs or resources are indicated.    SERGIO Payne  Social Work Care Coordinator - [facility] [place]  Care Coordination  [email or web address]  Cell Phone: [phone]  Gender pronouns: she/her  Employed by [employer]

## 2022-05-23 ENCOUNTER — TRANSFERRED RECORDS (OUTPATIENT)
Dept: HEALTH INFORMATION MANAGEMENT | Facility: CLINIC | Age: 62
End: 2022-05-23

## 2022-06-06 ENCOUNTER — TRANSFERRED RECORDS (OUTPATIENT)
Dept: HEALTH INFORMATION MANAGEMENT | Facility: CLINIC | Age: 62
End: 2022-06-06

## 2022-06-16 ENCOUNTER — TRANSFERRED RECORDS (OUTPATIENT)
Dept: PULMONOLOGY | Facility: OTHER | Age: 62
End: 2022-06-16

## 2022-06-16 ENCOUNTER — ANCILLARY PROCEDURE (OUTPATIENT)
Dept: RADIOLOGY | Facility: CLINIC | Age: 62
End: 2022-06-16
Payer: MEDICARE

## 2022-06-20 ENCOUNTER — MEDICAL CORRESPONDENCE (OUTPATIENT)
Dept: HEALTH INFORMATION MANAGEMENT | Facility: CLINIC | Age: 62
End: 2022-06-20

## 2022-06-21 ENCOUNTER — DOCUMENTATION ONLY (OUTPATIENT)
Dept: PULMONOLOGY | Facility: CLINIC | Age: 62
End: 2022-06-21
Payer: MEDICARE

## 2022-06-21 ENCOUNTER — OFFICE VISIT (OUTPATIENT)
Dept: PULMONOLOGY | Facility: OTHER | Age: 62
End: 2022-06-21
Payer: MEDICARE

## 2022-06-21 VITALS
BODY MASS INDEX: 26.78 KG/M2 | DIASTOLIC BLOOD PRESSURE: 80 MMHG | SYSTOLIC BLOOD PRESSURE: 144 MMHG | HEIGHT: 66 IN | HEART RATE: 70 BPM | WEIGHT: 166.6 LBS | OXYGEN SATURATION: 95 %

## 2022-06-21 DIAGNOSIS — R91.1 LUNG NODULE: Primary | ICD-10-CM

## 2022-06-21 PROCEDURE — 99204 OFFICE O/P NEW MOD 45 MIN: CPT | Performed by: INTERNAL MEDICINE

## 2022-06-21 RX ORDER — GABAPENTIN 300 MG/1
600 CAPSULE ORAL EVERY EVENING
COMMUNITY
Start: 2022-03-21

## 2022-06-21 NOTE — PROGRESS NOTES
LUNG NODULE & INTERVENTIONAL PULMONARY CLINIC  [facility]     Annette Shore MRN# [id number]   Age: 62 year old YOB: 1960       Requesting Physician: No referring provider defined for this encounter.       Assessment and Plan:    1. New solitary pulmonary lung nodule(s). Given the characteristics on current/previous imaging and risk factors; I would classify this to be Intermediate (6-65%) risk for cancer. It is right at the site of an osteophyte.  Based on old spirometry it seems unlikely that surgery will be an option.   -PET scan   -nodify    2. Tobacco use - we discussed cutting down, other methods.               History:     Annette Shore is a 62 year old female with sig h/o for smoking who is here for evaluation/followup of lung nodule(s).  This was found on a lung cancer screening CT scan.  She has had them in the past but none recently and none of them mention this nodule.  She also has COPD but this is currently well controlled.      - My interpretation of the images relevant for this visit includes: paraspinal RLL nodule   - My interpretation of the PFT's relevant for this visit includes: Obstructive pattern            Past Medical History:      Past Medical History:   Diagnosis Date     Back pain     low back     COPD (chronic obstructive pulmonary disease) (H)      Depression      Diabetes mellitus (H)      Hyperlipidemia      Hypertension      PAD (peripheral artery disease) (H)      Peripheral neuropathy            Past Surgical History:      Past Surgical History:   Procedure Laterality Date     AORTA - FEMORAL ARTERY BYPASS GRAFT       HYSTEROSCOPY W/ ENDOMETRIAL ABLATION       IR EXTREMITY ANGIOGRAM LEFT  11/25/2020     IR EXTREMITY ANGIOGRAM LEFT  12/16/2020     IR LOWER EXTREMITY ANGIOGRAM LEFT  11/25/2020     IR LOWER EXTREMITY ANGIOGRAM LEFT  12/16/2020     OTHER SURGICAL HISTORY      tubal ligation     OTHER  "SURGICAL HISTORY      spine fusion          Social History:     Social History     Tobacco Use     Smoking status: Current Every Day Smoker     Types: Cigarettes     Start date: 7/17/1970     Smokeless tobacco: Never Used     Tobacco comment: 4 cigarettes a day   Substance Use Topics     Alcohol use: No          Family History:     Family History   Problem Relation Age of Onset     Diabetes Mother      Hypertension Mother      Cancer Father      Heart Disease Father      Diabetes Father            Allergies:      Allergies   Allergen Reactions     Lyrica [Pregabalin] Itching     Bupropion Itching          Medications:     Current Outpatient Medications   Medication Sig     aspirin 325 MG tablet [ASPIRIN 325 MG TABLET] Take 325 mg by mouth daily.      atorvastatin (LIPITOR) 20 MG tablet [ATORVASTATIN (LIPITOR) 20 MG TABLET] Take 20 mg by mouth every morning.      DULoxetine (CYMBALTA) 60 MG capsule Take 60 mg by mouth daily     gabapentin (NEURONTIN) 300 MG capsule Take 600 mg by mouth 2 times daily     ipratropium-albuteroL (DUO-NEB) 0.5-2.5 mg/3 mL nebulizer [IPRATROPIUM-ALBUTEROL (DUO-NEB) 0.5-2.5 MG/3 ML NEBULIZER] 3 mL as needed.      losartan-hydrochlorothiazide (HYZAAR) 50-12.5 mg per tablet [LOSARTAN-HYDROCHLOROTHIAZIDE (HYZAAR) 50-12.5 MG PER TABLET] Take 1 tablet by mouth daily     clopidogreL (PLAVIX) 75 mg tablet [CLOPIDOGREL (PLAVIX) 75 MG TABLET] Take 1 tablet (75 mg total) by mouth daily.     OXcarbazepine (TRILEPTAL) 300 MG tablet Take 1 tablet (300 mg) by mouth 2 times daily for 10 days     No current facility-administered medications for this visit.          Review of Systems:     See HPI         Physical Exam:   BP (!) 144/80 (BP Location: Right arm, Patient Position: Chair, Cuff Size: Adult Large)   Pulse 70   Ht 1.676 m (5' 6\")   Wt 75.6 kg (166 lb 9.6 oz)   SpO2 95%   BMI 26.89 kg/m      Constitutional - looks well, in no apparent distress  Eyes - no redness or discharge  Respiratory " -breathing appears comfortable. No wheeze or rhonchi.   Cardiac -- Normal rate, rhythm.   Skin - No appreciable discoloration or lesions (very limited exam)  Neurological - No apparent tremors. Speech fluent and articlate  Psychiatric - no signs of delirium or anxiety          Current Laboratory Data:   All laboratory and imaging data reviewed.    No results found for this or any previous visit (from the past 24 hour(s)).

## 2022-06-21 NOTE — PATIENT INSTRUCTIONS
Please call [phone] if you have any questions.    We will get a PET and a blood test to help us determine the best course for biopsy.

## 2022-06-23 ENCOUNTER — TRANSFERRED RECORDS (OUTPATIENT)
Dept: HEALTH INFORMATION MANAGEMENT | Facility: CLINIC | Age: 62
End: 2022-06-23

## 2022-06-23 ENCOUNTER — HOSPITAL ENCOUNTER (OUTPATIENT)
Dept: RESPIRATORY THERAPY | Facility: CLINIC | Age: 62
Discharge: HOME | End: 2022-06-23
Attending: INTERNAL MEDICINE | Admitting: INTERNAL MEDICINE
Payer: MEDICARE

## 2022-06-23 DIAGNOSIS — R91.1 LUNG NODULE: ICD-10-CM

## 2022-06-23 LAB — HGB BLD-MCNC: 15.1 G/DL (ref 11.7–15.7)

## 2022-06-23 PROCEDURE — 94726 PLETHYSMOGRAPHY LUNG VOLUMES: CPT

## 2022-06-23 PROCEDURE — 250N000009 HC RX 250: Performed by: INTERNAL MEDICINE

## 2022-06-23 PROCEDURE — 85018 HEMOGLOBIN: CPT | Performed by: INTERNAL MEDICINE

## 2022-06-23 PROCEDURE — 94060 EVALUATION OF WHEEZING: CPT

## 2022-06-23 PROCEDURE — 94729 DIFFUSING CAPACITY: CPT

## 2022-06-23 PROCEDURE — 36415 COLL VENOUS BLD VENIPUNCTURE: CPT | Performed by: INTERNAL MEDICINE

## 2022-06-23 PROCEDURE — 999N000157 HC STATISTIC RCP TIME EA 10 MIN

## 2022-06-23 RX ORDER — ALBUTEROL SULFATE 0.83 MG/ML
2.5 SOLUTION RESPIRATORY (INHALATION) ONCE
Status: COMPLETED | OUTPATIENT
Start: 2022-06-23 | End: 2022-06-23

## 2022-06-23 RX ADMIN — ALBUTEROL SULFATE 2.5 MG: 2.5 SOLUTION RESPIRATORY (INHALATION) at 13:13

## 2022-06-24 ENCOUNTER — TRANSFERRED RECORDS (OUTPATIENT)
Dept: ONCOLOGY | Facility: CLINIC | Age: 62
End: 2022-06-24

## 2022-06-25 LAB
DLCOCOR-%PRED-PRE: 60 %
DLCOCOR-PRE: 12.85 ML/MIN/MMHG
DLCOUNC-%PRED-PRE: 63 %
DLCOUNC-PRE: 13.47 ML/MIN/MMHG
DLCOUNC-PRED: 21.29 ML/MIN/MMHG
ERV-%PRED-PRE: 36 %
ERV-PRE: 0.28 L
ERV-PRED: 0.76 L
EXPTIME-PRE: 6.78 SEC
FEF2575-%PRED-POST: 52 %
FEF2575-%PRED-PRE: 26 %
FEF2575-POST: 1.16 L/SEC
FEF2575-PRE: 0.59 L/SEC
FEF2575-PRED: 2.2 L/SEC
FEFMAX-%PRED-PRE: 56 %
FEFMAX-PRE: 3.58 L/SEC
FEFMAX-PRED: 6.37 L/SEC
FEV1-%PRED-PRE: 40 %
FEV1-PRE: 1.04 L
FEV1FEV6-PRE: 63 %
FEV1FEV6-PRED: 80 %
FEV1FVC-PRE: 63 %
FEV1FVC-PRED: 79 %
FEV1SVC-PRE: 60 %
FEV1SVC-PRED: 74 %
FIFMAX-PRE: 2.06 L/SEC
FRCPLETH-%PRED-PRE: 131 %
FRCPLETH-PRE: 3.7 L
FRCPLETH-PRED: 2.82 L
FVC-%PRED-PRE: 50 %
FVC-PRE: 1.64 L
FVC-PRED: 3.26 L
IC-%PRED-PRE: 53 %
IC-PRE: 1.44 L
IC-PRED: 2.68 L
RVPLETH-%PRED-PRE: 166 %
RVPLETH-PRE: 3.43 L
RVPLETH-PRED: 2.06 L
TLCPLETH-%PRED-PRE: 97 %
TLCPLETH-PRE: 5.15 L
TLCPLETH-PRED: 5.27 L
VA-%PRED-PRE: 80 %
VA-PRE: 4.16 L
VC-%PRED-PRE: 49 %
VC-PRE: 1.72 L
VC-PRED: 3.44 L

## 2022-06-27 ENCOUNTER — HOSPITAL ENCOUNTER (OUTPATIENT)
Dept: PET IMAGING | Facility: HOSPITAL | Age: 62
Discharge: HOME | End: 2022-06-27
Attending: INTERNAL MEDICINE | Admitting: INTERNAL MEDICINE
Payer: MEDICARE

## 2022-06-27 DIAGNOSIS — R91.1 LUNG NODULE: ICD-10-CM

## 2022-06-27 LAB — GLUCOSE BLDC GLUCOMTR-MCNC: 110 MG/DL (ref 70–99)

## 2022-06-27 PROCEDURE — 343N000001 HC RX 343: Performed by: INTERNAL MEDICINE

## 2022-06-27 PROCEDURE — 82962 GLUCOSE BLOOD TEST: CPT

## 2022-06-27 PROCEDURE — A9552 F18 FDG: HCPCS | Performed by: INTERNAL MEDICINE

## 2022-06-27 PROCEDURE — 78816 PET IMAGE W/CT FULL BODY: CPT | Mod: PI

## 2022-06-27 RX ADMIN — FLUDEOXYGLUCOSE F-18 10.17 MCI.: 500 INJECTION, SOLUTION INTRAVENOUS at 10:07

## 2022-06-28 ENCOUNTER — TELEPHONE (OUTPATIENT)
Dept: PULMONOLOGY | Facility: OTHER | Age: 62
End: 2022-06-28

## 2022-06-28 NOTE — TELEPHONE ENCOUNTER
"Phone call from patient asking about results of her PET done on 6/27.    Per Dr. Dave:  PET shows no other concerning areas.  She should be receiving a call from the [facility] to schedule surgery consult.       Patient asking what stage her cancer would be?  Per Dr. Dave:  Stage 1    Also asking about results of \"biomarkers in her RBC's\".  \"They came to my house to draw this blood\".  Per Dr. Dave:  Results not back yet, should take about 7-10 days to receive results.  "

## 2022-07-05 NOTE — TELEPHONE ENCOUNTER
RECORDS STATUS - ALL OTHER DIAGNOSIS      RECORDS RECEIVED FROM: [facility]   DATE RECEIVED: 7/5   NOTES STATUS DETAILS   OFFICE NOTE from referring provider [facility] Rosendo Dave MD in [facility] INTERV PULM NODULE: 6/21/22   DISCHARGE REPORT from the ER [facility] 8/27/21   MEDICATION LIST Epic    PFT Epic 6/23/22   LABS     ANYTHING RELATED TO DIAGNOSIS Epic    IMAGING (NEED IMAGES & REPORT)     CT SCANS PACS 6/16/22: [facility]   ULTRASOUND PACS Epic   PET PACS 6/27/22: Epic

## 2022-07-06 ENCOUNTER — PRE VISIT (OUTPATIENT)
Dept: SURGERY | Facility: CLINIC | Age: 62
End: 2022-07-06

## 2022-07-06 ENCOUNTER — OFFICE VISIT (OUTPATIENT)
Dept: SURGERY | Facility: CLINIC | Age: 62
End: 2022-07-06
Attending: INTERNAL MEDICINE
Payer: MEDICARE

## 2022-07-06 VITALS
HEART RATE: 76 BPM | SYSTOLIC BLOOD PRESSURE: 154 MMHG | BODY MASS INDEX: 27.13 KG/M2 | HEIGHT: 66 IN | DIASTOLIC BLOOD PRESSURE: 87 MMHG | OXYGEN SATURATION: 95 % | WEIGHT: 168.8 LBS | TEMPERATURE: 97.9 F | RESPIRATION RATE: 16 BRPM

## 2022-07-06 DIAGNOSIS — R91.1 LUNG NODULE: ICD-10-CM

## 2022-07-06 DIAGNOSIS — R91.1 LUNG NODULE: Primary | ICD-10-CM

## 2022-07-06 PROCEDURE — G0463 HOSPITAL OUTPT CLINIC VISIT: HCPCS

## 2022-07-06 PROCEDURE — 99205 OFFICE O/P NEW HI 60 MIN: CPT | Performed by: THORACIC SURGERY (CARDIOTHORACIC VASCULAR SURGERY)

## 2022-07-06 ASSESSMENT — PAIN SCALES - GENERAL: PAINLEVEL: NO PAIN (0)

## 2022-07-06 NOTE — LETTER
7/6/2022         RE: Annette Shore  [address]  [place] [address]        Dear Colleague,    Thank you for referring your patient, Annette Shore, to the [facility]. Please see a copy of my visit note below.    THORACIC SURGERY - NEW PATIENT OFFICE VISIT      Dear Dr. Clifton,    I saw Annette Shore in consultation for the evaluation and treatment of an indeterminate subsolid pulmonary nodule.     HPI  Ms. Annette Shore is a 62 year old female patient who presented with a nodule as an incidental finding on a routine lung cancer screening CT scan. She is being referred for surgical consideration. She states that she still smokes and does not plan on stopping. She also states that she is not vaccinated for COVID-19 and does not believe in the vaccine. She states that she currently spends about half of her day in bed and has significant difficulty with going up a single flight of stairs, usually needing to take a break.    ECOG performance status  3- Limited self care, <50% of the day up and about                    Previsit Tests   Covid vaccination status: Unvaccinated (she doesn't believe in the vaccine).   PFT (date 6/23/2022): FEV1 1.04L, 50% predicted, improves to 1.3L and 53%,  DLCO 60%.   CT scan (date 6/16/2022): Right lower lobe 1.6 cm pulmonary Nodule, without mediastinal adenopathy, with no pleural effusion.    PET scan (date 6/27/2022): FDG avid 1.5 cm superior segment Right lower lobe Nodule (Max SUV 2.2). No suspicious hypermetabolic activity elsewhere      PMH    Past Medical History:   Diagnosis Date     Back pain     low back     COPD (chronic obstructive pulmonary disease) (H)      Depression      Diabetes mellitus (H)      Hyperlipidemia      Hypertension      PAD (peripheral artery disease) (H)      Peripheral neuropathy         PSH    Past Surgical History:   Procedure Laterality Date     AORTA - FEMORAL ARTERY BYPASS GRAFT       HYSTEROSCOPY W/  "ENDOMETRIAL ABLATION       IR EXTREMITY ANGIOGRAM LEFT  11/25/2020     IR EXTREMITY ANGIOGRAM LEFT  12/16/2020     IR LOWER EXTREMITY ANGIOGRAM LEFT  11/25/2020     IR LOWER EXTREMITY ANGIOGRAM LEFT  12/16/2020     OTHER SURGICAL HISTORY      tubal ligation     OTHER SURGICAL HISTORY      spine fusion        Allergies   Allergen Reactions     Lyrica [Pregabalin] Itching     Bupropion Itching       Current Outpatient Medications   Medication     aspirin 325 MG tablet     atorvastatin (LIPITOR) 20 MG tablet     DULoxetine (CYMBALTA) 60 MG capsule     gabapentin (NEURONTIN) 300 MG capsule     ipratropium-albuteroL (DUO-NEB) 0.5-2.5 mg/3 mL nebulizer     losartan-hydrochlorothiazide (HYZAAR) 50-12.5 mg per tablet     OXcarbazepine (TRILEPTAL) 300 MG tablet     No current facility-administered medications for this visit.       ETOH: None  TOBACCO: Current smoker, 4 cigarettes/day since 1970  OTHER DRUGS: None    Physical examination  BP (!) 154/87 (BP Location: Right arm, Patient Position: Sitting, Cuff Size: Adult Regular)   Pulse 76   Temp 97.9  F (36.6  C) (Oral)   Resp 16   Ht 1.676 m (5' 6\")   Wt 76.6 kg (168 lb 12.8 oz)   SpO2 95%   BMI 27.25 kg/m    A&Ox3, NAD  Breathing comfortably on room air    From a personal perspective, patient was worried about complications from surgery and prefers to avoid a big operation. She states that she is DNR/DNI. Additionally, she states that she is an avid , though it is tough for her due to physical limitations.    IMPRESSION   62 year old female patient with RLL indeterminate subsolid pulmonary nodule.  I had an extensive discussion with the patient and her family regarding her options at this time. Given her low performance status, limited pulmonary reserve, smoking condition, and her unvaccinated status for Covid-19, I believe the risks of surgery outweigh the benefits. I recommend referral to radiation oncology for consideration of SBRT.     Stage  [id number] by " PET scan    PLAN  I spent 60 min on the date of the encounter in chart review, patient visit, review of tests, documentation and/or discussion with other providers about the issues documented above. I reviewed the plan as follows:  Referral to Radiation Oncology for consideration of SBRT.   We will contact Dr Dave to stage the mediastinum with an EBUS.   Smoking Cessation: Ms. Shore was counseled on the importance of smoking cessation and its impact on the hyun-operative course. The patient is strongly encouraged to quit smoking for 3 weeks prior to their procedure. If they feel they're absolutely unable to quit, we will proceed as planned given the malignant nature of their disease.  This guideline is in accordance with the World Health Organization (WHO) and has shown to lower the risk of both surgical and anesthesia complications as well as improve post-operative outcomes.     F/u with thoracic surgery PRN.     I appreciate the opportunity to participate in the care of your patient and will keep you updated.      Again, thank you for allowing me to participate in the care of your patient.      Sincerely,    Edward Ortega MD

## 2022-07-06 NOTE — PROGRESS NOTES
THORACIC SURGERY - NEW PATIENT OFFICE VISIT      Dear Dr. Clifton,    I saw Annette Shore in consultation for the evaluation and treatment of an indeterminate subsolid pulmonary nodule.     HPI  Ms. Annette Shore is a 62 year old female patient who presented with a nodule as an incidental finding on a routine lung cancer screening CT scan. She is being referred for surgical consideration. She states that she still smokes and does not plan on stopping. She also states that she is not vaccinated for COVID-19 and does not believe in the vaccine. She states that she currently spends about half of her day in bed and has significant difficulty with going up a single flight of stairs, usually needing to take a break.    ECOG performance status  3- Limited self care, <50% of the day up and about                    Previsit Tests   Covid vaccination status: Unvaccinated (she doesn't believe in the vaccine).   PFT (date 6/23/2022): FEV1 1.04L, 50% predicted, improves to 1.3L and 53%,  DLCO 60%.   CT scan (date 6/16/2022): Right lower lobe 1.6 cm pulmonary Nodule, without mediastinal adenopathy, with no pleural effusion.    PET scan (date 6/27/2022): FDG avid 1.5 cm superior segment Right lower lobe Nodule (Max SUV 2.2). No suspicious hypermetabolic activity elsewhere      PMH    Past Medical History:   Diagnosis Date     Back pain     low back     COPD (chronic obstructive pulmonary disease) (H)      Depression      Diabetes mellitus (H)      Hyperlipidemia      Hypertension      PAD (peripheral artery disease) (H)      Peripheral neuropathy         PSH    Past Surgical History:   Procedure Laterality Date     AORTA - FEMORAL ARTERY BYPASS GRAFT       HYSTEROSCOPY W/ ENDOMETRIAL ABLATION       IR EXTREMITY ANGIOGRAM LEFT  11/25/2020     IR EXTREMITY ANGIOGRAM LEFT  12/16/2020     IR LOWER EXTREMITY ANGIOGRAM LEFT  11/25/2020     IR LOWER EXTREMITY ANGIOGRAM LEFT  12/16/2020     OTHER SURGICAL HISTORY      tubal ligation      "OTHER SURGICAL HISTORY      spine fusion        Allergies   Allergen Reactions     Lyrica [Pregabalin] Itching     Bupropion Itching       Current Outpatient Medications   Medication     aspirin 325 MG tablet     atorvastatin (LIPITOR) 20 MG tablet     DULoxetine (CYMBALTA) 60 MG capsule     gabapentin (NEURONTIN) 300 MG capsule     ipratropium-albuteroL (DUO-NEB) 0.5-2.5 mg/3 mL nebulizer     losartan-hydrochlorothiazide (HYZAAR) 50-12.5 mg per tablet     OXcarbazepine (TRILEPTAL) 300 MG tablet     No current facility-administered medications for this visit.       ETOH: None  TOBACCO: Current smoker, 4 cigarettes/day since 1970  OTHER DRUGS: None    Physical examination  BP (!) 154/87 (BP Location: Right arm, Patient Position: Sitting, Cuff Size: Adult Regular)   Pulse 76   Temp 97.9  F (36.6  C) (Oral)   Resp 16   Ht 1.676 m (5' 6\")   Wt 76.6 kg (168 lb 12.8 oz)   SpO2 95%   BMI 27.25 kg/m    A&Ox3, NAD  Breathing comfortably on room air    From a personal perspective, patient was worried about complications from surgery and prefers to avoid a big operation. She states that she is DNR/DNI. Additionally, she states that she is an avid , though it is tough for her due to physical limitations.    IMPRESSION   62 year old female patient with RLL indeterminate subsolid pulmonary nodule.  I had an extensive discussion with the patient and her family regarding her options at this time. Given her low performance status, limited pulmonary reserve, smoking condition, and her unvaccinated status for Covid-19, I believe the risks of surgery outweigh the benefits. I recommend referral to radiation oncology for consideration of SBRT.     Stage  [id number] by PET scan    PLAN  I spent 60 min on the date of the encounter in chart review, patient visit, review of tests, documentation and/or discussion with other providers about the issues documented above. I reviewed the plan as follows:  Referral to Radiation Oncology " for consideration of SBRT.   We will contact Dr Dave to stage the mediastinum with an EBUS.   Smoking Cessation: Ms. Shore was counseled on the importance of smoking cessation and its impact on the hyun-operative course. The patient is strongly encouraged to quit smoking for 3 weeks prior to their procedure. If they feel they're absolutely unable to quit, we will proceed as planned given the malignant nature of their disease.  This guideline is in accordance with the World Health Organization (WHO) and has shown to lower the risk of both surgical and anesthesia complications as well as improve post-operative outcomes.     F/u with thoracic surgery PRN.     I appreciate the opportunity to participate in the care of your patient and will keep you updated.  Sincerely,  Rosa Isela Vasquez MD

## 2022-07-06 NOTE — NURSING NOTE
"Oncology Rooming Note    July 6, 2022 9:04 AM   Annette Shore is a 62 year old female who presents for:    Chief Complaint   Patient presents with     Oncology Clinic Visit     Lung nodule      Initial Vitals: BP (!) 154/87 (BP Location: Right arm, Patient Position: Sitting, Cuff Size: Adult Regular)   Pulse 76   Temp 97.9  F (36.6  C) (Oral)   Resp 16   Ht 1.676 m (5' 6\")   Wt 76.6 kg (168 lb 12.8 oz)   SpO2 95%   BMI 27.25 kg/m   Estimated body mass index is 27.25 kg/m  as calculated from the following:    Height as of this encounter: 1.676 m (5' 6\").    Weight as of this encounter: 76.6 kg (168 lb 12.8 oz). Body surface area is 1.89 meters squared.  No Pain (0) Comment: Data Unavailable   No LMP recorded. Patient is postmenopausal.  Allergies reviewed: Yes  Medications reviewed: Yes    Medications: Medication refills not needed today.  Pharmacy name entered into [employer]:    [facility] ([facility] - [place], [place] [facility] [address] - [place], [place] - [address] [place] [address]    Clinical concerns:    Pt has had a lack of appetite. She thinks it might be related to a tooth that was pulled back in January. He appetite has not been the same since.     Pt at one point noticed her hair was turning green. Shortly after, she noticed her hair was falling out more than usual.     Fish Goodson    "

## 2022-07-07 ENCOUNTER — PREP FOR PROCEDURE (OUTPATIENT)
Dept: PULMONOLOGY | Facility: CLINIC | Age: 62
End: 2022-07-07

## 2022-07-07 ENCOUNTER — VIRTUAL VISIT (OUTPATIENT)
Dept: PULMONOLOGY | Facility: OTHER | Age: 62
End: 2022-07-07
Payer: MEDICARE

## 2022-07-07 DIAGNOSIS — R91.1 LUNG NODULE: Primary | ICD-10-CM

## 2022-07-07 PROCEDURE — 99215 OFFICE O/P EST HI 40 MIN: CPT | Mod: 95 | Performed by: INTERNAL MEDICINE

## 2022-07-07 PROCEDURE — 99417 PROLNG OP E/M EACH 15 MIN: CPT | Mod: 95 | Performed by: INTERNAL MEDICINE

## 2022-07-07 ASSESSMENT — PATIENT HEALTH QUESTIONNAIRE - PHQ9: SUM OF ALL RESPONSES TO PHQ QUESTIONS 1-9: 13

## 2022-07-07 NOTE — NURSING NOTE
When pt was asked about allergies they stated that they thought they only had an issue with Wellbutrin awhile ago. I stated that I would record that info in a note.    Pt stated they are using a Cream called Aug Betamet 0.05% cre taro.    Maira Wilcox VF

## 2022-07-07 NOTE — PROGRESS NOTES
Annette is a 62 year old who is being evaluated via a billable telephone visit.      What phone number would you like to be contacted at? [phone]  How would you like to obtain your AVS? Mail a copy    62-year-old woman with history of smoking, peripheral arterial disease and new lung nodule here for follow-up.    She met with thoracic surgery.  For variety of reasons they decided that referral to radiation made the most sense.    [place] will plan [place] EBUS to stage the mediastinum, attempt biopsy of the lung nodule and fiducial placement.    She will need to hold aspirin for 5 days prior to procedure.    We will plan on Ion bronchoscopy.    70 minutes spent on this telephone visit.    Rosendo Dave MD

## 2022-07-08 ENCOUNTER — TELEPHONE (OUTPATIENT)
Dept: PULMONOLOGY | Facility: CLINIC | Age: 62
End: 2022-07-08

## 2022-07-08 NOTE — TELEPHONE ENCOUNTER
Spoke with patient to schedule procedure with Rosendo Dave    Procedure was scheduled on 07/14 at [facility] OR  Patient will have H&P with Dr. Ortega (MD will update if needed)    Patient is aware a COVID-19 test is needed before their procedure.   Patient will do a home test    Patient is aware a / is needed day of surgery.   Surgery letter was sent via Mail, patient has my direct contact information for any further questions.

## 2022-07-11 ENCOUNTER — LAB REQUISITION (OUTPATIENT)
Dept: LAB | Facility: CLINIC | Age: 62
End: 2022-07-11
Payer: MEDICARE

## 2022-07-11 DIAGNOSIS — Z01.812 ENCOUNTER FOR PREPROCEDURAL LABORATORY EXAMINATION: ICD-10-CM

## 2022-07-11 LAB — SARS-COV-2 RNA RESP QL NAA+PROBE: NEGATIVE

## 2022-07-11 PROCEDURE — U0005 INFEC AGEN DETEC AMPLI PROBE: HCPCS | Mod: ORL | Performed by: FAMILY MEDICINE

## 2022-07-13 ENCOUNTER — ANESTHESIA EVENT (OUTPATIENT)
Dept: SURGERY | Facility: CLINIC | Age: 62
End: 2022-07-13
Payer: MEDICARE

## 2022-07-14 ENCOUNTER — APPOINTMENT (OUTPATIENT)
Dept: GENERAL RADIOLOGY | Facility: CLINIC | Age: 62
End: 2022-07-14
Attending: STUDENT IN AN ORGANIZED HEALTH CARE EDUCATION/TRAINING PROGRAM
Payer: MEDICARE

## 2022-07-14 ENCOUNTER — HOSPITAL ENCOUNTER (OUTPATIENT)
Facility: CLINIC | Age: 62
Discharge: HOME | End: 2022-07-14
Attending: INTERNAL MEDICINE | Admitting: INTERNAL MEDICINE
Payer: MEDICARE

## 2022-07-14 ENCOUNTER — ANESTHESIA (OUTPATIENT)
Dept: SURGERY | Facility: CLINIC | Age: 62
End: 2022-07-14
Payer: MEDICARE

## 2022-07-14 ENCOUNTER — HOSPITAL ENCOUNTER (OUTPATIENT)
Dept: CT IMAGING | Facility: CLINIC | Age: 62
Discharge: HOME | End: 2022-07-14
Attending: INTERNAL MEDICINE | Admitting: INTERNAL MEDICINE
Payer: MEDICARE

## 2022-07-14 ENCOUNTER — APPOINTMENT (OUTPATIENT)
Dept: GENERAL RADIOLOGY | Facility: CLINIC | Age: 62
End: 2022-07-14
Attending: INTERNAL MEDICINE
Payer: MEDICARE

## 2022-07-14 VITALS
HEART RATE: 85 BPM | HEIGHT: 66 IN | OXYGEN SATURATION: 93 % | RESPIRATION RATE: 18 BRPM | WEIGHT: 170.64 LBS | DIASTOLIC BLOOD PRESSURE: 101 MMHG | BODY MASS INDEX: 27.42 KG/M2 | SYSTOLIC BLOOD PRESSURE: 146 MMHG | TEMPERATURE: 98 F

## 2022-07-14 DIAGNOSIS — R91.1 LUNG NODULE: ICD-10-CM

## 2022-07-14 DIAGNOSIS — J41.0 SIMPLE CHRONIC BRONCHITIS (H): Primary | ICD-10-CM

## 2022-07-14 LAB — GLUCOSE BLDC GLUCOMTR-MCNC: 115 MG/DL (ref 70–99)

## 2022-07-14 PROCEDURE — 250N000011 HC RX IP 250 OP 636: Performed by: STUDENT IN AN ORGANIZED HEALTH CARE EDUCATION/TRAINING PROGRAM

## 2022-07-14 PROCEDURE — 88305 TISSUE EXAM BY PATHOLOGIST: CPT | Mod: TC | Performed by: INTERNAL MEDICINE

## 2022-07-14 PROCEDURE — G1010 CDSM STANSON: HCPCS | Mod: GC | Performed by: RADIOLOGY

## 2022-07-14 PROCEDURE — 710N000011 HC RECOVERY PHASE 1, LEVEL 3, PER MIN: Performed by: INTERNAL MEDICINE

## 2022-07-14 PROCEDURE — 272N000001 HC OR GENERAL SUPPLY STERILE: Performed by: INTERNAL MEDICINE

## 2022-07-14 PROCEDURE — 370N000017 HC ANESTHESIA TECHNICAL FEE, PER MIN: Performed by: INTERNAL MEDICINE

## 2022-07-14 PROCEDURE — 272N000002 HC OR SUPPLY OTHER OPNP: Performed by: INTERNAL MEDICINE

## 2022-07-14 PROCEDURE — 250N000011 HC RX IP 250 OP 636

## 2022-07-14 PROCEDURE — 82962 GLUCOSE BLOOD TEST: CPT

## 2022-07-14 PROCEDURE — 999N000179 XR SURGERY CARM FLUORO LESS THAN 5 MIN W STILLS: Mod: TC

## 2022-07-14 PROCEDURE — A4648 IMPLANTABLE TISSUE MARKER: HCPCS | Performed by: INTERNAL MEDICINE

## 2022-07-14 PROCEDURE — 71045 X-RAY EXAM CHEST 1 VIEW: CPT | Mod: 26 | Performed by: RADIOLOGY

## 2022-07-14 PROCEDURE — 71250 CT THORAX DX C-: CPT | Mod: 26 | Performed by: RADIOLOGY

## 2022-07-14 PROCEDURE — 250N000009 HC RX 250

## 2022-07-14 PROCEDURE — 999N000065 XR CHEST PORT 1 VIEW

## 2022-07-14 PROCEDURE — 258N000003 HC RX IP 258 OP 636

## 2022-07-14 PROCEDURE — 250N000013 HC RX MED GY IP 250 OP 250 PS 637

## 2022-07-14 PROCEDURE — 710N000012 HC RECOVERY PHASE 2, PER MINUTE: Performed by: INTERNAL MEDICINE

## 2022-07-14 PROCEDURE — 31652 BRONCH EBUS SAMPLNG 1/2 NODE: CPT | Mod: GC | Performed by: INTERNAL MEDICINE

## 2022-07-14 PROCEDURE — G1010 CDSM STANSON: HCPCS

## 2022-07-14 PROCEDURE — 999N000141 HC STATISTIC PRE-PROCEDURE NURSING ASSESSMENT: Performed by: INTERNAL MEDICINE

## 2022-07-14 PROCEDURE — 360N000086 HC SURGERY LEVEL 6 W/ FLUORO, PER MIN: Performed by: INTERNAL MEDICINE

## 2022-07-14 DEVICE — MARKER FIDUCIALS UNIBODY FM4010: Type: IMPLANTABLE DEVICE | Site: BRONCHUS | Status: FUNCTIONAL

## 2022-07-14 RX ORDER — PROPOFOL 10 MG/ML
INJECTION, EMULSION INTRAVENOUS PRN
Status: DISCONTINUED | OUTPATIENT
Start: 2022-07-14 | End: 2022-07-14

## 2022-07-14 RX ORDER — ALBUTEROL SULFATE 0.83 MG/ML
2.5 SOLUTION RESPIRATORY (INHALATION) EVERY 4 HOURS PRN
Status: DISCONTINUED | OUTPATIENT
Start: 2022-07-14 | End: 2022-07-14 | Stop reason: HOSPADM

## 2022-07-14 RX ORDER — DEXAMETHASONE SODIUM PHOSPHATE 4 MG/ML
INJECTION, SOLUTION INTRA-ARTICULAR; INTRALESIONAL; INTRAMUSCULAR; INTRAVENOUS; SOFT TISSUE PRN
Status: DISCONTINUED | OUTPATIENT
Start: 2022-07-14 | End: 2022-07-14

## 2022-07-14 RX ORDER — MEPERIDINE HYDROCHLORIDE 25 MG/ML
12.5 INJECTION INTRAMUSCULAR; INTRAVENOUS; SUBCUTANEOUS
Status: DISCONTINUED | OUTPATIENT
Start: 2022-07-14 | End: 2022-07-14 | Stop reason: HOSPADM

## 2022-07-14 RX ORDER — OXYCODONE HYDROCHLORIDE 5 MG/1
5 TABLET ORAL EVERY 4 HOURS PRN
Status: DISCONTINUED | OUTPATIENT
Start: 2022-07-14 | End: 2022-07-14 | Stop reason: HOSPADM

## 2022-07-14 RX ORDER — PROPOFOL 10 MG/ML
INJECTION, EMULSION INTRAVENOUS CONTINUOUS PRN
Status: DISCONTINUED | OUTPATIENT
Start: 2022-07-14 | End: 2022-07-14

## 2022-07-14 RX ORDER — ALBUTEROL SULFATE 90 UG/1
AEROSOL, METERED RESPIRATORY (INHALATION) PRN
Status: DISCONTINUED | OUTPATIENT
Start: 2022-07-14 | End: 2022-07-14

## 2022-07-14 RX ORDER — ONDANSETRON 2 MG/ML
INJECTION INTRAMUSCULAR; INTRAVENOUS PRN
Status: DISCONTINUED | OUTPATIENT
Start: 2022-07-14 | End: 2022-07-14

## 2022-07-14 RX ORDER — SODIUM CHLORIDE, SODIUM LACTATE, POTASSIUM CHLORIDE, CALCIUM CHLORIDE 600; 310; 30; 20 MG/100ML; MG/100ML; MG/100ML; MG/100ML
INJECTION, SOLUTION INTRAVENOUS CONTINUOUS PRN
Status: DISCONTINUED | OUTPATIENT
Start: 2022-07-14 | End: 2022-07-14

## 2022-07-14 RX ORDER — FENTANYL CITRATE 50 UG/ML
25 INJECTION, SOLUTION INTRAMUSCULAR; INTRAVENOUS EVERY 5 MIN PRN
Status: DISCONTINUED | OUTPATIENT
Start: 2022-07-14 | End: 2022-07-14 | Stop reason: HOSPADM

## 2022-07-14 RX ORDER — FENTANYL CITRATE 50 UG/ML
INJECTION, SOLUTION INTRAMUSCULAR; INTRAVENOUS PRN
Status: DISCONTINUED | OUTPATIENT
Start: 2022-07-14 | End: 2022-07-14

## 2022-07-14 RX ORDER — ONDANSETRON 4 MG/1
4 TABLET, ORALLY DISINTEGRATING ORAL EVERY 30 MIN PRN
Status: DISCONTINUED | OUTPATIENT
Start: 2022-07-14 | End: 2022-07-14 | Stop reason: HOSPADM

## 2022-07-14 RX ORDER — PREDNISONE 20 MG/1
TABLET ORAL
Qty: 7 TABLET | Refills: 0 | Status: SHIPPED | OUTPATIENT
Start: 2022-07-14 | End: 2022-07-22

## 2022-07-14 RX ORDER — HYDROMORPHONE HCL IN WATER/PF 6 MG/30 ML
0.2 PATIENT CONTROLLED ANALGESIA SYRINGE INTRAVENOUS EVERY 10 MIN PRN
Status: DISCONTINUED | OUTPATIENT
Start: 2022-07-14 | End: 2022-07-14 | Stop reason: HOSPADM

## 2022-07-14 RX ORDER — ONDANSETRON 2 MG/ML
4 INJECTION INTRAMUSCULAR; INTRAVENOUS EVERY 30 MIN PRN
Status: DISCONTINUED | OUTPATIENT
Start: 2022-07-14 | End: 2022-07-14 | Stop reason: HOSPADM

## 2022-07-14 RX ORDER — HYDRALAZINE HYDROCHLORIDE 20 MG/ML
10 INJECTION INTRAMUSCULAR; INTRAVENOUS ONCE
Status: COMPLETED | OUTPATIENT
Start: 2022-07-14 | End: 2022-07-14

## 2022-07-14 RX ORDER — HALOPERIDOL 5 MG/ML
1 INJECTION INTRAMUSCULAR
Status: DISCONTINUED | OUTPATIENT
Start: 2022-07-14 | End: 2022-07-14 | Stop reason: HOSPADM

## 2022-07-14 RX ORDER — LIDOCAINE HYDROCHLORIDE 20 MG/ML
INJECTION, SOLUTION INFILTRATION; PERINEURAL PRN
Status: DISCONTINUED | OUTPATIENT
Start: 2022-07-14 | End: 2022-07-14

## 2022-07-14 RX ADMIN — ONDANSETRON 4 MG: 2 INJECTION INTRAMUSCULAR; INTRAVENOUS at 09:06

## 2022-07-14 RX ADMIN — Medication 10 MG: at 09:08

## 2022-07-14 RX ADMIN — PHENYLEPHRINE HYDROCHLORIDE 100 MCG: 10 INJECTION INTRAVENOUS at 08:48

## 2022-07-14 RX ADMIN — HYDRALAZINE HYDROCHLORIDE 10 MG: 20 INJECTION INTRAMUSCULAR; INTRAVENOUS at 07:29

## 2022-07-14 RX ADMIN — Medication 50 MG: at 08:18

## 2022-07-14 RX ADMIN — PROPOFOL 200 MG: 10 INJECTION, EMULSION INTRAVENOUS at 08:16

## 2022-07-14 RX ADMIN — PHENYLEPHRINE HYDROCHLORIDE 100 MCG: 10 INJECTION INTRAVENOUS at 08:45

## 2022-07-14 RX ADMIN — DEXAMETHASONE SODIUM PHOSPHATE 8 MG: 4 INJECTION, SOLUTION INTRA-ARTICULAR; INTRALESIONAL; INTRAMUSCULAR; INTRAVENOUS; SOFT TISSUE at 08:30

## 2022-07-14 RX ADMIN — FENTANYL CITRATE 50 MCG: 50 INJECTION, SOLUTION INTRAMUSCULAR; INTRAVENOUS at 08:15

## 2022-07-14 RX ADMIN — PHENYLEPHRINE HYDROCHLORIDE 100 MCG: 10 INJECTION INTRAVENOUS at 08:30

## 2022-07-14 RX ADMIN — LIDOCAINE HYDROCHLORIDE 100 MG: 20 INJECTION, SOLUTION INFILTRATION; PERINEURAL at 08:15

## 2022-07-14 RX ADMIN — SUGAMMADEX 200 MG: 100 INJECTION, SOLUTION INTRAVENOUS at 09:23

## 2022-07-14 RX ADMIN — PROPOFOL 150 MCG/KG/MIN: 10 INJECTION, EMULSION INTRAVENOUS at 08:21

## 2022-07-14 RX ADMIN — ALBUTEROL SULFATE 2 PUFF: 108 INHALANT RESPIRATORY (INHALATION) at 07:55

## 2022-07-14 RX ADMIN — FENTANYL CITRATE 25 MCG: 50 INJECTION, SOLUTION INTRAMUSCULAR; INTRAVENOUS at 09:11

## 2022-07-14 RX ADMIN — FENTANYL CITRATE 50 MCG: 50 INJECTION, SOLUTION INTRAMUSCULAR; INTRAVENOUS at 08:33

## 2022-07-14 RX ADMIN — SODIUM CHLORIDE, POTASSIUM CHLORIDE, SODIUM LACTATE AND CALCIUM CHLORIDE: 600; 310; 30; 20 INJECTION, SOLUTION INTRAVENOUS at 08:04

## 2022-07-14 ASSESSMENT — LIFESTYLE VARIABLES: TOBACCO_USE: 1

## 2022-07-14 ASSESSMENT — COPD QUESTIONNAIRES
COPD: 1
CAT_SEVERITY: MODERATE

## 2022-07-14 NOTE — ANESTHESIA CARE TRANSFER NOTE
Patient: Annette Shore    Procedure: Procedure(s):  BRONCHOSCOPY, USING OPTICAL TRACKING SYSTEM, .  Ion or veran system, fiducial placement  endbronchial ultrasound, transbronchial biopsy       Diagnosis: Lung nodule [R91.1]  Diagnosis Additional Information: No value filed.    Anesthesia Type:   No value filed.     Note:    Oropharynx: oropharynx clear of all foreign objects and spontaneously breathing  Level of Consciousness: awake  Oxygen Supplementation: face mask  Level of Supplemental Oxygen (L/min / FiO2): 6  Independent Airway: airway patency satisfactory and stable  Dentition: dentition unchanged  Vital Signs Stable: post-procedure vital signs reviewed and stable  Report to RN Given: handoff report given  Patient transferred to: PACU    Handoff Report: Identifed the Patient, Identified the Reponsible Provider, Reviewed the pertinent medical history, Discussed the surgical course, Reviewed Intra-OP anesthesia mangement and issues during anesthesia, Set expectations for post-procedure period and Allowed opportunity for questions and acknowledgement of understanding      Vitals:  Vitals Value Taken Time   /85 07/14/22 0941   Temp     Pulse 106 07/14/22 0942   Resp 26 07/14/22 0944   SpO2 99 % 07/14/22 0944   Vitals shown include unvalidated device data.    Electronically Signed By: JAMIE Zamora CRNA  July 14, 2022  9:45 AM

## 2022-07-14 NOTE — ANESTHESIA PREPROCEDURE EVALUATION
Anesthesia Pre-Procedure Evaluation    Patient: Annette Shore   MRN: [id number] : 1960        Procedure : Procedure(s):  BRONCHOSCOPY, USING OPTICAL TRACKING SYSTEM, .  Ion or veran system, fiducial placement  endbronchial ultrasound, transbronchial biopsy          Past Medical History:   Diagnosis Date     Back pain     low back     COPD (chronic obstructive pulmonary disease) (H)      Depression      Diabetes mellitus (H)      Hyperlipidemia      Hypertension      PAD (peripheral artery disease) (H)      Peripheral neuropathy       Past Surgical History:   Procedure Laterality Date     AORTA - FEMORAL ARTERY BYPASS GRAFT       HYSTEROSCOPY W/ ENDOMETRIAL ABLATION       IR EXTREMITY ANGIOGRAM LEFT  2020     IR EXTREMITY ANGIOGRAM LEFT  2020     IR LOWER EXTREMITY ANGIOGRAM LEFT  2020     IR LOWER EXTREMITY ANGIOGRAM LEFT  2020     OTHER SURGICAL HISTORY      tubal ligation     OTHER SURGICAL HISTORY      spine fusion      Allergies   Allergen Reactions     Lyrica [Pregabalin] Itching     Pt does not think she is allergic to this     Bupropion Itching      Social History     Tobacco Use     Smoking status: Current Every Day Smoker     Types: Cigarettes     Start date: 1970     Smokeless tobacco: Never Used     Tobacco comment: 4 cigarettes a day   Substance Use Topics     Alcohol use: No      Wt Readings from Last 1 Encounters:   22 77.4 kg (170 lb 10.2 oz)        Anesthesia Evaluation   Pt has had prior anesthetic.     No history of anesthetic complications       ROS/MED HX  ENT/Pulmonary:     (+) sleep apnea, tobacco use, Current use, moderate,  COPD,     Neurologic:     (+) peripheral neuropathy, migraines,     Cardiovascular:     (+) Dyslipidemia hypertension-Peripheral Vascular Disease----    METS/Exercise Tolerance: 3 - Able to walk 1-2 blocks without stopping    Hematologic:  - neg hematologic  ROS     Musculoskeletal:  - neg musculoskeletal ROS     GI/Hepatic:  -  neg GI/hepatic ROS     Renal/Genitourinary:  - neg Renal ROS     Endo:     (+) type II DM,     Psychiatric/Substance Use:     (+) psychiatric history depression     Infectious Disease:  - neg infectious disease ROS     Malignancy:  - neg malignancy ROS     Other:  - neg other ROS          Physical Exam    Airway  airway exam normal           Respiratory Devices and Support         Dental  no notable dental history         Cardiovascular   cardiovascular exam normal          Pulmonary   pulmonary exam normal                OUTSIDE LABS:  CBC:   Lab Results   Component Value Date    WBC 8.8 02/04/2022    WBC 8.5 05/27/2019    HGB 15.1 06/23/2022    HGB 14.3 02/04/2022    HCT 43.5 02/04/2022    HCT 40.0 05/27/2019     02/04/2022     12/16/2020     BMP:   Lab Results   Component Value Date     02/04/2022     02/03/2022    POTASSIUM 3.7 02/04/2022    POTASSIUM 3.9 02/03/2022    CHLORIDE 102 02/04/2022    CHLORIDE 103 02/03/2022    CO2 28 02/04/2022    CO2 23 02/03/2022    BUN 13 02/04/2022    BUN 16 02/03/2022    CR 0.86 02/04/2022    CR 0.83 02/03/2022     (H) 07/14/2022     (H) 06/27/2022     COAGS:   Lab Results   Component Value Date    PTT 25 05/27/2019    INR 1.09 12/16/2020     POC: No results found for: BGM, HCG, HCGS  HEPATIC:   Lab Results   Component Value Date    ALBUMIN 3.1 (L) 02/04/2022    PROTTOTAL 7.1 02/04/2022    ALT 11 02/04/2022    AST 10 02/04/2022    ALKPHOS 65 02/04/2022    BILITOTAL 0.7 02/04/2022     OTHER:   Lab Results   Component Value Date    A1C 6.5 (H) 12/17/2018    VANIA 9.4 02/04/2022    LIPASE 10 12/17/2018    CRP 1.1 (H) 09/24/2018    SED 14 12/17/2018       Anesthesia Plan    ASA Status:  3   NPO Status:  NPO Appropriate    Anesthesia Type: General.     - Airway: ETT   Induction: Intravenous.   Maintenance: Balanced.   Techniques and Equipment:     Airway: Laryngotracheal anesthesia with lidocaine. Pre-induction treatment with inhaled  albuterol.         Consents    Anesthesia Plan(s) and associated risks, benefits, and realistic alternatives discussed. Questions answered and patient/representative(s) expressed understanding.    - Discussed:     - Discussed with:  Patient      - Extended Intubation/Ventilatory Support Discussed: Yes.      - Patient is DNR/DNI Status: No    Use of blood products discussed: No .     Postoperative Care    Pain management: Multi-modal analgesia.   PONV prophylaxis: Ondansetron (or other 5HT-3), Dexamethasone or Solumedrol     Comments:                Jose Cruz Garcia MD

## 2022-07-14 NOTE — ANESTHESIA POSTPROCEDURE EVALUATION
Patient: Annette Shore    Procedure: Procedure(s):  BRONCHOSCOPY, USING OPTICAL TRACKING SYSTEM, .  Ion or veran system, fiducial placement  endbronchial ultrasound, transbronchial biopsy       Anesthesia Type:  No value filed.    Note:  Disposition: Outpatient   Postop Pain Control: Uneventful            Sign Out: Well controlled pain   PONV: No   Neuro/Psych: Uneventful            Sign Out: Acceptable/Baseline neuro status   Airway/Respiratory: Uneventful            Sign Out: Acceptable/Baseline resp. status   CV/Hemodynamics: Uneventful            Sign Out: Acceptable CV status; No obvious hypovolemia; No obvious fluid overload   Other NRE: NONE   DID A NON-ROUTINE EVENT OCCUR? No           Last vitals:  Vitals Value Taken Time   /61 07/14/22 1030   Temp 36.8  C (98.3  F) 07/14/22 1015   Pulse 77 07/14/22 1033   Resp 16 07/14/22 1033   SpO2 92 % 07/14/22 1037   Vitals shown include unvalidated device data.    Electronically Signed By: Alexx Ardon MD  July 14, 2022  10:38 AM

## 2022-07-14 NOTE — ANESTHESIA PROCEDURE NOTES
Airway       Patient location during procedure: OR       Procedure Start/Stop Times: 7/14/2022 8:23 AM  Staff -        CRNA: Louis Sigala APRN CRNA       Performed By: CRNA  Consent for Airway        Urgency: elective  Indications and Patient Condition       Indications for airway management: hyun-procedural       Induction type:intravenous       Mask difficulty assessment: 1 - vent by mask    Final Airway Details       Final airway type: endotracheal airway       Successful airway: ETT - single  Endotracheal Airway Details        ETT size (mm): 8.0       Cuffed: yes       Successful intubation technique: direct laryngoscopy       DL Blade Type: Goldberg 2       Grade View of Cords: 1       Adjucts: stylet       Position: Right       Measured from: gums/teeth       Secured at (cm): 21       Bite block used: None    Post intubation assessment        Placement verified by: capnometry, equal breath sounds and chest rise        Number of attempts at approach: 2 (8.5 ETT wouldn't pass.)       Secured with: silk tape       Ease of procedure: easy       Dentition: Intact and Unchanged    Medication(s) Administered   Medication Administration Time: 7/14/2022 8:23 AM

## 2022-07-14 NOTE — DISCHARGE INSTRUCTIONS
Post-Bronchoscopy Patient Instructions:    2022  Annette Shore    Your procedure bronchoscopy with biopsy, endobronchial ultrasound with transbronchial needle aspiration was completed without any immediate complications.  You may start aspirin tomorrow.    You may cough up scant amount of blood for the next 12-24 hours. If you have excessive cough with blood, chest pain, shortness of breath or other concerning symptoms, please report to the closest emergency room.    You may experience low grade (less than 100.5 F) fever next 24 hours for which you can take Tylenol. If the fever persists more than 24 hours contact our office or your primary care provider.    Our office (Pulmonary--[phone])    You  resume your regular diet as it was prior to procedure.    Should you have any question, please do not hesitate to call our office.     Adult Discharge Orders & Instructions   For 24 hours after surgery  Get plenty of rest.  A responsible adult must stay with you for at least 24 hours after you leave the hospital.   Do not drive or use heavy equipment.  If you have weakness or tingling, don't drive or use heavy equipment until this feeling goes away.  Do not drink alcohol.  Avoid strenuous or risky activities.  Ask for help when climbing stairs.   You may feel lightheaded.  IF so, sit for a few minutes before standing.  Have someone help you get up.   If you have nausea (feel sick to your stomach): Drink only clear liquids such as apple juice, ginger ale, broth or 7-Up.  Rest may also help.  Be sure to drink enough fluids.  Move to a regular diet as you feel able.  You may have a slight fever. Call the doctor if your fever is over 100 F (37.7 C) (taken under the tongue) or lasts longer than 24 hours.  You may have a dry mouth, a sore throat, muscle aches or trouble sleeping.  These should go away after 24 hours.  Do not make important or legal decisions.   Call your doctor for any of the followin.  Signs  of infection (fever, growing tenderness at the surgery site, a large amount of drainage or bleeding, severe pain, foul-smelling drainage, redness, swelling).    2. It has been over 8 to 10 hours since surgery and you are still not able to urinate (pass water).    3.  Headache for over 24 hours.  To contact a doctor, call Dr. Dave office at [phone] or:  ' [phone] and ask for the resident on call for Pulmonology (answered 24 hours a day)

## 2022-07-14 NOTE — PROCEDURES
INTERVENTIONAL PULMONOLOGY       Procedure(s):    A flexible bronchoscopy  EBUS-TBNA (2 sites)  ION Robotic Bronchoscopy   Radial EBUS  Fiducial placement    Indication:  Right lower lobe nodule    Attending of Record:  Rosendo Dave MD    Interventional Pulmonary Fellow   Adolfo Barbosa DO    Trainees Present:   None     Medications:    General Anesthesia - See anesthesia flowsheet for details    Sedation Time:   Per Anesthesia Care Provider    Time Out:  Performed    The patient's medical record has been reviewed.  The indication for the procedure was reviewed.  The necessary history and physical examination was performed and reviewed.  The risks, benefits and alternatives of the procedure were discussed with the the patient in detail and she had the opportunity to ask questions.  I discussed in particular the potential complications including risks of minor or life-threatening bleeding and/or infection, respiratory failure, vocal cord trauma / paralysis, pneumothorax, and discomfort. Sedation risks were also discussed including abnormal heart rhythms, low blood pressure, and respiratory failure. All questions were answered to the best of my ability.  Verbal and written informed consent was obtained.  The proposed procedure and the patient's identification were verified prior to the procedure by the physician and the nurse.    The patient was assessed for the adequacy for the procedure and to receive medications.   Mental Status:  Alert and oriented x 3  Airway examination:  Class II (Complete visualization of uvula)  Pulmonary:  Non labored respirations   CV:  RRR  ASA Grade:  (II)  Mild systemic disease    After clinical evaluation and reviewing the indication, risks, alternatives and benefits of the procedure the patient was deemed to be in satisfactory condition to undergo the procedure.      Immediately before administration of medications the patient was re-assessed for adequacy to receive sedatives  including the heart rate, respiratory rate, mental status, oxygen saturation, blood pressure and adequacy of pulmonary ventilation. These same parameters were continuously monitored throughout the procedure.    A Tuberculosis risk assessment was performed:  The patient has no known RISK of Tuberculosis    The procedure was performed in a negative airflow room: Yes    Maneuvers / Procedure:      Airway Examination: A complete airway examination was performed from the distal trachea to the subsegmental level in each lobe of both lungs.  Pertinent findings include patent airways in all segments. No endobronchial obstruction.     ION Robotic bronchoscopy: Planning was performed on the laptop prior to the procedure. The ION successfully completed its pre-procedural check phase. The ION arm was oriented towards the ETT and docked successfully. The robotic catheter was inserted into the ETT and the guide was clamped down. Registration was then completed successfully. The catheter was advanced towards the RLL lesion/nodule using the vision probe. Unable to obtain a good radial probe signal. Fiducial marker was placed x1 in the RLL superior segment. No biopsies were obtained.     The airway accessing the nodule was apically directed from the superior segment, the small caliber of the airway combined with the tight turn made passing instruments and accessing with the catheter difficult. Turn radius around 6 mm.         EBUS-TBNA: The EBUS scope was inserted and biopsies were obtained from Station 4R with 2 passes, 2 samples obtained. Station 11R was also biopsied, 2 passes, 2 samples. Suction was used for all passes. EBUS samples were sent for cytology.  Below nodes were examined.     11L: < 5mm, not sampled  10L: < 5mm, not sampled  7: < 5 mm, not sampled.   10R: < 5 mm, not sampled.     Any disposable equipment was visually inspected and deemed to be intact immediately post procedure.      Recommendations:     -->  Plan to  discharge home per anesthesia  -->  Primary pulmonologist to follow up results      Adolfo Barbosa  Interventional pulmonary fellow  Pager: [phone]    I was present for the entire procedure including scope insertion and withdrawal.    Rosendo Dave MD

## 2022-07-15 LAB
PATH REPORT.COMMENTS IMP SPEC: NORMAL
PATH REPORT.FINAL DX SPEC: NORMAL
PATH REPORT.GROSS SPEC: NORMAL
PATH REPORT.MICROSCOPIC SPEC OTHER STN: NORMAL
PATH REPORT.RELEVANT HX SPEC: NORMAL

## 2022-07-15 PROCEDURE — 88173 CYTOPATH EVAL FNA REPORT: CPT | Mod: 26 | Performed by: PATHOLOGY

## 2022-07-15 PROCEDURE — 88305 TISSUE EXAM BY PATHOLOGIST: CPT | Mod: 26 | Performed by: PATHOLOGY

## 2022-07-15 NOTE — PROGRESS NOTES
NAME: Annette Shore  MEDICAL RECORD NUMBER: [id number]  : 1960    REFERRING PHYSICIAN: Adry Lozano, J[facility]  [address] [facility] [facility]  [place] [address]    REASON FOR CONSULTATION: Suspicious right lower lobe nodule, concerning for lung cancer    HPI: Annette Shore is a 62-year-old female longtime smoker with history of COPD who is present to discuss the role for ablative radiotherapy targeting a right low lobe nodule highly concerning for lung cancer. This was discovered on low-dose screening lung CT (22), located in the paraspinal region and measured 1.6 cm. She established care with Dr. Dave of Pulmonology, who ordered a staging PET/CT (22) that demonstrated low-level FDG uptake associated with this nodule (SUVmax=2.2) and no other findings. She also had a recent brain MRI (22) for workup of headache and it was unrevealing.    She had a PFT (22) that showed obstructive pattern and limited pulmonary reserve. She met with Dr. Vasquez, who recommended against surgery. She completed workup with bronchoscopy and EBUS (22). FNA of a 4R node and a 11R node were negative ([id number]).     On interview, Ms. Shore says she does not have shortness of breath at rest or needs supplementary oxygen but endorses dyspnea with minimal exertion. She spends a lot of time during the resting. She has a cough productive of yellowish sputum. She denies fever/chill, or chest pain.     PMH:  COPD  T2DM  HTN  HLD  Peripheral artery disease  Peripheral neuropathy  Depression    PSH:  Aorta-femoral artery bypass graft  Endometrial ablation  Tubal ligation  Spine fusion    FAMILY HISTORY:  Father had cancer    MEDICATIONS:   Reviewed    ALLERGY:    Lyrica and Bupropion (itchiness)    SH:   Tobacco:Since , current daily smoker (4 cig/day)  Alcohol: No  Lives in [place], [place]    ROS: The review of system was done by the nurse on this visit. The information on EPIC records were  reviewed from today.    PE: Vitals: /70   Pulse 78   Resp 16   Wt 77.6 kg (171 lb 1.6 oz)   SpO2 94%   BMI 27.62 kg/m    BMI= Body mass index is 27.62 kg/m .  GEN: Appears fatigued, alert, oriented, and in no acute distress  NECK: Full range of motion, supple  CV: Good distal perfusion  RESP: Breathing comfortably on room air  SKIN: Normal color and turgor  PSYCH: Appropriate mood and affect    ECOG: 3    LAB:   PFT (22): FEV1 1.04L, 50% predicted, improves to 1.3L and 53%,  DLCO 60%.     IMAGIN22 PET/CT        IMPRESSION: Presumptive diagnosis of right lower lobe lung cancer, [id number], IA2. She is medically inoperable.     RECOMMENDATIONS:   We discussed the her diagnosis and our recommendation for empirical ablative radiotherapy. We discussed the logistics, risks, and benefits of radiation therapy.We discussed that her treatment would be delivered in up to 5 total fractions. Wediscussed the toxicity associated with RT of the thorax. These side effects include, but are not limited to, esophagitis, pneumonitis, cardiac toxicity and pericarditis, rib fracture, chest wall pain, skin changes, and hair loss.    The risks, benefits, alternatives, and logistics to radiation therapy were discussed in detail. She is aware that the side effects of radiation therapy may be severe and permanent, although we expect that such risks would be low and that they are outweighed by the benefit of treatment. She was given the opportunity to ask questions, which were answered. Informed consent was obtained.She will be seen back in our clinic for treatment planning.    The patient was seen and discussed with my attending, Dr. Ruvalcaba.    Shaniqua Dalton MD  Resident, PGY-5  Department of Radiation Oncology  [facility]  Pager: [phone]    I saw the patient with the resident.  I agree with the resident's note and plan of care.      JANEEN Ruvalcaba M.D.  Department of Radiation Oncology  [facility]  [facility]

## 2022-07-18 ENCOUNTER — TELEPHONE (OUTPATIENT)
Dept: VASCULAR SURGERY | Facility: CLINIC | Age: 62
End: 2022-07-18

## 2022-07-18 ENCOUNTER — PATIENT OUTREACH (OUTPATIENT)
Dept: PULMONOLOGY | Facility: CLINIC | Age: 62
End: 2022-07-18

## 2022-07-18 NOTE — TELEPHONE ENCOUNTER
Called patient and advised her that no cancer was found in her lymph nodes.  She stated she had seen that on My Chart.  She confirmed she will see the radiation oncologist on Friday.  Patient was thankful for the call.

## 2022-07-18 NOTE — TELEPHONE ENCOUNTER
Writer spoke with pt, she stated that she continues to have claudication symptoms bilaterally. Also concerns of neuropathy which PCP is aware. Writer informed her that she can keep the appt if she in interested in intervention. Angiogram would probably be the next step per note 11/2021.     She stated that she will push out three months and call back if she needs to be seen sooner.

## 2022-07-18 NOTE — TELEPHONE ENCOUNTER
Patient just found out that she has lung cancer, and she will be meeting with a doctor this Friday to discuss radiation. Patient is scheduled to see Dr. Gilliland on Monday, she is not sure if he would like to see her still, or if she should push the appt out.

## 2022-07-22 ENCOUNTER — OFFICE VISIT (OUTPATIENT)
Dept: RADIATION ONCOLOGY | Facility: CLINIC | Age: 62
End: 2022-07-22
Attending: CLINICAL NURSE SPECIALIST
Payer: MEDICARE

## 2022-07-22 VITALS
SYSTOLIC BLOOD PRESSURE: 117 MMHG | HEART RATE: 78 BPM | DIASTOLIC BLOOD PRESSURE: 70 MMHG | OXYGEN SATURATION: 94 % | RESPIRATION RATE: 16 BRPM | BODY MASS INDEX: 27.62 KG/M2 | WEIGHT: 171.1 LBS

## 2022-07-22 DIAGNOSIS — R91.1 LUNG NODULE: ICD-10-CM

## 2022-07-22 PROCEDURE — G0463 HOSPITAL OUTPT CLINIC VISIT: HCPCS

## 2022-07-22 ASSESSMENT — ENCOUNTER SYMPTOMS
SENSORY CHANGE: 1
DEPRESSION: 1
COUGH: 1
BACK PAIN: 1
HEADACHES: 1
NERVOUS/ANXIOUS: 1
HEARTBURN: 1
SHORTNESS OF BREATH: 1
CONSTIPATION: 1
WHEEZING: 1
EYES NEGATIVE: 1
BRUISES/BLEEDS EASILY: 1

## 2022-07-22 ASSESSMENT — PAIN SCALES - GENERAL: PAINLEVEL: NO PAIN (0)

## 2022-07-22 NOTE — LETTER
2022         RE: Annette Shore   [address]  [place] [address]        Dear Colleague,    Thank you for referring your patient, Annette Shore, to the [facility] RADIATION ONCOLOGY. Please see a copy of my visit note below.    NAME: Annette Shore  MEDICAL RECORD NUMBER: [id number]  : 1960    REFERRING PHYSICIAN: Adry Lozano, JAMIE [facility]  [address] [facility] [address] [facility] [address]  [place] [address]    REASON FOR CONSULTATION: Suspicious right lower lobe nodule, concerning for lung cancer    HPI: Annette Shore is a 62-year-old female longtime smoker with history of COPD who is present to discuss the role for ablative radiotherapy targeting a right low lobe nodule highly concerning for lung cancer. This was discovered on low-dose screening lung CT (22), located in the paraspinal region and measured 1.6 cm. She established care with Dr. Dave of Pulmonology, who ordered a staging PET/CT (22) that demonstrated low-level FDG uptake associated with this nodule (SUVmax=2.2) and no other findings. She also had a recent brain MRI (22) for workup of headache and it was unrevealing.    She had a PFT (22) that showed obstructive pattern and limited pulmonary reserve. She met with Dr. Vasquez, who recommended against surgery. She completed workup with bronchoscopy and EBUS (22). FNA of a 4R node and a 11R node were negative ([id number]).     On interview, Ms. Shore says she does not have shortness of breath at rest or needs supplementary oxygen but endorses dyspnea with minimal exertion. She spends a lot of time during the resting. She has a cough productive of yellowish sputum. She denies fever/chill, or chest pain.     PMH:  COPD  T2DM  HTN  HLD  Peripheral artery disease  Peripheral neuropathy  Depression    PSH:  Aorta-femoral artery bypass graft  Endometrial ablation  Tubal ligation  Spine fusion    FAMILY HISTORY:  Father had cancer    MEDICATIONS:    Reviewed    ALLERGY:    Lyrica and Bupropion (itchiness)    SH:   Tobacco:Since , current daily smoker (4 cig/day)  Alcohol: No  Lives in [place], [place]    ROS: The review of system was done by the nurse on this visit. The information on EPIC records were reviewed from today.    PE: Vitals: /70   Pulse 78   Resp 16   Wt 77.6 kg (171 lb 1.6 oz)   SpO2 94%   BMI 27.62 kg/m    BMI= Body mass index is 27.62 kg/m .  GEN: Appears fatigued, alert, oriented, and in no acute distress  NECK: Full range of motion, supple  CV: Good distal perfusion  RESP: Breathing comfortably on room air  SKIN: Normal color and turgor  PSYCH: Appropriate mood and affect    ECOG: 3    LAB:   PFT (22): FEV1 1.04L, 50% predicted, improves to 1.3L and 53%,  DLCO 60%.     IMAGIN22 PET/CT        IMPRESSION: Presumptive diagnosis of right lower lobe lung cancer, [id number], IA2. She is medically inoperable.     RECOMMENDATIONS:   We discussed the her diagnosis and our recommendation for empirical ablative radiotherapy. We discussed the logistics, risks, and benefits of radiation therapy.We discussed that her treatment would be delivered in up to 5 total fractions. Wediscussed the toxicity associated with RT of the thorax. These side effects include, but are not limited to, esophagitis, pneumonitis, cardiac toxicity and pericarditis, rib fracture, chest wall pain, skin changes, and hair loss.    The risks, benefits, alternatives, and logistics to radiation therapy were discussed in detail. She is aware that the side effects of radiation therapy may be severe and permanent, although we expect that such risks would be low and that they are outweighed by the benefit of treatment. She was given the opportunity to ask questions, which were answered. Informed consent was obtained.She will be seen back in our clinic for treatment planning.    The patient was seen and discussed with my attending, Dr. Ruvalcaba.    Shaniqua Dalton,  MD  Resident, PGY-5  Department of Radiation Oncology  [facility]  Pager: [phone]    I saw the patient with the resident.  I agree with the resident's note and plan of care.      JANEEN Ruvalcaba M.D.  Department of Radiation Oncology  [facility]        INITIAL PATIENT ASSESSMENT    Diagnosis: lung nodule    Prior radiation therapy: None    Prior chemotherapy: None    Prior hormonal therapy:No    Pain Eval:  Denies    Psychosocial  Living arrangements: Both adult children live with her  Fall Risk: independent   referral needs: yes     Advanced Directive: Yes - Location: says its on the chart   Implantable Cardiac Device? No      LMP: No LMP recorded. Patient is postmenopausal.  Onset of menopause: unknown - had an ablation in 2000s that stopped menarche  Abnormal vaginal bleeding/discharge: No  Are you pregnant? No  Reproductive note: 2 living children     Nurse face-to-face time: Level 3:  10 min face to face time          Review of Systems   Constitutional: Positive for malaise/fatigue.        Hot flashes    HENT: Positive for ear pain (right ).    Eyes: Negative.    Respiratory: Positive for cough, shortness of breath and wheezing.    Cardiovascular:        Sternal swelling    Gastrointestinal: Positive for constipation and heartburn (post bronch).   Musculoskeletal: Positive for back pain and joint pain.        Metal in back   Skin: Negative.    Neurological: Positive for sensory change (pheripheral neuropathy ) and headaches (left temporal).   Endo/Heme/Allergies: Bruises/bleeds easily (hands).   Psychiatric/Behavioral: Positive for depression. The patient is nervous/anxious.            Again, thank you for allowing me to participate in the care of your patient.        Sincerely,        Kamron Ruvalcaba MD

## 2022-07-25 ENCOUNTER — DOCUMENTATION ONLY (OUTPATIENT)
Dept: OTHER | Facility: CLINIC | Age: 62
End: 2022-07-25

## 2022-07-26 ENCOUNTER — OFFICE VISIT (OUTPATIENT)
Dept: RADIATION ONCOLOGY | Facility: CLINIC | Age: 62
End: 2022-07-26
Attending: RADIOLOGY
Payer: MEDICARE

## 2022-07-26 DIAGNOSIS — R91.1 LUNG NODULE: Primary | ICD-10-CM

## 2022-07-26 PROCEDURE — 77334 RADIATION TREATMENT AID(S): CPT | Mod: 26 | Performed by: RADIOLOGY

## 2022-07-26 PROCEDURE — 77470 SPECIAL RADIATION TREATMENT: CPT | Performed by: RADIOLOGY

## 2022-07-26 PROCEDURE — 77290 THER RAD SIMULAJ FIELD CPLX: CPT | Mod: 26 | Performed by: RADIOLOGY

## 2022-07-26 PROCEDURE — 77263 THER RADIOLOGY TX PLNG CPLX: CPT | Performed by: RADIOLOGY

## 2022-07-26 PROCEDURE — 77334 RADIATION TREATMENT AID(S): CPT | Performed by: RADIOLOGY

## 2022-07-26 PROCEDURE — 77290 THER RAD SIMULAJ FIELD CPLX: CPT | Performed by: RADIOLOGY

## 2022-07-26 PROCEDURE — 77470 SPECIAL RADIATION TREATMENT: CPT | Mod: 26 | Performed by: RADIOLOGY

## 2022-07-26 NOTE — PROGRESS NOTES
Simulation Note    Date: 7/26/2022     Patient: Annette Shore    Diagnosis: Lung nodule    Type of Simulation:  Cure/ Definitive     Patient Position: Supine    Patient Immobilization: SBRT Frame  Vac-Loc    Simulation Aid(s): None    Image Acquisition: Conventional CT simulation with 4D for respiratory studies    Total Dose Planned: 5000 cGy      Dose/fraction:1000 cGy    Energy of machine:  6 MV           Type of Radiotherapy Technique: SBRT(Stereotectic Body Radiotherapy)      Continuing Physcis/Dosimetry  and Dose calculations are ordered.  Simple simulations will be done prior to new start and changes in fields.  Weekly on treat visit.      JANEEN Ruvalcaba M.D.  Department of Radiation Oncology  [facility]

## 2022-07-26 NOTE — LETTER
7/26/2022         RE: Annette Shore  [address]  [place] [address]        Dear Colleague,    Thank you for referring your patient, Annette Shore, to the [facility] RADIATION ONCOLOGY. Please see a copy of my visit note below.    Radiation Therapy Patient Education    Person involved with teaching: Patient    Patient educational needs for self management of treatment-related side effects assessment completed.  EPIC Patient Ed tab contains Patient Learning Assessment    Education Materials Given  Radiation Therapy and You    Educational Topics Discussed  Pain management, Skin care, Activity, Nutrition and weight loss and When to call MD/RN    Response To Teaching  Verbalizes understanding    GYN Only  Vaginal Dilator-given and educated: N/A    Referrals sent: None    Chemotherapy?  No        Simulation Note    Date: 7/26/2022     Patient: Annette Shore    Diagnosis: Lung nodule    Type of Simulation:  Cure/ Definitive     Patient Position: Supine    Patient Immobilization: SBRT Frame  Vac-Loc    Simulation Aid(s): None    Image Acquisition: Conventional CT simulation with 4D for respiratory studies    Total Dose Planned: 5000 cGy      Dose/fraction:1000 cGy    Energy of machine:  6 MV           Type of Radiotherapy Technique: SBRT(Stereotectic Body Radiotherapy)      Continuing Physcis/Dosimetry  and Dose calculations are ordered.  Simple simulations will be done prior to new start and changes in fields.  Weekly on treat visit.      JANEEN Ruvalcaba M.D.  Department of Radiation Oncology  [facility]

## 2022-07-26 NOTE — PROGRESS NOTES
Radiation Therapy Patient Education    Person involved with teaching: Patient    Patient educational needs for self management of treatment-related side effects assessment completed.  [facility] Patient Ed tab contains Patient Learning Assessment    Education Materials Given  Radiation Therapy and You    Educational Topics Discussed  Pain management, Skin care, Activity, Nutrition and weight loss and When to call MD/RN    Response To Teaching  Verbalizes understanding    GYN Only  Vaginal Dilator-given and educated: N/A    Referrals sent: None    Chemotherapy?  No

## 2022-07-31 ENCOUNTER — HEALTH MAINTENANCE LETTER (OUTPATIENT)
Age: 62
End: 2022-07-31

## 2022-08-01 ENCOUNTER — ANCILLARY PROCEDURE (OUTPATIENT)
Dept: MAMMOGRAPHY | Facility: CLINIC | Age: 62
End: 2022-08-01
Attending: FAMILY MEDICINE
Payer: MEDICARE

## 2022-08-01 DIAGNOSIS — Z12.31 VISIT FOR SCREENING MAMMOGRAM: ICD-10-CM

## 2022-08-01 PROCEDURE — 77067 SCR MAMMO BI INCL CAD: CPT

## 2022-08-11 ENCOUNTER — APPOINTMENT (OUTPATIENT)
Dept: RADIATION ONCOLOGY | Facility: CLINIC | Age: 62
End: 2022-08-11
Attending: RADIOLOGY
Payer: MEDICARE

## 2022-08-11 ENCOUNTER — ONCOLOGY VISIT (OUTPATIENT)
Dept: RADIATION ONCOLOGY | Facility: CLINIC | Age: 62
End: 2022-08-11

## 2022-08-11 PROCEDURE — 77336 RADIATION PHYSICS CONSULT: CPT | Performed by: RADIOLOGY

## 2022-08-11 PROCEDURE — 77373 STRTCTC BDY RAD THER TX DLVR: CPT | Performed by: RADIOLOGY

## 2022-08-12 NOTE — PROCEDURES
JORDYNARLINE        MR#: [id number]        STEREOTACTIC BODY RADIOTHERAPY TREATMENT (SBRT) NOTE        DATE OF SERVICE:  8/11/2022          Diagnosis: Clinical C34.31  Malignant neoplasm of lower lobe, right bronchus or lung   T1b N0 M0 IA2      Medical Indication for SBRT: The patient has medically inoperable presumed  Right   lung cancer due to poor pulmonary function.     Intent of SBRT: Curative     Narrative:  This is a treatment note for 1st fraction of the 5 fraction SBRT for resumed right lung   cancer. Biopsy was not possible. EBUS was unremarkable.        The patient was positioned in the custom made immobilization in the [employer] Stereotactic   body frame allowing X, Y, Z coordinates to be verified. The patient had a cone beam CT   scan today prior to treatment to verify the new central axis coordinates.       The new XYZ coordinates has shifted to ? 0. 0  cm X(lateral), ? 0. 9 cm Y(longitudinal),   and  ? 0. 4 cm Z(vertical).  After the cone beam CT verifications, the CAX set up was on   the treatment machine (True-Beam), the patient had treatment delivered with 10 non-  coplanar beams. The patient had 1000 cGy delivered with heterogeneity corrections with   a 6 MV photons. The cumulative dose is  1000 cGy of planned 5000 cGy.     No complication was encountered.  There was no complaint or skin reaction seen. The   patient tolerated the therapy well and left the department in stable condition and will   return for the 2nd fraction of total 5 on August 15, 2022.    The patient will continue with radiotherapy.     JANEEN Ruvalcaba MD  Department of Therapeutic Radiology

## 2022-08-15 ENCOUNTER — ONCOLOGY VISIT (OUTPATIENT)
Dept: RADIATION ONCOLOGY | Facility: CLINIC | Age: 62
End: 2022-08-15

## 2022-08-15 ENCOUNTER — LAB REQUISITION (OUTPATIENT)
Dept: LAB | Facility: CLINIC | Age: 62
End: 2022-08-15
Payer: MEDICARE

## 2022-08-15 ENCOUNTER — APPOINTMENT (OUTPATIENT)
Dept: RADIATION ONCOLOGY | Facility: CLINIC | Age: 62
End: 2022-08-15
Attending: RADIOLOGY
Payer: MEDICARE

## 2022-08-15 DIAGNOSIS — Z01.419 ENCOUNTER FOR GYNECOLOGICAL EXAMINATION (GENERAL) (ROUTINE) WITHOUT ABNORMAL FINDINGS: ICD-10-CM

## 2022-08-15 PROCEDURE — 87624 HPV HI-RISK TYP POOLED RSLT: CPT | Mod: ORL | Performed by: FAMILY MEDICINE

## 2022-08-15 PROCEDURE — 77373 STRTCTC BDY RAD THER TX DLVR: CPT | Performed by: RADIOLOGY

## 2022-08-15 PROCEDURE — G0145 SCR C/V CYTO,THINLAYER,RESCR: HCPCS | Mod: ORL | Performed by: FAMILY MEDICINE

## 2022-08-16 NOTE — PROCEDURES
JORDYNARLINE        MR#: [id number]        STEREOTACTIC BODY RADIOTHERAPY TREATMENT (SBRT) NOTE        DATE OF SERVICE:  8/11/2022          Diagnosis: Clinical C34.31  Malignant neoplasm of lower lobe, right bronchus or lung   T1b N0 M0 IA2      Medical Indication for SBRT: The patient has medically inoperable presumed  Right   lung cancer due to poor pulmonary function.     Intent of SBRT: Curative     Narrative:  This is a treatment note for 2nd fraction of the 5 fraction SBRT for resumed right lung   cancer. Biopsy was not possible. EBUS was unremarkable.        The patient was positioned in the custom made immobilization in the [employer] Stereotactic   body frame allowing X, Y, Z coordinates to be verified. The patient had a cone beam CT   scan today prior to treatment to verify the new central axis coordinates.       The new XYZ coordinates has shifted to ? 0. 9  cm X(lateral), ? 0. 4 cm Y(longitudinal),   and  ? 0. 3 cm Z(vertical).  After the cone beam CT verifications, the CAX set up was on   the treatment machine (True-Beam), the patient had treatment delivered with 10 non-  coplanar beams. The patient had 1000 cGy delivered with heterogeneity corrections with   a 6 MV photons. The cumulative dose is  2000 cGy of planned 5000 cGy.     No complication was encountered.  There was no complaint or skin reaction seen. The   patient tolerated the therapy well and left the department in stable condition and will   return for the 3rd fraction of total 5 on August 15, 2022.    The patient will continue with radiotherapy.     JANEEN Ruvalcaba MD  Department of Therapeutic Radiology

## 2022-08-17 ENCOUNTER — ONCOLOGY VISIT (OUTPATIENT)
Dept: RADIATION ONCOLOGY | Facility: CLINIC | Age: 62
End: 2022-08-17

## 2022-08-17 ENCOUNTER — APPOINTMENT (OUTPATIENT)
Dept: RADIATION ONCOLOGY | Facility: CLINIC | Age: 62
End: 2022-08-17
Attending: RADIOLOGY
Payer: MEDICARE

## 2022-08-17 PROCEDURE — 77373 STRTCTC BDY RAD THER TX DLVR: CPT | Performed by: RADIOLOGY

## 2022-08-17 NOTE — LETTER
8/17/2022         RE: Iglesia Shore  [address]  [place] [facility] [address]        Dear Colleague,    Thank you for referring your patient, Iglesia Shore, to the [facility] RADIATION ONCOLOGY. Please see a copy of my visit note below.    No notes on file    Again, thank you for allowing me to participate in the care of your patient.        Sincerely,        Marisela Ashby MD

## 2022-08-18 LAB
BKR LAB AP GYN ADEQUACY: NORMAL
BKR LAB AP GYN INTERPRETATION: NORMAL
BKR LAB AP HPV REFLEX: NORMAL
BKR LAB AP LMP: NORMAL
BKR LAB AP PREVIOUS ABNORMAL: NORMAL
PATH REPORT.COMMENTS IMP SPEC: NORMAL
PATH REPORT.COMMENTS IMP SPEC: NORMAL
PATH REPORT.RELEVANT HX SPEC: NORMAL

## 2022-08-19 ENCOUNTER — APPOINTMENT (OUTPATIENT)
Dept: RADIATION ONCOLOGY | Facility: CLINIC | Age: 62
End: 2022-08-19
Attending: RADIOLOGY
Payer: MEDICARE

## 2022-08-19 DIAGNOSIS — R91.1 LUNG NODULE: Primary | ICD-10-CM

## 2022-08-19 PROCEDURE — 77373 STRTCTC BDY RAD THER TX DLVR: CPT | Performed by: RADIOLOGY

## 2022-08-19 ASSESSMENT — PAIN SCALES - GENERAL: PAINLEVEL: NO PAIN (0)

## 2022-08-19 NOTE — PROGRESS NOTES
The note is generated in the [employer] radiation oncology record and verify system and it will be transferred to the EMR via interface.

## 2022-08-19 NOTE — LETTER
8/19/2022         RE: Annette Shore  [address]  [place] [address]        Dear Colleague,    Thank you for referring your patient, Annette Shore, to the [facility] RADIATION ONCOLOGY. Please see a copy of my visit note below.    The note is generated in the [employer] radiation oncology record and verify system and it will be transferred to the EMR via interface.      Again, thank you for allowing me to participate in the care of your patient.        Sincerely,        Kamron Ruvalcaba MD

## 2022-08-22 ENCOUNTER — APPOINTMENT (OUTPATIENT)
Dept: RADIATION ONCOLOGY | Facility: CLINIC | Age: 62
End: 2022-08-22
Attending: RADIOLOGY
Payer: MEDICARE

## 2022-08-22 ENCOUNTER — ONCOLOGY VISIT (OUTPATIENT)
Dept: RADIATION ONCOLOGY | Facility: CLINIC | Age: 62
End: 2022-08-22

## 2022-08-22 LAB
HUMAN PAPILLOMA VIRUS 16 DNA: NEGATIVE
HUMAN PAPILLOMA VIRUS 18 DNA: NEGATIVE
HUMAN PAPILLOMA VIRUS FINAL DIAGNOSIS: NORMAL
HUMAN PAPILLOMA VIRUS OTHER HR: NEGATIVE

## 2022-08-22 PROCEDURE — 77373 STRTCTC BDY RAD THER TX DLVR: CPT | Performed by: RADIOLOGY

## 2022-08-22 PROCEDURE — 77435 SBRT MANAGEMENT: CPT | Performed by: RADIOLOGY

## 2022-09-20 NOTE — PROCEDURES
Radiotherapy Treatment Summary          Date of Report: 2022     PATIENT: ARLINE COBB  MEDICAL RECORD NO: [id number]  : 1960     DIAGNOSIS: C34.31 Malignant neoplasm of lower lobe, right bronchus or lung  INTENT OF RADIOTHERAPY: Cure  PATHOLOGY: None                                  STAGE: [id number], IA2   CONCURRENT SYSTEMIC THERAPY: None                    Details of the treatments summarized below are found in records kept in the Department of Radiation Oncology at [facility].     Treatment Summary:  Radiation Oncology - Course: 1 Protocol:   Treatment Site Dose Modality From To Days Fx.  1 RLL  5,000 cGy 06 X  8/11/2022  2022  11  5          Dose per Fraction: 1,000 cGy       Total Dose: 5,000 cGy             COMMENTS:                      Arline Cobb is a 62-year-old female longtime smoker with history of COPD is found with a right lower   lobe nodule highly concerning for lung cancer. This was discovered on low-dose screening lung CT (22),   located in the paraspinal region and measured 1.6 cm. Staging PET/CT (22) demonstrated low-level FDG   uptake associated with this nodule (SUVmax=2.2) and no other findings. She also had a brain MRI (22)   for workup of headache and it was unrevealing. She completed mediastinal staging with bronchoscopy and   EBUS (22). FNA of a 4R node and a 11R node were negative. She is medically inoperable and is   recommended to undergo ablative radiotherapy.     The right lower lobe nodule was treated to 50 Gy in 5 once-every-other-day fractions using 4 6 MV photon   beams. She tolerated therapy well without signs of acute side effects.       ED visits/hospitalizations: No     Missed treatments: No     Acute Toxicity Profile by CTC v5.0: None     PAIN MANAGEMENT: None                             FOLLOW UP PLAN:   -Repeat chest CT in 3 months to be done with RAMY Bangura, follow up afterwards.                             Resident  Physician: Shaniqua Dalton M.D.   Staff Physician: JANEEN Ruvalcaba M.D.  Physicist: Misael Ramos, PhD     CC:   Adry Lozano                                        Radiation Oncology:  [facility] [address] [place] [address]

## 2022-09-24 NOTE — PROCEDURES
JORDYN KRISSGARTH SHANIARadha        MR#: [id number]      STEREOTACTIC BODY RADIOTHERAPY TREATMENT (SBRT) NOTE  DATE OF SERVICE:  8/22/2022    Diagnosis: Clinical C34.31 Malignant neoplasm of lower lobe, right bronchus or lung T1b N0 M0 IA2     Medical Indication for SBRT: The patient has medically inoperable presumed  Right lung cancer due to poor pulmonary function.    Intent of SBRT: Curative    Narrative:  This is a treatment note for 5th fraction of the 5 fraction SBRT for presumed right lung cancer. Biopsy was not possible. EBUS was unremarkable.      The patient was positioned in the custom made immobilization in the [employer] Stereot[employer] body frame allowing X, Y, Z coordinates to be verified. The patient had a cone beam CT scan today prior to treatment to verify the new central axis coordinates.      The new XYZ coordinates has shifted to ? 1. 0  cm X(lateral), ? 0. 1 cm Y(longitudinal), and  ? 0. 2 cm Z(vertical).  After the cone beam CT verifications, the CAX set up was on the treatment machine (True-Beam), the patient had treatment delivered with 10 non-coplanar beams. The patient had 1000 cGy delivered with heterogeneity corrections with a 6 MV photons. The cumulative dose is  5000 cGy of planned 5000 cGy.    No complication was encountered.  There was no complaint or skin reaction seen. The patient tolerated the therapy well and left the department in stable condition. The patient will return in 3 months with a CT scan    Radha Tirado MD  Department of Radiation Oncology

## 2022-10-15 ENCOUNTER — HEALTH MAINTENANCE LETTER (OUTPATIENT)
Age: 62
End: 2022-10-15

## 2022-10-27 ENCOUNTER — OFFICE VISIT (OUTPATIENT)
Dept: VASCULAR SURGERY | Facility: CLINIC | Age: 62
End: 2022-10-27
Attending: SURGERY
Payer: MEDICARE

## 2022-10-27 ENCOUNTER — ANCILLARY PROCEDURE (OUTPATIENT)
Dept: VASCULAR ULTRASOUND | Facility: CLINIC | Age: 62
End: 2022-10-27
Attending: SURGERY
Payer: MEDICARE

## 2022-10-27 VITALS
HEART RATE: 68 BPM | DIASTOLIC BLOOD PRESSURE: 90 MMHG | TEMPERATURE: 98.4 F | SYSTOLIC BLOOD PRESSURE: 142 MMHG | RESPIRATION RATE: 18 BRPM

## 2022-10-27 DIAGNOSIS — I73.9 PVD (PERIPHERAL VASCULAR DISEASE) (H): Primary | ICD-10-CM

## 2022-10-27 DIAGNOSIS — Z13.6 ENCOUNTER FOR SCREENING FOR STENOSIS OF CAROTID ARTERY: ICD-10-CM

## 2022-10-27 DIAGNOSIS — I73.9 PVD (PERIPHERAL VASCULAR DISEASE) (H): ICD-10-CM

## 2022-10-27 DIAGNOSIS — R09.89 OTHER SPECIFIED SYMPTOMS AND SIGNS INVOLVING THE CIRCULATORY AND RESPIRATORY SYSTEMS: ICD-10-CM

## 2022-10-27 PROCEDURE — 93923 UPR/LXTR ART STDY 3+ LVLS: CPT | Mod: 26 | Performed by: SURGERY

## 2022-10-27 PROCEDURE — 99213 OFFICE O/P EST LOW 20 MIN: CPT | Performed by: SURGERY

## 2022-10-27 PROCEDURE — 93923 UPR/LXTR ART STDY 3+ LVLS: CPT

## 2022-10-27 PROCEDURE — G0463 HOSPITAL OUTPT CLINIC VISIT: HCPCS

## 2022-10-27 ASSESSMENT — PAIN SCALES - GENERAL: PAINLEVEL: NO PAIN (0)

## 2022-10-27 NOTE — PATIENT INSTRUCTIONS
Eb Bryant,    Thank you for entrusting your care with us today. After your visit today with MD Mehdi Gilliland this is the plan that was discussed at your appointment.    Follow up in 3 months with CTA abd/pelvis runoff and carotid ultrasound.    See Dr. Gilliland when completed.      I am including additional information on these things and our contact information if you have any questions or concerns.   Please do not hesitate to reach out to us if you felt we did not answer your questions or you are unsure of the treatment plan after your visit today. Our number is [phone].Thank you for trusting us with your care.

## 2022-10-27 NOTE — PROGRESS NOTES
[facility]        Patient is here for a 1year follow up  to discuss Peripheral artery disease (PAD). Symptoms include claudicaton: right buttocks and right thigh at distance of 100-200 yards. She complains of night pain that start in her feet then go up her leg.     Pt is currently taking Aspirin and Statin.    BP (!) 142/90   Pulse 68   Temp 98.4  F (36.9  C)   Resp 18     The provider has been notified that the patient has no concerns.     Questions patient would like addressed today are: she is wondering how her blood flow is.     Refills are needed: No    Has homecare services and agency name:  Michelle Shipman, RN

## 2022-10-27 NOTE — PROGRESS NOTES
VASCULAR SURGERY PROGRESS NOTE   VASCULAR SURGEON: Mehdi Gilliland MD, RPVI     LOCATION:  [facility]     Annette Shore   Medical Record #:  [id number]  YOB: 1960  Age:  62 year old     Date of Service: 10/27/2022    PRIMARY CARE PROVIDER: Jo Hamilton      Reason for visit: Follow-up PAD    IMPRESSION: Patient with history of aortobifemoral bypass in 2006 with continued claudication and most recent intervention SFA angioplasty in 2020.  Since then she has been followed with ABIs and arterial ultrasounds of bilateral lower extremities.  ABIs have been stable and worse on the left with 0.6 on the left and 0.7 on the right.  Toe pressures today greater than 70 bilaterally.  Stable buttock claudication, denies rest pain, also experiences neuropathic pain bilaterally with intermittent severe sharp burning pain of thighs and trigeminal neuralgia.  Also with recent diagnosis of lung cancer currently undergoing radiation.  Continues to smoke 1 pack a day, compliant with aspirin and statin.  She feels moderately limited by current symptoms of claudication but is not interested in intervention at this time as she feels she has multiple health concerns that she would rather deal with first.    RECOMMENDATION/RISKS: CTA abdomen pelvis with bilateral leg runoff in 3 months.  Counseled on smoking cessation.  Continue ASA and statin.  Contact office sooner if pain worsens and she would like more urgent evaluation or intervention.    HPI:  Annette Shore is a 62 year old female who was seen today in consultation for follow-up PAD.  Patient has known atherosclerotic disease with history of aortobifemoral bypass in 2006 and SFA angioplasty in 2020.  She has had continued right buttock claudication with short distances but denies rest pain, although does note severe neuropathic pain in bilateral thighs that is sharp and burning in nature.  Denies rest pain of bilateral lower extremities and  denies tissue loss.  She feels moderately limited and aggravated by the pain but overall is able to manage through her daily activities.  She is more concerned about her recent diagnosis of lung cancer of which she is currently undergoing radiation and wishes to focus on that at this time.  Current smoker of 1 pack/day.  On aspirin and statin.    REVIEW OF SYSTEMS:    A 12 point ROS was reviewed and is negative except for noted in HPI.     PHH:    Past Medical History:   Diagnosis Date     Back pain     low back     COPD (chronic obstructive pulmonary disease) (H)      Depression      Diabetes mellitus (H)      Hyperlipidemia      Hypertension      PAD (peripheral artery disease) (H)      Peripheral neuropathy      Pulmonary nodules           Past Surgical History:   Procedure Laterality Date     AORTA - FEMORAL ARTERY BYPASS GRAFT       BRONCHOSCOPY RIGID OR FLEXIBLE W/TRANSENDOSCOPIC ENDOBRONCHIAL ULTRASOUND GUIDED N/A 07/14/2022    Procedure: endbronchial ultrasound, transbronchial biopsy;  Surgeon: Rosendo Dave MD;  Location: [facility] OR     HYSTEROSCOPY W/ ENDOMETRIAL ABLATION       IR EXTREMITY ANGIOGRAM LEFT  11/25/2020     IR EXTREMITY ANGIOGRAM LEFT  12/16/2020     IR LOWER EXTREMITY ANGIOGRAM LEFT  11/25/2020     IR LOWER EXTREMITY ANGIOGRAM LEFT  12/16/2020     OPTICAL TRACKING SYSTEM BRONCHOSCOPY N/A 07/14/2022    Procedure: BRONCHOSCOPY, USING OPTICAL TRACKING SYSTEM, .  Ion or veran system, fiducial placement;  Surgeon: Rosendo Dave MD;  Location: [facility] OR     OTHER SURGICAL HISTORY      tubal ligation     OTHER SURGICAL HISTORY      spine fusion     PAIN STELLATE GANGLION BLOCK RIGHT Right 1992    x5, [facility]       ALLERGIES:  Lyrica [pregabalin] and Bupropion    MEDS:    Current Outpatient Medications:      aspirin 325 MG tablet, [ASPIRIN 325 MG TABLET] Take 325 mg by mouth daily. , Disp: , Rfl:      atorvastatin (LIPITOR) 20 MG tablet, [ATORVASTATIN (LIPITOR) 20 MG TABLET] Take 20 mg by  mouth every morning. , Disp: , Rfl:      DULoxetine (CYMBALTA) 60 MG capsule, Take 60 mg by mouth daily, Disp: , Rfl:      gabapentin (NEURONTIN) 300 MG capsule, Take 600 mg by mouth 2 times daily, Disp: , Rfl:      ipratropium-albuteroL (DUO-NEB) 0.5-2.5 mg/3 mL nebulizer, 3 mLs as needed, Disp: , Rfl:      losartan-hydrochlorothiazide (HYZAAR) 50-12.5 mg per tablet, [LOSARTAN-HYDROCHLOROTHIAZIDE (HYZAAR) 50-12.5 MG PER TABLET] Take 1 tablet by mouth daily, Disp: , Rfl:     SOCIAL HABITS:    History   Smoking Status     Every Day     Packs/day: 1.00     Types: Cigarettes     Start date: 7/17/1970   Smokeless Tobacco     Never     Social History    Substance and Sexual Activity      Alcohol use: No      History   Drug Use Unknown       FAMILY HISTORY:    Family History   Problem Relation Age of Onset     Diabetes Mother      Hypertension Mother      Cancer Father      Heart Disease Father      Diabetes Father        PE:  BP (!) 142/90   Pulse 68   Temp 98.4  F (36.9  C)   Resp 18   Wt Readings from Last 1 Encounters:   07/22/22 77.6 kg (171 lb 1.6 oz)     There is no height or weight on file to calculate BMI.    EXAM:  GENERAL: This is a well-developed 62 year old female who appears her stated age  EYES: Grossly normal.  MOUTH: Buccal mucosa normal   CARDIAC: Normal   CHEST/LUNG: Normal work of breathing on room air  GASTROINTESINAL soft nontender nondistended  MUSCULOSKELETAL: Grossly normal and both lower extremities are intact.  HEME/LYMPH: No lymphedema  NEUROLOGIC: Focally intact, Alert and oriented x 3.   PSYCH: appropriate affect  INTEGUMENT: No open lesions or ulcers            DIAGNOSTIC STUDIES:     Images:  No results found.    I personally reviewed the images and my interpretation is moderate PAD bilaterally, left worse than right..    LABS:      Sodium   Date Value Ref Range Status   02/04/2022 140 136 - 145 mmol/L Final   02/03/2022 141 136 - 145 mmol/L Final   10/19/2021 144 136 - 145 mmol/L Final      Urea Nitrogen   Date Value Ref Range Status   02/04/2022 13 8 - 22 mg/dL Final   02/03/2022 16 8 - 22 mg/dL Final   10/19/2021 8 8 - 22 mg/dL Final     Hemoglobin   Date Value Ref Range Status   06/23/2022 15.1 11.7 - 15.7 g/dL Final   02/04/2022 14.3 11.7 - 15.7 g/dL Final   12/16/2020 13.1 12.0 - 16.0 g/dL Final     Platelet Count   Date Value Ref Range Status   02/04/2022 233 150 - 450 10e3/uL Final   12/16/2020 241 140 - 440 thou/uL Final   11/25/2020 261 140 - 440 thou/uL Final     BNP   Date Value Ref Range Status   09/26/2018 30 0 - 93 pg/mL Final     INR   Date Value Ref Range Status   12/16/2020 1.09 0.90 - 1.10 Final   11/25/2020 0.99 0.90 - 1.10 Final   11/11/2020 0.99 0.90 - 1.10 Final       25 minutes spent on the day of encounter doing chart review, history and exam, documentation, and further activities as noted.       Annabelle Morrison, DO  VASCULAR SURGERY     I have seen and evaluated this patient with a fellow.  Past medical history, surgical history, past family history review of systems, HPI, and physical exam were reviewed by me personally.  I agree with all the above.        Mehdi Gilliland MD,  [facility]  VASCULAR AND ENDOVASCULAR SURGERY

## 2022-11-07 ENCOUNTER — HOSPITAL ENCOUNTER (OUTPATIENT)
Dept: CT IMAGING | Facility: HOSPITAL | Age: 62
Discharge: HOME | End: 2022-11-07
Attending: RADIOLOGY | Admitting: RADIOLOGY
Payer: MEDICARE

## 2022-11-07 DIAGNOSIS — R91.1 LUNG NODULE: ICD-10-CM

## 2022-11-07 PROCEDURE — 71250 CT THORAX DX C-: CPT | Mod: MG

## 2022-11-07 PROCEDURE — G1010 CDSM STANSON: HCPCS

## 2022-11-15 ENCOUNTER — OFFICE VISIT (OUTPATIENT)
Dept: RADIATION ONCOLOGY | Facility: CLINIC | Age: 62
End: 2022-11-15
Attending: RADIOLOGY
Payer: MEDICARE

## 2022-11-15 VITALS
BODY MASS INDEX: 28.58 KG/M2 | SYSTOLIC BLOOD PRESSURE: 172 MMHG | HEART RATE: 52 BPM | DIASTOLIC BLOOD PRESSURE: 101 MMHG | WEIGHT: 177.1 LBS

## 2022-11-15 DIAGNOSIS — R91.1 LUNG NODULE: Primary | ICD-10-CM

## 2022-11-15 PROCEDURE — G0463 HOSPITAL OUTPT CLINIC VISIT: HCPCS

## 2022-11-15 NOTE — PROGRESS NOTES
Department of Radiation Oncology  [facility]  [address] [place] [address]  [phone]     Follow-up Note  Nov 15, 2022    Annette Shore  MRN: [id number]  : 1960    DISEASE TREATED: Presumed NSCLC of the RLL, [id number], stage IA2    INTERVAL SINCE COMPLETION OF RADIATION THERAPY: 3 months (completed 22)     RADIATION THERAPY DELIVERED: RLL nodule, 50 Gy in 5 fractions, SBRT           SUBJECTIVE:  Annette Shore is a 62-year-old female longtime smoker with history of COPD who was found to have a right lower lobe nodule highly concerning for lung cancer. This was discovered on low-dose screening lung CT (22), located in the paraspinal region and measured 1.6 cm. Staging PET/CT (22) demonstrated low-level FDG uptake associated with this nodule (SUVmax=2.2) and no other findings. She also had a brain MRI (22) for workup of headache and it was unrevealing. She completed mediastinal staging with bronchoscopy and EBUS (22). FNA of a 4R node and a 11R node were negative. She is not a surgical candidate and underwent ablative radiotherapy as above.    Today, the patient states she is in her usual state of health. No new cough or shortness of breath. She is a current smoker.     OBJECTIVE:   BP (!) 172/101   Pulse 52   Wt 80.3 kg (177 lb 1.6 oz)   BMI 28.58 kg/m    GENERAL:  Appears well, in no acute distress.   CV: Normal rate, regular rhythm, good distal perfusion.   RESP: No audible wheezing, breathing on room air.   SKIN: No rashes seen on exposed skin.     CT Chest 22  IMPRESSION:   1.  Slightly decreased size of right lower lobe nodule following radiation therapy. No new or worsening disease.     IMPRESSION: No clinical evidence of disease recurrence. CT chest demonstrated post-treatment changes of RLL nodule.      RECOMMENDATIONS:   - RTC in 3 months with CYNTHIA Broussard. CT chest prior to visit.     Fariba Dixon MD PGY4  Radiation Oncology    I  saw the patient with the resident.  I agree with the resident's note and plan of care.      JANEEN Ruvalcaba M.D.  Department of Radiation Oncology  [facility]

## 2022-11-15 NOTE — LETTER
11/15/2022         RE: Annette Shore   [address]  [place] [address]        Dear Colleague,    Thank you for referring your patient, Annette Shore, to the [facility] RADIATION ONCOLOGY. Please see a copy of my visit note below.    Department of Radiation Oncology  [facility]  [address] [place] [address]  [phone]     Follow-up Note  Nov 15, 2022    Annette Shore  MRN: [id number]  : 1960    DISEASE TREATED: Presumed NSCLC of the RLL, [id number], stage IA2    INTERVAL SINCE COMPLETION OF RADIATION THERAPY: 3 months (completed 22)     RADIATION THERAPY DELIVERED: RLL nodule, 50 Gy in 5 fractions, SBRT           SUBJECTIVE:  Annette Shore is a 62-year-old female longtime smoker with history of COPD who was found to have a right lower lobe nodule highly concerning for lung cancer. This was discovered on low-dose screening lung CT (22), located in the paraspinal region and measured 1.6 cm. Staging PET/CT (22) demonstrated low-level FDG uptake associated with this nodule (SUVmax=2.2) and no other findings. She also had a brain MRI (22) for workup of headache and it was unrevealing. She completed mediastinal staging with bronchoscopy and EBUS (22). FNA of a 4R node and a 11R node were negative. She is not a surgical candidate and underwent ablative radiotherapy as above.    Today, the patient states she is in her usual state of health. No new cough or shortness of breath. She is a current smoker.     OBJECTIVE:   BP (!) 172/101   Pulse 52   Wt 80.3 kg (177 lb 1.6 oz)   BMI 28.58 kg/m    GENERAL:  Appears well, in no acute distress.   CV: Normal rate, regular rhythm, good distal perfusion.   RESP: No audible wheezing, breathing on room air.   SKIN: No rashes seen on exposed skin.     CT Chest 22  IMPRESSION:   1.  Slightly decreased size of right lower lobe nodule following radiation therapy. No new or  worsening disease.     IMPRESSION: No clinical evidence of disease recurrence. CT chest demonstrated post-treatment changes of RLL nodule.      RECOMMENDATIONS:   - RTC in 3 months with CYNTHIA Broussard. CT chest prior to visit.     Fariba Dixon MD PGY4  Radiation Oncology    I saw the patient with the resident.  I agree with the resident's note and plan of care.      JANEEN Ruvalcaba M.D.  Department of Radiation Oncology  [facility]        FOLLOW-UP VISIT    Patient Name: Annette Shore      : 1960     Age: 62 year old        ______________________________________________________________________________     Chief Complaint   Patient presents with     Cancer     3 month fup for rad therapy to chest  SBRT     There were no vitals taken for this visit.     Date Radiation Completed: 2022    Pain  Denies    Labs  Other Labs: No    Imaging  CT: chest  2022          Other Appointments:     MD Name:  Appointment Date:    MD Name: Appointment Date:   MD Name: Appointment Date:   Other Appointment Notes:     Residual Radiation side effect: none     Additional Instructions:     Nurse face-to-face time: Level 3:  10 min face to face time        Again, thank you for allowing me to participate in the care of your patient.        Sincerely,        Kamron Ruvalcaba MD

## 2022-11-15 NOTE — PROGRESS NOTES
FOLLOW-UP VISIT    Patient Name: Annette Shore      : 1960     Age: 62 year old        ______________________________________________________________________________     Chief Complaint   Patient presents with     Cancer     3 month fup for rad therapy to chest  SBRT     There were no vitals taken for this visit.     Date Radiation Completed: 2022    Pain  Denies    Labs  Other Labs: No    Imaging  CT: chest  2022          Other Appointments:     MD Name:  Appointment Date:    MD Name: Appointment Date:   MD Name: Appointment Date:   Other Appointment Notes:     Residual Radiation side effect: none     Additional Instructions:     Nurse face-to-face time: Level 3:  10 min face to face time

## 2022-12-03 ENCOUNTER — HEALTH MAINTENANCE LETTER (OUTPATIENT)
Age: 62
End: 2022-12-03

## 2023-01-26 ENCOUNTER — OFFICE VISIT (OUTPATIENT)
Dept: VASCULAR SURGERY | Facility: CLINIC | Age: 63
End: 2023-01-26
Attending: SURGERY
Payer: MEDICARE

## 2023-01-26 ENCOUNTER — HOSPITAL ENCOUNTER (OUTPATIENT)
Dept: CT IMAGING | Facility: HOSPITAL | Age: 63
Discharge: HOME | End: 2023-01-26
Attending: SURGERY
Payer: MEDICARE

## 2023-01-26 ENCOUNTER — ANCILLARY PROCEDURE (OUTPATIENT)
Dept: VASCULAR ULTRASOUND | Facility: CLINIC | Age: 63
End: 2023-01-26
Attending: SURGERY
Payer: MEDICARE

## 2023-01-26 VITALS
WEIGHT: 175 LBS | SYSTOLIC BLOOD PRESSURE: 142 MMHG | BODY MASS INDEX: 28.25 KG/M2 | RESPIRATION RATE: 16 BRPM | HEART RATE: 74 BPM | DIASTOLIC BLOOD PRESSURE: 80 MMHG | OXYGEN SATURATION: 97 %

## 2023-01-26 DIAGNOSIS — I73.9 PVD (PERIPHERAL VASCULAR DISEASE) (H): ICD-10-CM

## 2023-01-26 DIAGNOSIS — I73.9 PVD (PERIPHERAL VASCULAR DISEASE) (H): Primary | ICD-10-CM

## 2023-01-26 DIAGNOSIS — R09.89 OTHER SPECIFIED SYMPTOMS AND SIGNS INVOLVING THE CIRCULATORY AND RESPIRATORY SYSTEMS: ICD-10-CM

## 2023-01-26 DIAGNOSIS — Z13.6 ENCOUNTER FOR SCREENING FOR STENOSIS OF CAROTID ARTERY: ICD-10-CM

## 2023-01-26 LAB
CREAT BLD-MCNC: 0.9 MG/DL (ref 0.6–1.1)
GFR SERPL CREATININE-BSD FRML MDRD: >60 ML/MIN/1.73M2

## 2023-01-26 PROCEDURE — 75635 CT ANGIO ABDOMINAL ARTERIES: CPT | Mod: MG

## 2023-01-26 PROCEDURE — 250N000011 HC RX IP 250 OP 636: Performed by: SURGERY

## 2023-01-26 PROCEDURE — G0463 HOSPITAL OUTPT CLINIC VISIT: HCPCS | Mod: 25 | Performed by: SURGERY

## 2023-01-26 PROCEDURE — 99213 OFFICE O/P EST LOW 20 MIN: CPT | Performed by: SURGERY

## 2023-01-26 PROCEDURE — 93880 EXTRACRANIAL BILAT STUDY: CPT

## 2023-01-26 PROCEDURE — 82565 ASSAY OF CREATININE: CPT

## 2023-01-26 PROCEDURE — 93880 EXTRACRANIAL BILAT STUDY: CPT | Mod: 26 | Performed by: SURGERY

## 2023-01-26 RX ORDER — FAMOTIDINE 40 MG/1
40 TABLET, FILM COATED ORAL AT BEDTIME
COMMUNITY
Start: 2023-01-11 | End: 2024-01-19

## 2023-01-26 RX ORDER — ONDANSETRON 4 MG/1
TABLET, FILM COATED ORAL
COMMUNITY
Start: 2022-02-03 | End: 2024-01-19

## 2023-01-26 RX ORDER — ALBUTEROL SULFATE 5 MG/ML
SOLUTION RESPIRATORY (INHALATION)
COMMUNITY

## 2023-01-26 RX ORDER — IOPAMIDOL 755 MG/ML
100 INJECTION, SOLUTION INTRAVASCULAR ONCE
Status: COMPLETED | OUTPATIENT
Start: 2023-01-26 | End: 2023-01-26

## 2023-01-26 RX ORDER — HYDROCODONE BITARTRATE AND ACETAMINOPHEN 5; 325 MG/1; MG/1
TABLET ORAL
COMMUNITY
Start: 2022-04-14 | End: 2023-04-06

## 2023-01-26 RX ADMIN — IOPAMIDOL 100 ML: 755 INJECTION, SOLUTION INTRAVENOUS at 12:36

## 2023-01-26 ASSESSMENT — PAIN SCALES - GENERAL: PAINLEVEL: MODERATE PAIN (4)

## 2023-01-26 NOTE — PROGRESS NOTES
VASCULAR SURGERY PROGRESS NOTE   VASCULAR SURGEON: Mehdi Gilliland MD, RPVI     LOCATION:  [facility]     Annette Shore   Medical Record #:  [id number]  YOB: 1960  Age:  63 year old     Date of Service: 1/26/2023    PRIMARY CARE PROVIDER: Jo Hamilton      Reason for visit: Follow-up    IMPRESSION: 63-year-old female who comes to vascular surgery clinic for follow-up.  Patient has known history of aortobifemoral bypass and several interventions for the left lower extremity with subsequent balloon angioplasty of the left SFA.  Most recent CT scan did show an occlusion of the left SFA.  ABIs are consistent with moderate arterial disease.  Patient's pain in bilateral lower extremities are mostly neurogenic.  Patient states that even after left extremity angiogram she did not feel any improvement which makes me think that her pain is definitely not vascular.  Patient is currently undergoing treatment for lung cancer.    RECOMMENDATION/RISKS: As needed follow-up in vascular surgery clinic.  Continue optimal medical management therapy.  I encouraged her to quit smoking.  Endovascular over the next    HPI:  Annette Shore is a 63 year old female who was seen today for follow-up.  Patient denies any new    REVIEW OF SYSTEMS:    A 12 point ROS was reviewed and is negative      PHH:    Past Medical History:   Diagnosis Date     Back pain     low back     COPD (chronic obstructive pulmonary disease) (H)      Depression      Diabetes mellitus (H)      Hyperlipidemia      Hypertension      PAD (peripheral artery disease) (H)      Peripheral neuropathy      Pulmonary nodules           Past Surgical History:   Procedure Laterality Date     AORTA - FEMORAL ARTERY BYPASS GRAFT       BRONCHOSCOPY RIGID OR FLEXIBLE W/TRANSENDOSCOPIC ENDOBRONCHIAL ULTRASOUND GUIDED N/A 07/14/2022    Procedure: endbronchial ultrasound, transbronchial biopsy;  Surgeon: Rosendo Dave MD;  Location:  OR      HYSTEROSCOPY W/ ENDOMETRIAL ABLATION       IR EXTREMITY ANGIOGRAM LEFT  11/25/2020     IR EXTREMITY ANGIOGRAM LEFT  12/16/2020     IR LOWER EXTREMITY ANGIOGRAM LEFT  11/25/2020     IR LOWER EXTREMITY ANGIOGRAM LEFT  12/16/2020     OPTICAL TRACKING SYSTEM BRONCHOSCOPY N/A 07/14/2022    Procedure: BRONCHOSCOPY, USING OPTICAL TRACKING SYSTEM, .  Ion or veran system, fiducial placement;  Surgeon: Rosendo Dave MD;  Location: [facility] OR     OTHER SURGICAL HISTORY      tubal ligation     OTHER SURGICAL HISTORY      spine fusion     PAIN STELLATE GANGLION BLOCK RIGHT Right 1992    x5, [facility]       ALLERGIES:  Lyrica [pregabalin] and Bupropion    MEDS:    Current Outpatient Medications:      albuterol (PROVENTIL) (5 MG/ML) 0.5% neb solution, 0.5 mL by nebulizer as needed, Disp: , Rfl:      aspirin (ASA) 81 MG EC tablet, 1 tab(s) Orally once a day, Disp: , Rfl:      aspirin 325 MG tablet, [ASPIRIN 325 MG TABLET] Take 325 mg by mouth daily. , Disp: , Rfl:      atorvastatin (LIPITOR) 20 MG tablet, [ATORVASTATIN (LIPITOR) 20 MG TABLET] Take 20 mg by mouth every morning. , Disp: , Rfl:      DULoxetine (CYMBALTA) 60 MG capsule, Take 60 mg by mouth daily, Disp: , Rfl:      famotidine (PEPCID) 40 MG tablet, Take 40 mg by mouth At Bedtime, Disp: , Rfl:      gabapentin (NEURONTIN) 300 MG capsule, Take 600 mg by mouth 2 times daily, Disp: , Rfl:      HYDROcodone-acetaminophen (NORCO) 5-325 MG tablet, 1 tablet Orally once every 12 hours as needed for severe pain, Disp: , Rfl:      ipratropium-albuteroL (DUO-NEB) 0.5-2.5 mg/3 mL nebulizer, 3 mLs as needed, Disp: , Rfl:      losartan-hydrochlorothiazide (HYZAAR) 50-12.5 mg per tablet, [LOSARTAN-HYDROCHLOROTHIAZIDE (HYZAAR) 50-12.5 MG PER TABLET] Take 1 tablet by mouth daily, Disp: , Rfl:      ondansetron (ZOFRAN) 4 MG tablet, 1-2 tablets Orally every 8 hours if needed for nausea/vomiting, Disp: , Rfl:   No current facility-administered medications for this visit.    SOCIAL  HABITS:    History   Smoking Status     Every Day     Packs/day: 1.00     Types: Cigarettes     Start date: 7/17/1970   Smokeless Tobacco     Never     Social History    Substance and Sexual Activity      Alcohol use: No      History   Drug Use Unknown       FAMILY HISTORY:    Family History   Problem Relation Age of Onset     Diabetes Mother      Hypertension Mother      Cancer Father      Heart Disease Father      Diabetes Father        PE:  BP (!) 142/80   Pulse 74   Resp 16   Wt 175 lb (79.4 kg)   SpO2 97%   BMI 28.25 kg/m    Wt Readings from Last 1 Encounters:   01/26/23 175 lb (79.4 kg)     Body mass index is 28.25 kg/m .    EXAM:  GENERAL: This is a well-developed 63 year old female who appears her stated age  EYES: Grossly normal.  MOUTH: Buccal mucosa normal   CARDIAC: Normal   CHEST/LUNG: Clear to auscultation bilaterally  GASTROINTESINAL soft nontender nondistended  MUSCULOSKELETAL: Grossly normal and both lower extremities are intact.  HEME/LYMPH: No lymphedema  NEUROLOGIC: Focally intact, Alert and oriented x 3.   PSYCH: appropriate affect  INTEGUMENT: No open lesions or ulcers            DIAGNOSTIC STUDIES:     Images:  CTA Abdomen Pelvis Bilat Leg Runoff w Contr    Result Date: 1/26/2023  EXAM: CTA ABDOMEN PELVIS BILAT LEG RUNOFF W CONTR LOCATION: [facility] DATE/TIME: 1/26/2023 12:49 PM INDICATION:  PVD (peripheral vascular disease) (H) COMPARISON: None. TECHNIQUE: Helical acquisition through the abdomen, pelvis, and bilateral lower extremities was performed during the arterial phase of contrast enhancement using IV Contrast. 2D and 3D reconstructions were performed by the CT technologist. Dose reduction  techniques were used. CONTRAST: 100ml IV ISOVUE 370 FINDINGS: AORTA: Supraceliac abdominal aorta unremarkable. The celiac artery and superior mesenteric artery are patent. Distal perfusion of the JAZIEL. Single renal artery supplies the right kidney. 2 renal arteries  supply the left kidney. No evidence of renal artery  stenosis. Patent aortobifemoral graft. Retrograde flow in the left external iliac and internal iliac arteries. RIGHT LEG: Right common femoral and right profunda femoral artery are patent. Occlusion of the right superficial femoral artery. Reconstitution of the right popliteal artery with three-vessel runoff into the right foot. LEFT LEG: Left common femoral and left profunda femoral artery are patent. Proximal left superficial femoral artery occlusion. Reconstitution of the left superficial femoral artery in the upper thigh with severe diffuse disease. Left popliteal artery patent. Three-vessel runoff into the left foot. LUNG BASES: Unremarkable ABDOMEN: Hepatic cyst left hepatic lobe. The gallbladder, pancreas, spleen, adrenal glands and kidneys are unremarkable. PELVIS: Sigmoid diverticulosis. Artifact arising from posterior fixation L4-L5.     IMPRESSION: 1.  Patent aortobifemoral graft. 2.  Right superficial femoral artery occlusion with reconstitution of the right popliteal artery. Three-vessel runoff into the right foot. 3.  Left superficial femoral artery proximal occlusion. Reconstitution in the upper thigh with severe diffuse disease in the mid and distal left superficial femoral artery. Patent left popliteal artery with three-vessel runoff into the left foot.        LABS:      Sodium   Date Value Ref Range Status   02/04/2022 140 136 - 145 mmol/L Final   02/03/2022 141 136 - 145 mmol/L Final   10/19/2021 144 136 - 145 mmol/L Final     Urea Nitrogen   Date Value Ref Range Status   02/04/2022 13 8 - 22 mg/dL Final   02/03/2022 16 8 - 22 mg/dL Final   10/19/2021 8 8 - 22 mg/dL Final     Hemoglobin   Date Value Ref Range Status   06/23/2022 15.1 11.7 - 15.7 g/dL Final   02/04/2022 14.3 11.7 - 15.7 g/dL Final   12/16/2020 13.1 12.0 - 16.0 g/dL Final     Platelet Count   Date Value Ref Range Status   02/04/2022 233 150 - 450 10e3/uL Final   12/16/2020 241 140  - 440 thou/uL Final   11/25/2020 261 140 - 440 thou/uL Final     BNP   Date Value Ref Range Status   09/26/2018 30 0 - 93 pg/mL Final     INR   Date Value Ref Range Status   12/16/2020 1.09 0.90 - 1.10 Final   11/25/2020 0.99 0.90 - 1.10 Final   11/11/2020 0.99 0.90 - 1.10 Final       30 minutes spent on the day of encounter doing chart review, history and exam, documentation, and further activities as noted.       Mehdi Gilliland MD, [facility]  VASCULAR SURGERY

## 2023-01-26 NOTE — NURSING NOTE
[facility]        Patient is here for a 3 month follow up  to discuss Peripheral artery disease (PAD). Symptoms include claudicaton: right buttocks, left buttocks, right thigh, left thigh, right calf and left calf at distance of 100-250 feet and rest pain in both lower extremities. Vision changes, pain in right jaw - Carotid screen. Currently has lung cancer.    Pt is currently taking Aspirin and Statin.    BP (!) 142/80   Pulse 74   Resp 16   Wt 175 lb (79.4 kg)   SpO2 97%   BMI 28.25 kg/m      The provider has been notified that the patient has concerns of continued pain in legs.     Questions patient would like addressed today are: N/A.    Refills are needed: No    Has homecare services and agency name:  Michelle Montemayor MA

## 2023-02-06 ENCOUNTER — OFFICE VISIT (OUTPATIENT)
Dept: PHYSICAL MEDICINE AND REHAB | Facility: CLINIC | Age: 63
End: 2023-02-06
Payer: MEDICARE

## 2023-02-06 VITALS
HEIGHT: 66 IN | HEART RATE: 95 BPM | DIASTOLIC BLOOD PRESSURE: 74 MMHG | WEIGHT: 170 LBS | SYSTOLIC BLOOD PRESSURE: 132 MMHG | BODY MASS INDEX: 27.32 KG/M2

## 2023-02-06 DIAGNOSIS — M79.18 MYOFASCIAL PAIN: ICD-10-CM

## 2023-02-06 DIAGNOSIS — M48.061 FORAMINAL STENOSIS OF LUMBAR REGION: ICD-10-CM

## 2023-02-06 DIAGNOSIS — Z98.1 S/P LUMBAR FUSION: ICD-10-CM

## 2023-02-06 DIAGNOSIS — M51.369 DDD (DEGENERATIVE DISC DISEASE), LUMBAR: ICD-10-CM

## 2023-02-06 DIAGNOSIS — M54.16 LUMBAR RADICULAR PAIN: Primary | ICD-10-CM

## 2023-02-06 PROCEDURE — 99214 OFFICE O/P EST MOD 30 MIN: CPT | Performed by: PHYSICAL MEDICINE & REHABILITATION

## 2023-02-06 ASSESSMENT — PAIN SCALES - GENERAL: PAINLEVEL: MILD PAIN (3)

## 2023-02-06 NOTE — LETTER
2/6/2023         RE: Annette Shore  [address]  [place] [address]        Dear Colleague,    Thank you for referring your patient, Annette Shore, to the [facility] SPINE AND NEUROSURGERY. Please see a copy of my visit note below.    Assessment/Plan:      Annette was seen today for back pain.    Diagnoses and all orders for this visit:    Lumbar radicular pain  -     MR Lumbar Spine w/o Contrast; Future  -     Physical Therapy Referral; Future    Foraminal stenosis of lumbar region  -     MR Lumbar Spine w/o Contrast; Future  -     Physical Therapy Referral; Future    DDD (degenerative disc disease), lumbar  -     MR Lumbar Spine w/o Contrast; Future  -     Physical Therapy Referral; Future    Myofascial pain  -     MR Lumbar Spine w/o Contrast; Future  -     Physical Therapy Referral; Future    S/P lumbar fusion  -     MR Lumbar Spine w/o Contrast; Future  -     Physical Therapy Referral; Future         Assessment: Pleasant 63 year old female with a past medical history of COPD, hyperlipidemia, hypertension, peripheral vascular disease status post femoropopliteal bypass after failed stenting, and polyneuropathy, as well as previous lumbar fusion with:        1.  Worsening lumbar spine pain mostly in the gluteal region and sacral region and more significant bilateral lower extremity pain posterior legs to the feet medial thighs to the medial calves.  She is status post lumbar fusion L4-5 with degenerative changes of above and below the fusion resulting in moderate foraminal stenosis at L3-4 on MRI from 2020 and foraminal stenosis L5-S1..  She has extensive vascular disease.   she also has a recent diagnosis of lung cancer over the past year treated with radiation.  Much of her pain is consistent with more of a vascular cause and peripheral polyneuropathy combination but she does have foraminal stenosis and may have a radicular component.  Has had physical therapy in the past along with lumbar  epidurals.       2.  Significant myofascial pain in the gluteal region.       Discussion:    1.  We discussed the diagnosis and treatment options.  Discussed the options of further imaging given her diagnosis of lung cancer along with last MRI being 3 years ago.  She is on medications for neuropathic pain such as gabapentin and has hydrocodone at home as well and I do not have any medication options for her.  We also discussed therapy.    2.  She would like to return to myofascial release physical therapy with Corrie who she has had very good luck with in the past.  Referral was placed.      3. We will update MRI to evaluate for progressed degenerative disc disease above and below the fusion or progressive nerve compression or spinal stenosis.       4.  Follow-up with me in 1 month.      It was our pleasure caring for your patient today, if there any questions or concerns please do not hesitate to contact us.      Subjective:   Patient ID: Annette Shore is a 63 year old female.    History of Present Illness: Patient presents for follow-up of lumbar spine pain bilateral gluteal pain lower extremity pain.  She has not been seen for couple of years.  Since that time she has been diagnosed with lung cancer.  She has had worsening pain.  She has extensive vascular disease and her vascular provider recommended that she see me spine again to explore lumbar contribution to her pain.    Her pain is in the right greater than left gluteal region worse with sitting bending walking.  Better with lying down.  The middle of the night also hurts.  Feels like spider webs down the medial thighs into the medial calves also has pain from the buttock down the back of the leg to the bottom of the feet with paresthesias carries diagnosis of neuropathy as well.  Pain is a 9/10 at worst 3/10 today 0/10 at best.  On gabapentin and hydrocodone.  Has a few hydrocodone remaining.  Has not used these very often.  This was for diagnosis of  "trigeminal neuralgia.  Has been seen by physical therapy in the past, Corrie with some manipulation which was very helpful.    Imaging: *MRI lumbar spine from July 2020 personally reviewed for medical decision-making purposes.  This shows moderate right foraminal stenosis L5-S1 related to mild broad-based disc bulge mild facet arthropathy.  L4-5 status post fusion appears solid central canal is patent.  L3-4 and L2-3 moderate disc height loss with mild facet arthropathy mild broad-based disc bulge mild central stenosis moderate bilateral foraminal stenosis.  No foraminal stenosis L2-3.    Review of Systems: Pertinent positives: Complains of some constipation inability to urinate, headaches, swallowing difficulties, coordination problems and unintentional weight loss.  Denies fevers, bowel or bladder incontinence, weakness in the legs.         Prior interventions:  1.  Right L5-S1 TFESI July 2020    Past Medical History:   Diagnosis Date     Back pain     low back     COPD (chronic obstructive pulmonary disease) (H)      Depression      Diabetes mellitus (H)      Hyperlipidemia      Hypertension      PAD (peripheral artery disease) (H)      Peripheral neuropathy      Pulmonary nodules        The following portions of the patient's history were reviewed and updated as appropriate: allergies, current medications, past family history, past medical history, past social history, past surgical history and problem list.           Objective:   Physical Exam:    /74   Pulse 95   Ht 5' 6\" (1.676 m)   Wt 170 lb (77.1 kg)   BMI 27.44 kg/m    Body mass index is 27.44 kg/m .      General: Alert and oriented with normal affect. Attention, knowledge, memory, and language are intact. No acute distress.      Respirations: Unlabored. CV: No lower extremity edema.    Gait:  Nonantalgic, negative Romberg.  Full range of motion of the hips knees and ankles from seated.  Tenderness to palpation bilateral gluteal tissues particular " the right gluteus medius/gluteal tissues.  Sensation is intact to light touch throughout the   lower extremities.  Reflexes are 2+ and symmetric in the biceps triceps and brachioradialis with negative Hoffmans. 2+ patellar and 0 Achilles      Manual muscle testing reveals:  Right /Left out of 5     5/5 hip flexors  5/5 knee flexors  5/5 knee extensors  5/5 ankle plantar flexors  5/5 ankle dorsiflexors  5/5  EHL        Again, thank you for allowing me to participate in the care of your patient.        Sincerely,        Alexx Norman, DO

## 2023-02-06 NOTE — PATIENT INSTRUCTIONS
A physical therapy order was provided for you today.  You will be contacted by physical therapy.  If nobody contacts you within 3 to 5 days, please contact the clinic at [phone].  It will be very important for you to do your physical therapy exercises on a regular basis to decrease your pain and prevent future pain flares.   An MRI was ordered for you today.  You will be contacted by scheduling within 3 days.    If you are not contacted, please call Radiology at [phone].

## 2023-02-06 NOTE — PROGRESS NOTES
Assessment/Plan:      Annette was seen today for back pain.    Diagnoses and all orders for this visit:    Lumbar radicular pain  -     MR Lumbar Spine w/o Contrast; Future  -     Physical Therapy Referral; Future    Foraminal stenosis of lumbar region  -     MR Lumbar Spine w/o Contrast; Future  -     Physical Therapy Referral; Future    DDD (degenerative disc disease), lumbar  -     MR Lumbar Spine w/o Contrast; Future  -     Physical Therapy Referral; Future    Myofascial pain  -     MR Lumbar Spine w/o Contrast; Future  -     Physical Therapy Referral; Future    S/P lumbar fusion  -     MR Lumbar Spine w/o Contrast; Future  -     Physical Therapy Referral; Future         Assessment: Pleasant 63 year old female with a past medical history of COPD, hyperlipidemia, hypertension, peripheral vascular disease status post femoropopliteal bypass after failed stenting, and polyneuropathy, as well as previous lumbar fusion with:        1.  Worsening lumbar spine pain mostly in the gluteal region and sacral region and more significant bilateral lower extremity pain posterior legs to the feet medial thighs to the medial calves.  She is status post lumbar fusion L4-5 with degenerative changes of above and below the fusion resulting in moderate foraminal stenosis at L3-4 on MRI from 2020 and foraminal stenosis L5-S1..  She has extensive vascular disease.   she also has a recent diagnosis of lung cancer over the past year treated with radiation.  Much of her pain is consistent with more of a vascular cause and peripheral polyneuropathy combination but she does have foraminal stenosis and may have a radicular component.  Has had physical therapy in the past along with lumbar epidurals.       2.  Significant myofascial pain in the gluteal region.       Discussion:    1.  We discussed the diagnosis and treatment options.  Discussed the options of further imaging given her diagnosis of lung cancer along with last MRI being 3 years  ago.  She is on medications for neuropathic pain such as gabapentin and has hydrocodone at home as well and I do not have any medication options for her.  We also discussed therapy.    2.  She would like to return to myofascial release physical therapy with Corrie who she has had very good luck with in the past.  Referral was placed.      3. We will update MRI to evaluate for progressed degenerative disc disease above and below the fusion or progressive nerve compression or spinal stenosis.       4.  Follow-up with me in 1 month.      It was our pleasure caring for your patient today, if there any questions or concerns please do not hesitate to contact us.      Subjective:   Patient ID: Annette Shore is a 63 year old female.    History of Present Illness: Patient presents for follow-up of lumbar spine pain bilateral gluteal pain lower extremity pain.  She has not been seen for couple of years.  Since that time she has been diagnosed with lung cancer.  She has had worsening pain.  She has extensive vascular disease and her vascular provider recommended that she see me spine again to explore lumbar contribution to her pain.    Her pain is in the right greater than left gluteal region worse with sitting bending walking.  Better with lying down.  The middle of the night also hurts.  Feels like spider webs down the medial thighs into the medial calves also has pain from the buttock down the back of the leg to the bottom of the feet with paresthesias carries diagnosis of neuropathy as well.  Pain is a 9/10 at worst 3/10 today 0/10 at best.  On gabapentin and hydrocodone.  Has a few hydrocodone remaining.  Has not used these very often.  This was for diagnosis of trigeminal neuralgia.  Has been seen by physical therapy in the past, Corrie with some manipulation which was very helpful.    Imaging: *MRI lumbar spine from July 2020 personally reviewed for medical decision-making purposes.  This shows moderate right foraminal  "stenosis L5-S1 related to mild broad-based disc bulge mild facet arthropathy.  L4-5 status post fusion appears solid central canal is patent.  L3-4 and L2-3 moderate disc height loss with mild facet arthropathy mild broad-based disc bulge mild central stenosis moderate bilateral foraminal stenosis.  No foraminal stenosis L2-3.    Review of Systems: Pertinent positives: Complains of some constipation inability to urinate, headaches, swallowing difficulties, coordination problems and unintentional weight loss.  Denies fevers, bowel or bladder incontinence, weakness in the legs.         Prior interventions:  1.  Right L5-S1 TFESI July 2020    Past Medical History:   Diagnosis Date     Back pain     low back     COPD (chronic obstructive pulmonary disease) (H)      Depression      Diabetes mellitus (H)      Hyperlipidemia      Hypertension      PAD (peripheral artery disease) (H)      Peripheral neuropathy      Pulmonary nodules        The following portions of the patient's history were reviewed and updated as appropriate: allergies, current medications, past family history, past medical history, past social history, past surgical history and problem list.           Objective:   Physical Exam:    /74   Pulse 95   Ht 5' 6\" (1.676 m)   Wt 170 lb (77.1 kg)   BMI 27.44 kg/m    Body mass index is 27.44 kg/m .      General: Alert and oriented with normal affect. Attention, knowledge, memory, and language are intact. No acute distress.      Respirations: Unlabored. CV: No lower extremity edema.    Gait:  Nonantalgic, negative Romberg.  Full range of motion of the hips knees and ankles from seated.  Tenderness to palpation bilateral gluteal tissues particular the right gluteus medius/gluteal tissues.  Sensation is intact to light touch throughout the   lower extremities.  Reflexes are 2+ and symmetric in the biceps triceps and brachioradialis with negative Hoffmans. 2+ patellar and 0 Achilles      Manual muscle " testing reveals:  Right /Left out of 5     5/5 hip flexors  5/5 knee flexors  5/5 knee extensors  5/5 ankle plantar flexors  5/5 ankle dorsiflexors  5/5  EHL

## 2023-02-13 ENCOUNTER — HOSPITAL ENCOUNTER (OUTPATIENT)
Dept: CT IMAGING | Facility: HOSPITAL | Age: 63
Discharge: HOME | End: 2023-02-13
Attending: RADIOLOGY | Admitting: RADIOLOGY
Payer: MEDICARE

## 2023-02-13 DIAGNOSIS — R91.1 LUNG NODULE: ICD-10-CM

## 2023-02-13 PROCEDURE — 71250 CT THORAX DX C-: CPT | Mod: ME

## 2023-02-13 PROCEDURE — G1010 CDSM STANSON: HCPCS

## 2023-02-16 ENCOUNTER — OFFICE VISIT (OUTPATIENT)
Dept: RADIATION ONCOLOGY | Facility: CLINIC | Age: 63
End: 2023-02-16
Attending: RADIOLOGY
Payer: MEDICARE

## 2023-02-16 VITALS
DIASTOLIC BLOOD PRESSURE: 90 MMHG | WEIGHT: 171.6 LBS | SYSTOLIC BLOOD PRESSURE: 176 MMHG | HEART RATE: 82 BPM | BODY MASS INDEX: 27.7 KG/M2

## 2023-02-16 DIAGNOSIS — R91.1 LUNG NODULE: Primary | ICD-10-CM

## 2023-02-16 PROCEDURE — 99213 OFFICE O/P EST LOW 20 MIN: CPT | Performed by: NURSE PRACTITIONER

## 2023-02-16 PROCEDURE — G0463 HOSPITAL OUTPT CLINIC VISIT: HCPCS | Performed by: NURSE PRACTITIONER

## 2023-02-16 NOTE — PROGRESS NOTES
FOLLOW-UP VISIT    Patient Name: Annette Shore      : 1960     Age: 63 year old        ______________________________________________________________________________     Chief Complaint   Patient presents with     Cancer     Follow up:Lung Cancer: Right lung SBRT 5000 cGy completed 22     Pain  Denies    Labs  Other Labs: No    Imaging  CT: 23        Other Appointments:     MD Name:  Appointment Date:    MD Name: Appointment Date:   MD Name: Appointment Date:   Other Appointment Notes:     Residual Radiation side effect: No concerns

## 2023-02-16 NOTE — LETTER
2023         RE: Annette Shore   [address]  [place] [address]        Dear Colleague,    Thank you for referring your patient, Annette Shore, to the [facility] RADIATION ONCOLOGY. Please see a copy of my visit note below.    FOLLOW-UP VISIT    Patient Name: Annette Shore      : 1960     Age: 63 year old        ______________________________________________________________________________     Chief Complaint   Patient presents with     Cancer     Follow up:Lung Cancer: Right lung SBRT 5000 cGy completed 22     Pain  Denies    Labs  Other Labs: No    Imaging  CT: 23        Other Appointments:     MD Name:  Appointment Date:    MD Name: Appointment Date:   MD Name: Appointment Date:   Other Appointment Notes:     Residual Radiation side effect: No concerns             Department of Radiation Oncology  [address] [address]  [phone]     Follow-up Note  2023    Annette Shore  MRN: [id number]  : 1960    DISEASE TREATED: Presumed NSCLC of the RLL, [id number], stage IA2    INTERVAL SINCE COMPLETION OF RADIATION THERAPY: 6 months (completed 22)     RADIATION THERAPY DELIVERED: RLL nodule, 50 Gy in 5 fractions, SBRT           SUBJECTIVE:  Annette Shore is a 62-year-old female longtime smoker with history of COPD who was found to have a right lower lobe nodule highly concerning for lung cancer. This was discovered on low-dose screening lung CT (22), located in the paraspinal region and measured 1.6 cm. Staging PET/CT (22) demonstrated low-level FDG uptake associated with this nodule (SUVmax=2.2) and no other findings. She also had a brain MRI (22) for workup of headache and it was unrevealing. She completed mediastinal staging with bronchoscopy and EBUS (22). FNA of a 4R node and a 11R node were negative. She is not a surgical candidate and underwent ablative  radiotherapy as above.    Today, the patient states she is in her usual state of health. No new cough or shortness of breath.  She does have quite a bit of shortness of breath going up stairs.  She is a current smoker.   She does have a lot of other health concerns with degenerative disc disease and peripheral arterial disease.    OBJECTIVE:   BP (!) 176/90   Pulse 82   Wt 77.8 kg (171 lb 9.6 oz)   BMI 27.70 kg/m    GENERAL:  Appears well, in no acute distress.   CV: Normal rate, regular rhythm, good distal perfusion.   RESP: No audible wheezing, breathing on room air.   SKIN: No rashes seen on exposed skin.     CT Chest 2/16/2023:    IMPRESSION:   1.  Unchanged subpleural pulmonary nodule in the superior segment right lower lobe between the metallic fiducial along the mediastinal pleura. No new or enlarging lung nodules.  2.  Emphysema.  3.  Severe atheromatous coronary calcifications.     IMPRESSION: No clinical evidence of disease recurrence. CT chest demonstrated post-treatment changes of RLL nodule.      RECOMMENDATIONS:   - RTC in 3 months with CYNTHIA Broussard. CT chest prior to visit.     Aarti Desir NP  Department of Radiation Oncology  [facility]

## 2023-02-16 NOTE — PROGRESS NOTES
Department of Radiation Oncology  [facility]  [address] [place] [address]  [phone]     Follow-up Note  2023    Annette Shore  MRN: [id number]  : 1960    DISEASE TREATED: Presumed NSCLC of the RLL, [id number], stage IA2    INTERVAL SINCE COMPLETION OF RADIATION THERAPY: 6 months (completed 22)     RADIATION THERAPY DELIVERED: RLL nodule, 50 Gy in 5 fractions, SBRT           SUBJECTIVE:  Annette Shore is a 62-year-old female longtime smoker with history of COPD who was found to have a right lower lobe nodule highly concerning for lung cancer. This was discovered on low-dose screening lung CT (22), located in the paraspinal region and measured 1.6 cm. Staging PET/CT (22) demonstrated low-level FDG uptake associated with this nodule (SUVmax=2.2) and no other findings. She also had a brain MRI (22) for workup of headache and it was unrevealing. She completed mediastinal staging with bronchoscopy and EBUS (22). FNA of a 4R node and a 11R node were negative. She is not a surgical candidate and underwent ablative radiotherapy as above.    Today, the patient states she is in her usual state of health. No new cough or shortness of breath.  She does have quite a bit of shortness of breath going up stairs.  She is a current smoker.   She does have a lot of other health concerns with degenerative disc disease and peripheral arterial disease.    OBJECTIVE:   BP (!) 176/90   Pulse 82   Wt 77.8 kg (171 lb 9.6 oz)   BMI 27.70 kg/m    GENERAL:  Appears well, in no acute distress.   CV: Normal rate, regular rhythm, good distal perfusion.   RESP: No audible wheezing, breathing on room air.   SKIN: No rashes seen on exposed skin.     CT Chest 2023:    IMPRESSION:   1.  Unchanged subpleural pulmonary nodule in the superior segment right lower lobe between the metallic fiducial along the mediastinal pleura. No new or enlarging lung  nodules.  2.  Emphysema.  3.  Severe atheromatous coronary calcifications.     IMPRESSION: No clinical evidence of disease recurrence. CT chest demonstrated post-treatment changes of RLL nodule.      RECOMMENDATIONS:   - RTC in 3 months with CYNTHIA Broussard. CT chest prior to visit.     Aarti Desir NP  Department of Radiation Oncology  [facility]

## 2023-03-13 ENCOUNTER — HOSPITAL ENCOUNTER (OUTPATIENT)
Dept: MRI IMAGING | Facility: HOSPITAL | Age: 63
Discharge: HOME | End: 2023-03-13
Attending: PHYSICAL MEDICINE & REHABILITATION | Admitting: PHYSICAL MEDICINE & REHABILITATION
Payer: MEDICARE

## 2023-03-13 DIAGNOSIS — Z98.1 S/P LUMBAR FUSION: ICD-10-CM

## 2023-03-13 DIAGNOSIS — M51.369 DDD (DEGENERATIVE DISC DISEASE), LUMBAR: ICD-10-CM

## 2023-03-13 DIAGNOSIS — M48.061 FORAMINAL STENOSIS OF LUMBAR REGION: ICD-10-CM

## 2023-03-13 DIAGNOSIS — M79.18 MYOFASCIAL PAIN: ICD-10-CM

## 2023-03-13 DIAGNOSIS — M54.16 LUMBAR RADICULAR PAIN: ICD-10-CM

## 2023-03-13 PROCEDURE — G1010 CDSM STANSON: HCPCS

## 2023-03-15 ENCOUNTER — MYC MEDICAL ADVICE (OUTPATIENT)
Dept: PHYSICAL MEDICINE AND REHAB | Facility: CLINIC | Age: 63
End: 2023-03-15
Payer: MEDICARE

## 2023-03-20 ENCOUNTER — OFFICE VISIT (OUTPATIENT)
Dept: PHYSICAL MEDICINE AND REHAB | Facility: CLINIC | Age: 63
End: 2023-03-20
Payer: MEDICARE

## 2023-03-20 VITALS
WEIGHT: 178 LBS | BODY MASS INDEX: 28.73 KG/M2 | SYSTOLIC BLOOD PRESSURE: 191 MMHG | HEART RATE: 81 BPM | DIASTOLIC BLOOD PRESSURE: 81 MMHG

## 2023-03-20 DIAGNOSIS — M51.369 DDD (DEGENERATIVE DISC DISEASE), LUMBAR: ICD-10-CM

## 2023-03-20 DIAGNOSIS — M54.16 LUMBAR RADICULAR PAIN: Primary | ICD-10-CM

## 2023-03-20 DIAGNOSIS — Z98.1 S/P LUMBAR FUSION: ICD-10-CM

## 2023-03-20 DIAGNOSIS — M48.061 FORAMINAL STENOSIS OF LUMBAR REGION: ICD-10-CM

## 2023-03-20 DIAGNOSIS — M79.18 MYOFASCIAL PAIN: ICD-10-CM

## 2023-03-20 PROCEDURE — 99214 OFFICE O/P EST MOD 30 MIN: CPT | Performed by: PHYSICAL MEDICINE & REHABILITATION

## 2023-03-20 NOTE — PATIENT INSTRUCTIONS
A Lumbar epidural injection  has been ordered today. Please schedule this injection at least 2 weeks from now to allow time for insurance prior authorization. On the day of your injection, you cannot be sick or taking antibiotics. If you become sick and are prescribed, please call the clinic so your injection can be rescheduled for once you have completed your antibiotics. You will need to bring a  with you for your injection. If you have any questions or concerns prior to your injection, please do not hesitate to call the nurse navigation line at [phone].   Continue with the plan of physical therapy

## 2023-03-20 NOTE — PROGRESS NOTES
Assessment/Plan:      Annette was seen today for back pain.    Diagnoses and all orders for this visit:    Lumbar radicular pain  -     Pain Transforaminal Rosanna Inj Lmbscrl 1 Lvl Homer; Future    Foraminal stenosis of lumbar region  -     Pain Transforaminal Rosanna Inj Lmbscrl 1 Lvl Homer; Future    DDD (degenerative disc disease), lumbar  -     Pain Transforaminal Rosanna Inj Lmbscrl 1 Lvl Homer; Future    Myofascial pain  -     Pain Transforaminal Rosanna Inj Lmbscrl 1 Lvl Homer; Future    S/P lumbar fusion  -     Pain Transforaminal Rosanna Inj Lmbscrl 1 Lvl Homer; Future         Assessment: Pleasant 63 year old female with a past medical history of COPD, hyperlipidemia, hypertension, peripheral vascular disease status post femoropopliteal bypass after failed stenting, and polyneuropathy, as well as previous lumbar fusion with:        1.    Persistent severe lumbar spine pain mostly in the gluteal region and sacral region and more significant bilateral lower extremity pain posterior legs to the feet medial thighs to the medial calves.  Status post L4-5 laminectomy and posterior fusion with mild-moderate degenerative changes at adjacent levels to the fusion with moderate foraminal stenosis bilaterally at L5-S1.    She has extensive vascular disease.   she also has a diagnosis of lung cancer over the past year treated with radiation.    Her pain is consistent with  a vascular cause, peripheral polyneuropathy and may have a radicular component related to the foraminal stenosis L5-S1.  Has significant tenderness over the right sacral region may also have SI component.    Start physical therapy in the next 2 to 3 weeks.    2.  Significant myofascial pain in the gluteal region.        Discussion:    1. *I discussed the diagnosis and treatment options.  At this point I do not know if she would tolerate starting physical therapy without some intervention to treat her severe back pain such as an epidural.  Although she has no significant central  stenosis she has moderate foraminal stenosis L5-S1 and some lateral recess stenosis regarding left which is very mild.  We discussed injection options.  She also has vascular component to her pain particularly with the lower extremity symptoms but tells me there is not much else that the vascular provider has to offer her at this point.    2.  Recommend bilateral L5-S1 TFESI.  This will be ordered    3.  Start physical therapy as planned.    4.  Continue duloxetine and gabapentin per ordering provider.    5.  Follow-up with me 1 month after injection.    .  It was our pleasure caring for your patient today, if there any questions or concerns please do not hesitate to contact us.      Subjective:   Patient ID: Annette Shore is a 63 year old female.    History of Present Illness:Patient presents for follow-up of lumbar spine pain bilateral lower extremity pain.  Continues to have severe pain in the low back.  She has had episodes of severe popping over the right PSIS and SI joint severe pain and tenderness to palpation she also has bilateral lower extremity pain posterior legs to the bottom of the feet which waxes and wanes in intensity.  Back pain is worse with bending and back pain is fairly severe.  Also has significant leg pain worse at night.  Pain is better with ice and occasional pressure.  Pain is a 9/10 at worst 4/10 today 0/10 at best.    Has been following with vascular provider and she reports no further treatment options.      Imaging: MRI report and images were personally reviewed and discussed with the patient.  A plastic model was utilized during the discussion.  MRI of the lumbar spine personally reviewed for medical decision-making purposes.  L4-5 laminectomy with posterior fusion.  Mild to moderate adjacent level degenerative changes.  Mild to moderate bilateral foraminal stenosis at L3-4 moderate foraminal stenosis L5-S1 however there is artifact given the instrumentation.    Review of Systems:  Pertinent positives: Leg pain bilaterally.  Pertinent negatives: No paresthesias or weakness.  No bowel or bladder incontinence.  No urinary retention.  No fevers, unintentional weight loss, balance changes, headaches, frequent falling, difficulty swallowing, or coordination difficulties.  All others reviewed are negative.       Past Medical History:   Diagnosis Date     Back pain     low back     COPD (chronic obstructive pulmonary disease) (H)      Depression      Diabetes mellitus (H)      Hyperlipidemia      Hypertension      PAD (peripheral artery disease) (H)      Peripheral neuropathy      Pulmonary nodules        The following portions of the patient's history were reviewed and updated as appropriate: allergies, current medications, past family history, past medical history, past social history, past surgical history and problem list.           Objective:   Physical Exam:    BP (!) 191/81   Pulse 81   Wt 178 lb (80.7 kg)   BMI 28.73 kg/m    Body mass index is 28.73 kg/m .      General: Alert and oriented with normal affect. Attention, knowledge, memory, and language are intact. No acute distress.    CV: No lower extremity edema.  Absent pulses bilateral posterior tibial arteries bilaterally.  Gait:  Nonantalgic  Significant tenderness palpation right PSIS SI joint and gluteal tissues.  Sensation is intact to light touch throughout the  lower extremities.  Reflexes are  2+ patellar and Achilles     Manual muscle testing reveals:  Right /Left out of 5     5/5 hip flexors  5/5 knee flexors  5/5 knee extensors  5/5 ankle plantar flexors  5/5 ankle dorsiflexors  5/5  EHL

## 2023-03-20 NOTE — LETTER
3/20/2023         RE: Annette Shore  [address]  [place] [address]        Dear Colleague,    Thank you for referring your patient, Annette Shore, to the [facility]. Please see a copy of my visit note below.    Assessment/Plan:      Annette was seen today for back pain.    Diagnoses and all orders for this visit:    Lumbar radicular pain  -     Pain Transforaminal Rosanna Inj Lmbscrl 1 Lvl Homer; Future    Foraminal stenosis of lumbar region  -     Pain Transforaminal Rosanna Inj Lmbscrl 1 Lvl Homer; Future    DDD (degenerative disc disease), lumbar  -     Pain Transforaminal Rosanna Inj Lmbscrl 1 Lvl Homer; Future    Myofascial pain  -     Pain Transforaminal Rosanna Inj Lmbscrl 1 Lvl Homer; Future    S/P lumbar fusion  -     Pain Transforaminal Rosanna Inj Lmbscrl 1 Lvl Homer; Future         Assessment: Pleasant 63 year old female with a past medical history of COPD, hyperlipidemia, hypertension, peripheral vascular disease status post femoropopliteal bypass after failed stenting, and polyneuropathy, as well as previous lumbar fusion with:        1.    Persistent severe lumbar spine pain mostly in the gluteal region and sacral region and more significant bilateral lower extremity pain posterior legs to the feet medial thighs to the medial calves.  Status post L4-5 laminectomy and posterior fusion with mild-moderate degenerative changes at adjacent levels to the fusion with moderate foraminal stenosis bilaterally at L5-S1.    She has extensive vascular disease.   she also has a diagnosis of lung cancer over the past year treated with radiation.    Her pain is consistent with  a vascular cause, peripheral polyneuropathy and may have a radicular component related to the foraminal stenosis L5-S1.  Has significant tenderness over the right sacral region may also have SI component.    Start physical therapy in the next 2 to 3 weeks.    2.  Significant myofascial pain in the gluteal  region.        Discussion:    1. *I discussed the diagnosis and treatment options.  At this point I do not know if she would tolerate starting physical therapy without some intervention to treat her severe back pain such as an epidural.  Although she has no significant central stenosis she has moderate foraminal stenosis L5-S1 and some lateral recess stenosis regarding left which is very mild.  We discussed injection options.  She also has vascular component to her pain particularly with the lower extremity symptoms but tells me there is not much else that the vascular provider has to offer her at this point.    2.  Recommend bilateral L5-S1 TFESI.  This will be ordered    3.  Start physical therapy as planned.    4.  Continue duloxetine and gabapentin per ordering provider.    5.  Follow-up with me 1 month after injection.    .  It was our pleasure caring for your patient today, if there any questions or concerns please do not hesitate to contact us.      Subjective:   Patient ID: Annette Shore is a 63 year old female.    History of Present Illness:Patient presents for follow-up of lumbar spine pain bilateral lower extremity pain.  Continues to have severe pain in the low back.  She has had episodes of severe popping over the right PSIS and SI joint severe pain and tenderness to palpation she also has bilateral lower extremity pain posterior legs to the bottom of the feet which waxes and wanes in intensity.  Back pain is worse with bending and back pain is fairly severe.  Also has significant leg pain worse at night.  Pain is better with ice and occasional pressure.  Pain is a 9/10 at worst 4/10 today 0/10 at best.    Has been following with vascular provider and she reports no further treatment options.      Imaging: MRI report and images were personally reviewed and discussed with the patient.  A plastic model was utilized during the discussion.  MRI of the lumbar spine personally reviewed for medical  decision-making purposes.  L4-5 laminectomy with posterior fusion.  Mild to moderate adjacent level degenerative changes.  Mild to moderate bilateral foraminal stenosis at L3-4 moderate foraminal stenosis L5-S1 however there is artifact given the instrumentation.    Review of Systems: Pertinent positives: Leg pain bilaterally.  Pertinent negatives: No paresthesias or weakness.  No bowel or bladder incontinence.  No urinary retention.  No fevers, unintentional weight loss, balance changes, headaches, frequent falling, difficulty swallowing, or coordination difficulties.  All others reviewed are negative.       Past Medical History:   Diagnosis Date     Back pain     low back     COPD (chronic obstructive pulmonary disease) (H)      Depression      Diabetes mellitus (H)      Hyperlipidemia      Hypertension      PAD (peripheral artery disease) (H)      Peripheral neuropathy      Pulmonary nodules        The following portions of the patient's history were reviewed and updated as appropriate: allergies, current medications, past family history, past medical history, past social history, past surgical history and problem list.           Objective:   Physical Exam:    BP (!) 191/81   Pulse 81   Wt 178 lb (80.7 kg)   BMI 28.73 kg/m    Body mass index is 28.73 kg/m .      General: Alert and oriented with normal affect. Attention, knowledge, memory, and language are intact. No acute distress.    CV: No lower extremity edema.  Absent pulses bilateral posterior tibial arteries bilaterally.  Gait:  Nonantalgic  Significant tenderness palpation right PSIS SI joint and gluteal tissues.  Sensation is intact to light touch throughout the  lower extremities.  Reflexes are  2+ patellar and Achilles     Manual muscle testing reveals:  Right /Left out of 5     5/5 hip flexors  5/5 knee flexors  5/5 knee extensors  5/5 ankle plantar flexors  5/5 ankle dorsiflexors  5/5  [facility]        Again, thank you for allowing me to participate in  the care of your patient.        Sincerely,        Alexx Norman, DO

## 2023-03-26 ENCOUNTER — HEALTH MAINTENANCE LETTER (OUTPATIENT)
Age: 63
End: 2023-03-26

## 2023-04-06 ENCOUNTER — RADIOLOGY INJECTION OFFICE VISIT (OUTPATIENT)
Dept: PHYSICAL MEDICINE AND REHAB | Facility: CLINIC | Age: 63
End: 2023-04-06
Attending: PHYSICAL MEDICINE & REHABILITATION
Payer: MEDICARE

## 2023-04-06 VITALS
SYSTOLIC BLOOD PRESSURE: 136 MMHG | OXYGEN SATURATION: 96 % | HEART RATE: 76 BPM | TEMPERATURE: 97.2 F | DIASTOLIC BLOOD PRESSURE: 72 MMHG | RESPIRATION RATE: 16 BRPM

## 2023-04-06 DIAGNOSIS — M79.18 MYOFASCIAL PAIN: ICD-10-CM

## 2023-04-06 DIAGNOSIS — M54.16 LUMBAR RADICULAR PAIN: ICD-10-CM

## 2023-04-06 DIAGNOSIS — M48.061 FORAMINAL STENOSIS OF LUMBAR REGION: ICD-10-CM

## 2023-04-06 DIAGNOSIS — M51.369 DDD (DEGENERATIVE DISC DISEASE), LUMBAR: ICD-10-CM

## 2023-04-06 DIAGNOSIS — Z98.1 S/P LUMBAR FUSION: ICD-10-CM

## 2023-04-06 PROBLEM — Z86.0100 HISTORY OF COLONIC POLYPS: Status: ACTIVE | Noted: 2018-02-06

## 2023-04-06 PROBLEM — K64.9 HEMORRHOIDS: Status: ACTIVE | Noted: 2018-02-07

## 2023-04-06 PROBLEM — F32.2 MAJOR DEPRESSIVE DISORDER, SEVERE (H): Status: ACTIVE | Noted: 2023-04-06

## 2023-04-06 PROCEDURE — 64483 NJX AA&/STRD TFRM EPI L/S 1: CPT | Mod: 50 | Performed by: PAIN MEDICINE

## 2023-04-06 RX ORDER — LIDOCAINE HYDROCHLORIDE 10 MG/ML
INJECTION, SOLUTION EPIDURAL; INFILTRATION; INTRACAUDAL; PERINEURAL
Status: COMPLETED | OUTPATIENT
Start: 2023-04-06 | End: 2023-04-06

## 2023-04-06 RX ORDER — DEXAMETHASONE SODIUM PHOSPHATE 10 MG/ML
INJECTION, SOLUTION INTRAMUSCULAR; INTRAVENOUS
Status: COMPLETED | OUTPATIENT
Start: 2023-04-06 | End: 2023-04-06

## 2023-04-06 RX ADMIN — LIDOCAINE HYDROCHLORIDE 4 ML: 10 INJECTION, SOLUTION EPIDURAL; INFILTRATION; INTRACAUDAL; PERINEURAL at 09:11

## 2023-04-06 RX ADMIN — DEXAMETHASONE SODIUM PHOSPHATE 20 MG: 10 INJECTION, SOLUTION INTRAMUSCULAR; INTRAVENOUS at 09:12

## 2023-04-06 ASSESSMENT — PAIN SCALES - GENERAL
PAINLEVEL: SEVERE PAIN (7)
PAINLEVEL: NO PAIN (0)

## 2023-04-06 NOTE — PATIENT INSTRUCTIONS
DISCHARGE INSTRUCTIONS    During office hours (8:00 a.m.- 4:00 p.m.) questions or concerns may be answered  by calling Spine Center Navigation Nurses at  [phone].  Messages received after hours will be returned the following business day.      In the case of an emergency, please dial 911 or seek assistance at the nearest Emergency Room/Urgent Care facility.     All Patients:    You may experience an increase in your symptoms for the first 2 days (It may take anywhere between 2 days- 2 weeks for the steroid to have maximum effect).    You may use ice on the injection site, as frequently as 20 minutes each hour if needed.    You may take your pain medicine.    You may continue taking your regular medication after your injection. If you have had a Medial Branch Block you may resume pain medication once your pain diary is completed.    You may shower. No swimming, tub bath or hot tub for 48 hours.  You may remove your bandaid/bandage as soon as you are home.    You may resume light activities, as tolerated.    Resume your usual diet as tolerated.    It is strongly advised that you do not drive for 1-3 hours post injection.    If you have had oral sedation:  Do not drive for 8 hours post injection.      If you have had IV sedation:  Do not drive for 24 hours post injection.  Do not operate hazardous machinery or make important personal/business decisions for 24 hours.      POSSIBLE STEROID SIDE EFFECTS (If steroid/cortisone was used for your procedure)    -If you experience these symptoms, it should only last for a short period    Swelling of the legs              Skin redness (flushing)     Mouth (oral) irritation   Blood sugar (glucose) levels            Sweats                    Mood changes  Headache  Sleeplessness  Weakened immune system for up to 14 days, which could increase the risk of natividad the COVID-19 virus and/or experiencing more severe symptoms of the disease, if exposed.  Decreased  effectiveness of the flu vaccine if given within 2 weeks of the steroid.         POSSIBLE PROCEDURE SIDE EFFECTS  -Call the Spine Center if you are concerned  Increased Pain           Increased numbness/tingling      Nausea/Vomiting          Bruising/bleeding at site      Redness or swelling                                              Difficulty walking      Weakness           Fever greater than 100.5    *In the event of a severe headache after an epidural steroid injection that is relieved by lying down, please call the [facility] to speak with a clinical staff member*

## 2023-05-08 ENCOUNTER — HOSPITAL ENCOUNTER (OUTPATIENT)
Dept: PHYSICAL THERAPY | Facility: REHABILITATION | Age: 63
Discharge: HOME | End: 2023-05-08
Attending: PHYSICAL MEDICINE & REHABILITATION
Payer: MEDICARE

## 2023-05-08 ENCOUNTER — HOSPITAL ENCOUNTER (OUTPATIENT)
Dept: CT IMAGING | Facility: HOSPITAL | Age: 63
Discharge: HOME | End: 2023-05-08
Attending: NURSE PRACTITIONER | Admitting: NURSE PRACTITIONER
Payer: MEDICARE

## 2023-05-08 DIAGNOSIS — M48.061 FORAMINAL STENOSIS OF LUMBAR REGION: ICD-10-CM

## 2023-05-08 DIAGNOSIS — M54.16 LUMBAR RADICULAR PAIN: ICD-10-CM

## 2023-05-08 DIAGNOSIS — M51.369 DDD (DEGENERATIVE DISC DISEASE), LUMBAR: ICD-10-CM

## 2023-05-08 DIAGNOSIS — Z98.1 S/P LUMBAR FUSION: ICD-10-CM

## 2023-05-08 DIAGNOSIS — R91.1 LUNG NODULE: ICD-10-CM

## 2023-05-08 DIAGNOSIS — M79.18 MYOFASCIAL PAIN: ICD-10-CM

## 2023-05-08 PROCEDURE — 97140 MANUAL THERAPY 1/> REGIONS: CPT | Mod: GP | Performed by: PHYSICAL THERAPIST

## 2023-05-08 PROCEDURE — 97110 THERAPEUTIC EXERCISES: CPT | Mod: GP | Performed by: PHYSICAL THERAPIST

## 2023-05-08 PROCEDURE — 97161 PT EVAL LOW COMPLEX 20 MIN: CPT | Mod: GP | Performed by: PHYSICAL THERAPIST

## 2023-05-08 PROCEDURE — G1010 CDSM STANSON: HCPCS

## 2023-05-09 NOTE — PROGRESS NOTES
[facility]    OUTPATIENT PHYSICAL THERAPY ORTHOPEDIC EVALUATION  PLAN OF TREATMENT FOR OUTPATIENT REHABILITATION  (COMPLETE FOR INITIAL CLAIMS ONLY)  Patient's Last Name, First Name, M.I.  YOB: 1960  Annette Shore    Provider s Name:  [facility]   Medical Record No.  [id number]   Start of Care Date:  05/08/23   Onset Date:  02/06/23 (LBP over 20 years)   Type:     _X__PT   ___OT   ___SLP Medical Diagnosis:  Foraminal lumbar stenosis, Myofascial pain, Lumbar radicular pain, s/p lumbar fusion     PT Diagnosis:  Foraminal stenosis of lumbar region, Lumbar radicular pain, Lubar DDD, myofascial pain, s/p lumbar fusion,   Visits from SOC:  1      _________________________________________________________________________________  Plan of Treatment/Functional Goals:  ADL retraining, balance training, joint mobilization, manual therapy, neuromuscular re-education, strengthening, stretching           Goals  Goal Identifier: limp  Goal Description: No limp when walking 5 minutes  Target Date: 08/08/23    Goal Identifier: stand  Goal Description: Pt able to stand 5 min with 0-4/10 LBP  Target Date: 08/08/23           Therapy Frequency:  1 time/week  Predicted Duration of Therapy Intervention:  12 weeks, access issue    Corrie Hernandez PT                 I CERTIFY THE NEED FOR THESE SERVICES FURNISHED UNDER        THIS PLAN OF TREATMENT AND WHILE UNDER MY CARE     (Physician co-signature of this document indicates review and certification of the therapy plan).                     Certification Date From:  05/08/23   Certification Date To:  08/08/23    Referring Provider:  Dr. Alexx Norman    Initial Assessment        See Epic Evaluation Start of Care Date: 05/08/23 05/08/23 0800   General Information   Type of Visit Initial OP Ortho PT Evaluation   Start of Care Date  05/08/23   Referring Physician Dr. Alexx Norman   Patient/Family Goals Statement Decrease limp when walking   Orders Evaluate and Treat   Orders Comment Myofascial Release- Corrie   Date of Order 02/06/23   Certification Required? Yes   Medical Diagnosis Foraminal stenosis of lumbar region, Lumbar radicular pain, Lumbar DDD, myofascial pain, s/p lumbar fusion   Surgical/Medical history reviewed Yes   Precautions/Limitations other (see comments)  (see history- Lung CA 2022 with radiation)       Present No   Body Part(s)   Body Part(s) Lumbar Spine/SI   Presentation and Etiology   Pertinent history of current problem (include personal factors and/or comorbidities that impact the POC) Per pt, pain is different since Lumbar spine injections 4/6/23. Pain is at center on sacrum, radiates to right sacrum. Pain shoots down both buttocks and down both back thighs, and pain along inner thighs (1 or both) all to the knees- electrical feeling- less intensity since injection. Pt reports she also feels pain/dysesthesia like a bump in ball of foot, and feet feel burning and cold. Abdominal pain - feels raw and painful, comes and goes (eating has no impact). She has severe constipation and has squatty potty stool. Sometimes she bleeds after BM due to fissures.   Impairments A. Pain;B. Decreased WB tolerance;D. Decreased ROM;E. Decreased flexibility;F. Decreased strength and endurance;H. Impaired gait;J. Burning;L. Tingling;N. Headaches;P. Bowel or bladder problems   Symptom Location Sacrum especially right side and pain to both gluteals, down both post and inner thighs to knees.PN both feet which feel burning and cold. Pt also reports HA behind eyes and top of the head.   How/Where did it occur From insidious onset   Onset date of current episode/exacerbation 02/06/23  (LBP over 20 years)   Chronicity Chronic   Pain rating (0-10 point scale) Best (/10);Worst (/10)   Best (/10) 1  (Low level ache)   Worst (/10)  9   Pain quality A. Sharp;C. Aching;D. Burning;E. Shooting;F. Stabbing;G. Cramping;H. Other   Pain quality comment electrical pain.   Frequency of pain/symptoms B. Intermittent  (since injection is intermittant and more mild since injection)   Pain/symptoms are: Worse during the night   Pain/symptoms exacerbated by I. Bending;B. Walking;C. Lifting;M. Other   Pain exacerbation comment standing more than 5 min to wash dishes, laundry, BM 2x/wk and takes 10 min to 2 hours to achieve., fatigue from pain. sometimes sitting and sometimes laying   Pain/symptoms eased by D. Nothing  (has tried ice, stretching, self massage all are no help, hot baths (did help))   Progression of symptoms since onset: Worsened  (Pt feels injection has helped and would like to improve from this point- is tired of hurting)   Current / Previous Interventions   Diagnostic Tests: MRI   MRI Results Results   MRI results MRI 3/13/23 IMPRESSION: 1. Status post L4-L5 laminectomy and posterior spinal instrumented fusion with fusion across the disc space, similar to the 01/26/2023 CT study. 2. At L2-L3, there is mild to moderate right lateral recess stenosis. 3. At L3-L4, there is mild to moderate bilateral neural foraminal stenosis. 4. At L5-S1, there is moderate right and mild left lateral recess stenosis. The bilateral neural foramina are not clearly delineated secondary to susceptibility artifact. However, within this constraint, there is likely moderate bilateral neural foraminal stenosis, similar to 01/26/2023.   Current Level of Function   Living environment [place]  (2 kids occasionally help, SO [place])   Current equipment-Gait/Locomotion Front wheeled walker;Walker;Wheelchair;Axillary crutches;Standard cane;Quad cane  (Has devices and doesn't use them most of the time)   Current equipment-ADL Shower/tub chair   Fall Risk Screen   Fall screen completed by PT   Have you fallen 2 or more times in the past year? Yes  (Pt was walking in her  kitchen- fell- she doesnot know why she fell. Denies dizziness- this has not happened.)   Have you fallen and had an injury in the past year? No   Is patient a fall risk? No  (Possibly at times, if this is related to sudden weakness due to spine, even walkers would not have helped.)   Abuse Screen (yes response referral indicated)   Feels Unsafe at Home or Work/School no   Feels Threatened by Someone no   Does Anyone Try to Keep You From Having Contact with Others or Doing Things Outside Your Home? no   Physical Signs of Abuse Present no   System Outcome Measures   Outcome Measures Low Back Pain (see Oswestry and [place])  (Oswestry 54% today)   Lumbar Spine/SI Objective Findings   Gait/Locomotion No noticable limp today   Flexion ROM 12 inches fingertips to floor with hamstring stretch bilat   Extension ROM 50% normal in standing   Ankle Dorsiflexion (L4) Strength 5 both feet   Great Toe Extension (L5) Strength 5 both feet   Palpation Cranial scan is + for fascial dysfunction in: PLL/LF of lumbar sline, All 3 lumbar veins (SVE/SVF, epidurals), viscera (left ant/pst pelvic and right ant/post chest), arterial and neural but not defined.   Observation Pt moving readily from sit to stand   Integumentary Slight fullness in abdomen but otherwise appears normal weight. Bilat LE show venous /purple dusky color bilaterally in feet and ankles.   Posture Slight thoracic kyphosis, Pelvis and hips level in standing   Sensation Testing Present to light touch bilat but sensitive to touch both ant shins- feels like bones are broken in the legs and feet   Patellar Tendon Reflexes  Present bilat and brisk   Achilles Tendon Reflexes Present but slow bilat   Planned Therapy Interventions   Planned Therapy Interventions ADL retraining;balance training;joint mobilization;manual therapy;neuromuscular re-education;strengthening;stretching   Clinical Impression   Criteria for Skilled Therapeutic Interventions Met yes, treatment indicated    PT Diagnosis Foraminal stenosis of lumbar region, Lumbar radicular pain, Lubar DDD, myofascial pain, s/p lumbar fusion,   Influenced by the following impairments Pain, tolerance to ADL's due to pain,   Functional limitations due to impairments Pain with all ADL's bending, walking, sitting, sleeping, defecating   Clinical Presentation Stable/Uncomplicated   Clinical Decision Making (Complexity) Moderate complexity  (Has not responded to exercise and required very specific manual therapy skills)   Therapy Frequency 1 time/week   Predicted Duration of Therapy Intervention (days/wks) 12 weeks, access issue   Risk & Benefits of therapy have been explained Yes   Patient, Family & other staff in agreement with plan of care Yes   Clinical Impression Comments Annette is a 63 year old female referred to use with diagnsosis of Lumbar and gluteal pain s/p lumbar fusion for myofascial release. Pt is a previous pt for this same service in 2021 with good response. PMH:Lung CA 2022 mayela radiation, COPD, Depression, DM, HTN, Peripheral neuropathy, Hysteroscopy w/endometrial ablation, Aorta-femoral bypass graft after failed stenting, PVD, s/p lumbar fusion L4-5. Previous PT 2021 with previous lumbar epidural injections   Education Assessment   Preferred Learning Style Listening;Reading;Demonstration;Pictures/video   Barriers to Learning No barriers   ORTHO GOALS   PT Ortho Eval Goals 1;2   Ortho Goal 1   Goal Identifier limp   Goal Description No limp when walking 5 minutes   Target Date 08/08/23   Ortho Goal 2   Goal Identifier stand   Goal Description Pt able to stand 5 min with 0-4/10 LBP   Target Date 08/08/23   Total Evaluation Time   PT Eval, Low Complexity Minutes (18359) 30   Therapy Certification   Certification date from 05/08/23   Certification date to 08/08/23   Medical Diagnosis Foraminal lumbar stenosis, Myofascial pain, Lumbar radicular pain, s/p lumbar fusion

## 2023-05-11 ENCOUNTER — VIRTUAL VISIT (OUTPATIENT)
Dept: RADIATION ONCOLOGY | Facility: CLINIC | Age: 63
End: 2023-05-11
Attending: RADIOLOGY
Payer: MEDICARE

## 2023-05-11 DIAGNOSIS — R91.1 LUNG NODULE: Primary | ICD-10-CM

## 2023-05-11 PROCEDURE — 99213 OFFICE O/P EST LOW 20 MIN: CPT | Mod: VID | Performed by: NURSE PRACTITIONER

## 2023-05-11 PROCEDURE — G0463 HOSPITAL OUTPT CLINIC VISIT: HCPCS | Mod: PN,GT | Performed by: NURSE PRACTITIONER

## 2023-05-11 NOTE — PROGRESS NOTES
Department of Radiation Oncology  [facility]  [address] [place] [address]  [phone]     Follow-up Note  May 11, 2023    Annette Shore  MRN: [id number]  : 1960    DISEASE TREATED: Presumed NSCLC of the RLL, [id number], stage IA2    INTERVAL SINCE COMPLETION OF RADIATION THERAPY: 9 months (completed 22)     RADIATION THERAPY DELIVERED: RLL nodule, 50 Gy in 5 fractions, SBRT           SUBJECTIVE:  Annette Shore is a 62-year-old female longtime smoker with history of COPD who was found to have a right lower lobe nodule highly concerning for lung cancer. This was discovered on low-dose screening lung CT (22), located in the paraspinal region and measured 1.6 cm. Staging PET/CT (22) demonstrated low-level FDG uptake associated with this nodule (SUVmax=2.2) and no other findings. She also had a brain MRI (22) for workup of headache and it was unrevealing. She completed mediastinal staging with bronchoscopy and EBUS (22). FNA of a 4R node and a 11R node were negative. She is not a surgical candidate and underwent ablative radiotherapy as above.    Today, the patient states she is in her usual state of health. No new cough or shortness of breath.  She does have an occasional cough but not all the time.  She does have quite a bit of shortness of breath going up stairs.  She is a current smoker about 1 pack a day.  She does have a lot of other health concerns with degenerative disc disease and peripheral arterial disease.    OBJECTIVE:   There were no vitals taken for this visit.  GENERAL:  Appears well, in no acute distress.   SKIN: No rashes seen on exposed skin.     CT Chest 2023:                                                                   IMPRESSION:   1.  Unchanged size of right lower lobe nodule status post radiation therapy. No new or worsening disease.     2.  New groundglass opacities inferior to the nodule, likely infectious or  inflammatory. Attention on follow-up.       IMPRESSION: No clinical evidence of disease recurrence. CT chest demonstrated post-treatment changes of RLL nodule.      RECOMMENDATIONS:   - RTC in 3 months with me. CT chest prior to visit.     Virtual Visit Details    Type of service:  Video Visit   Joined the call at 5/11/2023, 10:34:02?am.  Left the call at 5/11/2023, 10:45:05?am.  You were on the call for 11 minutes 2 seconds .    Originating Location (pt. Location): Home  Distant Location (provider location):  On-site  Platform used for Video Visit: Basilio Desir NP  Department of Radiation Oncology  [facility]

## 2023-05-11 NOTE — LETTER
2023         RE: Annette Shore   [address]  [place] [address]        Dear Colleague,    Thank you for referring your patient, Annette Shore, to the [facility] RADIATION ONCOLOGY. Please see a copy of my visit note below.    Department of Radiation Oncology  [facility]  [address] [place] [address]  [phone]     Follow-up Note  May 11, 2023    Annette Shore  MRN: [id number]  : 1960    DISEASE TREATED: Presumed NSCLC of the RLL, [id number], stage IA2    INTERVAL SINCE COMPLETION OF RADIATION THERAPY: 9 months (completed 22)     RADIATION THERAPY DELIVERED: RLL nodule, 50 Gy in 5 fractions, SBRT           SUBJECTIVE:  Annette Shore is a 62-year-old female longtime smoker with history of COPD who was found to have a right lower lobe nodule highly concerning for lung cancer. This was discovered on low-dose screening lung CT (22), located in the paraspinal region and measured 1.6 cm. Staging PET/CT (22) demonstrated low-level FDG uptake associated with this nodule (SUVmax=2.2) and no other findings. She also had a brain MRI (22) for workup of headache and it was unrevealing. She completed mediastinal staging with bronchoscopy and EBUS (22). FNA of a 4R node and a 11R node were negative. She is not a surgical candidate and underwent ablative radiotherapy as above.    Today, the patient states she is in her usual state of health. No new cough or shortness of breath.  She does have an occasional cough but not all the time.  She does have quite a bit of shortness of breath going up stairs.  She is a current smoker about 1 pack a day.  She does have a lot of other health concerns with degenerative disc disease and peripheral arterial disease.    OBJECTIVE:   There were no vitals taken for this visit.  GENERAL:  Appears well, in no acute distress.   SKIN: No rashes seen on exposed skin.     CT Chest 2023:                                                                    IMPRESSION:   1.  Unchanged size of right lower lobe nodule status post radiation therapy. No new or worsening disease.     2.  New groundglass opacities inferior to the nodule, likely infectious or inflammatory. Attention on follow-up.       IMPRESSION: No clinical evidence of disease recurrence. CT chest demonstrated post-treatment changes of RLL nodule.      RECOMMENDATIONS:   - RTC in 3 months with me. CT chest prior to visit.     Virtual Visit Details    Type of service:  Video Visit   Joined the call at 5/11/2023, 10:34:02?am.  Left the call at 5/11/2023, 10:45:05?am.  You were on the call for 11 minutes 2 seconds .    Originating Location (pt. Location): Home  Distant Location (provider location):  On-site  Platform used for Video Visit: Basilio Desir NP  Department of Radiation Oncology  [facility]

## 2023-05-15 ENCOUNTER — OFFICE VISIT (OUTPATIENT)
Dept: PHYSICAL MEDICINE AND REHAB | Facility: CLINIC | Age: 63
End: 2023-05-15
Payer: MEDICARE

## 2023-05-15 ENCOUNTER — HOSPITAL ENCOUNTER (OUTPATIENT)
Dept: PHYSICAL THERAPY | Facility: REHABILITATION | Age: 63
Discharge: HOME | End: 2023-05-15
Attending: PHYSICAL MEDICINE & REHABILITATION
Payer: MEDICARE

## 2023-05-15 VITALS — SYSTOLIC BLOOD PRESSURE: 180 MMHG | DIASTOLIC BLOOD PRESSURE: 82 MMHG | HEART RATE: 77 BPM

## 2023-05-15 DIAGNOSIS — M79.662 PAIN OF LEFT LOWER LEG: ICD-10-CM

## 2023-05-15 DIAGNOSIS — G89.29 CHRONIC RIGHT-SIDED LOW BACK PAIN WITHOUT SCIATICA: ICD-10-CM

## 2023-05-15 DIAGNOSIS — Z98.1 S/P LUMBAR FUSION: ICD-10-CM

## 2023-05-15 DIAGNOSIS — M54.2 CERVICAL SPINE PAIN: ICD-10-CM

## 2023-05-15 DIAGNOSIS — M79.18 MYOFASCIAL PAIN: ICD-10-CM

## 2023-05-15 DIAGNOSIS — M54.50 CHRONIC RIGHT-SIDED LOW BACK PAIN WITHOUT SCIATICA: ICD-10-CM

## 2023-05-15 DIAGNOSIS — M79.671 BILATERAL FOOT PAIN: ICD-10-CM

## 2023-05-15 DIAGNOSIS — K59.00 CONSTIPATION, UNSPECIFIED CONSTIPATION TYPE: ICD-10-CM

## 2023-05-15 DIAGNOSIS — M54.50 LUMBAR SPINE PAIN: Primary | ICD-10-CM

## 2023-05-15 DIAGNOSIS — M54.16 LUMBAR RADICULAR PAIN: ICD-10-CM

## 2023-05-15 DIAGNOSIS — G89.29 UPPER BACK PAIN, CHRONIC: ICD-10-CM

## 2023-05-15 DIAGNOSIS — M54.9 UPPER BACK PAIN, CHRONIC: ICD-10-CM

## 2023-05-15 DIAGNOSIS — M48.061 FORAMINAL STENOSIS OF LUMBAR REGION: ICD-10-CM

## 2023-05-15 DIAGNOSIS — R29.2 HYPER REFLEXIA: ICD-10-CM

## 2023-05-15 DIAGNOSIS — M79.672 BILATERAL FOOT PAIN: ICD-10-CM

## 2023-05-15 DIAGNOSIS — M47.816 LUMBAR FACET ARTHROPATHY: ICD-10-CM

## 2023-05-15 DIAGNOSIS — R25.2 HAND CRAMP: ICD-10-CM

## 2023-05-15 DIAGNOSIS — M79.18 MYOFASCIAL PAIN SYNDROME: ICD-10-CM

## 2023-05-15 DIAGNOSIS — I73.9 PVD (PERIPHERAL VASCULAR DISEASE) (H): ICD-10-CM

## 2023-05-15 DIAGNOSIS — M51.369 DDD (DEGENERATIVE DISC DISEASE), LUMBAR: ICD-10-CM

## 2023-05-15 DIAGNOSIS — M48.061 FORAMINAL STENOSIS OF LUMBAR REGION: Primary | ICD-10-CM

## 2023-05-15 PROCEDURE — 99215 OFFICE O/P EST HI 40 MIN: CPT | Performed by: PHYSICAL MEDICINE & REHABILITATION

## 2023-05-15 PROCEDURE — 97140 MANUAL THERAPY 1/> REGIONS: CPT | Mod: GP | Performed by: PHYSICAL THERAPIST

## 2023-05-15 ASSESSMENT — PAIN SCALES - GENERAL: PAINLEVEL: NO PAIN (1)

## 2023-05-15 NOTE — LETTER
5/15/2023         RE: Annette Shore  [address]  [place] [address]        Dear Colleague,    Thank you for referring your patient, Annette Shore, to the [facility] SP[facility]UR[facility]URGERY. Please see a copy of my visit note below.    Assessment/Plan:      Annette was seen today for back pain.    Diagnoses and all orders for this visit:    Lumbar spine pain    Lumbar radicular pain    Foraminal stenosis of lumbar region    DDD (degenerative disc disease), lumbar    Lumbar facet arthropathy    S/P lumbar fusion    Myofascial pain    Constipation, unspecified constipation type    Hand cramp  -     MR Cervical Spine w/o Contrast; Future    Cervical spine pain  -     MR Cervical Spine w/o Contrast; Future    Hyper reflexia  -     MR Cervical Spine w/o Contrast; Future         Assessment: Pleasant 63 year old female with a past medical history of COPD, hyperlipidemia, hypertension, peripheral vascular disease status post femoropopliteal bypass after failed stenting, and polyneuropathy, as well as previous lumbar fusion with:        1.  Improvement of severe lumbar spine pain mostly in the gluteal region and sacral region and more significant bilateral lower extremity pain posterior legs to the feet medial thighs to the medial calves.  Status post L4-5 laminectomy and posterior fusion with mild-moderate degenerative changes at adjacent levels to the fusion with moderate foraminal stenosis bilaterally at L5-S1.    She has extensive vascular disease.   she also has a diagnosis of lung cancer over the past year treated with radiation.    Her pain is consistent with  a vascular cause, peripheral polyneuropathy and may have a radicular component related to the foraminal stenosis L5-S1.    Has also had significant tenderness over the right sacral region may also have SI component.     Up to 50% improvement following bilateral L5-S1 TFESI.  She finds this very effective particularly of the lower extremity  pain.  Continues to have low back pain.  Recently started on gabapentin per Dr. De La Garza's and neurology.  Finds physical therapy helpful and continues to be participatory.  A significant portion of her lower extremity dysesthesias are likely related to peripheral polyneuropathy in combination with vascular disease.     2.  Constipation.  This likely contributes to some of her low back pain significant     3.  Myofascial pain in the gluteal region.     4.  Cervical spine pain with left arm paresthesias and hyperreflexia of the left upper extremity.  This is accompanied by recent  episodes of urge incontinence.  Need evaluate for cervical myelopathy.  She also has cramping of her hands.       Discussion:    1 I discussed the diagnosis and treatment options.  I discussed options of continuing therapy, further injections for the lumbar spine, medications and further imaging.    2.  Continue physical therapy for the lumbar spine.    3.  She is not interested in further interim mention such as medial branch blocks or facet injections for the low back at this time.    4.  Continue with gabapentin per neurology    5.  MRI of the cervical spine evaluate for cervical myelopathy/cervical stenosis contributing to her upper extremity symptoms and urinary symptoms.    6.  Follow-up with me as needed if her lumbar spine pain worsens.  We will reach out over [facility] with further recommendations after imaging.    It was our pleasure caring for your patient today, if there any questions or concerns please do not hesitate to contact us.    Over 40 minutes were spent on the date of the encounter performing chart review, patient visit and documentation in addition to any procedure.    Subjective:   Patient ID: Annette Shore is a 63 year old female.    History of Present Illness:Patient presents for follow-up of lumbar spine pain with bilateral lower extremity pain right greater than left with dysesthesias of the feet.  Status post  bilateral L5-S1 TFESI.  Her leg symptoms and buttock symptoms have improved about 50% which is quite significant for her.  She states that she had 2 days of very good relief and laid in bed as she wanted to experience the time without any pain and make the most of it.  She has remained about 50% improved overall with regards to the back and leg symptoms but still has dysesthesias of the feet which is very painful of the toes as well as some low back pain worse with bending, twisting, lifting, walking, sitting or standing for too long.  Better with ice and heat.  Pain is a 9/10 at worst 1/10 today 0/10 at best.  Still working with physical therapy doing well with manual treatments.    She has had some constipation and notes increased back pain while constipated wondering what she can do for that.  She also has had some episodes of urinary incontinence which are urge type incontinence wondering if that is from the lumbar spine.  She has had some cervical spine pain as well and some intermittent left arm paresthesias.  Following with oncology for her cancer treatment and monitoring her lung cancer.  She has been seen by neurology.  Was seen by Dr. Alvaress at [facility] and was recently started on gabapentin again.      Imaging: I personally reviewed MRI lumbar spine from March 2013 for medical decision-making purposes.  Status post L4-5 fusion.  Degenerative changes adjacent L3-4 and L5-S1.  Mild central stenosis L1-2 L2-3 L3-4 no high-grade spinal cord compression there is some mild lateral recess stenosis as well and moderate foraminal stenosis L5-S1.  Bilaterally.    MRI of the head from May 2023 report was reviewed no acute infarcts or masses.      Review of Systems: Pertinent positives: Multiple positive review of systems including paresthesias of the feet and left hand.  She has bowel and bladder incontinence a couple of times along with headaches, poor balance, difficulty with coordination and unintentional  weight loss.  Denies swallowing difficulties and fevers.        Past Medical History:   Diagnosis Date     Back pain     low back     COPD (chronic obstructive pulmonary disease) (H)      Depression      Diabetes mellitus (H)      Hyperlipidemia      Hypertension      PAD (peripheral artery disease) (H)      Peripheral neuropathy      Pulmonary nodules        The following portions of the patient's history were reviewed and updated as appropriate: allergies, current medications, past family history, past medical history, past social history, past surgical history and problem list.           Objective:   Physical Exam:    BP (!) 180/82   Pulse 77   There is no height or weight on file to calculate BMI.      General: Alert and oriented with normal affect. Attention, knowledge, memory, and language are intact. No acute distress.   Eyes: Sclerae are clear.  Respirations: Unlabored. CV: No lower extremity edema.  Skin: No rashes seen.    Gait:  Nonantalgic, cautious.    Sensation is intact to light touch throughout the   lower extremities.  Reflexes are 1+ patellar and Achilles   Manual muscle testing reveals:  Right /Left out of 5     5/5 hip flexors  5/5 knee flexors  5/5 knee extensors  5/5 ankle plantar flexors  5/5 ankle dorsiflexors  5/5  EHL        Again, thank you for allowing me to participate in the care of your patient.        Sincerely,        Alexx Norman DO

## 2023-05-15 NOTE — PATIENT INSTRUCTIONS
Continue with physical therapy  Continue with trial of gabapentin per Neurology  You can try a 1/2-1 cap full of Mirlax daily  4.  An MRI was ordered for you today.  You will be contacted by scheduling within 3 days.    If you are not contacted, please call Radiology at [phone].

## 2023-05-15 NOTE — PROGRESS NOTES
Assessment/Plan:      Annette was seen today for back pain.    Diagnoses and all orders for this visit:    Lumbar spine pain    Lumbar radicular pain    Foraminal stenosis of lumbar region    DDD (degenerative disc disease), lumbar    Lumbar facet arthropathy    S/P lumbar fusion    Myofascial pain    Constipation, unspecified constipation type    Hand cramp  -     MR Cervical Spine w/o Contrast; Future    Cervical spine pain  -     MR Cervical Spine w/o Contrast; Future    Hyper reflexia  -     MR Cervical Spine w/o Contrast; Future         Assessment: Pleasant 63 year old female with a past medical history of COPD, hyperlipidemia, hypertension, peripheral vascular disease status post femoropopliteal bypass after failed stenting, and polyneuropathy, as well as previous lumbar fusion with:        1.  Improvement of severe lumbar spine pain mostly in the gluteal region and sacral region and more significant bilateral lower extremity pain posterior legs to the feet medial thighs to the medial calves.  Status post L4-5 laminectomy and posterior fusion with mild-moderate degenerative changes at adjacent levels to the fusion with moderate foraminal stenosis bilaterally at L5-S1.    She has extensive vascular disease.   she also has a diagnosis of lung cancer over the past year treated with radiation.    Her pain is consistent with  a vascular cause, peripheral polyneuropathy and may have a radicular component related to the foraminal stenosis L5-S1.    Has also had significant tenderness over the right sacral region may also have SI component.     Up to 50% improvement following bilateral L5-S1 TFESI.  She finds this very effective particularly of the lower extremity pain.  Continues to have low back pain.  Recently started on gabapentin per Dr. De La Garza's and neurology.  Finds physical therapy helpful and continues to be participatory.  A significant portion of her lower extremity dysesthesias are likely related to peripheral  polyneuropathy in combination with vascular disease.     2.  Constipation.  This likely contributes to some of her low back pain significant     3.  Myofascial pain in the gluteal region.     4.  Cervical spine pain with left arm paresthesias and hyperreflexia of the left upper extremity.  This is accompanied by recent  episodes of urge incontinence.  Need evaluate for cervical myelopathy.  She also has cramping of her hands.       Discussion:    1 I discussed the diagnosis and treatment options.  I discussed options of continuing therapy, further injections for the lumbar spine, medications and further imaging.    2.  Continue physical therapy for the lumbar spine.    3.  She is not interested in further interim mention such as medial branch blocks or facet injections for the low back at this time.    4.  Continue with gabapentin per neurology    5.  MRI of the cervical spine evaluate for cervical myelopathy/cervical stenosis contributing to her upper extremity symptoms and urinary symptoms.    6.  Follow-up with me as needed if her lumbar spine pain worsens.  [facility] with further recommendations after imaging.    It was our pleasure caring for your patient today, if there any questions or concerns please do not hesitate to contact us.    Over 40 minutes were spent on the date of the encounter performing chart review, patient visit and documentation in addition to any procedure.    Subjective:   Patient ID: Annette Shore is a 63 year old female.    History of Present Illness:Patient presents for follow-up of lumbar spine pain with bilateral lower extremity pain right greater than left with dysesthesias of the feet.  Status post bilateral L5-S1 TFESI.  Her leg symptoms and buttock symptoms have improved about 50% which is quite significant for her.  She states that she had 2 days of very good relief and laid in bed as she wanted to experience the time without any pain and make the most of it.   She has remained about 50% improved overall with regards to the back and leg symptoms but still has dysesthesias of the feet which is very painful of the toes as well as some low back pain worse with bending, twisting, lifting, walking, sitting or standing for too long.  Better with ice and heat.  Pain is a 9/10 at worst 1/10 today 0/10 at best.  Still working with physical therapy doing well with manual treatments.    She has had some constipation and notes increased back pain while constipated wondering what she can do for that.  She also has had some episodes of urinary incontinence which are urge type incontinence wondering if that is from the lumbar spine.  She has had some cervical spine pain as well and some intermittent left arm paresthesias.  Following with oncology for her cancer treatment and monitoring her lung cancer.  She has been seen by neurology.  Was seen by Dr. Alvaress at [facility] and was recently started on gabapentin again.      Imaging: I personally reviewed MRI lumbar spine from March 2013 for medical decision-making purposes.  Status post L4-5 fusion.  Degenerative changes adjacent L3-4 and L5-S1.  Mild central stenosis L1-2 L2-3 L3-4 no high-grade spinal cord compression there is some mild lateral recess stenosis as well and moderate foraminal stenosis L5-S1.  Bilaterally.    MRI of the head from May 2023 report was reviewed no acute infarcts or masses.      Review of Systems: Pertinent positives: Multiple positive review of systems including paresthesias of the feet and left hand.  She has bowel and bladder incontinence a couple of times along with headaches, poor balance, difficulty with coordination and unintentional weight loss.  Denies swallowing difficulties and fevers.        Past Medical History:   Diagnosis Date     Back pain     low back     COPD (chronic obstructive pulmonary disease) (H)      Depression      Diabetes mellitus (H)      Hyperlipidemia      Hypertension       PAD (peripheral artery disease) (H)      Peripheral neuropathy      Pulmonary nodules        The following portions of the patient's history were reviewed and updated as appropriate: allergies, current medications, past family history, past medical history, past social history, past surgical history and problem list.           Objective:   Physical Exam:    BP (!) 180/82   Pulse 77   There is no height or weight on file to calculate BMI.      General: Alert and oriented with normal affect. Attention, knowledge, memory, and language are intact. No acute distress.   Eyes: Sclerae are clear.  Respirations: Unlabored. CV: No lower extremity edema.  Skin: No rashes seen.    Gait:  Nonantalgic, cautious.    Sensation is intact to light touch throughout the   lower extremities.  Reflexes are 1+ patellar and Achilles   Manual muscle testing reveals:  Right /Left out of 5     5/5 hip flexors  5/5 knee flexors  5/5 knee extensors  5/5 ankle plantar flexors  5/5 ankle dorsiflexors  5/5  EHL

## 2023-05-19 ENCOUNTER — HOSPITAL ENCOUNTER (OUTPATIENT)
Dept: MRI IMAGING | Facility: HOSPITAL | Age: 63
Discharge: HOME | End: 2023-05-19
Attending: PHYSICAL MEDICINE & REHABILITATION | Admitting: PHYSICAL MEDICINE & REHABILITATION
Payer: MEDICARE

## 2023-05-19 DIAGNOSIS — R29.2 HYPER REFLEXIA: ICD-10-CM

## 2023-05-19 DIAGNOSIS — R25.2 HAND CRAMP: ICD-10-CM

## 2023-05-19 DIAGNOSIS — M54.2 CERVICAL SPINE PAIN: ICD-10-CM

## 2023-05-19 PROCEDURE — 72141 MRI NECK SPINE W/O DYE: CPT | Mod: MF

## 2023-05-22 ENCOUNTER — THERAPY VISIT (OUTPATIENT)
Dept: PHYSICAL THERAPY | Facility: REHABILITATION | Age: 63
End: 2023-05-22
Attending: PHYSICAL MEDICINE & REHABILITATION
Payer: MEDICARE

## 2023-05-22 DIAGNOSIS — M54.16 LUMBAR RADICULAR PAIN: ICD-10-CM

## 2023-05-22 DIAGNOSIS — M54.9 UPPER BACK PAIN, CHRONIC: ICD-10-CM

## 2023-05-22 DIAGNOSIS — M48.061 FORAMINAL STENOSIS OF LUMBAR REGION: Primary | ICD-10-CM

## 2023-05-22 DIAGNOSIS — G89.29 CHRONIC RIGHT-SIDED LOW BACK PAIN WITHOUT SCIATICA: ICD-10-CM

## 2023-05-22 DIAGNOSIS — Z98.1 S/P LUMBAR FUSION: ICD-10-CM

## 2023-05-22 DIAGNOSIS — M54.50 CHRONIC RIGHT-SIDED LOW BACK PAIN WITHOUT SCIATICA: ICD-10-CM

## 2023-05-22 DIAGNOSIS — I73.9 PVD (PERIPHERAL VASCULAR DISEASE) (H): ICD-10-CM

## 2023-05-22 DIAGNOSIS — M79.662 PAIN OF LEFT LOWER LEG: ICD-10-CM

## 2023-05-22 DIAGNOSIS — M79.18 MYOFASCIAL PAIN: ICD-10-CM

## 2023-05-22 DIAGNOSIS — M79.18 MYOFASCIAL PAIN SYNDROME: ICD-10-CM

## 2023-05-22 DIAGNOSIS — M79.672 BILATERAL FOOT PAIN: ICD-10-CM

## 2023-05-22 DIAGNOSIS — G89.29 UPPER BACK PAIN, CHRONIC: ICD-10-CM

## 2023-05-22 DIAGNOSIS — M51.369 DDD (DEGENERATIVE DISC DISEASE), LUMBAR: ICD-10-CM

## 2023-05-22 DIAGNOSIS — M79.671 BILATERAL FOOT PAIN: ICD-10-CM

## 2023-05-22 PROCEDURE — 97140 MANUAL THERAPY 1/> REGIONS: CPT | Mod: GP | Performed by: PHYSICAL THERAPIST

## 2023-06-12 ENCOUNTER — THERAPY VISIT (OUTPATIENT)
Dept: PHYSICAL THERAPY | Facility: REHABILITATION | Age: 63
End: 2023-06-12
Payer: MEDICARE

## 2023-06-12 DIAGNOSIS — M54.9 UPPER BACK PAIN, CHRONIC: ICD-10-CM

## 2023-06-12 DIAGNOSIS — M79.18 MYOFASCIAL PAIN: ICD-10-CM

## 2023-06-12 DIAGNOSIS — M54.50 CHRONIC RIGHT-SIDED LOW BACK PAIN WITHOUT SCIATICA: ICD-10-CM

## 2023-06-12 DIAGNOSIS — G89.29 UPPER BACK PAIN, CHRONIC: ICD-10-CM

## 2023-06-12 DIAGNOSIS — M79.672 BILATERAL FOOT PAIN: ICD-10-CM

## 2023-06-12 DIAGNOSIS — M79.671 BILATERAL FOOT PAIN: ICD-10-CM

## 2023-06-12 DIAGNOSIS — Z98.1 S/P LUMBAR FUSION: ICD-10-CM

## 2023-06-12 DIAGNOSIS — M79.662 PAIN OF LEFT LOWER LEG: ICD-10-CM

## 2023-06-12 DIAGNOSIS — M48.061 FORAMINAL STENOSIS OF LUMBAR REGION: ICD-10-CM

## 2023-06-12 DIAGNOSIS — M79.18 MYOFASCIAL PAIN SYNDROME: ICD-10-CM

## 2023-06-12 DIAGNOSIS — G89.29 CHRONIC RIGHT-SIDED LOW BACK PAIN WITHOUT SCIATICA: ICD-10-CM

## 2023-06-12 DIAGNOSIS — M54.16 LUMBAR RADICULAR PAIN: Primary | ICD-10-CM

## 2023-06-12 DIAGNOSIS — M51.369 DDD (DEGENERATIVE DISC DISEASE), LUMBAR: ICD-10-CM

## 2023-06-12 PROCEDURE — 97140 MANUAL THERAPY 1/> REGIONS: CPT | Mod: GP | Performed by: PHYSICAL THERAPIST

## 2023-08-08 ENCOUNTER — LAB REQUISITION (OUTPATIENT)
Dept: LAB | Facility: CLINIC | Age: 63
End: 2023-08-08
Payer: MEDICARE

## 2023-08-08 DIAGNOSIS — Z01.818 ENCOUNTER FOR OTHER PREPROCEDURAL EXAMINATION: ICD-10-CM

## 2023-08-08 PROCEDURE — 83036 HEMOGLOBIN GLYCOSYLATED A1C: CPT | Mod: ORL | Performed by: NURSE PRACTITIONER

## 2023-08-08 PROCEDURE — 80053 COMPREHEN METABOLIC PANEL: CPT | Mod: ORL | Performed by: NURSE PRACTITIONER

## 2023-08-09 LAB
ALBUMIN SERPL BCG-MCNC: 3.6 G/DL (ref 3.5–5.2)
ALP SERPL-CCNC: 59 U/L (ref 35–104)
ALT SERPL W P-5'-P-CCNC: 13 U/L (ref 0–50)
ANION GAP SERPL CALCULATED.3IONS-SCNC: 11 MMOL/L (ref 7–15)
AST SERPL W P-5'-P-CCNC: 16 U/L (ref 0–45)
BILIRUB SERPL-MCNC: 0.2 MG/DL
BUN SERPL-MCNC: 7.9 MG/DL (ref 8–23)
CALCIUM SERPL-MCNC: 8.7 MG/DL (ref 8.8–10.2)
CHLORIDE SERPL-SCNC: 103 MMOL/L (ref 98–107)
CREAT SERPL-MCNC: 0.62 MG/DL (ref 0.51–0.95)
DEPRECATED HCO3 PLAS-SCNC: 30 MMOL/L (ref 22–29)
GFR SERPL CREATININE-BSD FRML MDRD: >90 ML/MIN/1.73M2
GLUCOSE SERPL-MCNC: 132 MG/DL (ref 70–99)
HBA1C MFR BLD: 6.5 %
POTASSIUM SERPL-SCNC: 4.1 MMOL/L (ref 3.4–5.3)
PROT SERPL-MCNC: 5.6 G/DL (ref 6.4–8.3)
SODIUM SERPL-SCNC: 144 MMOL/L (ref 136–145)

## 2023-08-11 ENCOUNTER — HOSPITAL ENCOUNTER (OUTPATIENT)
Dept: CT IMAGING | Facility: HOSPITAL | Age: 63
Discharge: HOME | End: 2023-08-11
Attending: NURSE PRACTITIONER | Admitting: NURSE PRACTITIONER
Payer: MEDICARE

## 2023-08-11 DIAGNOSIS — R91.1 LUNG NODULE: ICD-10-CM

## 2023-08-11 PROCEDURE — 71250 CT THORAX DX C-: CPT | Mod: MG

## 2023-08-11 PROCEDURE — G1010 CDSM STANSON: HCPCS

## 2023-08-14 ENCOUNTER — VIRTUAL VISIT (OUTPATIENT)
Dept: RADIATION ONCOLOGY | Facility: CLINIC | Age: 63
End: 2023-08-14
Attending: RADIOLOGY
Payer: MEDICARE

## 2023-08-14 DIAGNOSIS — R91.1 LUNG NODULE: Primary | ICD-10-CM

## 2023-08-14 PROBLEM — E11.9 DIABETES MELLITUS, TYPE 2 (H): Status: ACTIVE | Noted: 2023-08-14

## 2023-08-14 PROCEDURE — 99442 PR PHYSICIAN TELEPHONE EVALUATION 11-20 MIN: CPT | Mod: 95 | Performed by: NURSE PRACTITIONER

## 2023-08-14 NOTE — PROGRESS NOTES
Department of Radiation Oncology  [facility]  [address] [place] [address]  [phone]     Follow-up Note  Aug 14, 2023    Annette Shore  MRN: [id number]  : 1960    DISEASE TREATED: Presumed NSCLC of the RLL, [id number], stage IA2    INTERVAL SINCE COMPLETION OF RADIATION THERAPY: 12 months (completed 22)     RADIATION THERAPY DELIVERED: RLL nodule, 50 Gy in 5 fractions, SBRT           SUBJECTIVE:  Annette Shore is a 62-year-old female longtime smoker with history of COPD who was found to have a right lower lobe nodule highly concerning for lung cancer. This was discovered on low-dose screening lung CT (22), located in the paraspinal region and measured 1.6 cm. Staging PET/CT (22) demonstrated low-level FDG uptake associated with this nodule (SUVmax=2.2) and no other findings. She also had a brain MRI (22) for workup of headache and it was unrevealing. She completed mediastinal staging with bronchoscopy and EBUS (22). FNA of a 4R node and a 11R node were negative. She is not a surgical candidate and underwent ablative radiotherapy as above.    Today, the patient states she is going through a very difficult time.  She is having reconstructive bowel surgery on 2023.  She has had a bowel obstruction for about a month with only small amounts passing through.  She will require an ostomy.  No new cough or shortness of breath.  She does have an occasional cough but not all the time.  She does have quite a bit of shortness of breath going up stairs.  She is a current smoker about 1 pack a day.  She does have a lot of other health concerns with degenerative disc disease and peripheral arterial disease.    OBJECTIVE:   There were no vitals taken for this visit.  GENERAL:  Appears well, in no acute distress.   SKIN: No rashes seen on exposed skin.     CT Chest 2023:                                                                    IMPRESSION:   1.  Interval increased size of previously seen FDG avid pulmonary nodule now measuring almost 2 cm. If the patient has had recent radiation therapy, this could be reactive and close follow-up in 3 months is recommended. If no recent therapy was   performed, this would be concerning for worsening disease.       IMPRESSION: Increased size of treated nodule could be progression or radiation fibrosis.     RECOMMENDATIONS:   - I recommended that she have a PET scan.  She is very overwhelmed by her current bowel obstruction and feels her recovery will be difficult.  Discussed that it is reasonable to wait a month before proceed with PET scan so that she can be recovered.  - I will call her in a couple of weeks and see how she is doing and will formulate a plan at that time based on results of her surgery and her recovery.    Virtual Visit Details    Type of service:  Telephone Visit   Phone call duration: 16 minutes  Total time 25 min  Patient didn't connect to video visit.  She called me back an hour later.  Her alarm had not gone off.        Aarti Desir NP  Department of Radiation Oncology  [facility]

## 2023-08-14 NOTE — LETTER
2023         RE: Annette Shore   [address]  [place] [address]        Dear Colleague,    Thank you for referring your patient, Annette Shore, to the [facility] RADIATION ONCOLOGY. Please see a copy of my visit note below.    Department of Radiation Oncology  [facility]  [address] [place] [address]  [phone]     Follow-up Note  Aug 14, 2023    Annette Shore  MRN: [id number]  : 1960    DISEASE TREATED: Presumed NSCLC of the RLL, [id number], stage IA2    INTERVAL SINCE COMPLETION OF RADIATION THERAPY: 12 months (completed 22)     RADIATION THERAPY DELIVERED: RLL nodule, 50 Gy in 5 fractions, SBRT           SUBJECTIVE:  Annette Shore is a 62-year-old female longtime smoker with history of COPD who was found to have a right lower lobe nodule highly concerning for lung cancer. This was discovered on low-dose screening lung CT (22), located in the paraspinal region and measured 1.6 cm. Staging PET/CT (22) demonstrated low-level FDG uptake associated with this nodule (SUVmax=2.2) and no other findings. She also had a brain MRI (22) for workup of headache and it was unrevealing. She completed mediastinal staging with bronchoscopy and EBUS (22). FNA of a 4R node and a 11R node were negative. She is not a surgical candidate and underwent ablative radiotherapy as above.    Today, the patient states she is going through a very difficult time.  She is having reconstructive bowel surgery on 2023.  She has had a bowel obstruction for about a month with only small amounts passing through.  She will require an ostomy.  No new cough or shortness of breath.  She does have an occasional cough but not all the time.  She does have quite a bit of shortness of breath going up stairs.  She is a current smoker about 1 pack a day.  She does have a lot of other health concerns with degenerative disc disease and  peripheral arterial disease.    OBJECTIVE:   There were no vitals taken for this visit.  GENERAL:  Appears well, in no acute distress.   SKIN: No rashes seen on exposed skin.     CT Chest 8/11/2023:                                                                   IMPRESSION:   1.  Interval increased size of previously seen FDG avid pulmonary nodule now measuring almost 2 cm. If the patient has had recent radiation therapy, this could be reactive and close follow-up in 3 months is recommended. If no recent therapy was   performed, this would be concerning for worsening disease.       IMPRESSION: Increased size of treated nodule could be progression or radiation fibrosis.     RECOMMENDATIONS:   - I recommended that she have a PET scan.  She is very overwhelmed by her current bowel obstruction and feels her recovery will be difficult.  Discussed that it is reasonable to wait a month before proceed with PET scan so that she can be recovered.  - I will call her in a couple of weeks and see how she is doing and will formulate a plan at that time based on results of her surgery and her recovery.    Virtual Visit Details    Type of service:  Telephone Visit   Phone call duration: 16 minutes  Total time 25 min  Patient didn't connect to video visit.  She called me back an hour later.  Her alarm had not gone off.        Aarti Desir NP  Department of Radiation Oncology  [facility]

## 2023-08-24 ENCOUNTER — LAB REQUISITION (OUTPATIENT)
Dept: LAB | Facility: CLINIC | Age: 63
End: 2023-08-24
Payer: MEDICARE

## 2023-08-24 DIAGNOSIS — K56.699 OTHER INTESTINAL OBSTRUCTION UNSPECIFIED AS TO PARTIAL VERSUS COMPLETE OBSTRUCTION (H): ICD-10-CM

## 2023-08-24 PROCEDURE — 80048 BASIC METABOLIC PNL TOTAL CA: CPT | Mod: ORL | Performed by: FAMILY MEDICINE

## 2023-08-25 LAB
ANION GAP SERPL CALCULATED.3IONS-SCNC: 17 MMOL/L (ref 7–15)
BUN SERPL-MCNC: 9.5 MG/DL (ref 8–23)
CALCIUM SERPL-MCNC: 8.9 MG/DL (ref 8.8–10.2)
CHLORIDE SERPL-SCNC: 103 MMOL/L (ref 98–107)
CREAT SERPL-MCNC: 0.66 MG/DL (ref 0.51–0.95)
DEPRECATED HCO3 PLAS-SCNC: 20 MMOL/L (ref 22–29)
GFR SERPL CREATININE-BSD FRML MDRD: >90 ML/MIN/1.73M2
GLUCOSE SERPL-MCNC: 112 MG/DL (ref 70–99)
POTASSIUM SERPL-SCNC: 4 MMOL/L (ref 3.4–5.3)
SODIUM SERPL-SCNC: 140 MMOL/L (ref 136–145)

## 2023-08-28 ENCOUNTER — DOCUMENTATION ONLY (OUTPATIENT)
Dept: RADIATION ONCOLOGY | Facility: CLINIC | Age: 63
End: 2023-08-28
Payer: MEDICARE

## 2023-08-28 DIAGNOSIS — R91.1 LUNG NODULE: Primary | ICD-10-CM

## 2023-09-05 ENCOUNTER — TELEPHONE (OUTPATIENT)
Dept: VASCULAR SURGERY | Facility: CLINIC | Age: 63
End: 2023-09-05
Payer: MEDICARE

## 2023-09-05 NOTE — TELEPHONE ENCOUNTER
Caller: Annette    Provider: MD Mehdi Gilliland    Detailed reason for call: Annette is calling requesting an apt with Dr. Gilliland, she recently had a CT done on 8/11. She was told to follow up with vascular due to the results, please review if more studies are needed and how soon she should be seen.    Best phone number to contact: [phone]    Best time to contact: any    Ok to leave a detailed message: Yes    Ok to speak to authorized person if needed: No      (Noted to patient if reason is related to wound or incision, to please send a photo via email or Epoqt.)

## 2023-09-06 ENCOUNTER — TRANSFERRED RECORDS (OUTPATIENT)
Dept: HEALTH INFORMATION MANAGEMENT | Facility: CLINIC | Age: 63
End: 2023-09-06

## 2023-09-06 LAB — RETINOPATHY: NEGATIVE

## 2023-09-07 NOTE — TELEPHONE ENCOUNTER
Dr. Gilliland reviewed pts CT. It is not vascular related. Informed her to discuss with oncologist or PCP. She agreed with the plan.

## 2023-09-08 ENCOUNTER — THERAPY VISIT (OUTPATIENT)
Dept: PHYSICAL THERAPY | Facility: REHABILITATION | Age: 63
End: 2023-09-08
Payer: COMMERCIAL

## 2023-09-08 DIAGNOSIS — I73.9 PVD (PERIPHERAL VASCULAR DISEASE) (H): ICD-10-CM

## 2023-09-08 DIAGNOSIS — M48.061 FORAMINAL STENOSIS OF LUMBAR REGION: ICD-10-CM

## 2023-09-08 DIAGNOSIS — M54.50 CHRONIC RIGHT-SIDED LOW BACK PAIN WITHOUT SCIATICA: ICD-10-CM

## 2023-09-08 DIAGNOSIS — M54.9 UPPER BACK PAIN, CHRONIC: ICD-10-CM

## 2023-09-08 DIAGNOSIS — M79.672 BILATERAL FOOT PAIN: ICD-10-CM

## 2023-09-08 DIAGNOSIS — M79.662 PAIN OF LEFT LOWER LEG: ICD-10-CM

## 2023-09-08 DIAGNOSIS — M79.671 BILATERAL FOOT PAIN: ICD-10-CM

## 2023-09-08 DIAGNOSIS — M54.16 LUMBAR RADICULAR PAIN: Primary | ICD-10-CM

## 2023-09-08 DIAGNOSIS — M79.18 MYOFASCIAL PAIN: ICD-10-CM

## 2023-09-08 DIAGNOSIS — G89.29 UPPER BACK PAIN, CHRONIC: ICD-10-CM

## 2023-09-08 DIAGNOSIS — M51.369 DDD (DEGENERATIVE DISC DISEASE), LUMBAR: ICD-10-CM

## 2023-09-08 DIAGNOSIS — M79.18 MYOFASCIAL PAIN SYNDROME: ICD-10-CM

## 2023-09-08 DIAGNOSIS — Z98.1 S/P LUMBAR FUSION: ICD-10-CM

## 2023-09-08 DIAGNOSIS — G89.29 CHRONIC RIGHT-SIDED LOW BACK PAIN WITHOUT SCIATICA: ICD-10-CM

## 2023-09-08 PROCEDURE — 97140 MANUAL THERAPY 1/> REGIONS: CPT | Mod: GP | Performed by: PHYSICAL THERAPIST

## 2023-09-08 NOTE — PROGRESS NOTES
[facility]                                                                                   OUTPATIENT PHYSICAL THERAPY    PLAN OF TREATMENT FOR OUTPATIENT REHABILITATION   Patient's Last Name, First Name, Annette Park YOB: 1960   Provider's Name   [facility]   Medical Record No.  [id number]     Onset Date: 02/06/23 (Date of PT order)  Start of Care Date: 05/08/23     Medical Diagnosis:  Foraminal stenosis of lumbar region, Lumbar radicular pin, Lumbar DDD, myofascial pain, s/p lumbar fusion      PT Treatment Diagnosis:  Lumbar pain, s/p lumbar fusion, Myofascial pain, Plan of Treatment  Frequency/Duration: 1x/wk/ 20 weeks    Certification date from 08/09/23 to 11/09/23         See note for plan of treatment details and functional goals     Corrie Hernandez, PT                         I CERTIFY THE NEED FOR THESE SERVICES FURNISHED UNDER        THIS PLAN OF TREATMENT AND WHILE UNDER MY CARE .             Physician Signature               Date    X_____________________________________________________                    Referring Provider:  Alexx Norman      Initial Assessment  See Epic Evaluation- Start of Care Date: 05/08/23

## 2023-09-12 NOTE — PROGRESS NOTES
DISCHARGE  Reason for Discharge: Pt is having trouble with skin and colostomy bag. Needs to focus on bag care for now and will do her HEP    Equipment Issued: none    Discharge Plan: Patient to continue home program.    Referring Provider:  Alexx Norman

## 2023-09-27 ENCOUNTER — HOSPITAL ENCOUNTER (OUTPATIENT)
Dept: PET IMAGING | Facility: HOSPITAL | Age: 63
Discharge: HOME | End: 2023-09-27
Attending: NURSE PRACTITIONER
Payer: MEDICARE

## 2023-09-27 DIAGNOSIS — R91.1 LUNG NODULE: ICD-10-CM

## 2023-09-27 LAB — GLUCOSE BLDC GLUCOMTR-MCNC: 111 MG/DL (ref 70–99)

## 2023-09-27 PROCEDURE — 74177 CT ABD & PELVIS W/CONTRAST: CPT

## 2023-09-27 PROCEDURE — 82962 GLUCOSE BLOOD TEST: CPT | Mod: GZ

## 2023-09-27 PROCEDURE — A9552 F18 FDG: HCPCS | Performed by: NURSE PRACTITIONER

## 2023-09-27 PROCEDURE — 343N000001 HC RX 343: Performed by: NURSE PRACTITIONER

## 2023-09-27 PROCEDURE — G1010 CDSM STANSON: HCPCS | Mod: PS

## 2023-09-27 PROCEDURE — 250N000011 HC RX IP 250 OP 636: Performed by: NURSE PRACTITIONER

## 2023-09-27 RX ORDER — IOPAMIDOL 755 MG/ML
50-150 INJECTION, SOLUTION INTRAVASCULAR ONCE
Status: COMPLETED | OUTPATIENT
Start: 2023-09-27 | End: 2023-09-27

## 2023-09-27 RX ADMIN — IOPAMIDOL 86 ML: 755 INJECTION, SOLUTION INTRAVENOUS at 10:53

## 2023-09-27 RX ADMIN — FLUDEOXYGLUCOSE F-18 10.66 MILLICURIE: 500 INJECTION, SOLUTION INTRAVENOUS at 09:43

## 2023-10-02 NOTE — PROGRESS NOTES
Follow-up Note by Video  Oct 4, 2023    Annette Shore  MRN: [id number]  : 1960    DISEASE TREATED: Presumed NSCLC of the RLL, [id number], stage IA2    INTERVAL SINCE COMPLETION OF RADIATION THERAPY: 12 months (completed 22)     TYPE OF RADIATION THERAPY DELIVERED: 50 Gy in 5 fractions, SBRT, Rx=82%           SUBJECTIVE:  Annette Shore is a 62-year-old woman with a history of COPD who was found to have a right lower lobe nodule highly concerning for lung cancer.     A 1.6 cm RLL nodule was discovered on low-dose screening lung CT (22), located in the paraspinal region. Staging PET/CT (22) demonstrated a SUV max 2.2  FDG uptake associated with this nodule and no other findings. She also had an unremarkable brain MRI (22). She completed mediastinal staging with bronchoscopy and EBUS (22). FNA of a 4R node and a 11R node were negative. She was medically inoperable and underwent SBRT(EOT 2022).    The patient  also underwent a reconstructive bowel surgery on 2023.  She has had a bowel obstruction for about a month with only small amounts passing through. The patient noticed some blood passing after surgery. The patient is due for a follow up.    A follow up CT of chest (2023), showed an interval increased size of the previously seen at medial right lower lobe pulmonary nodule which now measures 1.9 x 0.6 cm, previously 1 x 0.5 cm. However, a follow up PET/CT (2023) showed no evidence of disease.    She has JOHNSON but is stable. She is scheduled to follow up with GI surgery.        Pre SBRT 2022   6M post SBRT 23       12M post 23     IMPRESSION: cNED     RECOMMENDATIONS: I recommend a F/U in 2024 with a CT scan of chest. A video visit is preferred by the patient.    JANEEN Ruvalcaba M.D.  Department of Radiation Oncology  [facility]

## 2023-10-04 ENCOUNTER — VIRTUAL VISIT (OUTPATIENT)
Dept: RADIATION ONCOLOGY | Facility: CLINIC | Age: 63
End: 2023-10-04
Attending: RADIOLOGY
Payer: MEDICARE

## 2023-10-04 DIAGNOSIS — R91.1 LUNG NODULE: Primary | ICD-10-CM

## 2023-10-04 PROCEDURE — 99212 OFFICE O/P EST SF 10 MIN: CPT | Mod: 95 | Performed by: RADIOLOGY

## 2023-10-04 NOTE — LETTER
10/4/2023         RE: Annette Shore   [address]  [place] [address]        Dear Colleague,    Thank you for referring your patient, Annette Shore, to the [facility] RADIATION ONCOLOGY. Please see a copy of my visit note below.    Follow-up Note by Video  Oct 4, 2023    Annette Shore  MRN: [id number]  : 1960    DISEASE TREATED: Presumed NSCLC of the RLL, [id number], stage IA2    INTERVAL SINCE COMPLETION OF RADIATION THERAPY: 12 months (completed 22)     TYPE OF RADIATION THERAPY DELIVERED: 50 Gy in 5 fractions, SBRT, Rx=82%           SUBJECTIVE:  Annette Shore is a 62-year-old woman with a history of COPD who was found to have a right lower lobe nodule highly concerning for lung cancer.     A 1.6 cm RLL nodule was discovered on low-dose screening lung CT (22), located in the paraspinal region. Staging PET/CT (22) demonstrated a SUV max 2.2  FDG uptake associated with this nodule and no other findings. She also had an unremarkable brain MRI (22). She completed mediastinal staging with bronchoscopy and EBUS (22). FNA of a 4R node and a 11R node were negative. She was medically inoperable and underwent SBRT(EOT 2022).    The patient  also underwent a reconstructive bowel surgery on 2023.  She has had a bowel obstruction for about a month with only small amounts passing through. The patient noticed some blood passing after surgery. The patient is due for a follow up.    A follow up CT of chest (2023), showed an interval increased size of the previously seen at medial right lower lobe pulmonary nodule which now measures 1.9 x 0.6 cm, previously 1 x 0.5 cm. However, a follow up PET/CT (2023) showed no evidence of disease.    She has JOHNSON but is stable. She is scheduled to follow up with GI surgery.        Pre SBRT 2022   6M post SBRT 23       12M post 23     IMPRESSION: cNED     RECOMMENDATIONS: I recommend a F/U in  1/2024 with a CT scan of chest. A video visit is preferred by the patient.    JANEEN Ruvalcaba M.D.  Department of Radiation Oncology  [facility]

## 2023-10-13 ENCOUNTER — LAB REQUISITION (OUTPATIENT)
Dept: LAB | Facility: CLINIC | Age: 63
End: 2023-10-13
Payer: MEDICARE

## 2023-10-13 DIAGNOSIS — E11.9 TYPE 2 DIABETES MELLITUS WITHOUT COMPLICATIONS (H): ICD-10-CM

## 2023-10-13 LAB
ANION GAP SERPL CALCULATED.3IONS-SCNC: 10 MMOL/L (ref 7–15)
BUN SERPL-MCNC: 10.8 MG/DL (ref 8–23)
CALCIUM SERPL-MCNC: 9.7 MG/DL (ref 8.8–10.2)
CHLORIDE SERPL-SCNC: 103 MMOL/L (ref 98–107)
CREAT SERPL-MCNC: 0.74 MG/DL (ref 0.51–0.95)
DEPRECATED HCO3 PLAS-SCNC: 27 MMOL/L (ref 22–29)
EGFRCR SERPLBLD CKD-EPI 2021: 89 ML/MIN/1.73M2
GLUCOSE SERPL-MCNC: 123 MG/DL (ref 70–99)
POTASSIUM SERPL-SCNC: 4.6 MMOL/L (ref 3.4–5.3)
SODIUM SERPL-SCNC: 140 MMOL/L (ref 135–145)

## 2023-10-13 PROCEDURE — 80048 BASIC METABOLIC PNL TOTAL CA: CPT | Mod: ORL | Performed by: PHYSICIAN ASSISTANT

## 2023-10-30 ENCOUNTER — LAB REQUISITION (OUTPATIENT)
Dept: LAB | Facility: CLINIC | Age: 63
End: 2023-10-30
Payer: MEDICARE

## 2023-10-30 DIAGNOSIS — K56.609 UNSPECIFIED INTESTINAL OBSTRUCTION, UNSPECIFIED AS TO PARTIAL VERSUS COMPLETE OBSTRUCTION (H): ICD-10-CM

## 2023-10-30 LAB
ALBUMIN SERPL BCG-MCNC: 3.5 G/DL (ref 3.5–5.2)
ALP SERPL-CCNC: 55 U/L (ref 35–104)
ALT SERPL W P-5'-P-CCNC: 31 U/L (ref 0–50)
ANION GAP SERPL CALCULATED.3IONS-SCNC: 14 MMOL/L (ref 7–15)
AST SERPL W P-5'-P-CCNC: 16 U/L (ref 0–45)
BILIRUB SERPL-MCNC: 0.2 MG/DL
BUN SERPL-MCNC: 11.6 MG/DL (ref 8–23)
CALCIUM SERPL-MCNC: 9 MG/DL (ref 8.8–10.2)
CHLORIDE SERPL-SCNC: 103 MMOL/L (ref 98–107)
CREAT SERPL-MCNC: 0.62 MG/DL (ref 0.51–0.95)
DEPRECATED HCO3 PLAS-SCNC: 25 MMOL/L (ref 22–29)
EGFRCR SERPLBLD CKD-EPI 2021: >90 ML/MIN/1.73M2
GLUCOSE SERPL-MCNC: 105 MG/DL (ref 70–99)
POTASSIUM SERPL-SCNC: 3.7 MMOL/L (ref 3.4–5.3)
PROT SERPL-MCNC: 6.4 G/DL (ref 6.4–8.3)
SODIUM SERPL-SCNC: 142 MMOL/L (ref 135–145)

## 2023-10-30 PROCEDURE — 80053 COMPREHEN METABOLIC PANEL: CPT | Mod: ORL | Performed by: FAMILY MEDICINE

## 2023-12-04 ENCOUNTER — APPOINTMENT (OUTPATIENT)
Dept: MRI IMAGING | Facility: HOSPITAL | Age: 63
End: 2023-12-04
Attending: EMERGENCY MEDICINE
Payer: MEDICARE

## 2023-12-04 ENCOUNTER — HOSPITAL ENCOUNTER (EMERGENCY)
Facility: HOSPITAL | Age: 63
Discharge: HOME | End: 2023-12-04
Attending: EMERGENCY MEDICINE | Admitting: EMERGENCY MEDICINE
Payer: MEDICARE

## 2023-12-04 ENCOUNTER — APPOINTMENT (OUTPATIENT)
Dept: CT IMAGING | Facility: HOSPITAL | Age: 63
End: 2023-12-04
Attending: STUDENT IN AN ORGANIZED HEALTH CARE EDUCATION/TRAINING PROGRAM
Payer: MEDICARE

## 2023-12-04 VITALS
RESPIRATION RATE: 17 BRPM | SYSTOLIC BLOOD PRESSURE: 181 MMHG | HEART RATE: 63 BPM | OXYGEN SATURATION: 92 % | TEMPERATURE: 97.9 F | DIASTOLIC BLOOD PRESSURE: 86 MMHG

## 2023-12-04 DIAGNOSIS — H53.2 DIPLOPIA: ICD-10-CM

## 2023-12-04 DIAGNOSIS — I10 HYPERTENSION, UNSPECIFIED TYPE: ICD-10-CM

## 2023-12-04 DIAGNOSIS — R51.9 ACUTE NONINTRACTABLE HEADACHE, UNSPECIFIED HEADACHE TYPE: ICD-10-CM

## 2023-12-04 PROBLEM — C34.31 MALIGNANT NEOPLASM OF LOWER LOBE OF RIGHT LUNG (H): Status: ACTIVE | Noted: 2023-08-16

## 2023-12-04 PROBLEM — K56.699 SIGMOID STRICTURE (H): Status: ACTIVE | Noted: 2023-08-16

## 2023-12-04 PROBLEM — G89.29 CHRONIC PAIN: Status: ACTIVE | Noted: 2023-10-16

## 2023-12-04 PROBLEM — G95.0 SYRINX OF SPINAL CORD (H): Status: ACTIVE | Noted: 2023-06-22

## 2023-12-04 PROBLEM — K57.32 DIVERTICULITIS OF COLON: Status: ACTIVE | Noted: 2023-08-16

## 2023-12-04 PROBLEM — K94.19 ALTERED BOWEL ELIMINATION DUE TO INTESTINAL OSTOMY (H): Status: ACTIVE | Noted: 2023-10-16

## 2023-12-04 PROBLEM — K52.9 ENTERITIS: Status: ACTIVE | Noted: 2023-10-16

## 2023-12-04 PROBLEM — Z66 DNR (DO NOT RESUSCITATE): Status: ACTIVE | Noted: 2023-08-16

## 2023-12-04 LAB
ANION GAP SERPL CALCULATED.3IONS-SCNC: 6 MMOL/L (ref 7–15)
APTT PPP: 27 SECONDS (ref 22–38)
BASOPHILS # BLD AUTO: 0 10E3/UL (ref 0–0.2)
BASOPHILS NFR BLD AUTO: 1 %
BUN SERPL-MCNC: 9.9 MG/DL (ref 8–23)
CALCIUM SERPL-MCNC: 8.9 MG/DL (ref 8.8–10.2)
CHLORIDE SERPL-SCNC: 106 MMOL/L (ref 98–107)
CREAT SERPL-MCNC: 0.67 MG/DL (ref 0.51–0.95)
DEPRECATED HCO3 PLAS-SCNC: 30 MMOL/L (ref 22–29)
EGFRCR SERPLBLD CKD-EPI 2021: >90 ML/MIN/1.73M2
EOSINOPHIL # BLD AUTO: 0.1 10E3/UL (ref 0–0.7)
EOSINOPHIL NFR BLD AUTO: 2 %
ERYTHROCYTE [DISTWIDTH] IN BLOOD BY AUTOMATED COUNT: 13.2 % (ref 10–15)
GLUCOSE SERPL-MCNC: 114 MG/DL (ref 70–99)
HCT VFR BLD AUTO: 40.7 % (ref 35–47)
HGB BLD-MCNC: 13.4 G/DL (ref 11.7–15.7)
IMM GRANULOCYTES # BLD: 0 10E3/UL
IMM GRANULOCYTES NFR BLD: 0 %
INR PPP: 1.04 (ref 0.85–1.15)
LYMPHOCYTES # BLD AUTO: 1.9 10E3/UL (ref 0.8–5.3)
LYMPHOCYTES NFR BLD AUTO: 25 %
MCH RBC QN AUTO: 29.9 PG (ref 26.5–33)
MCHC RBC AUTO-ENTMCNC: 32.9 G/DL (ref 31.5–36.5)
MCV RBC AUTO: 91 FL (ref 78–100)
MONOCYTES # BLD AUTO: 0.6 10E3/UL (ref 0–1.3)
MONOCYTES NFR BLD AUTO: 8 %
NEUTROPHILS # BLD AUTO: 4.9 10E3/UL (ref 1.6–8.3)
NEUTROPHILS NFR BLD AUTO: 64 %
NRBC # BLD AUTO: 0 10E3/UL
NRBC BLD AUTO-RTO: 0 /100
PLATELET # BLD AUTO: 261 10E3/UL (ref 150–450)
POTASSIUM SERPL-SCNC: 4.3 MMOL/L (ref 3.4–5.3)
RBC # BLD AUTO: 4.48 10E6/UL (ref 3.8–5.2)
SODIUM SERPL-SCNC: 142 MMOL/L (ref 135–145)
TROPONIN T SERPL HS-MCNC: 8 NG/L
WBC # BLD AUTO: 7.5 10E3/UL (ref 4–11)

## 2023-12-04 PROCEDURE — 99207 PR NO BILLABLE SERVICE THIS VISIT: CPT

## 2023-12-04 PROCEDURE — 70496 CT ANGIOGRAPHY HEAD: CPT | Mod: MA

## 2023-12-04 PROCEDURE — G1010 CDSM STANSON: HCPCS

## 2023-12-04 PROCEDURE — 255N000002 HC RX 255 OP 636: Mod: JZ | Performed by: EMERGENCY MEDICINE

## 2023-12-04 PROCEDURE — 70553 MRI BRAIN STEM W/O & W/DYE: CPT | Mod: MG

## 2023-12-04 PROCEDURE — 250N000011 HC RX IP 250 OP 636: Mod: JZ | Performed by: EMERGENCY MEDICINE

## 2023-12-04 PROCEDURE — A9585 GADOBUTROL INJECTION: HCPCS | Mod: JZ | Performed by: EMERGENCY MEDICINE

## 2023-12-04 PROCEDURE — 99285 EMERGENCY DEPT VISIT HI MDM: CPT | Mod: 25

## 2023-12-04 PROCEDURE — 70498 CT ANGIOGRAPHY NECK: CPT | Mod: MA

## 2023-12-04 PROCEDURE — 85730 THROMBOPLASTIN TIME PARTIAL: CPT | Performed by: STUDENT IN AN ORGANIZED HEALTH CARE EDUCATION/TRAINING PROGRAM

## 2023-12-04 PROCEDURE — 36415 COLL VENOUS BLD VENIPUNCTURE: CPT | Performed by: STUDENT IN AN ORGANIZED HEALTH CARE EDUCATION/TRAINING PROGRAM

## 2023-12-04 PROCEDURE — 84484 ASSAY OF TROPONIN QUANT: CPT | Performed by: STUDENT IN AN ORGANIZED HEALTH CARE EDUCATION/TRAINING PROGRAM

## 2023-12-04 PROCEDURE — 93005 ELECTROCARDIOGRAM TRACING: CPT | Performed by: STUDENT IN AN ORGANIZED HEALTH CARE EDUCATION/TRAINING PROGRAM

## 2023-12-04 PROCEDURE — 82947 ASSAY GLUCOSE BLOOD QUANT: CPT | Performed by: STUDENT IN AN ORGANIZED HEALTH CARE EDUCATION/TRAINING PROGRAM

## 2023-12-04 PROCEDURE — 85610 PROTHROMBIN TIME: CPT | Performed by: STUDENT IN AN ORGANIZED HEALTH CARE EDUCATION/TRAINING PROGRAM

## 2023-12-04 PROCEDURE — 82374 ASSAY BLOOD CARBON DIOXIDE: CPT | Performed by: STUDENT IN AN ORGANIZED HEALTH CARE EDUCATION/TRAINING PROGRAM

## 2023-12-04 PROCEDURE — 85025 COMPLETE CBC W/AUTO DIFF WBC: CPT | Performed by: STUDENT IN AN ORGANIZED HEALTH CARE EDUCATION/TRAINING PROGRAM

## 2023-12-04 RX ORDER — GADOBUTROL 604.72 MG/ML
8 INJECTION INTRAVENOUS ONCE
Status: COMPLETED | OUTPATIENT
Start: 2023-12-04 | End: 2023-12-04

## 2023-12-04 RX ORDER — IOPAMIDOL 755 MG/ML
75 INJECTION, SOLUTION INTRAVASCULAR ONCE
Status: COMPLETED | OUTPATIENT
Start: 2023-12-04 | End: 2023-12-04

## 2023-12-04 RX ADMIN — GADOBUTROL 8 ML: 604.72 INJECTION INTRAVENOUS at 14:27

## 2023-12-04 RX ADMIN — IOPAMIDOL 75 ML: 755 INJECTION, SOLUTION INTRAVENOUS at 11:42

## 2023-12-04 ASSESSMENT — ACTIVITIES OF DAILY LIVING (ADL)
ADLS_ACUITY_SCORE: 35
ADLS_ACUITY_SCORE: 35

## 2023-12-04 NOTE — ED TRIAGE NOTES
"Patient BIBA from home for evaluation of HTN and double vision bilaterally since 1015 this morning. She does endorse right temporal head \"pressure\" at this time.   Patient is A&O x4. Strength and sensation equal bilaterally, no drift, facial symmetry, clear speech. Provider called to bedside for neuro eval.        "

## 2023-12-04 NOTE — PROGRESS NOTES
"  [facility]    Brief Stroke Telephone Note    I was called by Dr. Sims who stated the patient's symptoms are improving. Discussed MRI Brain findings were negative for acute stroke.     Vitals  BP: (!) 172/79   Pulse: 62   Resp: 17   Temp: 97.9  F (36.6  C)        Imaging Findings  MRI Brain with and without: Negative for acute infarct.     Impression  Bilateral monocular diplopia and right sided headache, improving     Recommendations  - Recommend outpatient follow up with ophthalmology regarding diplopia   - Recommend close outpatient follow up with PCP regarding hypertension management   - No further stroke workup is recommended, we will sign off. Please contact us for additional questions or concerns.     Case discussed with vascular neurology attending Dr. Goldberg.    My recommendations are based on the information provided over the phone by Annette Shore's in-person providers. They are not intended to replace the clinical judgment of her in-person providers. I was not requested to personally see or examine the patient at this time.     Rossy Rodríguez PA-C  Vascular Neurology    To page me or covering stroke neurology team member, click here: AMCOM  Choose \"On Call\" tab at top, then select \"NEUROLOGY/ALL SITES\" from middle drop-down box, press Enter, then look for \"stroke\" or \"telestroke\" for your site.   "

## 2023-12-04 NOTE — ED NOTES
[facility]  Expected date:   Expected time:   Means of arrival: Ambulance  Comments:  SPF: Hypertension and double vision

## 2023-12-04 NOTE — CONSULTS
[facility]    Stroke Telephone Note    I was called by Dr. Ruperto Sims on 23 regarding patient Annette Shore. The patient is a 63 year old female with a PMH significant for HTN (uncontrolled), COPD, DM, HLD, PAD, migraine, and lung cancer. History obtained from ED provider. LKW was 1014. Symptoms started at 1015 with sudden onset of diplopia and right sided headache. She reports horizontal diplopia that is present when closing either eye. On exam, no focal weakness, EOMs intact, no visual loss on visual field testing but reports diplopia. Of note, she had a similar occurrence of symptoms (double vision and right sided headache) last week that resolved after she took a nap. Presenting /93. B.    Vitals  BP: (!) 201/93   Pulse: 66   Resp: 16   Temp: 97.9  F (36.6  C)        Stroke Code Data (for stroke code without tele)  Stroke code activated 23  1129   Stroke provider first response 23  1131   Last known normal 23  1014      Time of discovery (or onset of symptoms) 23  1015   Head CT read by Stroke Neuro Provider 23  1148   Was stroke code de-escalated? Yes  23  1202     Imaging Findings  CT head: No evidence of hemorrhage or acute intracranial process. Moderate brain atrophy.    CTA head: No large vessel occlusion or significant stenosis.  CTA Neck: Moderate to severe atherosclerotic narrowing of V1 of the right PCA. Plaque present in bilateral ICA origins without significant stenosis.     Intravenous Thrombolysis  Not given due to:   - stroke mimic: bilateral monocular diplopia     Endovascular Treatment  Not initiated due to absence of proximal vessel occlusion    Impression  Bilateral monocular diplopia which is non localizable, concern for hypertensive urgency vs atypical migraine     Recommendations   - brain MRI with and without contrast; please page stroke neurology if infarct is identified for further  "recommendations    Case discussed with vascular neurology attending Dr. Goldberg.    My recommendations are based on the information provided over the phone by Annette Shore's in-person providers. They are not intended to replace the clinical judgment of her in-person providers. I was not requested to personally see or examine the patient at this time.     Rossy Rodríguez PA-C  Vascular Neurology    To page me or covering stroke neurology team member, click here: AMCOM  Choose \"On Call\" tab at top, then select \"NEUROLOGY/ALL SITES\" from middle drop-down box, press Enter, then look for \"stroke\" or \"telestroke\" for your site.   "

## 2023-12-04 NOTE — ED PROVIDER NOTES
EMERGENCY DEPARTMENT ENCOUNTER      NAME: Annette Shore  AGE: 63 year old female  YOB: 1960  MRN: [id number]  EVALUATION DATE & TIME: 2023 11:16 AM    PCP: Jo Hamilton    ED PROVIDER: Ruperto Sims M.D.      Chief Complaint   Patient presents with    Eye Problem    Hypertension         FINAL IMPRESSION:  1. Acute nonintractable headache, unspecified headache type    2. Hypertension, unspecified type    3. Diplopia          ED COURSE & MEDICAL DECISION MAKIN:19 AM I met with the patient, obtained history, performed an initial exam, and discussed options and plan for diagnostics and treatment here in the ED.    11:32 AM I spoke with stroke neurology   12:01 PM I spoke with stroke neurology who recommends de-escalating the stroke code.   3:18 PM Repeat exam is benign, discussed findings and neurology consult.  3:26 PM I spoke again with stroke neurology.  3:40 PM repeat exam is benign, discussed discharge with close follow-up.    Pertinent Labs & Imaging studies reviewed. (See chart for details)  63 year old female presents to the Emergency Department for evaluation of diplopia. Patient appears non toxic with stable vitals signs, patient afebrile with no tachycardia or hypoxia.  Lungs are clear and abdomen benign.  Exam significant for abnormal visual field testing with double vision.  Onset of symptoms was approximately 60 to 90 minutes ago, given exam findings and timeline tier 1 stroke code was initiated, discussed patient presentation and findings with the neuro stroke team and at this time they agree with moving forward with the tier 1 stroke code.  We will obtain screening labs and CT/CT imaging of the head and neck.    Reassessment: Labs by my independent interpretation showed no acute concerning findings, troponin not elevated and ECG showed no acute ischemic changes, again no anginal symptoms to suggest ischemia.  No anemia with a hemoglobin 13.4, no signs of acute kidney  injury with creatinine of 0.67.  CTA head and neck reported no acute concerning findings, discussed these findings with neuro stroke team and they recommend MRI imaging which we did obtain.  MRI imaging returned and reported no acute cranial process.  Repeat exam of the patient was benign she states that her vision changes and headache are much improved.  Discussed this with neurostroke and at this time feel patient is safe for discharge and close follow-up.  Certainly nothing is just hypertensive crisis at this time as symptoms improved, she is not altered or confused, symptoms nearly resolved.  Will recommend patient follow-up with neurology, ophthalmology in the next 5 to 7 days for continued outpatient management evaluation and recommend she call her primary care provider in the next 1 or 2 days to discuss her blood pressure and need for ongoing blood pressure management.  Discussed all these findings and recommendations with the patient felt reassured and comfortable discharge.  Return precautions provided.    Medical Decision Making    History:  Supplemental history from: Documented in chart, if applicable  External Record(s) reviewed: Documented in chart, if applicable.    Work Up:  Chart documentation includes differential considered and any EKGs or imaging independently interpreted by provider, where specified.  In additional to work up documented, I considered the following work up: Documented in chart, if applicable.    External consultation:  Discussion of management with another provider: Other: Stroke Neurology    Complicating factors:  Care impacted by chronic illness: Cancer/Chemotherapy, Chronic Lung Disease, Diabetes, Hyperlipidemia, and Hypertension  Care affected by social determinants of health: N/A    Disposition considerations: Discharge. No recommendations on prescription strength medication(s). N/A.          At the conclusion of the encounter I discussed the results of all of the tests and  the disposition. The questions were answered and return precautions provided. The patient or family acknowledged understanding and was agreeable with the care plan.         MEDICATIONS GIVEN IN THE EMERGENCY:  Medications   iopamidol (ISOVUE-370) solution 75 mL (75 mLs Intravenous $Given 12/4/23 4131)   gadobutrol (GADAVIST) injection 8 mL (8 mLs Intravenous $Given 12/4/23 8659)       NEW PRESCRIPTIONS STARTED AT TODAY'S ER VISIT  Discharge Medication List as of 12/4/2023  3:42 PM               =================================================================    HPI    Patient information was obtained from: Patient    Use of Intrepreter: N/A         Annette Shore is a 63 year old female who presents with double vision.    Patient reports 1 month of uncontrolled hypertension. Last week she had an episode of right sided head pressure and double vision which resolved with sleep. This morning at 1015 (1 hour ago) she had right sided head pressure and double vision again prompting her home health nurse to call EMS. Patient's BP in ED is 201/93 and she endorses mild numbness in fingers. In the last month her losartan dose has increased from . She currently is being treated for lung cancer with a pet scan last month and a CT scheduled for January. Patient denies loss of vision, severe numbness, tingling, or any other complaints at this time.       REVIEW OF SYSTEMS   Constitutional:  Denies fever, chills  Respiratory:  Denies productive cough or increased work of breathing  Cardiovascular:  Denies chest pain, palpitations  GI:  Denies abdominal pain, nausea, vomiting, or change in bowel or bladder habits   Musculoskeletal:  Denies any new muscle/joint swelling  Skin:  Denies rash   Neurologic:  Denies focal weakness, Positive for double vision bilaterally, headache, numbness  All systems negative except as marked.     PAST MEDICAL HISTORY:  Past Medical History:   Diagnosis Date    Back pain     low back    COPD  (chronic obstructive pulmonary disease) (H)     Depression     Diabetes mellitus (H)     Hyperlipidemia     Hypertension     PAD (peripheral artery disease) (H24)     Peripheral neuropathy     Pulmonary nodules        PAST SURGICAL HISTORY:  Past Surgical History:   Procedure Laterality Date    AORTA - FEMORAL ARTERY BYPASS GRAFT      BRONCHOSCOPY RIGID OR FLEXIBLE W/TRANSENDOSCOPIC ENDOBRONCHIAL ULTRASOUND GUIDED N/A 07/14/2022    Procedure: endbronchial ultrasound, transbronchial biopsy;  Surgeon: Rosendo Dave MD;  Location: [facility] OR    HYSTEROSCOPY W/ ENDOMETRIAL ABLATION      IR EXTREMITY ANGIOGRAM LEFT  11/25/2020    IR EXTREMITY ANGIOGRAM LEFT  12/16/2020    IR LOWER EXTREMITY ANGIOGRAM LEFT  11/25/2020    IR LOWER EXTREMITY ANGIOGRAM LEFT  12/16/2020    OPTICAL TRACKING SYSTEM BRONCHOSCOPY N/A 07/14/2022    Procedure: BRONCHOSCOPY, USING OPTICAL TRACKING SYSTEM, .  Ion or veran system, fiducial placement;  Surgeon: Rosendo Dave MD;  Location: [facility] OR    OTHER SURGICAL HISTORY      tubal ligation    OTHER SURGICAL HISTORY      spine fusion    PAIN STELLATE GANGLION BLOCK RIGHT Right 1992    x5, [facility]         CURRENT MEDICATIONS:    Prior to Admission medications    Medication Sig Start Date End Date Taking? Authorizing Provider   albuterol (PROVENTIL) (5 MG/ML) 0.5% neb solution 0.5 mL by nebulizer as needed    Reported, Patient   aspirin (ASA) 81 MG EC tablet 1 tab(s) Orally once a day    Reported, Patient   atorvastatin (LIPITOR) 20 MG tablet [ATORVASTATIN (LIPITOR) 20 MG TABLET] Take 20 mg by mouth every morning.  8/2/18   Provider, Historical   DULoxetine (CYMBALTA) 60 MG capsule Take 60 mg by mouth daily 2/4/21   Provider, Historical   famotidine (PEPCID) 40 MG tablet Take 40 mg by mouth At Bedtime 1/11/23   Reported, Patient   gabapentin (NEURONTIN) 300 MG capsule Take 600 mg by mouth 2 times daily Taking 2 in AM 1 in PM and 2 at night. 3/21/22   Reported, Patient  "  ipratropium-albuteroL (DUO-NEB) 0.5-2.5 mg/3 mL nebulizer 3 mLs as needed 3/18/20   Provider, Historical   losartan-hydrochlorothiazide (HYZAAR) 50-12.5 mg per tablet [LOSARTAN-HYDROCHLOROTHIAZIDE (HYZAAR) 50-12.5 MG PER TABLET] Take 1 tablet by mouth daily 4/23/18   Provider, Historical   ondansetron (ZOFRAN) 4 MG tablet 1-2 tablets Orally every 8 hours if needed for nausea/vomiting 2/3/22   Reported, Patient        ALLERGIES:  Allergies   Allergen Reactions    Lyrica [Pregabalin] Itching     Pt does not think she is allergic to this    Bupropion Itching       FAMILY HISTORY:  Family History   Problem Relation Age of Onset    Diabetes Mother     Hypertension Mother     Cancer Father     Heart Disease Father     Diabetes Father        SOCIAL HISTORY:   Social History     Socioeconomic History    Marital status: Single   Tobacco Use    Smoking status: Every Day     Packs/day: 1     Types: Cigarettes     Start date: 7/17/1970    Smokeless tobacco: Never    Tobacco comments:     \"No more than 1 PPD\"   Vaping Use    Vaping Use: Never used   Substance and Sexual Activity    Alcohol use: No    Drug use: Not Currently       VITALS:  Patient Vitals for the past 24 hrs:   BP Temp Temp src Pulse Resp SpO2   12/04/23 1540 -- -- -- 63 -- 92 %   12/04/23 1526 (!) 181/86 -- -- 63 -- 93 %   12/04/23 1511 (!) 191/88 -- -- -- -- --   12/04/23 1330 (!) 172/79 -- -- 62 17 93 %   12/04/23 1315 (!) 181/84 -- -- 62 15 93 %   12/04/23 1300 (!) 192/92 -- -- 67 20 93 %   12/04/23 1250 (!) 191/80 -- -- 62 15 93 %   12/04/23 1235 (!) 173/127 -- -- 59 12 94 %   12/04/23 1220 (!) 173/80 -- -- 61 16 93 %   12/04/23 1205 (!) 166/81 -- -- 62 20 97 %   12/04/23 1150 (!) 183/86 -- -- 60 12 98 %   12/04/23 1115 (!) 201/93 97.9  F (36.6  C) Oral 66 16 98 %        PHYSICAL EXAM    Constitutional:  Awake, alert, in no apparent distress  HENT:  Normocephalic, Atraumatic. Bilateral external ears normal. Oropharynx moist. Nose normal. Neck- Normal range " of motion with no guarding, No midline cervical tenderness, Supple, No stridor.   Eyes:  PERRL, EOMI with no signs of entrapment, Conjunctiva normal, No discharge.   Respiratory:  Normal breath sounds, No respiratory distress, No wheezing.    Cardiovascular:  Normal heart rate, Normal rhythm, No appreciable rubs or gallops.   GI:  Soft, No tenderness, No distension, No palpable masses  Musculoskeletal:  Intact distal pulses, No edema. Good range of motion in all major joints. No tenderness to palpation or major deformities noted.  Integument:  Warm, Dry, No erythema, No rash.   Neurologic:  Alert & oriented, equal strength bilateral lower and upper extremities, cranial nerves II through XII intact, no facial droop.  Abnormal visual field testing as patient sees double the number of fingers presented, this occurs on both sides, patient continues to see double when one eye is closed  Psychiatric:  Affect normal, Judgment normal, Mood normal.     LAB:  All pertinent labs reviewed and interpreted.  Results for orders placed or performed during the hospital encounter of 12/04/23   CTA Head Neck with Contrast    Impression    IMPRESSION:   HEAD CT:  1.  No CT evidence for acute intracranial process.  2.  Brain atrophy and presumed chronic microvascular ischemic changes as above.    HEAD CTA:   1.  Normal CTA [place] [facility] [place].    NECK CTA:  1.  Moderate to severe atherosclerotic narrowing of the V1 segment of the congenitally nondominant right vertebral artery.  2.  Plaque without stenosis of the internal carotid origins bilaterally.  3.  Otherwise, normal neck CTA.   MR Brain w/o & w Contrast    Impression    IMPRESSION:  1.  No acute intracranial process.  2.  Generalized brain atrophy and presumed microvascular ischemic changes as detailed above.   Basic metabolic panel   Result Value Ref Range    Sodium 142 135 - 145 mmol/L    Potassium 4.3 3.4 - 5.3 mmol/L    Chloride 106 98 - 107 mmol/L    Carbon Dioxide (CO2) 30 (H)  22 - 29 mmol/L    Anion Gap 6 (L) 7 - 15 mmol/L    Urea Nitrogen 9.9 8.0 - 23.0 mg/dL    Creatinine 0.67 0.51 - 0.95 mg/dL    GFR Estimate >90 >60 mL/min/1.73m2    Calcium 8.9 8.8 - 10.2 mg/dL    Glucose 114 (H) 70 - 99 mg/dL   Result Value Ref Range    INR 1.04 0.85 - 1.15   Partial thromboplastin time   Result Value Ref Range    aPTT 27 22 - 38 Seconds   Result Value Ref Range    Troponin T, High Sensitivity 8 <=14 ng/L   CBC with platelets and differential   Result Value Ref Range    WBC Count 7.5 4.0 - 11.0 10e3/uL    RBC Count 4.48 3.80 - 5.20 10e6/uL    Hemoglobin 13.4 11.7 - 15.7 g/dL    Hematocrit 40.7 35.0 - 47.0 %    MCV 91 78 - 100 fL    MCH 29.9 26.5 - 33.0 pg    MCHC 32.9 31.5 - 36.5 g/dL    RDW 13.2 10.0 - 15.0 %    Platelet Count 261 150 - 450 10e3/uL    % Neutrophils 64 %    % Lymphocytes 25 %    % Monocytes 8 %    % Eosinophils 2 %    % Basophils 1 %    % Immature Granulocytes 0 %    NRBCs per 100 WBC 0 <1 /100    Absolute Neutrophils 4.9 1.6 - 8.3 10e3/uL    Absolute Lymphocytes 1.9 0.8 - 5.3 10e3/uL    Absolute Monocytes 0.6 0.0 - 1.3 10e3/uL    Absolute Eosinophils 0.1 0.0 - 0.7 10e3/uL    Absolute Basophils 0.0 0.0 - 0.2 10e3/uL    Absolute Immature Granulocytes 0.0 <=0.4 10e3/uL    Absolute NRBCs 0.0 10e3/uL       RADIOLOGY:  MR Brain w/o & w Contrast   Final Result   IMPRESSION:   1.  No acute intracranial process.   2.  Generalized brain atrophy and presumed microvascular ischemic changes as detailed above.      CTA Head Neck with Contrast   Final Result   IMPRESSION:    HEAD CT:   1.  No CT evidence for acute intracranial process.   2.  Brain atrophy and presumed chronic microvascular ischemic changes as above.      HEAD CTA:    1.  Normal CTA [place].      NECK CTA:   1.  Moderate to severe atherosclerotic narrowing of the V1 segment of the congenitally nondominant right vertebral artery.   2.  Plaque without stenosis of the internal carotid origins bilaterally.   3.  Otherwise,  normal neck CTA.             EKG:    Sinus rhythm, no specific ST acute ischemic changes, no concerning dysrhythmias and for prolongation, no priors for comparison  I have independently reviewed and interpreted the EKG(s) documented above.    PROCEDURES:        [facility] Documentation:   CMS Diagnoses:        I, Leon Gregorio, am serving as a scribe to document services personally performed by Ruperto Sims MD, based on my observation and the provider's statements to me. I, Ruperto Sims MD attest that Leon Gregorio is acting in a scribe capacity, has observed my performance of the services and has documented them in accordance with my direction.    Ruperto Sims M.D.  Emergency Medicine  [facility] EMERGENCY DEPARTMENT  [facility] [facility] [address] [phone]  Dept: [phone]     Ruperto Sims MD  12/04/23 8257

## 2023-12-11 ENCOUNTER — LAB REQUISITION (OUTPATIENT)
Dept: LAB | Facility: CLINIC | Age: 63
End: 2023-12-11
Payer: MEDICARE

## 2023-12-11 DIAGNOSIS — I10 ESSENTIAL (PRIMARY) HYPERTENSION: ICD-10-CM

## 2023-12-11 LAB
ANION GAP SERPL CALCULATED.3IONS-SCNC: 10 MMOL/L (ref 7–15)
BUN SERPL-MCNC: 11.4 MG/DL (ref 8–23)
CALCIUM SERPL-MCNC: 9.1 MG/DL (ref 8.8–10.2)
CHLORIDE SERPL-SCNC: 102 MMOL/L (ref 98–107)
CREAT SERPL-MCNC: 0.66 MG/DL (ref 0.51–0.95)
DEPRECATED HCO3 PLAS-SCNC: 29 MMOL/L (ref 22–29)
EGFRCR SERPLBLD CKD-EPI 2021: >90 ML/MIN/1.73M2
GLUCOSE SERPL-MCNC: 111 MG/DL (ref 70–99)
POTASSIUM SERPL-SCNC: 3.5 MMOL/L (ref 3.4–5.3)
SODIUM SERPL-SCNC: 141 MMOL/L (ref 135–145)

## 2023-12-11 PROCEDURE — 80048 BASIC METABOLIC PNL TOTAL CA: CPT | Mod: ORL | Performed by: FAMILY MEDICINE

## 2024-01-07 ENCOUNTER — HEALTH MAINTENANCE LETTER (OUTPATIENT)
Age: 64
End: 2024-01-07

## 2024-01-11 ENCOUNTER — LAB REQUISITION (OUTPATIENT)
Dept: LAB | Facility: CLINIC | Age: 64
End: 2024-01-11
Payer: MEDICARE

## 2024-01-11 DIAGNOSIS — R80.9 PROTEINURIA, UNSPECIFIED: ICD-10-CM

## 2024-01-11 DIAGNOSIS — E78.00 PURE HYPERCHOLESTEROLEMIA, UNSPECIFIED: ICD-10-CM

## 2024-01-11 DIAGNOSIS — E11.9 TYPE 2 DIABETES MELLITUS WITHOUT COMPLICATIONS (H): ICD-10-CM

## 2024-01-11 LAB
CHOLEST SERPL-MCNC: 216 MG/DL
FASTING STATUS PATIENT QL REPORTED: ABNORMAL
HDLC SERPL-MCNC: 49 MG/DL
LDLC SERPL CALC-MCNC: 134 MG/DL
NONHDLC SERPL-MCNC: 167 MG/DL
TRIGL SERPL-MCNC: 165 MG/DL

## 2024-01-11 PROCEDURE — 82570 ASSAY OF URINE CREATININE: CPT | Mod: ORL | Performed by: FAMILY MEDICINE

## 2024-01-11 PROCEDURE — 87086 URINE CULTURE/COLONY COUNT: CPT | Mod: ORL | Performed by: FAMILY MEDICINE

## 2024-01-11 PROCEDURE — 80061 LIPID PANEL: CPT | Mod: ORL | Performed by: FAMILY MEDICINE

## 2024-01-12 ENCOUNTER — HOSPITAL ENCOUNTER (OUTPATIENT)
Dept: CT IMAGING | Facility: HOSPITAL | Age: 64
Discharge: HOME | End: 2024-01-12
Attending: RADIOLOGY | Admitting: RADIOLOGY
Payer: MEDICARE

## 2024-01-12 DIAGNOSIS — R91.1 LUNG NODULE: ICD-10-CM

## 2024-01-12 LAB
BACTERIA UR CULT: NORMAL
CREAT UR-MCNC: 165 MG/DL
MICROALBUMIN UR-MCNC: 4151 MG/L
MICROALBUMIN/CREAT UR: 2515.76 MG/G CR (ref 0–25)

## 2024-01-12 PROCEDURE — 71250 CT THORAX DX C-: CPT | Mod: ME

## 2024-01-18 NOTE — PROGRESS NOTES
Follow-up Note by Video  2024    Annette Shore  MRN: [id number]  : 1960    DISEASE TREATED: Presumed NSCLC of the RLL, [id number], stage IA2    INTERVAL SINCE COMPLETION OF RADIATION THERAPY:  1 year and 5 months (completed 22)     TYPE OF RADIATION THERAPY DELIVERED: 50 Gy in 5 fractions, SBRT, Rx=82%           SUBJECTIVE:  Annette Shore is a 64-year-old woman with a history of COPD who was found to have a right lower lobe nodule highly concerning for lung cancer.     A 1.6 cm RLL nodule was discovered on low-dose screening lung CT (22), located in the paraspinal region. Staging PET/CT (22) demonstrated a SUV max 2.2  FDG uptake associated with this nodule and no other findings. She also had an unremarkable brain MRI (22). She completed mediastinal staging with bronchoscopy and EBUS (22). FNA of a 4R node and a 11R node were negative. She was medically inoperable and underwent SBRT(EOT 2022).    The patient  also underwent a reconstructive bowel surgery on 2023.  She has had a bowel obstruction for about a month with only small amounts passing through. The patient noticed some blood passing after surgery. The patient is due for a follow up.    A follow up CT of chest (2023), showed an interval increased size of the previously seen at medial right lower lobe pulmonary nodule which now measures 1.9 x 0.6 cm, previously 1 x 0.5 cm. However, a follow up PET/CT (2023) showed no evidence of disease.    The recent chest CT(2024) showed:  1.  Focal scarring right lower lobe superior segment along the mediastinal pleura near a metallic fiducial. No new nodules    2.  Unchanged emphysema and chronic airway wall thickening consistent with lung and airway manifestations of COPD.  3.  Moderate to severe three-vessel atheromatous coronary calcifications.     She has JOHNSON but is stable. She is able to walk 50 -100 feet before stopping due to  "\"claudications/neuropathy.\"         Pre SBRT 7/22/2022   6M post SBRT (2/13/23)       12M post (8/11/23)            17M post (1/12/24)     IMPRESSION: cNED     RECOMMENDATIONS: I recommend a F/U in 3M with a CT scan of chest. All subsequent follow ups will be with our DANIELA/NP. F/U instructions discussed with the patient.    JANEEN Ruvalcaba M.D.  Department of Radiation Oncology  [facility]     "

## 2024-01-19 ENCOUNTER — VIRTUAL VISIT (OUTPATIENT)
Dept: RADIATION ONCOLOGY | Facility: CLINIC | Age: 64
End: 2024-01-19
Attending: RADIOLOGY
Payer: MEDICARE

## 2024-01-19 DIAGNOSIS — R91.1 LUNG NODULE: Primary | ICD-10-CM

## 2024-01-19 PROCEDURE — 99212 OFFICE O/P EST SF 10 MIN: CPT | Performed by: RADIOLOGY

## 2024-01-19 RX ORDER — ATENOLOL 50 MG/1
75 TABLET ORAL DAILY
Status: ON HOLD | COMMUNITY
Start: 2024-01-11 | End: 2024-05-31

## 2024-01-19 NOTE — LETTER
2024         RE: Annette Shore   [address]  [place] [address]        Dear Colleague,    Thank you for referring your patient, Annette Shore, to the [facility] RADIATION ONCOLOGY. Please see a copy of my visit note below.    Follow-up Note by Video  2024    Annette Shore  MRN: [id number]  : 1960    DISEASE TREATED: Presumed NSCLC of the RLL, [id number], stage IA2    INTERVAL SINCE COMPLETION OF RADIATION THERAPY:  1 year and 5 months (completed 22)     TYPE OF RADIATION THERAPY DELIVERED: 50 Gy in 5 fractions, SBRT, Rx=82%           SUBJECTIVE:  Annette Shore is a 64-year-old woman with a history of COPD who was found to have a right lower lobe nodule highly concerning for lung cancer.     A 1.6 cm RLL nodule was discovered on low-dose screening lung CT (22), located in the paraspinal region. Staging PET/CT (22) demonstrated a SUV max 2.2  FDG uptake associated with this nodule and no other findings. She also had an unremarkable brain MRI (22). She completed mediastinal staging with bronchoscopy and EBUS (22). FNA of a 4R node and a 11R node were negative. She was medically inoperable and underwent SBRT(EOT 2022).    The patient  also underwent a reconstructive bowel surgery on 2023.  She has had a bowel obstruction for about a month with only small amounts passing through. The patient noticed some blood passing after surgery. The patient is due for a follow up.    A follow up CT of chest (2023), showed an interval increased size of the previously seen at medial right lower lobe pulmonary nodule which now measures 1.9 x 0.6 cm, previously 1 x 0.5 cm. However, a follow up PET/CT (2023) showed no evidence of disease.    The recent chest CT(2024) showed:  1.  Focal scarring right lower lobe superior segment along the mediastinal pleura near a metallic fiducial. No new nodules    2.  Unchanged emphysema and  "chronic airway wall thickening consistent with lung and airway manifestations of COPD.  3.  Moderate to severe three-vessel atheromatous coronary calcifications.     She has JOHNSON but is stable. She is able to walk 50 -100 feet before stopping due to \"claudications/neuropathy.\"         Pre SBRT 2022   6M post SBRT (23)       12M post (23)            17M post (24)     IMPRESSION: cNED     RECOMMENDATIONS: I recommend a F/U in 3M with a CT scan of chest. All subsequent follow ups will be with our DANIELA/NP. F/U instructions discussed with the patient.    JANEEN Ruvalcaba M.D.  Department of Radiation Oncology  [facility]       FOLLOW-UP VISIT    Patient Name: Annette Shore      : 1960     Age: 64 year old        ______________________________________________________________________________     No chief complaint on file.    There were no vitals taken for this visit.     Date Radiation Completed: Lung Cancer: Right lung SBRT 5000 cGy completed 22    Pain  Denies 0/10     Labs  Other Labs: Yes: 24    Imaging  CT: Chest 24      Residual Radiation side effect: denies          Again, thank you for allowing me to participate in the care of your patient.        Sincerely,        Kamron Ruvalcaba MD  "

## 2024-01-19 NOTE — PROGRESS NOTES
FOLLOW-UP VISIT    Patient Name: Annette Shore      : 1960     Age: 64 year old        ______________________________________________________________________________     No chief complaint on file.    There were no vitals taken for this visit.     Date Radiation Completed: Lung Cancer: Right lung SBRT 5000 cGy completed 22    Pain  Denies 010     Labs  Other Labs: Yes: 24    Imaging  CT: Chest 24      Residual Radiation side effect: denies      Virtual Visit Details    Type of service:  Video Visit       Originating Location (pt. Location): Home    Distant Location (provider location):  On-site  Platform used for Video Visit: Basilio

## 2024-02-12 ENCOUNTER — HOSPITAL ENCOUNTER (EMERGENCY)
Facility: HOSPITAL | Age: 64
Discharge: HOME | End: 2024-02-12
Attending: EMERGENCY MEDICINE | Admitting: EMERGENCY MEDICINE
Payer: MEDICARE

## 2024-02-12 VITALS
TEMPERATURE: 97.9 F | HEART RATE: 69 BPM | OXYGEN SATURATION: 96 % | SYSTOLIC BLOOD PRESSURE: 196 MMHG | RESPIRATION RATE: 20 BRPM | DIASTOLIC BLOOD PRESSURE: 101 MMHG

## 2024-02-12 DIAGNOSIS — I10 ASYMPTOMATIC HYPERTENSION: ICD-10-CM

## 2024-02-12 PROCEDURE — 99283 EMERGENCY DEPT VISIT LOW MDM: CPT

## 2024-02-12 NOTE — DISCHARGE INSTRUCTIONS
As discussed, per the clinical guidelines, there are no specific blood pressure numbers that would need emergent treatment in the ER.  Make an appointment with your doctor for blood pressure recheck and further medication adjustments.  If you start having chest pain, symptoms of a stroke, headaches or blurred vision these would be reasons to come back to the ER.

## 2024-02-12 NOTE — ED NOTES
Bed: JNFT12  Expected date:   Expected time:   Means of arrival: Ambulance  Comments:  SPF: Hypertensive

## 2024-02-12 NOTE — ED PROVIDER NOTES
EMERGENCY DEPARTMENT ENCOUNTER     NAME: Annette Shore   AGE: 64 year old female   YOB: 1960   MRN: [id number]   EVALUATION DATE & TIME: 2/12/2024 10:27 AM   PCP: Jo Hamilton     Chief Complaint   Patient presents with    Hypertension   :    FINAL IMPRESSION       1. Asymptomatic hypertension           ED COURSE & MEDICAL DECISION MAKING      Pertinent Labs & Imaging studies reviewed. (See chart for details)   64 year old female  presents to the Emergency Department for evaluation of elevated blood pressure with no symptoms. Initial Vitals Reviewed. Initial exam notable for generally well-appearing patient who is under a bunch of stressors as someone in her household is being evicted with an orthopedic today.  She has a history of longstanding hypertension and PCP has been adjusting medications.  Home health nurse saw her today and called 911 and had her come to the ED.  She is asymptomatic.  We had a long discussion about the ACEP clinical guidelines for asymptomatic hypertension and no ED intervention is recommended.  She will follow-up with PCP for blood pressure recheck and further medication adjustments later this week.           At the conclusion of the encounter I discussed the results of all of the tests and the disposition. The questions were answered. The patient or family acknowledged understanding and was agreeable with the care plan.       10:28 Am I met with the patient, gathered information, history, and performed initial exam. I discussed discharge plan with her. Patient is agreeable with the plan.     0 minutes critical care time, see procedure note below for details if relevant    Medical Decision Making    History:  Supplemental history from: Documented in chart, EMS  External Record(s) reviewed: Documented in chart, home health visit 2/12/24    Work Up:  Chart documentation includes differential considered and any EKGs or imaging independently interpreted by provider, where  specified.  In additional to work up documented, I considered the following work up: Documented in chart, if applicable.    External consultation:  Discussion of management with another provider: Documented in chart, if applicable    Complicating factors:  Care impacted by chronic illness: Diabetes, Hyperlipidemia, Hypertension, Mental Health, and Smoking / Nicotine Use  Care affected by social determinants of health: N/A    Disposition considerations: Discharge. No recommendations on prescription strength medication(s). I considered admission, but ultimately discharged patient with asymptomatic hypertension.        MEDICATIONS GIVEN IN THE EMERGENCY:   Medications - No data to display   NEW PRESCRIPTIONS STARTED AT TODAY'S ER VISIT   New Prescriptions    No medications on file     ================================================================   HISTORY OF PRESENT ILLNESS       Patient information was obtained from: patient, nurse, and EMS.     Use of Intrepreter: N/A    Annette SHANIA Shore is a 64 year old female with history of hypertension, COPD, DM2, and tobacco use  who presents to the ED for evaluation of hypertension.    Per nurse,  Patient has been under stress over the weekend, which caused her to have an elevated hypertension; asymmetric.      Per patient,  Patient had an Ileostomy in August and Ileostomy revision in October. Nurse comes to her house to help her out. Nurse has to report to the the doctor whenever the patient's  blood pressure goes over 186. This morning her blood pressure was elevated, which prompted the nurse to report it to the patient's doctor's office. Nurse triage advised them to present to the ED .     She takes her hypertension medications regularly. Medications were getting adjusted for the patient. Denies any associated symptoms with the elevated blood pressure.      ================================================================        PAST HISTORY     PAST MEDICAL HISTORY:   Past  Medical History:   Diagnosis Date    Back pain     low back    COPD (chronic obstructive pulmonary disease) (H)     Depression     Diabetes mellitus (H)     Hyperlipidemia     Hypertension     PAD (peripheral artery disease) (H24)     Peripheral neuropathy     Pulmonary nodules       PAST SURGICAL HISTORY:   Past Surgical History:   Procedure Laterality Date    AORTA - FEMORAL ARTERY BYPASS GRAFT      BRONCHOSCOPY RIGID OR FLEXIBLE W/TRANSENDOSCOPIC ENDOBRONCHIAL ULTRASOUND GUIDED N/A 07/14/2022    Procedure: endbronchial ultrasound, transbronchial biopsy;  Surgeon: Rosendo Dave MD;  Location: [facility] OR    HYSTEROSCOPY W/ ENDOMETRIAL ABLATION      ILEOSTOMY  08/16/2023    ILEOSTOMY REVISION  10/19/2023    IR EXTREMITY ANGIOGRAM LEFT  11/25/2020    IR EXTREMITY ANGIOGRAM LEFT  12/16/2020    IR LOWER EXTREMITY ANGIOGRAM LEFT  11/25/2020    IR LOWER EXTREMITY ANGIOGRAM LEFT  12/16/2020    OPTICAL TRACKING SYSTEM BRONCHOSCOPY N/A 07/14/2022    Procedure: BRONCHOSCOPY, USING OPTICAL TRACKING SYSTEM, .  Ion or veran system, fiducial placement;  Surgeon: Rosendo Dave MD;  Location: [facility] OR    OTHER SURGICAL HISTORY      tubal ligation    OTHER SURGICAL HISTORY      spine fusion    PAIN STELLATE GANGLION BLOCK RIGHT Right 1992    x5, [facility]      CURRENT MEDICATIONS:   albuterol (PROVENTIL) (5 MG/ML) 0.5% neb solution  aspirin (ASA) 81 MG EC tablet  atenolol (TENORMIN) 50 MG tablet  atorvastatin (LIPITOR) 20 MG tablet  DULoxetine (CYMBALTA) 60 MG capsule  gabapentin (NEURONTIN) 300 MG capsule  ipratropium-albuteroL (DUO-NEB) 0.5-2.5 mg/3 mL nebulizer  losartan-hydrochlorothiazide (HYZAAR) 50-12.5 mg per tablet      ALLERGIES:   Allergies   Allergen Reactions    Bupropion Itching      FAMILY HISTORY:   Family History   Problem Relation Age of Onset    Diabetes Mother     Hypertension Mother     Cancer Father     Heart Disease Father     Diabetes Father       SOCIAL HISTORY:   Social History     Socioeconomic  "History    Marital status: Single   Tobacco Use    Smoking status: Every Day     Packs/day: 1     Types: Cigarettes     Start date: 7/17/1970    Smokeless tobacco: Never    Tobacco comments:     \"No more than 1 PPD\"   Vaping Use    Vaping Use: Never used   Substance and Sexual Activity    Alcohol use: No    Drug use: Not Currently        VITALS  Patient Vitals for the past 24 hrs:   BP Temp Temp src Pulse Resp SpO2   02/12/24 1033 (!) 196/101 97.9  F (36.6  C) Oral 69 20 96 %        ================================================================    PHYSICAL EXAM     VITAL SIGNS: BP (!) 196/101   Pulse 69   Temp 97.9  F (36.6  C) (Oral)   Resp 20   SpO2 96%    Constitutional:  Awake, no acute distress   HENT:  Atraumatic, oropharynx without exudate or erythema, membranes moist  Lymph:  No adenopathy  Eyes: EOM intact, PERRL, no injection  Neck: Supple  Respiratory:  Clear to auscultation bilaterally, no wheezes or crackles   Cardiovascular:  Regular rate and rhythm, single S1 and S2   GI:  Soft, nontender, nondistended, no rebound or guarding   Musculoskeletal:  Moves all extremities, no lower extremity edema, no deformities    Skin:  Warm, dry  Neurologic:  Alert and oriented x3, no focal deficits noted       ================================================================  LAB       All pertinent labs reviewed and interpreted.   Labs Ordered and Resulted from Time of ED Arrival to Time of ED Departure - No data to display     ===============================================================  RADIOLOGY       Reviewed all pertinent imaging. Please see official radiology report.   No orders to display         ================================================================  EKG         I have independently reviewed and interpreted the EKG(s) documented above.     ================================================================  PROCEDURES         ICassi , miguel angel serving as a scribe to document services " personally performed by Dr. Sanches based on my observation and the provider's statements to me. I, Mine Sanches MD attest that Cassi Denicefaithin  is acting in a scribe capacity, has observed my performance of the services and has documented them in accordance with my direction.       Mine Sanches M.D.   Emergency Medicine   [facility] EMERGENCY DEPARTMENT  [address] [place] [id number] [phone]  Dept: [phone]      Mine Sanches MD  02/12/24 8078

## 2024-02-12 NOTE — ED TRIAGE NOTES
Patient's home nurse noted blood pressure was elevated today in 210s. Nurse called MD office who stated patient should be seen.     Triage Assessment (Adult)       Row Name 02/12/24 1034          Triage Assessment    Airway WDL WDL        Respiratory WDL    Respiratory WDL WDL        Skin Circulation/Temperature WDL    Skin Circulation/Temperature WDL WDL        Cardiac WDL    Cardiac WDL WDL        Peripheral/Neurovascular WDL    Peripheral Neurovascular WDL WDL        Cognitive/Neuro/Behavioral WDL    Cognitive/Neuro/Behavioral WDL WDL

## 2024-03-12 ENCOUNTER — LAB REQUISITION (OUTPATIENT)
Dept: LAB | Facility: CLINIC | Age: 64
End: 2024-03-12
Payer: MEDICARE

## 2024-03-12 DIAGNOSIS — C34.31 MALIGNANT NEOPLASM OF LOWER LOBE, RIGHT BRONCHUS OR LUNG (H): ICD-10-CM

## 2024-03-12 PROCEDURE — 87081 CULTURE SCREEN ONLY: CPT | Mod: ORL | Performed by: FAMILY MEDICINE

## 2024-03-14 LAB — BACTERIA SPEC CULT: NORMAL

## 2024-03-26 NOTE — PROCEDURES
ARLINE COBB        MR#: [id number]        STEREOTACTIC BODY RADIOTHERAPY TREATMENT (SBRT) NOTE        DATE OF SERVICE:  8/17/2022          Diagnosis: Clinical C34.31  Malignant neoplasm of lower lobe, right bronchus or lung   T1b N0 M0 IA2      Medical Indication for SBRT: The patient has medically inoperable presumed  Right   lung cancer due to poor pulmonary function.     Intent of SBRT: Curative     Narrative:  This is a treatment note for 3rd fraction of the 5 fraction SBRT for resumed right lung   cancer. Biopsy was not possible. EBUS was unremarkable.        The patient was positioned in the custom made immobilization in the [employer]   body frame allowing X, Y, Z coordinates to be verified. The patient had a cone beam CT   scan today prior to treatment to verify the new central axis coordinates.       The new XYZ coordinates has shifted to ? 0. 9  cm X(lateral), ? 0. 4 cm Y(longitudinal),   and  ? 0. 3 cm Z(vertical).  After the cone beam CT verifications, the CAX set up was on   the treatment machine (True-Beam), the patient had treatment delivered with 10 non-  coplanar beams. The patient had 1000 cGy delivered with heterogeneity corrections with   a 6 MV photons. The cumulative dose is  3000 cGy of planned 5000 cGy.     No complication was encountered.  There was no complaint or skin reaction seen. The   patient tolerated the therapy well and left the department in stable condition and will   return for the 4th fraction of total 5 on August 15, 2022.    The patient will continue with radiotherapy.     Marisela Ashby MD  Department of Therapeutic Radiology

## 2024-04-18 ENCOUNTER — TRANSFERRED RECORDS (OUTPATIENT)
Dept: HEALTH INFORMATION MANAGEMENT | Facility: CLINIC | Age: 64
End: 2024-04-18
Payer: MEDICARE

## 2024-04-18 LAB — HBA1C MFR BLD: 6.6 % (ref 4.2–6.1)

## 2024-04-19 ENCOUNTER — HOSPITAL ENCOUNTER (OUTPATIENT)
Dept: CT IMAGING | Facility: HOSPITAL | Age: 64
Discharge: HOME | End: 2024-04-19
Attending: RADIOLOGY | Admitting: RADIOLOGY
Payer: MEDICARE

## 2024-04-19 DIAGNOSIS — R91.1 LUNG NODULE: ICD-10-CM

## 2024-04-19 PROCEDURE — 71250 CT THORAX DX C-: CPT

## 2024-04-20 LAB — RADIOLOGIST FLAGS: ABNORMAL

## 2024-04-23 ENCOUNTER — VIRTUAL VISIT (OUTPATIENT)
Dept: RADIATION ONCOLOGY | Facility: CLINIC | Age: 64
End: 2024-04-23
Attending: PHYSICIAN ASSISTANT
Payer: MEDICARE

## 2024-04-23 DIAGNOSIS — R91.1 LUNG NODULE: Primary | ICD-10-CM

## 2024-04-23 DIAGNOSIS — F17.200 TOBACCO USE DISORDER: ICD-10-CM

## 2024-04-23 DIAGNOSIS — C34.31 MALIGNANT NEOPLASM OF LOWER LOBE OF RIGHT LUNG (H): ICD-10-CM

## 2024-04-23 PROCEDURE — 99212 OFFICE O/P EST SF 10 MIN: CPT | Mod: 95 | Performed by: PHYSICIAN ASSISTANT

## 2024-04-23 NOTE — PROGRESS NOTES
Follow-up Note by Video  2024    Annette Shore  MRN: [id number]  : 1960    Radiation Oncologist: Kamron Ruvalcaba MD    DISEASE TREATED: Presumed NSCLC of the RLL, [id number], stage IA2    INTERVAL SINCE COMPLETION OF RADIATION THERAPY:  1 year and 5 months (completed 22)     TYPE OF RADIATION THERAPY DELIVERED: 50 Gy in 5 fractions, SBRT, Rx=82%           SUBJECTIVE:  Annette Shore is a 64-year-old woman with a history of COPD who was found to have a right lower lobe nodule highly concerning for lung cancer.     A 1.6 cm RLL nodule was discovered on low-dose screening lung CT (22), located in the paraspinal region. Staging PET/CT (22) demonstrated a SUV max 2.2  FDG uptake associated with this nodule and no other findings. She also had an unremarkable brain MRI (22). She completed mediastinal staging with bronchoscopy and EBUS (22). FNA of a 4R node and a 11R node were negative. She was medically inoperable and underwent SBRT(EOT 2022).    The patient  also underwent a reconstructive bowel surgery on 2023.  She has had a bowel obstruction for about a month with only small amounts passing through. The patient noticed some blood passing after surgery. The patient is due for a follow up.    A follow up CT of chest (2023), showed an interval increased size of the previously seen at medial right lower lobe pulmonary nodule which now measures 1.9 x 0.6 cm, previously 1 x 0.5 cm. However, a follow up PET/CT (2023) showed no evidence of disease.     Chest CT(2024) showed:  1.  Focal scarring right lower lobe superior segment along the mediastinal pleura near a metallic fiducial. No new nodules    2.  Unchanged emphysema and chronic airway wall thickening consistent with lung and airway manifestations of COPD.  3.  Moderate to severe three-vessel atheromatous coronary calcifications.    24 CT Chest  MPRESSION:   1.  Unchanged focal scarring in the  "medial right lower lobe and near a fiducial marker.  2.  New areas of patchy and nodular consolidation within the anterior right upper lobe and left upper lobe suggestive of an infectious/inflammatory process.  3.  Severe coronary artery calcifications.    She has JOHNSON but is stable. She is able to walk 50 -100 feet before stopping due to \"claudications/neuropathy.\"   Recently she had a COPD exacerbation during which she was in the ED. She is using inhalers. Coughing fits at night. She has a phlegm.         Pre SBRT 7/22/2022   6M post SBRT (2/13/23)       12M post (8/11/23)            17M post (1/12/24)     IMPRESSION: cNED     RECOMMENDATIONS: I reviewed the CT scan with Aylin Weir MD PGY-4. By our review RLL is unchanged and reflects post radiation changes. We see no evidence of inflammatory or infectious changes in the bilateral anterior lobes.     Recommend for her to reach out to her PCP for the cough she is experiencing without improvement with steroids  I recommend a F/U in 3M with a CT scan of chest. Orders placed.     GHAZALA Rader  Department of Radiation Oncology  [facility]     "

## 2024-04-23 NOTE — LETTER
2024         RE: Iglesia Shore   [address]  [place] [address]        Dear Colleague,    Thank you for referring your patient, Iglesia Shore, to the [facility] RADIATION ONCOLOGY. Please see a copy of my visit note below.    Follow-up Note by Video  2024    Iglesia Shore  MRN: [id number]  : 1960    Radiation Oncologist: Kamron Ruvalcaba MD    DISEASE TREATED: Presumed NSCLC of the RLL, [id number], stage IA2    INTERVAL SINCE COMPLETION OF RADIATION THERAPY:  1 year and 5 months (completed 22)     TYPE OF RADIATION THERAPY DELIVERED: 50 Gy in 5 fractions, SBRT, Rx=82%           SUBJECTIVE:  Iglesia Shore is a 64-year-old woman with a history of COPD who was found to have a right lower lobe nodule highly concerning for lung cancer.     A 1.6 cm RLL nodule was discovered on low-dose screening lung CT (22), located in the paraspinal region. Staging PET/CT (22) demonstrated a SUV max 2.2  FDG uptake associated with this nodule and no other findings. She also had an unremarkable brain MRI (22). She completed mediastinal staging with bronchoscopy and EBUS (22). FNA of a 4R node and a 11R node were negative. She was medically inoperable and underwent SBRT(EOT 2022).    The patient  also underwent a reconstructive bowel surgery on 2023.  She has had a bowel obstruction for about a month with only small amounts passing through. The patient noticed some blood passing after surgery. The patient is due for a follow up.    A follow up CT of chest (2023), showed an interval increased size of the previously seen at medial right lower lobe pulmonary nodule which now measures 1.9 x 0.6 cm, previously 1 x 0.5 cm. However, a follow up PET/CT (2023) showed no evidence of disease.     Chest CT(2024) showed:  1.  Focal scarring right lower lobe superior segment along the mediastinal pleura near a metallic fiducial. No new nodules   "  2.  Unchanged emphysema and chronic airway wall thickening consistent with lung and airway manifestations of COPD.  3.  Moderate to severe three-vessel atheromatous coronary calcifications.    4/19/24 CT Chest  MPRESSION:   1.  Unchanged focal scarring in the medial right lower lobe and near a fiducial marker.  2.  New areas of patchy and nodular consolidation within the anterior right upper lobe and left upper lobe suggestive of an infectious/inflammatory process.  3.  Severe coronary artery calcifications.    She has JOHNSON but is stable. She is able to walk 50 -100 feet before stopping due to \"claudications/neuropathy.\"   Recently she had a COPD exacerbation during which she was in the ED. She is using inhalers. Coughing fits at night. She has a phlegm.         Pre SBRT 7/22/2022   6M post SBRT (2/13/23)       12M post (8/11/23)            17M post (1/12/24)     IMPRESSION: cNED     RECOMMENDATIONS: I reviewed the CT scan with Aylin Weir MD PGY-4. By our review RLL is unchanged and reflects post radiation changes. We see no evidence of inflammatory or infectious changes in the bilateral anterior lobes.     Recommend for her to reach out to her PCP for the cough she is experiencing without improvement with steroids  I recommend a F/U in 3M with a CT scan of chest. Orders placed.     GHAZALA Rader  Department of Radiation Oncology  [facility]       Again, thank you for allowing me to participate in the care of your patient.        Sincerely,        BENJI RaderC  "

## 2024-05-01 ENCOUNTER — OFFICE VISIT (OUTPATIENT)
Dept: PHARMACY | Facility: PHYSICIAN GROUP | Age: 64
End: 2024-05-01

## 2024-05-01 DIAGNOSIS — E78.5 HYPERLIPIDEMIA, UNSPECIFIED HYPERLIPIDEMIA TYPE: ICD-10-CM

## 2024-05-01 DIAGNOSIS — E11.29 TYPE 2 DIABETES MELLITUS WITH DIABETIC MICROALBUMINURIA, WITHOUT LONG-TERM CURRENT USE OF INSULIN (H): ICD-10-CM

## 2024-05-01 DIAGNOSIS — C34.31 MALIGNANT NEOPLASM OF LOWER LOBE OF RIGHT LUNG (H): ICD-10-CM

## 2024-05-01 DIAGNOSIS — J44.9 CHRONIC OBSTRUCTIVE PULMONARY DISEASE, UNSPECIFIED COPD TYPE (H): ICD-10-CM

## 2024-05-01 DIAGNOSIS — R80.9 TYPE 2 DIABETES MELLITUS WITH DIABETIC MICROALBUMINURIA, WITHOUT LONG-TERM CURRENT USE OF INSULIN (H): ICD-10-CM

## 2024-05-01 DIAGNOSIS — I10 ESSENTIAL HYPERTENSION: ICD-10-CM

## 2024-05-01 DIAGNOSIS — G50.0 TRIGEMINAL NEURALGIA: Primary | ICD-10-CM

## 2024-05-01 DIAGNOSIS — K59.00 CONSTIPATION, UNSPECIFIED CONSTIPATION TYPE: ICD-10-CM

## 2024-05-01 PROCEDURE — 99207 PR NO CHARGE LOS: CPT | Performed by: PHARMACIST

## 2024-05-01 RX ORDER — DILTIAZEM HYDROCHLORIDE 120 MG/1
120 CAPSULE, EXTENDED RELEASE ORAL 2 TIMES DAILY
COMMUNITY
End: 2024-05-29

## 2024-05-01 NOTE — PROGRESS NOTES
Medication Therapy Management (MTM) Encounter    ASSESSMENT:                            Medication Adherence/Access: See below for considerations  .  Trigeminal Neuralgia: would benefit from continuing the higher dose of gabapentin that the patient has been taking.  Running out of it each month is likely causing the TN pain to come back.  We could also consider increasing her duloxetine dose, higher doses of 90 mg/higher typically more helpful for pain.  Carbamazepine would also be an option but after discussing side effects she would like to hold off on this.  .  Hypertension: Stable. Patient is meeting blood pressure goal of < 140/90mmHg.  .  Hyperlipidemia: may benefit from increasing the dose of atorvastatin in the future if LDL remains elevated, or could consider adding ezetimibe.  .  COPD/Hx of Lung cancer: would benefit from starting Breztri inhaler instead of Trelegy, can get through the patient assistance program for free.  Would benefit from getting a ICS/LABA inhaler now to bring on her trip, likely won't get Breztri until after she returns.  .  Constipation: Stable.  .  Diabetes: Stable. Patient is meeting A1c goal of < 7%.  If medication treatment were needed would be a good candidate for SGLT2 inhibitor for microalbuminuria.      PLAN:                            I will talk to Dr Hamilton to recommend increasing your gabapentin 600 mg dosing to 2 tablets twice daily.    2.      Cancelling the Trelegy inhaler.  Instead start Breztri inhaler 2 puffs twice daily, rinse mouth out with water and spit out after use of inhaler.  This may take a couple weeks to get this inhaler because it's coming in the mail from the [place].    3.     You can fill the Ventolin inhaler from [facility] pharmacy, and I will send in a 1 month supply of Fluticasone-salmeterol inhaler 1 puff twice daily.  The of Fluticasone-salmeterol will be short term while you go on your trip, and once you get the Breztri you can  complete the rest of the of Fluticasone-salmeterol inhaler and then change to Breztri.    Information about the [place] and [place] program:    - You were [place] into the [place] program to get Breztri inhaler for free. You will be able to re-enroll in the program each year  - Make sure to fill out the application or enroll online ([email or web address]) every year around end of November or early December to avoid delays in getting medications. - You will be able to receive a free 3 months supply for your Breztri medication shipped to your home  - For refills or enrollment status questions you can call them at [phone]  - You can also ask to enroll in automatic refills so you don't have to call them each time to request refills    Follow-up:   Appointments in Next Year      May 29, 2024  1:30 PM  Pharmacist Visit with Jessica Villanueva [facility] ([facility] ) [phone]            SUBJECTIVE/OBJECTIVE:                          Annette Shore is a 64 year old female coming in for an initial visit. She was referred to me from Dr. Hamilton.      Reason for visit: medication review, referral for trigeminal neuralgia.    Allergies/ADRs: Reviewed in chart  Past Medical History: Reviewed in chart  Tobacco: She reports that she has been smoking cigarettes. She started smoking about 53 years ago. She has a 53.8 pack-year smoking history. She has never used smokeless tobacco.Nicotine/Tobacco Cessation Plan  Information offered: Patient not interested at this time  Alcohol: not discussed    Medication Adherence/Access: patient has been taking a higher then prescribed dose of gabapentin on accident and running out of it early each month.  Patient uses pill box(es).  Patient takes medications 3 time(s) per day.     Trigeminal Neuralgia:  - Gabapentin 600 mg twice daily and 300 mg midday if needed for pain- reports she had been taking TWO tablets of the 600 mg twice daily   -  Oxycodone 5 mg 1-2 tablets every 4 hours if needed for pain   - Duloxetine 60 mg once daily AM   Her pain has been flaring up more lately.  Up until recently her pain has well controlled on the gabapentin and duloxetine.  No grogginess or feeling out of it when taking the 2 gabapentin 600 mg tablets.  She has been out of this dose for awhile and thinks this is what caused her pain to flare up  TN started about a year ago. Had a tooth pulled and started after that for about 6 months. They had gotten under control awhile ago but it's back again right now.  Previously she couldn't eat.  She would take two oxycodone and the gabapentin which allowed her to eat.  She brought in the oxycodone very sparingly.   She brought in her oxycodone bottle with, she hasn't taken any yet and has all 20 tablets left - she is only going to use for very severe pain or when she can't eat.  Using ice and heat.    Dr. Hamilton would like to consider starting oxcarbazepine for her TN pain.  Ran a drug interaction screening and oxcarbazepine does not interact with any of her current medications.  Previous CMP in 10/2023 was normal and recent CBC was normal  The main interaction that came up was CNS depression with oxycodone and gabapentin.  We discussed possible side effects with oxcarbazepine.    Hypertension   - Diltiazem  mg twice daily  - Atenolol 50 mg, 1 and 1/2 tablets daily  - Losartan-hydrochlorothiazide 100-12.5 mg daily  - Aspirin 81 mg daily - primary prevention  Patient reports no current medication side effects.  Her blood pressure has been difficult to control.  They recently started diltiazem and it's been much better controlled since this was added.  Has been running 110/62 and 116/64 the last two clinic visits    Hyperlipidemia   - Atorvastatin 40 mg daily  Patient reports no significant myalgias or other side effects.  Her last LDL was elevated at 134  Discussed risk of myalgias/rhabdomyolysis with interaction between  atorvastatin and diltiazem.  The 10-year ASCVD risk score (Luiz MILLER, et al., 2019) is: 23.7%    Values used to calculate the score:      Age: 64 years      Sex: Female      Is Non- : No      Diabetic: Yes      Tobacco smoker: Yes      Systolic Blood Pressure: 116 mmHg      Is BP treated: Yes      HDL Cholesterol: 49 mg/dL      Total Cholesterol: 216 mg/dL    COPD/Hx of Lung cancer:   - Trelegy 200-62.5-25 mcg 1 puff daily- hasn't started yet, is $785  - Ipratropium/albuterol nebs as needed - uses a few times a day  - Albuterol nebs/HFA-  hasn't started yet  She's cut down to 4-5 cigarettes a day but can't afford Chantix or the generic medication. Dr. Hamilton recently started her on Trelegy at their last appointment however it was too expensive and she didn't pick it up. This is likely due to a deductible. Had a recent exacerbation that required steroids and azithromycin.     Constipation:   - Miralax 17 g daily as needed   Reports bowel movements occurring once every 1-2 days.  Doing well    Diabetes:    Is not on any medications, her last A1c was 6.6% which is up for her.  This is likely related to the prednisone she was on. Diet controlled.  Doesn't monitor home blood glucose.  Eye exam: up to date  Foot exam: up to date  Microalbumin: elevated, up to date. On ARB    We ran out of time to check vitals today - has been good at last few visits.  ----------------      I spent 60 minutes with this patient today. All changes were made via collaborative practice agreement with Jo Hamilton MD. A copy of the visit note was provided to the patient's provider(s).    A summary of these recommendations was declined by the patient.    Jessica Villanueva, TianaD  Medication Therapy Management Pharmacist           Medication Therapy Recommendations  COPD (chronic obstructive pulmonary disease) (H)    Current Medication: Fluticasone-Umeclidin-Vilanterol (TRELEGY ELLIPTA) 200-62.5-25 MCG/ACT oral inhaler  (Discontinued)   Rationale: Cannot afford medication product - Cost - Adherence   Recommendation: Change Medication Formulation  - Breztri Aerosphere 160-9-4.8 MCG/ACT Aero   Status: Accepted per CPA         Trigeminal neuralgia    Current Medication: gabapentin (NEURONTIN) 600 MG tablet   Rationale: Dose too low - Dosage too low - Effectiveness   Recommendation: Increase Dose   Status: Accepted per Provider

## 2024-05-03 VITALS — SYSTOLIC BLOOD PRESSURE: 116 MMHG | DIASTOLIC BLOOD PRESSURE: 64 MMHG

## 2024-05-04 RX ORDER — OXYCODONE HYDROCHLORIDE 5 MG/1
5-10 TABLET ORAL EVERY 6 HOURS PRN
COMMUNITY

## 2024-05-04 RX ORDER — ATORVASTATIN CALCIUM 40 MG/1
40 TABLET, FILM COATED ORAL DAILY
COMMUNITY

## 2024-05-04 RX ORDER — GABAPENTIN 600 MG/1
1200 TABLET ORAL 2 TIMES DAILY
COMMUNITY

## 2024-05-04 RX ORDER — BUDESONIDE AND FORMOTEROL FUMARATE DIHYDRATE 160; 4.5 UG/1; UG/1
2 AEROSOL RESPIRATORY (INHALATION) 2 TIMES DAILY
COMMUNITY
Start: 2024-05-04 | End: 2024-05-29

## 2024-05-04 RX ORDER — LOSARTAN POTASSIUM AND HYDROCHLOROTHIAZIDE 12.5; 1 MG/1; MG/1
1 TABLET ORAL DAILY
Status: ON HOLD | COMMUNITY
End: 2024-05-31

## 2024-05-04 RX ORDER — ALBUTEROL SULFATE 90 UG/1
2 AEROSOL, METERED RESPIRATORY (INHALATION) EVERY 6 HOURS PRN
COMMUNITY

## 2024-05-04 RX ORDER — POLYETHYLENE GLYCOL 3350 17 G/17G
17 POWDER, FOR SOLUTION ORAL EVERY OTHER DAY
COMMUNITY

## 2024-05-04 NOTE — PATIENT INSTRUCTIONS
Recommendations from today's MTM visit:                                                    MTM (medication therapy management) is a service provided by a clinical pharmacist designed to help you get the most of out of your medicines.   Today we reviewed what your medicines are for, how to know if they are working, that your medicines are safe and how to make your medicine regimen as easy as possible.      I will talk to Dr Hamilton to recommend increasing your gabapentin 600 mg dosing to 2 tablets twice daily.    2.      Cancelling the Trelegy inhaler.  Instead start Breztri inhaler 2 puffs twice daily, rinse mouth out with water and spit out after use of inhaler.  This may take a couple weeks to get this inhaler because it's coming in the mail from the [place] program.    3.     You can fill the Ventolin inhaler from [facility] pharmacy, and I will send in a 1 month supply of Fluticasone-salmeterol inhaler 1 puff twice daily.  The of Fluticasone-salmeterol will be short term while you go on your trip, and once you get the Breztri you can complete the rest of the of Fluticasone-salmeterol inhaler and then change to Breztri.    Information about the [place] and [place] program:    - You were accepted into the [place] and [place] program to get Breztri inhaler for free. You will be able to re-enroll in the program each year  - Make sure to fill out the application or enroll online ([email or web address]) every year around end of November or early December to avoid delays in getting medications. - You will be able to receive a free 3 months supply for your Breztri medication shipped to your home  - For refills or enrollment status questions you can call them at [phone]  - You can also ask to enroll in automatic refills so you don't have to call them each time to request refills    Follow-up:   Appointments in Next Year      May 29, 2024  1:30 PM  Pharmacist Visit with Jessica Villanueva RPH  [facility]TM ([facility]  "[facility] ) [phone]              It was great speaking with you today.  I value your experience and would be very thankful for your time in providing feedback in our clinic survey. In the next few days, you may receive an email or text message from [facility] [address] with a link to a survey related to your  clinical pharmacist.\"     To schedule another [facility] appointment, please call the clinic directly or you may call the [facility] scheduling line at [phone].    My Clinical Pharmacist's contact information:                                                      Please feel free to contact me with any questions or concerns you have.      Jessica Villanueva, PharmD  Medication Therapy Management Pharmacist     "

## 2024-05-26 ENCOUNTER — HEALTH MAINTENANCE LETTER (OUTPATIENT)
Age: 64
End: 2024-05-26

## 2024-05-29 ENCOUNTER — HOSPITAL ENCOUNTER (INPATIENT)
Facility: HOSPITAL | Age: 64
LOS: 2 days | Discharge: HOME | DRG: 193 | End: 2024-05-31
Attending: EMERGENCY MEDICINE | Admitting: INTERNAL MEDICINE
Payer: MEDICARE

## 2024-05-29 ENCOUNTER — OFFICE VISIT (OUTPATIENT)
Dept: PHARMACY | Facility: PHYSICIAN GROUP | Age: 64
End: 2024-05-29
Payer: MEDICARE

## 2024-05-29 ENCOUNTER — APPOINTMENT (OUTPATIENT)
Dept: CT IMAGING | Facility: HOSPITAL | Age: 64
DRG: 193 | End: 2024-05-29
Attending: EMERGENCY MEDICINE
Payer: MEDICARE

## 2024-05-29 VITALS
OXYGEN SATURATION: 92 % | HEART RATE: 55 BPM | SYSTOLIC BLOOD PRESSURE: 108 MMHG | DIASTOLIC BLOOD PRESSURE: 62 MMHG | TEMPERATURE: 98 F

## 2024-05-29 DIAGNOSIS — J96.01 ACUTE HYPOXEMIC RESPIRATORY FAILURE (H): ICD-10-CM

## 2024-05-29 DIAGNOSIS — C34.31 MALIGNANT NEOPLASM OF LOWER LOBE OF RIGHT LUNG (H): ICD-10-CM

## 2024-05-29 DIAGNOSIS — Z72.0 CURRENT NICOTINE USE: Primary | ICD-10-CM

## 2024-05-29 DIAGNOSIS — C34.91 MALIGNANT NEOPLASM OF RIGHT LUNG, UNSPECIFIED PART OF LUNG (H): ICD-10-CM

## 2024-05-29 DIAGNOSIS — C34.90 MALIGNANT NEOPLASM OF LUNG, UNSPECIFIED LATERALITY, UNSPECIFIED PART OF LUNG (H): ICD-10-CM

## 2024-05-29 DIAGNOSIS — E11.22 TYPE 2 DIABETES MELLITUS WITH CHRONIC KIDNEY DISEASE, WITHOUT LONG-TERM CURRENT USE OF INSULIN, UNSPECIFIED CKD STAGE (H): ICD-10-CM

## 2024-05-29 DIAGNOSIS — J44.1 COPD EXACERBATION (H): ICD-10-CM

## 2024-05-29 DIAGNOSIS — R00.1 BRADYCARDIA: ICD-10-CM

## 2024-05-29 DIAGNOSIS — J44.9 CHRONIC OBSTRUCTIVE PULMONARY DISEASE, UNSPECIFIED COPD TYPE (H): ICD-10-CM

## 2024-05-29 DIAGNOSIS — R00.1 SINUS BRADYCARDIA: ICD-10-CM

## 2024-05-29 DIAGNOSIS — G50.0 TRIGEMINAL NEURALGIA: Primary | ICD-10-CM

## 2024-05-29 LAB
ALBUMIN SERPL BCG-MCNC: 3.4 G/DL (ref 3.5–5.2)
ALP SERPL-CCNC: 71 U/L (ref 40–150)
ALT SERPL W P-5'-P-CCNC: 12 U/L (ref 0–50)
ANION GAP SERPL CALCULATED.3IONS-SCNC: 10 MMOL/L (ref 7–15)
AST SERPL W P-5'-P-CCNC: 16 U/L (ref 0–45)
BASOPHILS # BLD AUTO: 0.1 10E3/UL (ref 0–0.2)
BASOPHILS NFR BLD AUTO: 1 %
BILIRUB DIRECT SERPL-MCNC: <0.2 MG/DL (ref 0–0.3)
BILIRUB SERPL-MCNC: 0.3 MG/DL
BUN SERPL-MCNC: 11.1 MG/DL (ref 8–23)
CALCIUM SERPL-MCNC: 9.2 MG/DL (ref 8.8–10.2)
CHLORIDE SERPL-SCNC: 100 MMOL/L (ref 98–107)
CREAT SERPL-MCNC: 0.82 MG/DL (ref 0.51–0.95)
DEPRECATED HCO3 PLAS-SCNC: 30 MMOL/L (ref 22–29)
EGFRCR SERPLBLD CKD-EPI 2021: 78 ML/MIN/1.73M2
EOSINOPHIL # BLD AUTO: 0.2 10E3/UL (ref 0–0.7)
EOSINOPHIL NFR BLD AUTO: 2 %
ERYTHROCYTE [DISTWIDTH] IN BLOOD BY AUTOMATED COUNT: 14.2 % (ref 10–15)
FLUAV RNA SPEC QL NAA+PROBE: NEGATIVE
FLUBV RNA RESP QL NAA+PROBE: NEGATIVE
GLUCOSE SERPL-MCNC: 97 MG/DL (ref 70–99)
HCT VFR BLD AUTO: 48.2 % (ref 35–47)
HGB BLD-MCNC: 15.9 G/DL (ref 11.7–15.7)
HOLD SPECIMEN: NORMAL
HOLD SPECIMEN: NORMAL
IMM GRANULOCYTES # BLD: 0 10E3/UL
IMM GRANULOCYTES NFR BLD: 0 %
LYMPHOCYTES # BLD AUTO: 3.1 10E3/UL (ref 0.8–5.3)
LYMPHOCYTES NFR BLD AUTO: 38 %
MAGNESIUM SERPL-MCNC: 1.9 MG/DL (ref 1.7–2.3)
MCH RBC QN AUTO: 29.8 PG (ref 26.5–33)
MCHC RBC AUTO-ENTMCNC: 33 G/DL (ref 31.5–36.5)
MCV RBC AUTO: 90 FL (ref 78–100)
MONOCYTES # BLD AUTO: 0.6 10E3/UL (ref 0–1.3)
MONOCYTES NFR BLD AUTO: 7 %
NEUTROPHILS # BLD AUTO: 4.2 10E3/UL (ref 1.6–8.3)
NEUTROPHILS NFR BLD AUTO: 52 %
NRBC # BLD AUTO: 0 10E3/UL
NRBC BLD AUTO-RTO: 0 /100
PLATELET # BLD AUTO: 307 10E3/UL (ref 150–450)
POTASSIUM SERPL-SCNC: 4 MMOL/L (ref 3.4–5.3)
PROT SERPL-MCNC: 6.8 G/DL (ref 6.4–8.3)
RBC # BLD AUTO: 5.34 10E6/UL (ref 3.8–5.2)
RSV RNA SPEC NAA+PROBE: NEGATIVE
SARS-COV-2 RNA RESP QL NAA+PROBE: NEGATIVE
SODIUM SERPL-SCNC: 140 MMOL/L (ref 135–145)
TROPONIN T SERPL HS-MCNC: 9 NG/L
TSH SERPL DL<=0.005 MIU/L-ACNC: 2.91 UIU/ML (ref 0.3–4.2)
WBC # BLD AUTO: 8.1 10E3/UL (ref 4–11)

## 2024-05-29 PROCEDURE — 94640 AIRWAY INHALATION TREATMENT: CPT

## 2024-05-29 PROCEDURE — 85025 COMPLETE CBC W/AUTO DIFF WBC: CPT | Performed by: EMERGENCY MEDICINE

## 2024-05-29 PROCEDURE — 250N000013 HC RX MED GY IP 250 OP 250 PS 637: Performed by: EMERGENCY MEDICINE

## 2024-05-29 PROCEDURE — 71275 CT ANGIOGRAPHY CHEST: CPT | Mod: MA

## 2024-05-29 PROCEDURE — 250N000009 HC RX 250: Performed by: EMERGENCY MEDICINE

## 2024-05-29 PROCEDURE — 250N000011 HC RX IP 250 OP 636: Performed by: EMERGENCY MEDICINE

## 2024-05-29 PROCEDURE — 80048 BASIC METABOLIC PNL TOTAL CA: CPT | Performed by: EMERGENCY MEDICINE

## 2024-05-29 PROCEDURE — 83735 ASSAY OF MAGNESIUM: CPT | Performed by: STUDENT IN AN ORGANIZED HEALTH CARE EDUCATION/TRAINING PROGRAM

## 2024-05-29 PROCEDURE — 80053 COMPREHEN METABOLIC PANEL: CPT | Performed by: STUDENT IN AN ORGANIZED HEALTH CARE EDUCATION/TRAINING PROGRAM

## 2024-05-29 PROCEDURE — 99207 PR NO CHARGE LOS: CPT | Performed by: PHARMACIST

## 2024-05-29 PROCEDURE — 120N000001 HC R&B MED SURG/OB

## 2024-05-29 PROCEDURE — 93005 ELECTROCARDIOGRAM TRACING: CPT | Performed by: STUDENT IN AN ORGANIZED HEALTH CARE EDUCATION/TRAINING PROGRAM

## 2024-05-29 PROCEDURE — 99285 EMERGENCY DEPT VISIT HI MDM: CPT | Mod: 25

## 2024-05-29 PROCEDURE — 250N000012 HC RX MED GY IP 250 OP 636 PS 637: Performed by: EMERGENCY MEDICINE

## 2024-05-29 PROCEDURE — 36415 COLL VENOUS BLD VENIPUNCTURE: CPT | Performed by: EMERGENCY MEDICINE

## 2024-05-29 PROCEDURE — 84443 ASSAY THYROID STIM HORMONE: CPT | Performed by: STUDENT IN AN ORGANIZED HEALTH CARE EDUCATION/TRAINING PROGRAM

## 2024-05-29 PROCEDURE — 87581 M.PNEUMON DNA AMP PROBE: CPT | Performed by: INTERNAL MEDICINE

## 2024-05-29 PROCEDURE — 93005 ELECTROCARDIOGRAM TRACING: CPT | Performed by: EMERGENCY MEDICINE

## 2024-05-29 PROCEDURE — 84484 ASSAY OF TROPONIN QUANT: CPT | Performed by: EMERGENCY MEDICINE

## 2024-05-29 PROCEDURE — 87637 SARSCOV2&INF A&B&RSV AMP PRB: CPT | Performed by: EMERGENCY MEDICINE

## 2024-05-29 PROCEDURE — 99223 1ST HOSP IP/OBS HIGH 75: CPT | Mod: AI | Performed by: INTERNAL MEDICINE

## 2024-05-29 RX ORDER — METHYLPREDNISOLONE SODIUM SUCCINATE 125 MG/2ML
60 INJECTION, POWDER, LYOPHILIZED, FOR SOLUTION INTRAMUSCULAR; INTRAVENOUS EVERY 8 HOURS
Status: DISCONTINUED | OUTPATIENT
Start: 2024-05-30 | End: 2024-05-30

## 2024-05-29 RX ORDER — IPRATROPIUM BROMIDE AND ALBUTEROL SULFATE 2.5; .5 MG/3ML; MG/3ML
3 SOLUTION RESPIRATORY (INHALATION) ONCE
Status: COMPLETED | OUTPATIENT
Start: 2024-05-29 | End: 2024-05-29

## 2024-05-29 RX ORDER — DOXYCYCLINE 100 MG/1
100 CAPSULE ORAL 2 TIMES DAILY
Qty: 14 CAPSULE | Refills: 0 | Status: SHIPPED | OUTPATIENT
Start: 2024-05-29 | End: 2024-05-31

## 2024-05-29 RX ORDER — DOXYCYCLINE 100 MG/10ML
100 INJECTION, POWDER, LYOPHILIZED, FOR SOLUTION INTRAVENOUS EVERY 12 HOURS
Status: DISCONTINUED | OUTPATIENT
Start: 2024-05-30 | End: 2024-05-31 | Stop reason: HOSPADM

## 2024-05-29 RX ORDER — NICOTINE POLACRILEX 4 MG
15-30 LOZENGE BUCCAL
Status: DISCONTINUED | OUTPATIENT
Start: 2024-05-29 | End: 2024-05-31 | Stop reason: HOSPADM

## 2024-05-29 RX ORDER — AMOXICILLIN 250 MG
2 CAPSULE ORAL 2 TIMES DAILY PRN
Status: DISCONTINUED | OUTPATIENT
Start: 2024-05-29 | End: 2024-05-31 | Stop reason: HOSPADM

## 2024-05-29 RX ORDER — IOPAMIDOL 755 MG/ML
90 INJECTION, SOLUTION INTRAVASCULAR ONCE
Status: COMPLETED | OUTPATIENT
Start: 2024-05-29 | End: 2024-05-29

## 2024-05-29 RX ORDER — DOCUSATE SODIUM 100 MG/1
100 CAPSULE, LIQUID FILLED ORAL 2 TIMES DAILY
Status: DISCONTINUED | OUTPATIENT
Start: 2024-05-29 | End: 2024-05-29

## 2024-05-29 RX ORDER — AMOXICILLIN 250 MG
1 CAPSULE ORAL 2 TIMES DAILY PRN
Status: DISCONTINUED | OUTPATIENT
Start: 2024-05-29 | End: 2024-05-31 | Stop reason: HOSPADM

## 2024-05-29 RX ORDER — FAMOTIDINE 10 MG
10 TABLET ORAL 2 TIMES DAILY
Status: DISCONTINUED | OUTPATIENT
Start: 2024-05-29 | End: 2024-05-29

## 2024-05-29 RX ORDER — METHYLPREDNISOLONE SODIUM SUCCINATE 125 MG/2ML
125 INJECTION, POWDER, LYOPHILIZED, FOR SOLUTION INTRAMUSCULAR; INTRAVENOUS ONCE
Status: DISCONTINUED | OUTPATIENT
Start: 2024-05-29 | End: 2024-05-29

## 2024-05-29 RX ORDER — DILTIAZEM HYDROCHLORIDE 120 MG/1
120 CAPSULE, EXTENDED RELEASE ORAL 2 TIMES DAILY
COMMUNITY

## 2024-05-29 RX ORDER — DOXYCYCLINE 100 MG/1
100 CAPSULE ORAL ONCE
Status: COMPLETED | OUTPATIENT
Start: 2024-05-29 | End: 2024-05-29

## 2024-05-29 RX ORDER — DEXTROSE MONOHYDRATE 25 G/50ML
25-50 INJECTION, SOLUTION INTRAVENOUS
Status: DISCONTINUED | OUTPATIENT
Start: 2024-05-29 | End: 2024-05-31 | Stop reason: HOSPADM

## 2024-05-29 RX ORDER — LIDOCAINE 40 MG/G
CREAM TOPICAL
Status: DISCONTINUED | OUTPATIENT
Start: 2024-05-29 | End: 2024-05-31 | Stop reason: HOSPADM

## 2024-05-29 RX ORDER — FUROSEMIDE 20 MG
20 TABLET ORAL
Status: ON HOLD | COMMUNITY
Start: 2024-05-03 | End: 2024-05-31

## 2024-05-29 RX ORDER — PREDNISONE 20 MG/1
TABLET ORAL
Qty: 10 TABLET | Refills: 0 | Status: SHIPPED | OUTPATIENT
Start: 2024-05-29 | End: 2024-05-29

## 2024-05-29 RX ORDER — PREDNISONE 20 MG/1
20 TABLET ORAL DAILY
Status: DISCONTINUED | OUTPATIENT
Start: 2024-05-30 | End: 2024-05-30

## 2024-05-29 RX ORDER — BUDESONIDE, GLYCOPYRROLATE, AND FORMOTEROL FUMARATE 160; 9; 4.8 UG/1; UG/1; UG/1
2 AEROSOL, METERED RESPIRATORY (INHALATION) 2 TIMES DAILY
COMMUNITY

## 2024-05-29 RX ORDER — IPRATROPIUM BROMIDE AND ALBUTEROL SULFATE 2.5; .5 MG/3ML; MG/3ML
3 SOLUTION RESPIRATORY (INHALATION) EVERY 4 HOURS PRN
Status: DISCONTINUED | OUTPATIENT
Start: 2024-05-29 | End: 2024-05-30

## 2024-05-29 RX ORDER — PREDNISONE 20 MG/1
20 TABLET ORAL ONCE
Status: COMPLETED | OUTPATIENT
Start: 2024-05-29 | End: 2024-05-29

## 2024-05-29 RX ORDER — CALCIUM CARBONATE 500 MG/1
1000 TABLET, CHEWABLE ORAL 4 TIMES DAILY PRN
Status: DISCONTINUED | OUTPATIENT
Start: 2024-05-29 | End: 2024-05-31 | Stop reason: HOSPADM

## 2024-05-29 RX ORDER — METHYLPREDNISOLONE SODIUM SUCCINATE 125 MG/2ML
125 INJECTION, POWDER, LYOPHILIZED, FOR SOLUTION INTRAMUSCULAR; INTRAVENOUS ONCE
Status: COMPLETED | OUTPATIENT
Start: 2024-05-29 | End: 2024-05-30

## 2024-05-29 RX ORDER — DOXYCYCLINE 100 MG/1
100 CAPSULE ORAL EVERY 12 HOURS SCHEDULED
Status: DISCONTINUED | OUTPATIENT
Start: 2024-05-30 | End: 2024-05-30

## 2024-05-29 RX ADMIN — IPRATROPIUM BROMIDE AND ALBUTEROL SULFATE 3 ML: .5; 3 SOLUTION RESPIRATORY (INHALATION) at 22:24

## 2024-05-29 RX ADMIN — PREDNISONE 20 MG: 20 TABLET ORAL at 20:54

## 2024-05-29 RX ADMIN — IOPAMIDOL 90 ML: 755 INJECTION, SOLUTION INTRAVENOUS at 19:20

## 2024-05-29 RX ADMIN — IPRATROPIUM BROMIDE AND ALBUTEROL SULFATE 3 ML: .5; 3 SOLUTION RESPIRATORY (INHALATION) at 20:54

## 2024-05-29 RX ADMIN — DOXYCYCLINE 100 MG: 100 CAPSULE ORAL at 20:54

## 2024-05-29 ASSESSMENT — ACTIVITIES OF DAILY LIVING (ADL)
ADLS_ACUITY_SCORE: 35
ADLS_ACUITY_SCORE: 33
ADLS_ACUITY_SCORE: 33
ADLS_ACUITY_SCORE: 35

## 2024-05-29 ASSESSMENT — COLUMBIA-SUICIDE SEVERITY RATING SCALE - C-SSRS
6. HAVE YOU EVER DONE ANYTHING, STARTED TO DO ANYTHING, OR PREPARED TO DO ANYTHING TO END YOUR LIFE?: NO
2. HAVE YOU ACTUALLY HAD ANY THOUGHTS OF KILLING YOURSELF IN THE PAST MONTH?: NO
1. IN THE PAST MONTH, HAVE YOU WISHED YOU WERE DEAD OR WISHED YOU COULD GO TO SLEEP AND NOT WAKE UP?: NO

## 2024-05-29 NOTE — PROGRESS NOTES
Medication Therapy Management (MTM) Encounter    ASSESSMENT:                            Medication Adherence/Access: no concerns  .  Trigeminal Neuralgia: Stable  .  COPD/Hx of Lung cancer: I'm concerned with her acute worsening in breathing the last 2 weeks and will see if she can be evaluated by a provider today.  She should still start the Breztri inhaler today.    PLAN:                            Start Breztri today, 2 puffs twice daily  Do NOT start fluticasone-salmeterol inhaler (The Breztri inhaler replaces this)  Continue taking albuterol as needed for shortness of breath.  Scheduled appointment for patient to see provider today to further evaluate her worsening shortness of breath and productive cough.  Amalia Lux was available to see the patient.    Follow-up: 1 month with MTM pharmacist    SUBJECTIVE/OBJECTIVE:                          Annette Shore is a 64 year old female coming in for a follow up visit    Reason for visit: follow up on trigeminal neuralgia and COPD.    Allergies/ADRs: Reviewed in chart  Past Medical History: Reviewed in chart  Tobacco: She reports that she has been smoking cigarettes. She started smoking about 53 years ago. She has a 53.9 pack-year smoking history. She has never used smokeless tobacco.Nicotine/Tobacco Cessation Plan  Information offered: Patient not interested at this time  Alcohol: not discussed    Medication Adherence/Access: patient has been taking a higher then prescribed dose of gabapentin on accident and running out of it early each month.  [place] Breztri through [place] now.  Patient uses pill box(es).  Patient takes medications 3 time(s) per day.     Trigeminal Neuralgia:  - Gabapentin 600 mg twice daily and 300 mg midday if needed for pain- restarted after our last appointment   - Oxycodone 5 mg 1-2 tablets every 4 hours if needed for pain   - Duloxetine 60 mg once daily AM   Since getting back on her Gabapentin she feels her TN pain is much more  manageable now.  She does still have this pain, and sometimes needing the midday dose of gabapentin, but feels its doing much better.  She will make sure to request refills ahead of time for it.  At this time she feels like she wouldn't want to add in another medication for this.  No grogginess or feeling out of it when taking the 2 gabapentin 600 mg tablets.   TN started about a year ago. Had a tooth pulled and started after that for about 6 months. Takes the oxycodone very sparingly- only uses for very severe pain or when she can't eat.  Using ice and heat before using other pain meds.    COPD/Hx of Lung cancer:   - Breztri 2 puffs twice daily - she didn't start this yet  - Ipratropium/albuterol nebs as needed - uses a few times a day  - Albuterol nebs/HFA-  hasn't started yet  She hasn't started the Breztri yet - she wanted to confirm with me today which inhalers she should be taking.  She also hadn't received the fluticasone-salmeterol inhaler until just the last week, I had hoped she'd be able to get that prior to her trip.  Since getting home from a trip her breathing has been a lot worse.  Started around 5/18-Getting very short of breath when walking short distances, when sitting and talking. Reports chest congestion/productive cough is also worse right now.  Denies feeling feverish.  Denies chest pains, weight on chest, dizziness.    She's cut down to 4-5 cigarettes a day but can't afford Chantix or the generic medication.       /62   Pulse 55   Temp 98  F (36.7  C)   SpO2 92%     ----------------      I spent 45 minutes with this patient today. All changes were made via collaborative practice agreement with Jo Hamilton MD. A copy of the visit note was provided to the patient's provider(s).    A summary of these recommendations was sent via [email or web address] to the patient.    Jessica Villanueva, Lazaro  Medication Therapy Management Pharmacist           Medication Therapy Recommendations  COPD (chronic  obstructive pulmonary disease) (H)    Current Medication: budeson-glycopyrrol-formoterol (BREZTRI AEROSPHERE) 160-9-4.8 MCG/ACT AERO inhaler   Rationale: Does not understand instructions - Adherence - Adherence   Recommendation: Provide Adherence Intervention   Status: Accepted per CPA

## 2024-05-29 NOTE — ED TRIAGE NOTES
Patient arrives by private car for evaluation of shortness of breath.  History of COPD and lung cancer and is a smoker.  Was seen at PCP and advised to come here for CTA and bloodwork.  Denies chest pain.

## 2024-05-29 NOTE — PATIENT INSTRUCTIONS
"Recommendations from today's MTM visit:                                                       Start Breztri today, 2 puffs twice daily  Do NOT start fluticasone-salmeterol inhaler (The Breztri inhaler replaces this)  Continue taking albuterol as needed for shortness of breath.    Follow-up: Return in about 1 month (around 6/29/2024) for MTM Follow Up.    It was great speaking with you today.  I value your experience and would be very thankful for your time in providing feedback in our clinic survey. In the next few days, you may receive an email or text message from [employer] with a link to a survey related to your  clinical pharmacist.\"     To schedule another MTM appointment, please call the clinic directly or you may call the MTM scheduling line at [phone].    My Clinical Pharmacist's contact information:                                                      Please feel free to contact me with any questions or concerns you have.      Jessica Villanueva, PharmD  Medication Therapy Management Pharmacist     "

## 2024-05-29 NOTE — ED PROVIDER NOTES
EMERGENCY DEPARTMENT NOTE     Name: Iglesia Shore    Age/Sex: 64 year old female   MRN: [id number]   Evaluation Date & Time:  No admission date for patient encounter.    PCP:    Jo Hamilton   ED Provider: Dougie Torres D.O.       CHIEF COMPLAINT    Shortness of Breath       DIAGNOSIS & DISPOSITION/MEDICAL DECISION MAKING         1. COPD exacerbation (H)    2. Sinus bradycardia                            Iglesia Shore is a 64 year old female with relevant past history of COPD, lung cancer, PAD, hypertension, hyperlipidemia, T2DM, trigeminal neuralgia, and smoking who presents to the emergency department for evaluation of shortness of breath.    Differential  diagnosis considered included but not limited to pulmonary embolism, COPD exacerbation, ACS, electrolyte derangement, hypothyroidism    Medical Decision Making  Patient on initial exam was afebrile with stable vital signs borderline hypoxemic with O2 sats 90%  Pertinent physical exam findings  Cardiac: Regular rate and rhythm S1-S2 without murmur rub  Pulmonary: Bilateral expiratory wheezing  Diagnostic studies:  EKG: Sinus bradycardia, troponin 7  CTA chest: No pulmonary embolism, right middle lobe groundglass infiltrate  Lab: Glucose 248 with normal CO2 and anion gap.  Electrolytes including magnesium and potassium within normal limits, renal function normal, CBC within normal limits, TSH 2.9  Patient was noted to be bradycardic with heart rate predominantly 45-50 but asymptomatic.  Patient initially given DuoNeb as well as oral prednisone and oral doxycycline.  Patient remained with bilateral expiratory wheezing.  Patient did not wish to be admitted to the hospital.  Patient was initially ambulated without oxygen and remained without hypoxemia.  Initial plan was for discharge with close outpatient follow-up but pending discharge the patient had hypoxemia into the mid 80s after further discussion agrees to be admitted for further evaluation and  "treatment.  Patient received further DuoNeb, was placed on low-flow oxygen and given IV Solu-Medrol.  Case was discussed with the hospitalist service.      Interventions: DuoNeb x 2, oral prednisone, IV Solu-Medrol, oral doxycycline  Discharge Vital Signs:/60 (BP Location: Right arm)   Pulse 72   Temp 97.9  F (36.6  C) (Oral)   Resp 18   Ht 1.651 m (5' 5\")   Wt 74.5 kg (164 lb 3.9 oz)   SpO2 94%   BMI 27.33 kg/m       DISPOSITION: To cardiac telemetry observation/[facility] [place]    Diagnostic studies:  Imaging:  Echocardiogram Complete   Final Result      CT Chest Pulmonary Embolism w Contrast   Final Result   IMPRESSION:   1.  No pulmonary emboli.   2.  New faint groundglass and tree-in-bud opacities right middle lobe consistent with atypical pneumonia.   3.  Mucous secretions/debris layer dependently within right mainstem bronchus. Bronchial wall thickening.   4.  Emphysema.         Lab:  Labs Ordered and Resulted from Time of ED Arrival to Time of ED Departure   BASIC METABOLIC PANEL - Abnormal       Result Value    Sodium 140      Potassium 4.0      Chloride 100      Carbon Dioxide (CO2) 30 (*)     Anion Gap 10      Urea Nitrogen 11.1      Creatinine 0.82      GFR Estimate 78      Calcium 9.2      Glucose 97     CBC WITH PLATELETS AND DIFFERENTIAL - Abnormal    WBC Count 8.1      RBC Count 5.34 (*)     Hemoglobin 15.9 (*)     Hematocrit 48.2 (*)     MCV 90      MCH 29.8      MCHC 33.0      RDW 14.2      Platelet Count 307      % Neutrophils 52      % Lymphocytes 38      % Monocytes 7      % Eosinophils 2      % Basophils 1      % Immature Granulocytes 0      NRBCs per 100 WBC 0      Absolute Neutrophils 4.2      Absolute Lymphocytes 3.1      Absolute Monocytes 0.6      Absolute Eosinophils 0.2      Absolute Basophils 0.1      Absolute Immature Granulocytes 0.0      Absolute NRBCs 0.0     HEPATIC FUNCTION PANEL - Abnormal    Protein Total 6.8      Albumin 3.4 (*)     Bilirubin Total 0.3      Alkaline Phosphatase " 71      AST 16      ALT 12      Bilirubin Direct <0.20     TROPONIN T, HIGH SENSITIVITY - Normal    Troponin T, High Sensitivity 9     INFLUENZA A/B, RSV, & SARS-COV2 PCR - Normal    Influenza A PCR Negative      Influenza B PCR Negative      RSV PCR Negative      SARS CoV2 PCR Negative     MAGNESIUM - Normal    Magnesium 1.9     TSH WITH FREE T4 REFLEX - Normal    TSH 2.91                 Triage note reviewed:Patient arrives by private car for evaluation of shortness of breath.  History of COPD and lung cancer and is a smoker.  Was seen at PCP and advised to come here for CTA and bloodwork.  Denies chest pain.             History:  Supplemental history from: Patient's friend  External Record(s) reviewed: Primary care office visit 5/29/2024    Work Up:  Chart documentation includes differential considered and any EKGs or imaging independently interpreted by provider, where specified.  In additional to work up documented, I considered the following work up:NA    External consultation:  Discussion of management with another provider: Hospitalist    Complicating factors:  Care impacted by chronic illness: Chronic Lung Disease, Diabetes, Heart Disease, Hyperlipidemia, Hypertension, and Smoking / Nicotine Use  Care affected by social determinants of health: N/A    Disposition considerations: Admit    At the conclusion of the encounter I discussed the results of all of the tests and the disposition. The questions were answered. The patient or family acknowledged understanding and was agreeable with the care plan.    TOTAL CRITICAL CARE TIME (EXCLUDING PROCEDURES): Not applicable    PROCEDURES:   None    EMERGENCY DEPARTMENT COURSE   8:15 PM I met with the patient to gather history and to perform my initial exam.  We discussed treatment options and the plan for care while in the Emergency Department.  9:54 PM We discussed the plan for discharge and the patient is agreeable. Reviewed supportive cares, symptomatic treatment,  outpatient follow up, and reasons to return to the Emergency Department. All questions and concerns were addressed. Patient to be discharged by ED RN.    11:09 PM I spoke to hospitalist, Tara Bell MD.    ED INTERVENTIONS     Medications   iopamidol (ISOVUE-370) solution 90 mL (90 mLs Intravenous $Given 5/29/24 1920)   ipratropium - albuterol 0.5 mg/2.5 mg/3 mL (DUONEB) neb solution 3 mL (3 mLs Nebulization $Given 5/29/24 2054)   doxycycline monohydrate (MONODOX) capsule 100 mg (100 mg Oral $Given 5/29/24 2054)   predniSONE (DELTASONE) tablet 20 mg (20 mg Oral $Given 5/29/24 2054)   ipratropium - albuterol 0.5 mg/2.5 mg/3 mL (DUONEB) neb solution 3 mL (3 mLs Nebulization $Given 5/29/24 2224)   methylPREDNISolone sodium succinate (solu-MEDROL) injection 125 mg (125 mg Intravenous $Given 5/30/24 0046)   methylPREDNISolone sodium succinate (solu-MEDROL) injection 62.5 mg (62.5 mg Intravenous $Given 5/31/24 0820)       DISCHARGE MEDICATIONS        Review of your medicines        START taking        Dose / Directions   doxycycline hyclate 100 MG capsule  Commonly known as: VIBRAMYCIN  Used for: Current nicotine use, Malignant neoplasm of lung, unspecified laterality, unspecified part of lung (H), Acute hypoxemic respiratory failure (H), Malignant neoplasm of right lung, unspecified part of lung (H)      Dose: 100 mg  Take 1 capsule (100 mg) by mouth 2 times daily  Quantity: 10 capsule  Refills: 0     metFORMIN 500 MG 24 hr tablet  Commonly known as: GLUCOPHAGE XR  Used for: Malignant neoplasm of lung, unspecified laterality, unspecified part of lung (H), Type 2 diabetes mellitus with chronic kidney disease, without long-term current use of insulin, unspecified CKD stage (H)      Dose: 500 mg  Take 1 tablet (500 mg) by mouth 2 times daily (with meals)  Quantity: 60 tablet  Refills: 0     predniSONE 20 MG tablet  Commonly known as: DELTASONE  Used for: Current nicotine use, Malignant neoplasm of lung, unspecified  laterality, unspecified part of lung (H), Acute hypoxemic respiratory failure (H), Malignant neoplasm of right lung, unspecified part of lung (H)      Dose: 40 mg  Take 2 tablets (40 mg) by mouth daily  Quantity: 14 tablet  Refills: 0            CONTINUE these medicines which may have CHANGED, or have new prescriptions. If we are uncertain of the size of tablets/capsules you have at home, strength may be listed as something that might have changed.        Dose / Directions   atenolol 25 MG tablet  Commonly known as: TENORMIN  This may have changed:   medication strength  how much to take  Used for: Current nicotine use, Malignant neoplasm of lung, unspecified laterality, unspecified part of lung (H), Acute hypoxemic respiratory failure (H), Malignant neoplasm of right lung, unspecified part of lung (H)      Dose: 25 mg  Take 1 tablet (25 mg) by mouth daily  Quantity: 30 tablet  Refills: 0            CONTINUE these medicines which have NOT CHANGED        Dose / Directions   * albuterol (5 MG/ML) 0.5% neb solution  Commonly known as: PROVENTIL      0.5 mL by nebulizer as needed  Refills: 0     * albuterol 108 (90 Base) MCG/ACT inhaler  Commonly known as: PROAIR HFA/PROVENTIL HFA/VENTOLIN HFA      Dose: 2 puff  Inhale 2 puffs into the lungs every 6 hours as needed for shortness of breath, wheezing or cough  Refills: 0     aspirin 81 MG EC tablet  Commonly known as: ASA      1 tab(s) Orally once a day  Refills: 0     atorvastatin 40 MG tablet  Commonly known as: LIPITOR      Dose: 40 mg  Take 40 mg by mouth daily  Refills: 0     Breztri Aerosphere 160-9-4.8 MCG/ACT Aero inhaler  Generic drug: budeson-glycopyrrol-formoterol      Dose: 2 puff  Inhale 2 puffs into the lungs 2 times daily  Refills: 0     diltiazem 120 MG Cp12 12 hr SR capsule  Commonly known as: CARDIZEM SR      Dose: 120 mg  Take 120 mg by mouth 2 times daily  Refills: 0     DULoxetine 60 MG capsule  Commonly known as: CYMBALTA      Dose: 60 mg  Take 60 mg  by mouth daily  Refills: 0     * gabapentin 300 MG capsule  Commonly known as: NEURONTIN      Dose: 300 mg  Take 300 mg by mouth every evening midday  Refills: 0     * gabapentin 600 MG tablet  Commonly known as: NEURONTIN      Dose: 1,200 mg  Take 1,200 mg by mouth 2 times daily  Refills: 0     ipratropium - albuterol 0.5 mg/2.5 mg/3 mL 0.5-2.5 (3) MG/3ML neb solution  Commonly known as: DUONEB      Dose: 3 mL  Take 3 mLs by nebulization every 6 hours as needed for shortness of breath, wheezing or cough  Refills: 0     oxyCODONE 5 MG tablet  Commonly known as: ROXICODONE      Dose: 5-10 mg  Take 5-10 mg by mouth every 6 hours as needed for severe pain  Refills: 0     polyethylene glycol 17 GM/Dose powder  Commonly known as: MIRALAX      Dose: 17 g  Take 17 g by mouth every other day  Refills: 0           * This list has 4 medication(s) that are the same as other medications prescribed for you. Read the directions carefully, and ask your doctor or other care provider to review them with you.                STOP taking      furosemide 20 MG tablet  Commonly known as: LASIX        losartan-hydrochlorothiazide 100-12.5 MG tablet  Commonly known as: HYZAAR                  Where to get your medicines        These medications were sent to [id number] - [place], [place] - [address], [place] [address]      Phone: [phone]   atenolol 25 MG tablet  doxycycline hyclate 100 MG capsule  metFORMIN 500 MG 24 hr tablet  predniSONE 20 MG tablet           INFORMATION SOURCE AND LIMITATIONS    History/Exam limitations: N/A  Patient information was obtained from: patient  Use of : N/A    HISTORY OF PRESENT ILLNESS   Iglesia Shore is a 64 year old year old female with a relevant past history of COPD, lung cancer, PAD, hypertension, hyperlipidemia, T2DM, trigeminal neuralgia, and smoking, who presents to this ED via walk-in for evaluation of shortness of breath.    On 5/17/24, the patient  started to have shortness of breath. Today, the patient visited her primary care provider and was told it could be a blood clot or a COPD exacerbation. She was sent for a CT scan but was unable to get the IV in. It has been 1 year since her lung cancer radiation therapy and she has had no problems with it.    Denies chest pain, history of CHF, or any other complaints at this time.    Per chart review,  5/29/24 The patient visited primary care provider for shortness of breath.   Start Breztri today, 2 puffs twice daily  Do NOT start fluticasone-salmeterol inhaler (The Breztri inhaler replaces this)  Continue taking albuterol as needed for shortness of breath.  Scheduled appointment for patient to see provider today to further evaluate her worsening shortness of breath and productive cough. Amalia Lux was available to see the patient.    REVIEW OF SYSTEMS:   All other systems reviewed and are negative except as noted above in HPI.    PATIENT HISTORY     Past Medical History:   Diagnosis Date    Back pain     COPD (chronic obstructive pulmonary disease) (H)     Depression     Diabetes mellitus (H)     Hyperlipidemia     Hypertension     PAD (peripheral artery disease) (H24)     Peripheral neuropathy     Pulmonary nodules      Patient Active Problem List   Diagnosis    COPD (chronic obstructive pulmonary disease) (H)    Family history of diabetes mellitus    Foot pain    Idiopathic sensorimotor axonal neuropathy    Laryngitis, chronic    Major depressive disorder, recurrent episode, moderate (H)    Migraine without aura    Mixed hyperlipidemia    Obstructive sleep apnea    Other somatoform disorders    PVD (peripheral vascular disease) (H24)    Screening for cervical cancer    Tobacco use disorder    Vitamin B12 deficiency    Hemorrhoids    History of colonic polyps    Major depressive disorder, severe (H)    Diabetes mellitus, type 2 (H)    Altered bowel elimination due to intestinal ostomy (H)    Chronic pain     Diverticulitis of colon    DNR (do not resuscitate)    Malignant neoplasm of lower lobe of right lung (H)    Primary hypertension    Enteritis    Sigmoid stricture (H)    Syrinx of spinal cord (H)    Bradycardia    COPD exacerbation (H)    Acute hypoxemic respiratory failure (H)    Lung cancer (H)    Current nicotine use     Past Surgical History:   Procedure Laterality Date    AORTA - FEMORAL ARTERY BYPASS GRAFT      BRONCHOSCOPY RIGID OR FLEXIBLE W/TRANSENDOSCOPIC ENDOBRONCHIAL ULTRASOUND GUIDED N/A 07/14/2022    Procedure: endbronchial ultrasound, transbronchial biopsy;  Surgeon: Rosendo Dave MD;  Location: [facility] OR    HYSTEROSCOPY W/ ENDOMETRIAL ABLATION      ILEOSTOMY  08/16/2023    ILEOSTOMY REVISION  10/19/2023    IR EXTREMITY ANGIOGRAM LEFT  11/25/2020    IR EXTREMITY ANGIOGRAM LEFT  12/16/2020    IR LOWER EXTREMITY ANGIOGRAM LEFT  11/25/2020    IR LOWER EXTREMITY ANGIOGRAM LEFT  12/16/2020    OPTICAL TRACKING SYSTEM BRONCHOSCOPY N/A 07/14/2022    Procedure: BRONCHOSCOPY, USING OPTICAL TRACKING SYSTEM, .  Ion or veran system, fiducial placement;  Surgeon: Rosendo Dave MD;  Location: [facility] OR    OTHER SURGICAL HISTORY      tubal ligation    OTHER SURGICAL HISTORY      spine fusion    PAIN STELLATE GANGLION BLOCK RIGHT Right 1992    x5, [facility]'s       Allergies   Allergen Reactions    Bupropion Itching       OUTPATIENT MEDICATIONS     Discharge Medication List as of 5/31/2024 12:29 PM        START taking these medications    Details   doxycycline hyclate (VIBRAMYCIN) 100 MG capsule Take 1 capsule (100 mg) by mouth 2 times daily, Disp-10 capsule, R-0, E-Prescribe      metFORMIN (GLUCOPHAGE XR) 500 MG 24 hr tablet Take 1 tablet (500 mg) by mouth 2 times daily (with meals), Disp-60 tablet, R-0, E-Prescribe      predniSONE (DELTASONE) 20 MG tablet Take 2 tablets (40 mg) by mouth daily, Disp-14 tablet, R-0, E-Prescribe            Vitals:    05/31/24 0112 05/31/24 0357 05/31/24 0813 05/31/24 1124    BP:  122/69 129/62 132/60   BP Location:  Right arm Right arm Right arm   Patient Position:       Cuff Size:       Pulse:  84 83 72   Resp:  18 16 18   Temp:  98  F (36.7  C) 97.6  F (36.4  C) 97.9  F (36.6  C)   TempSrc:  Oral Oral Oral   SpO2: 93% 92% 92% 94%   Weight:       Height:           Physical Exam   Constitutional: Oriented to person, place, and time. Appears well-developed and well-nourished.   Cardiovascular: Normal rate, regular rhythm and normal heart sounds.    Pulmonary/Chest: Diminished breath sounds. Expiratory wheeze.  Abdominal: Soft. Bowel sounds are normal.   Musculoskeletal: 2+ edema lower extremities  Neurological: Alert and oriented to person, place, and time. Normal strength. Skin: Skin is warm and dry.   Psychiatric: Normal mood and affect. Behavior is normal. Thought content normal.     DIAGNOSTICS    LABORATORY FINDINGS (REVIEWED AND INTERPRETED):  Labs Ordered and Resulted from Time of ED Arrival to Time of ED Departure   BASIC METABOLIC PANEL - Abnormal       Result Value    Sodium 140      Potassium 4.0      Chloride 100      Carbon Dioxide (CO2) 30 (*)     Anion Gap 10      Urea Nitrogen 11.1      Creatinine 0.82      GFR Estimate 78      Calcium 9.2      Glucose 97     CBC WITH PLATELETS AND DIFFERENTIAL - Abnormal    WBC Count 8.1      RBC Count 5.34 (*)     Hemoglobin 15.9 (*)     Hematocrit 48.2 (*)     MCV 90      MCH 29.8      MCHC 33.0      RDW 14.2      Platelet Count 307      % Neutrophils 52      % Lymphocytes 38      % Monocytes 7      % Eosinophils 2      % Basophils 1      % Immature Granulocytes 0      NRBCs per 100 WBC 0      Absolute Neutrophils 4.2      Absolute Lymphocytes 3.1      Absolute Monocytes 0.6      Absolute Eosinophils 0.2      Absolute Basophils 0.1      Absolute Immature Granulocytes 0.0      Absolute NRBCs 0.0     HEPATIC FUNCTION PANEL - Abnormal    Protein Total 6.8      Albumin 3.4 (*)     Bilirubin Total 0.3      Alkaline Phosphatase 71      AST  16      ALT 12      Bilirubin Direct <0.20     TROPONIN T, HIGH SENSITIVITY - Normal    Troponin T, High Sensitivity 9     INFLUENZA A/B, RSV, & SARS-COV2 PCR - Normal    Influenza A PCR Negative      Influenza B PCR Negative      RSV PCR Negative      SARS CoV2 PCR Negative     MAGNESIUM - Normal    Magnesium 1.9     TSH WITH FREE T4 REFLEX - Normal    TSH 2.91           IMAGING (REVIEWED AND INTERPRETED):  Echocardiogram Complete   Final Result      CT Chest Pulmonary Embolism w Contrast   Final Result   IMPRESSION:   1.  No pulmonary emboli.   2.  New faint groundglass and tree-in-bud opacities right middle lobe consistent with atypical pneumonia.   3.  Mucous secretions/debris layer dependently within right mainstem bronchus. Bronchial wall thickening.   4.  Emphysema.            ECG (REVIEWED AND INTERPRETED):   ECG:   Performed at: 17:58  HR:  49 bpm  Rhythm: Sinus  Axis: 5  QRS duration: 74 ms  QTC: 439 ms  ST changes: No ST segment elevation or depression, no T wave inversion, Q wave V2,V3  Interpretation: Sinus bradycardia  Compared to most recent ECG from: Sinus bradycardia replaces sinus rhythm, heart rate decreased by 16 bpm, Q waves now present in the anterior leads    I have reviewed the patient's ECG, with comments made as listed above. Please see scanned image for full interpretation.         I, Hi Funez, am serving as a scribe to document services personally performed by Dougie Torres D.O., based on my observation and the provider s statements to me.    I, Dougie Torres D.O., attest that Hi Funez is acting in a scribe capacity, has observed my performance of the services and has documented them in accordance with my direction.    Dougie Torres D.O.  EMERGENCY MEDICINE   05/29/24  [facility] EMERGENCY DEPARTMENT  [address] [place] [id number]  [phone]  Dept: [phone]     Dougie Torres DO  06/01/24 0518

## 2024-05-30 ENCOUNTER — APPOINTMENT (OUTPATIENT)
Dept: CARDIOLOGY | Facility: HOSPITAL | Age: 64
DRG: 193 | End: 2024-05-30
Attending: INTERNAL MEDICINE
Payer: MEDICARE

## 2024-05-30 LAB
ALBUMIN SERPL BCG-MCNC: 3 G/DL (ref 3.5–5.2)
ALP SERPL-CCNC: 63 U/L (ref 40–150)
ALT SERPL W P-5'-P-CCNC: 11 U/L (ref 0–50)
ANION GAP SERPL CALCULATED.3IONS-SCNC: 12 MMOL/L (ref 7–15)
AST SERPL W P-5'-P-CCNC: 11 U/L (ref 0–45)
BASOPHILS # BLD AUTO: 0 10E3/UL (ref 0–0.2)
BASOPHILS NFR BLD AUTO: 1 %
BILIRUB SERPL-MCNC: <0.2 MG/DL
BUN SERPL-MCNC: 13.9 MG/DL (ref 8–23)
C PNEUM DNA SPEC QL NAA+PROBE: NOT DETECTED
CALCIUM SERPL-MCNC: 9.2 MG/DL (ref 8.8–10.2)
CHLORIDE SERPL-SCNC: 103 MMOL/L (ref 98–107)
CREAT SERPL-MCNC: 0.63 MG/DL (ref 0.51–0.95)
DEPRECATED HCO3 PLAS-SCNC: 25 MMOL/L (ref 22–29)
EGFRCR SERPLBLD CKD-EPI 2021: >90 ML/MIN/1.73M2
EOSINOPHIL # BLD AUTO: 0 10E3/UL (ref 0–0.7)
EOSINOPHIL NFR BLD AUTO: 0 %
ERYTHROCYTE [DISTWIDTH] IN BLOOD BY AUTOMATED COUNT: 14 % (ref 10–15)
FLUAV H1 2009 PAND RNA SPEC QL NAA+PROBE: NOT DETECTED
FLUAV H1 RNA SPEC QL NAA+PROBE: NOT DETECTED
FLUAV H3 RNA SPEC QL NAA+PROBE: NOT DETECTED
FLUAV RNA SPEC QL NAA+PROBE: NOT DETECTED
FLUBV RNA SPEC QL NAA+PROBE: NOT DETECTED
GLUCOSE BLDC GLUCOMTR-MCNC: 170 MG/DL (ref 70–99)
GLUCOSE BLDC GLUCOMTR-MCNC: 211 MG/DL (ref 70–99)
GLUCOSE BLDC GLUCOMTR-MCNC: 225 MG/DL (ref 70–99)
GLUCOSE BLDC GLUCOMTR-MCNC: 294 MG/DL (ref 70–99)
GLUCOSE BLDC GLUCOMTR-MCNC: 305 MG/DL (ref 70–99)
GLUCOSE SERPL-MCNC: 248 MG/DL (ref 70–99)
HADV DNA SPEC QL NAA+PROBE: NOT DETECTED
HCOV PNL SPEC NAA+PROBE: NOT DETECTED
HCT VFR BLD AUTO: 42.7 % (ref 35–47)
HGB BLD-MCNC: 14 G/DL (ref 11.7–15.7)
HMPV RNA SPEC QL NAA+PROBE: NOT DETECTED
HPIV1 RNA SPEC QL NAA+PROBE: NOT DETECTED
HPIV2 RNA SPEC QL NAA+PROBE: NOT DETECTED
HPIV3 RNA SPEC QL NAA+PROBE: NOT DETECTED
HPIV4 RNA SPEC QL NAA+PROBE: NOT DETECTED
IMM GRANULOCYTES # BLD: 0 10E3/UL
IMM GRANULOCYTES NFR BLD: 0 %
L PNEUMO1 AG UR QL IA: NEGATIVE
LVEF ECHO: NORMAL
LYMPHOCYTES # BLD AUTO: 0.7 10E3/UL (ref 0.8–5.3)
LYMPHOCYTES NFR BLD AUTO: 8 %
M PNEUMO DNA SPEC QL NAA+PROBE: NOT DETECTED
MCH RBC QN AUTO: 29.7 PG (ref 26.5–33)
MCHC RBC AUTO-ENTMCNC: 32.8 G/DL (ref 31.5–36.5)
MCV RBC AUTO: 91 FL (ref 78–100)
MONOCYTES # BLD AUTO: 0.1 10E3/UL (ref 0–1.3)
MONOCYTES NFR BLD AUTO: 1 %
NEUTROPHILS # BLD AUTO: 7.5 10E3/UL (ref 1.6–8.3)
NEUTROPHILS NFR BLD AUTO: 90 %
NRBC # BLD AUTO: 0 10E3/UL
NRBC BLD AUTO-RTO: 0 /100
PLATELET # BLD AUTO: 263 10E3/UL (ref 150–450)
POTASSIUM SERPL-SCNC: 3.7 MMOL/L (ref 3.4–5.3)
PROT SERPL-MCNC: 5.9 G/DL (ref 6.4–8.3)
RBC # BLD AUTO: 4.71 10E6/UL (ref 3.8–5.2)
RSV RNA SPEC QL NAA+PROBE: NOT DETECTED
RSV RNA SPEC QL NAA+PROBE: NOT DETECTED
RV+EV RNA SPEC QL NAA+PROBE: NOT DETECTED
S PNEUM AG SPEC QL: NEGATIVE
SODIUM SERPL-SCNC: 140 MMOL/L (ref 135–145)
TROPONIN T SERPL HS-MCNC: 7 NG/L
TROPONIN T SERPL HS-MCNC: 7 NG/L
TROPONIN T SERPL HS-MCNC: 8 NG/L
WBC # BLD AUTO: 8.3 10E3/UL (ref 4–11)

## 2024-05-30 PROCEDURE — 94640 AIRWAY INHALATION TREATMENT: CPT

## 2024-05-30 PROCEDURE — 94640 AIRWAY INHALATION TREATMENT: CPT | Mod: 76

## 2024-05-30 PROCEDURE — 120N000001 HC R&B MED SURG/OB

## 2024-05-30 PROCEDURE — 93306 TTE W/DOPPLER COMPLETE: CPT

## 2024-05-30 PROCEDURE — 99232 SBSQ HOSP IP/OBS MODERATE 35: CPT | Performed by: INTERNAL MEDICINE

## 2024-05-30 PROCEDURE — 250N000011 HC RX IP 250 OP 636: Performed by: INTERNAL MEDICINE

## 2024-05-30 PROCEDURE — 80053 COMPREHEN METABOLIC PANEL: CPT | Performed by: INTERNAL MEDICINE

## 2024-05-30 PROCEDURE — 250N000012 HC RX MED GY IP 250 OP 636 PS 637: Performed by: INTERNAL MEDICINE

## 2024-05-30 PROCEDURE — 999N000157 HC STATISTIC RCP TIME EA 10 MIN

## 2024-05-30 PROCEDURE — 84484 ASSAY OF TROPONIN QUANT: CPT | Performed by: INTERNAL MEDICINE

## 2024-05-30 PROCEDURE — 250N000009 HC RX 250: Performed by: INTERNAL MEDICINE

## 2024-05-30 PROCEDURE — 99222 1ST HOSP IP/OBS MODERATE 55: CPT | Performed by: INTERNAL MEDICINE

## 2024-05-30 PROCEDURE — 250N000011 HC RX IP 250 OP 636: Performed by: EMERGENCY MEDICINE

## 2024-05-30 PROCEDURE — 250N000013 HC RX MED GY IP 250 OP 250 PS 637: Performed by: INTERNAL MEDICINE

## 2024-05-30 PROCEDURE — 93306 TTE W/DOPPLER COMPLETE: CPT | Mod: 26 | Performed by: INTERNAL MEDICINE

## 2024-05-30 PROCEDURE — 87899 AGENT NOS ASSAY W/OPTIC: CPT | Performed by: INTERNAL MEDICINE

## 2024-05-30 PROCEDURE — 85025 COMPLETE CBC W/AUTO DIFF WBC: CPT | Performed by: INTERNAL MEDICINE

## 2024-05-30 PROCEDURE — 36415 COLL VENOUS BLD VENIPUNCTURE: CPT | Performed by: INTERNAL MEDICINE

## 2024-05-30 PROCEDURE — 83036 HEMOGLOBIN GLYCOSYLATED A1C: CPT | Performed by: INTERNAL MEDICINE

## 2024-05-30 PROCEDURE — 999N000156 HC STATISTIC RCP CONSULT EA 30 MIN

## 2024-05-30 PROCEDURE — 87385 HISTOPLASMA CAPSUL AG IA: CPT | Performed by: INTERNAL MEDICINE

## 2024-05-30 RX ORDER — NICOTINE 21 MG/24HR
1 PATCH, TRANSDERMAL 24 HOURS TRANSDERMAL DAILY
Status: DISCONTINUED | OUTPATIENT
Start: 2024-05-30 | End: 2024-05-30

## 2024-05-30 RX ORDER — ENOXAPARIN SODIUM 100 MG/ML
40 INJECTION SUBCUTANEOUS EVERY 24 HOURS
Status: DISCONTINUED | OUTPATIENT
Start: 2024-05-30 | End: 2024-05-31 | Stop reason: HOSPADM

## 2024-05-30 RX ORDER — GABAPENTIN 300 MG/1
300 CAPSULE ORAL EVERY EVENING
Status: DISCONTINUED | OUTPATIENT
Start: 2024-05-30 | End: 2024-05-31 | Stop reason: HOSPADM

## 2024-05-30 RX ORDER — DULOXETIN HYDROCHLORIDE 60 MG/1
60 CAPSULE, DELAYED RELEASE ORAL DAILY
Status: DISCONTINUED | OUTPATIENT
Start: 2024-05-30 | End: 2024-05-31 | Stop reason: HOSPADM

## 2024-05-30 RX ORDER — NALOXONE HYDROCHLORIDE 0.4 MG/ML
0.4 INJECTION, SOLUTION INTRAMUSCULAR; INTRAVENOUS; SUBCUTANEOUS
Status: DISCONTINUED | OUTPATIENT
Start: 2024-05-30 | End: 2024-05-31 | Stop reason: HOSPADM

## 2024-05-30 RX ORDER — ALBUTEROL SULFATE 90 UG/1
2 AEROSOL, METERED RESPIRATORY (INHALATION) EVERY 6 HOURS PRN
Status: DISCONTINUED | OUTPATIENT
Start: 2024-05-30 | End: 2024-05-31 | Stop reason: HOSPADM

## 2024-05-30 RX ORDER — ALBUTEROL SULFATE 5 MG/ML
2.5 SOLUTION RESPIRATORY (INHALATION) EVERY 4 HOURS PRN
Status: DISCONTINUED | OUTPATIENT
Start: 2024-05-30 | End: 2024-05-31 | Stop reason: HOSPADM

## 2024-05-30 RX ORDER — FLUTICASONE FUROATE AND VILANTEROL 200; 25 UG/1; UG/1
1 POWDER RESPIRATORY (INHALATION) DAILY
Status: DISCONTINUED | OUTPATIENT
Start: 2024-05-30 | End: 2024-05-31 | Stop reason: HOSPADM

## 2024-05-30 RX ORDER — OXYCODONE HYDROCHLORIDE 5 MG/1
5-10 TABLET ORAL EVERY 6 HOURS PRN
Status: DISCONTINUED | OUTPATIENT
Start: 2024-05-30 | End: 2024-05-31 | Stop reason: HOSPADM

## 2024-05-30 RX ORDER — DILTIAZEM HYDROCHLORIDE 60 MG/1
120 CAPSULE, EXTENDED RELEASE ORAL 2 TIMES DAILY
Status: DISCONTINUED | OUTPATIENT
Start: 2024-05-30 | End: 2024-05-31 | Stop reason: HOSPADM

## 2024-05-30 RX ORDER — ATENOLOL 25 MG/1
25 TABLET ORAL DAILY
Status: DISCONTINUED | OUTPATIENT
Start: 2024-05-30 | End: 2024-05-31 | Stop reason: HOSPADM

## 2024-05-30 RX ORDER — METHYLPREDNISOLONE SODIUM SUCCINATE 125 MG/2ML
60 INJECTION, POWDER, LYOPHILIZED, FOR SOLUTION INTRAMUSCULAR; INTRAVENOUS EVERY 8 HOURS
Status: COMPLETED | OUTPATIENT
Start: 2024-05-30 | End: 2024-05-31

## 2024-05-30 RX ORDER — NICOTINE 21 MG/24HR
1 PATCH, TRANSDERMAL 24 HOURS TRANSDERMAL DAILY
Status: DISCONTINUED | OUTPATIENT
Start: 2024-05-30 | End: 2024-05-31 | Stop reason: HOSPADM

## 2024-05-30 RX ORDER — NALOXONE HYDROCHLORIDE 0.4 MG/ML
0.2 INJECTION, SOLUTION INTRAMUSCULAR; INTRAVENOUS; SUBCUTANEOUS
Status: DISCONTINUED | OUTPATIENT
Start: 2024-05-30 | End: 2024-05-31 | Stop reason: HOSPADM

## 2024-05-30 RX ORDER — IPRATROPIUM BROMIDE AND ALBUTEROL SULFATE 2.5; .5 MG/3ML; MG/3ML
3 SOLUTION RESPIRATORY (INHALATION) EVERY 6 HOURS PRN
Status: DISCONTINUED | OUTPATIENT
Start: 2024-05-30 | End: 2024-05-30

## 2024-05-30 RX ORDER — ASPIRIN 81 MG/1
81 TABLET ORAL DAILY
Status: DISCONTINUED | OUTPATIENT
Start: 2024-05-30 | End: 2024-05-31 | Stop reason: HOSPADM

## 2024-05-30 RX ORDER — FAMOTIDINE 20 MG/1
20 TABLET, FILM COATED ORAL 2 TIMES DAILY
Status: DISCONTINUED | OUTPATIENT
Start: 2024-05-30 | End: 2024-05-31 | Stop reason: HOSPADM

## 2024-05-30 RX ORDER — GABAPENTIN 400 MG/1
1200 CAPSULE ORAL 2 TIMES DAILY
Status: DISCONTINUED | OUTPATIENT
Start: 2024-05-30 | End: 2024-05-31 | Stop reason: HOSPADM

## 2024-05-30 RX ORDER — POLYETHYLENE GLYCOL 3350 17 G/17G
17 POWDER, FOR SOLUTION ORAL EVERY OTHER DAY
Status: DISCONTINUED | OUTPATIENT
Start: 2024-05-30 | End: 2024-05-31 | Stop reason: HOSPADM

## 2024-05-30 RX ORDER — PREDNISONE 20 MG/1
40 TABLET ORAL DAILY
Status: DISCONTINUED | OUTPATIENT
Start: 2024-05-31 | End: 2024-05-31 | Stop reason: HOSPADM

## 2024-05-30 RX ORDER — FUROSEMIDE 20 MG
20 TABLET ORAL
Status: DISCONTINUED | OUTPATIENT
Start: 2024-05-30 | End: 2024-05-31

## 2024-05-30 RX ORDER — PREDNISONE 20 MG/1
40 TABLET ORAL DAILY
Status: DISCONTINUED | OUTPATIENT
Start: 2024-05-31 | End: 2024-05-30

## 2024-05-30 RX ORDER — IPRATROPIUM BROMIDE AND ALBUTEROL SULFATE 2.5; .5 MG/3ML; MG/3ML
3 SOLUTION RESPIRATORY (INHALATION)
Status: DISCONTINUED | OUTPATIENT
Start: 2024-05-30 | End: 2024-05-31 | Stop reason: HOSPADM

## 2024-05-30 RX ORDER — ATORVASTATIN CALCIUM 40 MG/1
40 TABLET, FILM COATED ORAL DAILY
Status: DISCONTINUED | OUTPATIENT
Start: 2024-05-30 | End: 2024-05-31 | Stop reason: HOSPADM

## 2024-05-30 RX ADMIN — INSULIN ASPART 2 UNITS: 100 INJECTION, SOLUTION INTRAVENOUS; SUBCUTANEOUS at 17:05

## 2024-05-30 RX ADMIN — FAMOTIDINE 20 MG: 20 TABLET ORAL at 20:27

## 2024-05-30 RX ADMIN — ENOXAPARIN SODIUM 40 MG: 40 INJECTION SUBCUTANEOUS at 12:40

## 2024-05-30 RX ADMIN — DOXYCYCLINE 100 MG: 100 INJECTION, POWDER, LYOPHILIZED, FOR SOLUTION INTRAVENOUS at 10:05

## 2024-05-30 RX ADMIN — INSULIN ASPART 3 UNITS: 100 INJECTION, SOLUTION INTRAVENOUS; SUBCUTANEOUS at 12:40

## 2024-05-30 RX ADMIN — DULOXETINE HYDROCHLORIDE 60 MG: 60 CAPSULE, DELAYED RELEASE ORAL at 09:45

## 2024-05-30 RX ADMIN — METHYLPREDNISOLONE SODIUM SUCCINATE 62.5 MG: 125 INJECTION, POWDER, FOR SOLUTION INTRAMUSCULAR; INTRAVENOUS at 09:53

## 2024-05-30 RX ADMIN — NICOTINE 1 PATCH: 21 PATCH, EXTENDED RELEASE TRANSDERMAL at 10:05

## 2024-05-30 RX ADMIN — FLUTICASONE FUROATE AND VILANTEROL TRIFENATATE 1 PUFF: 200; 25 POWDER RESPIRATORY (INHALATION) at 10:04

## 2024-05-30 RX ADMIN — METHYLPREDNISOLONE SODIUM SUCCINATE 125 MG: 125 INJECTION, POWDER, FOR SOLUTION INTRAMUSCULAR; INTRAVENOUS at 00:46

## 2024-05-30 RX ADMIN — ATORVASTATIN CALCIUM 40 MG: 40 TABLET, FILM COATED ORAL at 09:46

## 2024-05-30 RX ADMIN — IPRATROPIUM BROMIDE AND ALBUTEROL SULFATE 3 ML: .5; 3 SOLUTION RESPIRATORY (INHALATION) at 21:33

## 2024-05-30 RX ADMIN — GABAPENTIN 1200 MG: 400 CAPSULE ORAL at 09:43

## 2024-05-30 RX ADMIN — DOXYCYCLINE 100 MG: 100 INJECTION, POWDER, LYOPHILIZED, FOR SOLUTION INTRAVENOUS at 20:33

## 2024-05-30 RX ADMIN — FUROSEMIDE 20 MG: 20 TABLET ORAL at 09:44

## 2024-05-30 RX ADMIN — UMECLIDINIUM 1 PUFF: 62.5 AEROSOL, POWDER ORAL at 10:03

## 2024-05-30 RX ADMIN — METHYLPREDNISOLONE SODIUM SUCCINATE 62.5 MG: 125 INJECTION, POWDER, FOR SOLUTION INTRAMUSCULAR; INTRAVENOUS at 16:12

## 2024-05-30 RX ADMIN — FAMOTIDINE 20 MG: 10 INJECTION, SOLUTION INTRAVENOUS at 00:45

## 2024-05-30 RX ADMIN — GABAPENTIN 1200 MG: 400 CAPSULE ORAL at 20:27

## 2024-05-30 RX ADMIN — DILTIAZEM HYDROCHLORIDE 120 MG: 60 CAPSULE, EXTENDED RELEASE ORAL at 09:45

## 2024-05-30 RX ADMIN — POLYETHYLENE GLYCOL 3350 17 G: 17 POWDER, FOR SOLUTION ORAL at 09:42

## 2024-05-30 RX ADMIN — DILTIAZEM HYDROCHLORIDE 120 MG: 60 CAPSULE, EXTENDED RELEASE ORAL at 20:27

## 2024-05-30 RX ADMIN — ATENOLOL 25 MG: 25 TABLET ORAL at 09:46

## 2024-05-30 RX ADMIN — ASPIRIN 81 MG: 81 TABLET, COATED ORAL at 09:44

## 2024-05-30 RX ADMIN — IPRATROPIUM BROMIDE AND ALBUTEROL SULFATE 3 ML: .5; 3 SOLUTION RESPIRATORY (INHALATION) at 16:50

## 2024-05-30 RX ADMIN — GABAPENTIN 300 MG: 300 CAPSULE ORAL at 12:39

## 2024-05-30 ASSESSMENT — ACTIVITIES OF DAILY LIVING (ADL)
ADLS_ACUITY_SCORE: 27
ADLS_ACUITY_SCORE: 28
ADLS_ACUITY_SCORE: 27
ADLS_ACUITY_SCORE: 35
ADLS_ACUITY_SCORE: 27
ADLS_ACUITY_SCORE: 35

## 2024-05-30 NOTE — PLAN OF CARE
"  Problem: Adult Inpatient Plan of Care  Goal: Patient-Specific Goal (Individualized)  Description: You can add care plan individualizations to a care plan. Examples of Individualization might be:  \"Parent requests to be called daily at 9am for status\", \"I have a hard time hearing out of my right ear\", or \"Do not touch me to wake me up as it startles  me\".  Outcome: Progressing   Goal Outcome Evaluation:                        "

## 2024-05-30 NOTE — MEDICATION SCRIBE - ADMISSION MEDICATION HISTORY
Medication Scribe Admission Medication History    Admission medication history is complete. The information provided in this note is only as accurate as the sources available at the time of the update.    Information Source(s): Patient via in-person    Pertinent Information: Patient states that she takes Furosemide 20 mg once a week as needed.    Changes made to PTA medication list:  Added: Furosemide (per patient and fill history)  Deleted: None  Changed: None    Allergies reviewed with patient and updates made in EHR: yes    Medication History Completed By: Chitra Lopez 5/29/2024 11:34 PM    PTA Med List   Medication Sig Last Dose    albuterol (PROAIR HFA/PROVENTIL HFA/VENTOLIN HFA) 108 (90 Base) MCG/ACT inhaler Inhale 2 puffs into the lungs every 6 hours as needed for shortness of breath, wheezing or cough Unknown at prn    albuterol (PROVENTIL) (5 MG/ML) 0.5% neb solution 0.5 mL by nebulizer as needed Unknown at prn    aspirin (ASA) 81 MG EC tablet 1 tab(s) Orally once a day 5/29/2024 at am    atenolol (TENORMIN) 50 MG tablet Take 75 mg by mouth daily 5/29/2024 at am    atorvastatin (LIPITOR) 40 MG tablet Take 40 mg by mouth daily 5/29/2024 at am    budeson-glycopyrrol-formoterol (BREZTRI AEROSPHERE) 160-9-4.8 MCG/ACT AERO inhaler Inhale 2 puffs into the lungs 2 times daily 5/29/2024 at am    diltiazem (CARDIZEM SR) 120 MG CP12 12 hr SR capsule Take 120 mg by mouth 2 times daily 5/29/2024 at am    doxycycline hyclate (VIBRAMYCIN) 100 MG capsule Take 1 capsule (100 mg) by mouth 2 times daily for 7 days     DULoxetine (CYMBALTA) 60 MG capsule Take 60 mg by mouth daily 5/29/2024 at am    furosemide (LASIX) 20 MG tablet Take 20 mg by mouth every 7 days Past Week at unknown    gabapentin (NEURONTIN) 300 MG capsule Take 300 mg by mouth every evening midday 5/28/2024 at noon    gabapentin (NEURONTIN) 600 MG tablet Take 1,200 mg by mouth 2 times daily 5/29/2024 at am    ipratropium-albuteroL (DUO-NEB) 0.5-2.5  mg/3 mL nebulizer Take 3 mLs by nebulization every 6 hours as needed for shortness of breath, wheezing or cough Unknown at prn    losartan-hydrochlorothiazide (HYZAAR) 100-12.5 MG tablet Take 1 tablet by mouth daily 5/29/2024 at am    oxyCODONE (ROXICODONE) 5 MG tablet Take 5-10 mg by mouth every 6 hours as needed for severe pain Unknown at prn    polyethylene glycol (MIRALAX) 17 GM/Dose powder Take 17 g by mouth every other day 5/28/2024 at am

## 2024-05-30 NOTE — PLAN OF CARE
Problem: Pulmonary Impairment  Goal: Optimal Gas Exchange  Outcome: Progressing  Problem: Pneumonia  Goal: Effective Oxygenation and Ventilation  Outcome: Progressing    Patient reports improvement in work of breathing compared to previous day.  SpO2 greater than 90% on room air (on 2L via nasal cannula at start of this shift) - continuous pulse oximetry in place.  Home O2 eval completed - SpO2 88% with activity.  RT/Pulmonology following.    Alessia Cabrera RN

## 2024-05-30 NOTE — CONSULTS
Pulmonary/Critical Care Consult Team Note    Iglesia Shore,  1960, MRN [id number]  Admitting Dx: Sinus bradycardia [R00.1]  COPD exacerbation (H) [J44.1]  Date / Time of Admission:  2024  8:02 PM    Long conversation with pt about her pulmonary history  She is smoking 1ppd  She is not interested in quitting-maybe cut back  She has not started her outpt inhalers yet  Her dyspnea sounds more like hypoxia with fatigue and dizziness with exertion  She still does breath hard and wheeze  She is fairly active     Assessment/Plan: Iglesia Shore is a 64 year old female with PMHx of right lung cancer s/p radiation therapy, COPD, peripheral vascular disease, type 2 diabetes, hyperlipidemia, obstructive sleep apnea, tobacco use disorder, hypertension, chronic pain admitted with acute hypoxemic respiratory failure due to pneumonia causing acute COPD exacerbation.    Pneumonia  - agree with antibiotics  - sending PNA serologies - sputum culture for non-tb mycoplasma    COPD exacerbation  - agree with steroids, would change to prednisone tomorrow 40mg x 7 days   - Continue doxycycline 100 mg every 12 hours  - Continue Breo Ellipta  - Continue DuoNebs    Concern for desaturation with exertion  - Supplemental oxygen as needed  - desat screen    History of ANDREW  - CPAP at night    Hx of COPD  - will have follow up in pulm clinic with PFTs    Hx of lung CA  - follows as an outpt    Medical Care Time excluding procedures and family discussions greater than: 1 Hour    Risk Factors Present on Admission:  Clinically Significant Risk Factors Present on Admission              # Hypoalbuminemia: Lowest albumin = 3 g/dL at 2024  5:39 AM, will monitor as appropriate   # Drug Induced Platelet Defect: home medication list includes an antiplatelet medication   # Hypertension: Noted on problem list      # Overweight: Estimated body mass index is 27.33 kg/m  as calculated from the following:    Height as of this encounter: 1.651  "m (5' 5\").    Weight as of this encounter: 74.5 kg (164 lb 3.9 oz).            Code Status: No CPR- Do NOT Intubate         Nelly Eckert, DO  Pulmonary and Critical Care Attending  pgr [phone]    Allergies   Allergen Reactions    Bupropion Itching       Meds: See MAR    Physical Exam:  /61 (BP Location: Right arm, Patient Position: Supine, Cuff Size: Adult Regular)   Pulse 63   Temp 98.4  F (36.9  C) (Oral)   Resp 16   Ht 1.651 m (5' 5\")   Wt 74.5 kg (164 lb 3.9 oz)   SpO2 90%   BMI 27.33 kg/m    Intake/Output this shift:  I/O this shift:  In: 930 [P.O.:930]  Out: -   GEN: sitting up in bed, NAD  HEENT: MMM  CVS: regular rhythm, no murmurs  RESP: trace end ex wheezing, no rhonchi  ABD: Soft, No abdominal pain with palpation, no guarding, no rigidity  EXT: Warm, well perfused, trace LE edema  NEURO: moving all extremities, nonfocal  PSYCH: pleasant    Pertinent Labs: Latest lab results in EHR personally reviewed.   CMP  Recent Labs   Lab 05/30/24  1225 05/30/24  0758 05/30/24  0539 05/30/24  0048 05/29/24  1825   NA  --   --  140  --  140   POTASSIUM  --   --  3.7  --  4.0   CHLORIDE  --   --  103  --  100   CO2  --   --  25  --  30*   ANIONGAP  --   --  12  --  10   * 225* 248* 170* 97   BUN  --   --  13.9  --  11.1   CR  --   --  0.63  --  0.82   GFRESTIMATED  --   --  >90  --  78   VANIA  --   --  9.2  --  9.2   MAG  --   --   --   --  1.9   PROTTOTAL  --   --  5.9*  --  6.8   ALBUMIN  --   --  3.0*  --  3.4*   BILITOTAL  --   --  <0.2  --  0.3   ALKPHOS  --   --  63  --  71   AST  --   --  11  --  16   ALT  --   --  11  --  12     CBC  Recent Labs   Lab 05/30/24  0539 05/29/24  1825   WBC 8.3 8.1   RBC 4.71 5.34*   HGB 14.0 15.9*   HCT 42.7 48.2*   MCV 91 90   MCH 29.7 29.8   MCHC 32.8 33.0   RDW 14.0 14.2    307     INRNo lab results found in last 7 days.  Arterial Blood GasNo lab results found in last 7 days.    Cultures: personally reviewed.   7-Day Micro Results    Collected " Updated Procedure Result Status    05/29/2024 1812 05/29/2024 1912 Symptomatic Influenza A/B, RSV, & SARS-CoV2 PCR (COVID-19) Nasopharyngeal [[id number]]    Swab from Nasopharyngeal    Final result Component Value   Influenza A PCR Negative   Influenza B PCR Negative   RSV PCR Negative   SARS CoV2 PCR Negative   NEGATIVE: SARS-CoV-2 (COVID-19) RNA not detected, presumed negative.              Imaging: personally reviewed.   Results for orders placed during the hospital encounter of 07/14/22    XR Chest Port 1 View    Narrative  EXAM: XR CHEST PORT 1 VIEW  7/14/2022 10:00 AM    HISTORY: 62 years Female post bronch with biopsy rll.    COMPARISON: Chest radiograph 8/27/2021. Outside hospital low-dose  chest CT without contrast 6/16/2022. PET CT 6/27/2022.    TECHNIQUE: Portable AP view of the chest.    FINDINGS:  New linear radiopacity in the right mainstem bronchus. Aortic arch  calcification. Midline trachea. Stable normal cardiomediastinal  silhouette. Distinct pulmonary vasculature. Trace bilateral pleural  effusions versus thickening. No discernible pneumothorax. Stable  increased lung volumes. No focal airspace opacities. Unremarkable  upper abdomen. No acute or suspicious osseous abnormalities.  Unremarkable soft tissues.    Impression  IMPRESSION: New right mainstem bronchus linear opacity. No  pneumothorax.    I have personally reviewed the examination and initial interpretation  and I agree with the findings.    ROGER ALEXIS MD      SYSTEM ID:  [id number]      Results for orders placed during the hospital encounter of 08/27/21    XR Chest Port 1 View    Narrative  EXAM: XR CHEST PORT 1 VIEW  LOCATION: [facility]  DATE/TIME: 8/27/2021 8:20 PM    INDICATION: short of breath  COMPARISON: None.    Impression  IMPRESSION: Negative chest.    Results for orders placed during the hospital encounter of 05/29/24    CT Chest Pulmonary Embolism w Contrast    Narrative  EXAM: CT CHEST PULMONARY  EMBOLISM W CONTRAST  LOCATION: [facility]  DATE: 5/29/2024    INDICATION: dyspnea  COMPARISON: Noncontrast chest CT 4/19/2024  TECHNIQUE: CT chest pulmonary angiogram during arterial phase injection of IV contrast. Multiplanar reformats and MIP reconstructions were performed. Dose reduction techniques were used.  CONTRAST: isovue 370 90ml    FINDINGS:  ANGIOGRAM CHEST: Pulmonary arteries are normal caliber and negative for pulmonary emboli. Thoracic aorta is negative for dissection. No CT evidence of right heart strain.    LUNGS AND PLEURA: Mild emphysema. Faint groundglass opacities and slight tree-in-bud foci involving posterior aspect of right middle lobe suggests developing atypical pneumonia. There is no dense consolidation or pleural effusion. However, there is  secretions or debris layering within right mainstem bronchus. Mild bronchial wall thickening diffusely.  Resolution of the slight atypical infiltrates previously seen within anterior aspects of upper lobes bilaterally.    MEDIASTINUM/AXILLAE: Normal.    CORONARY ARTERY CALCIFICATION: Severe.    UPPER ABDOMEN: Normal.    MUSCULOSKELETAL: Normal.    Impression  IMPRESSION:  1.  No pulmonary emboli.  2.  New faint groundglass and tree-in-bud opacities right middle lobe consistent with atypical pneumonia.  3.  Mucous secretions/debris layer dependently within right mainstem bronchus. Bronchial wall thickening.  4.  Emphysema.    No results found for this or any previous visit.    No results found for this or any previous visit.      Patient Active Problem List   Diagnosis    COPD (chronic obstructive pulmonary disease) (H)    Family history of diabetes mellitus    Foot pain    Idiopathic sensorimotor axonal neuropathy    Laryngitis, chronic    Major depressive disorder, recurrent episode, moderate (H)    Migraine without aura    Mixed hyperlipidemia    Obstructive sleep apnea    Other somatoform disorders    PVD (peripheral  vascular disease) (H24)    Screening for cervical cancer    Tobacco use disorder    Vitamin B12 deficiency    Hemorrhoids    History of colonic polyps    Major depressive disorder, severe (H)    Diabetes mellitus, type 2 (H)    Altered bowel elimination due to intestinal ostomy (H)    Chronic pain    Diverticulitis of colon    DNR (do not resuscitate)    Malignant neoplasm of lower lobe of right lung (H)    Primary hypertension    Enteritis    Sigmoid stricture (H)    Syrinx of spinal cord (H)    Bradycardia    COPD exacerbation (H)    Acute hypoxemic respiratory failure (H)    Lung cancer (H)    Current nicotine use         Surgical History  She  has a past surgical history that includes IR Lower Extremity Angiogram Left (11/25/2020); IR Lower Extremity Angiogram Left (12/16/2020); Aorta - Femoral Artery Bypass Graft; Hysteroscopy W/ Endometrial Ablation; other surgical history; other surgical history; IR Extremity Angiogram Left (11/25/2020); IR Extremity Angiogram Left (12/16/2020); Optical tracking system bronchoscopy (N/A, 07/14/2022); Bronchoscopy Rigid Or Flexible W/Transendoscopic Endobronchial Ultrasound Guided (N/A, 07/14/2022); PAIN Stellate Ganglion Block Right (Right, 1992); Ileostomy (08/16/2023); and Ileostomy Revision (10/19/2023).    Family History  Reviewed, and family history includes Cancer in her father; Diabetes in her father and mother; Heart Disease in her father; Hypertension in her mother.    Social History  Reviewed, and she  reports that she has been smoking cigarettes. She started smoking about 53 years ago. She has a 53.9 pack-year smoking history. She has never used smokeless tobacco. She reports that she does not currently use drugs. She reports that she does not drink alcohol.    Allergies  Allergies   Allergen Reactions    Bupropion Itching              Nelly Eckert DO  Pulmonary and Critical Care Attending  pgr [phone]    Securely message with the Vocera Web Console (learn  more here)

## 2024-05-30 NOTE — PROGRESS NOTES
[facility]    Medicine Progress Note - Hospitalist Service    Date of Admission:  5/29/2024    Assessment & Plan   Iglesia Shore is a 64 year old female with history of right lung cancer s/p radiation therapy, COPD, peripheral vascular disease, type 2 diabetes, hyperlipidemia, obstructive sleep apnea, tobacco use disorder, hypertension, chronic pain admitted with acute hypoxemic respiratory failure due to suspected atypical pneumonia and COPD exacerbation.    Pulmonary CT angiogram revealed new faint groundglass and tree-in-bud opacities right middle lobe consistent with atypical pneumonia, mucous secretions/debris layer dependently within right mainstem bronchus and bronchial wall thickening and emphysema without evidence of pulmonary embolism.  She was placed on doxycycline, steroid therapy and bronchodilators on admission.    Suspected COPD exacerbation  Obstructive sleep apnea  Acute hypoxic respiratory failure  Suspected atypical pneumonia    She was not on home oxygen PTA. Echocardiogram 5/30/2024 was unremarkable.    Continue doxycycline 100 mg every 12 hours  Continue Breo Ellipta  Continue DuoNebs  Continue methylprednisone  Supplemental oxygen as needed  CPAP at night  Follow-up pulmonology consult      Right lung cancer  S/p radiation therapy  Continue routine oncology follow-up with medical and radiation oncologists at [facility].    Type 2 diabetes  She was not on antidiabetic medication PTA  Continue insulin sliding scale  Insulin carb coverage 1:10  Consider adding Lantus if glucose remains persistently elevated    Peripheral arterial disease  Continue atorvastatin 40 mg daily  Continue aspirin 81 mg daily    Tobacco use disorder  Continue nicotine patch    Hypertension  Continue PTA atenolol 25 mg daily  Continue diltiazem 120 mg twice daily    Depression  Continue duloxetine      Diet: Moderate Consistent Carb (60 g CHO per Meal) Diet    DVT Prophylaxis:  "Enoxaparin (Lovenox) SQ  Cunningham Catheter: Not present  Lines: None     Cardiac Monitoring: ACTIVE order. Indication: Tachyarrhythmias, acute (48 hours)  Code Status: No CPR- Do NOT Intubate      Clinically Significant Risk Factors Present on Admission              # Hypoalbuminemia: Lowest albumin = 3 g/dL at 5/30/2024  5:39 AM, will monitor as appropriate   # Drug Induced Platelet Defect: home medication list includes an antiplatelet medication   # Hypertension: Noted on problem list      # Overweight: Estimated body mass index is 27.33 kg/m  as calculated from the following:    Height as of this encounter: 1.651 m (5' 5\").    Weight as of this encounter: 74.5 kg (164 lb 3.9 oz).              Disposition Plan     Medically Ready for Discharge: Anticipated Tomorrow             Tahir Maradiaga MD  Hospitalist Service  [facility]  Securely message with [employer] (more info)  Text page via [employer] Paging/Directory   ______________________________________________________________________    Interval History   No complaints today and no acute events overnight. She states she feels better and stronger today. She is requiring 2 L of intranasal oxygen this morning, although she does not use supplemental oxygen at baseline. She mentions having CPAP device but does not use it due to trigeminal neuralgia She plans to start using her CPAP device when discharged. Her last visit to the oncologist was about a month ago for follow-up on lung cancer. She underwent radiation treatment over a year ago for lung cancer. She is an active smoker and was counseled to quit smoking.    Physical Exam   Vital Signs: Temp: 97.8  F (36.6  C) Temp src: Oral BP: (!) 151/64 Pulse: 66   Resp: 18 SpO2: 92 % O2 Device: None (Room air) Oxygen Delivery: 2 LPM  Weight: 164 lbs 3.88 oz    General appearance: Awake, Alert, Cooperative, not in any obvious distress and appears stated age   HEENT: Normocephalic, atraumatic, conjunctiva " clear without icterus and ears without discharge  Lungs: Mild bilateral wheezes with diminished air entry bilaterally.  Cardiovascular: Regular Rate and Rythm, normal apical impulse, normal S1 and S2, no lower extremity edema bilaterally  Abdomen: Soft, non-tender and Non-distended, active bowel sounds  Skin: Skin color, texture normal and bruising or bleeding. No rashes or lesions over face, neck, arms and legs, turgor normal.  Musculoskeletal: No bony deformities or joint tenderness. Normal ROM upon flexion & extension.   Neurologic: Alert & Oriented X 3, Facial symmetry preserved and upper & lower extremities moving well with symmetry  Psychiatric: Calm, normal eye contact and normal affect      Medical Decision Making       40 MINUTES SPENT BY ME on the date of service doing chart review, history, exam, documentation & further activities per the note.      Data     I have personally reviewed the following data over the past 24 hrs:    8.3  \   14.0   / 263     140 103 13.9 /  225 (H)   3.7 25 0.63 \     ALT: 11 AST: 11 AP: 63 TBILI: <0.2   ALB: 3.0 (L) TOT PROTEIN: 5.9 (L) LIPASE: N/A     Trop: 7 BNP: N/A     TSH: 2.91 T4: N/A A1C: N/A       Imaging results reviewed over the past 24 hrs:   Recent Results (from the past 24 hour(s))   CT Chest Pulmonary Embolism w Contrast    Narrative    EXAM: CT CHEST PULMONARY EMBOLISM W CONTRAST  LOCATION: [facility]  DATE: 5/29/2024    INDICATION: dyspnea  COMPARISON: Noncontrast chest CT 4/19/2024  TECHNIQUE: CT chest pulmonary angiogram during arterial phase injection of IV contrast. Multiplanar reformats and MIP reconstructions were performed. Dose reduction techniques were used.   CONTRAST: isovue 370 90ml    FINDINGS:  ANGIOGRAM CHEST: Pulmonary arteries are normal caliber and negative for pulmonary emboli. Thoracic aorta is negative for dissection. No CT evidence of right heart strain.    LUNGS AND PLEURA: Mild emphysema. Faint groundglass  opacities and slight tree-in-bud foci involving posterior aspect of right middle lobe suggests developing atypical pneumonia. There is no dense consolidation or pleural effusion. However, there is   secretions or debris layering within right mainstem bronchus. Mild bronchial wall thickening diffusely.  Resolution of the slight atypical infiltrates previously seen within anterior aspects of upper lobes bilaterally.    MEDIASTINUM/AXILLAE: Normal.    CORONARY ARTERY CALCIFICATION: Severe.    UPPER ABDOMEN: Normal.    MUSCULOSKELETAL: Normal.      Impression    IMPRESSION:  1.  No pulmonary emboli.  2.  New faint groundglass and tree-in-bud opacities right middle lobe consistent with atypical pneumonia.  3.  Mucous secretions/debris layer dependently within right mainstem bronchus. Bronchial wall thickening.  4.  Emphysema.   Echocardiogram Complete   Result Value    LVEF  60-65%    [facility]    [id number]  [id number]  [id number]  [id number]^MILLA^JUAN^[place] [address]     Name: ARLINE COBB  MRN: [id number]  : 1960  Study Date: 2024 08:57 AM  Age: 64 yrs  Gender: Female  Patient Location: [facility]  Reason For Study: Bradycardia - Sinus  Ordering Physician: JUAN LOYOLA  Performed By: MB     BSA: 1.8 m2  Height: 65 in  Weight: 164 lb  HR: 62  BP: 133/66 mmHg  ______________________________________________________________________________  Procedure  Complete Echo Adult.  ______________________________________________________________________________  Interpretation Summary     Left ventricular size, wall motion and function are normal. The ejection  fraction is 60-65%.  Normal right ventricle size and systolic function.  No hemodynamically significant valvular abnormalities on 2D or color flow  imaging.  ______________________________________________________________________________  Left Ventricle  Left ventricular size, wall motion and function are normal. The  ejection  fraction is 60-65%. There is normal left ventricular wall thickness. Left  ventricular diastolic function is normal. No regional wall motion  abnormalities noted.     Right Ventricle  Normal right ventricle size and systolic function.     Atria  Normal left atrial size. Right atrial size is normal. There is no color  Doppler evidence of an atrial shunt.     Mitral Valve  Mitral valve leaflets appear normal. There is no evidence of mitral stenosis  or clinically significant mitral regurgitation.     Tricuspid Valve  Tricuspid valve leaflets appear normal. There is no evidence of tricuspid  stenosis or clinically significant tricuspid regurgitation.     Aortic Valve  Aortic valve leaflets appear normal. There is no evidence of aortic stenosis  or clinically significant aortic regurgitation.     Pulmonic Valve  The pulmonic valve is not well seen, but is grossly normal. This degree of  valvular regurgitation is within normal limits.     Vessels  The aorta root is normal. Normal size ascending aorta. IVC diameter <2.1 cm  collapsing >50% with sniff suggests a normal RA pressure of 3 mmHg.     Pericardium  There is no pericardial effusion.     ______________________________________________________________________________  MMode/2D Measurements & Calculations  IVSd: 1.1 cm  LVIDd: 4.7 cm  LVIDs: 3.2 cm  LVPWd: 0.93 cm  FS: 32.4 %     LV mass(C)d: 165.6 grams  LV mass(C)dI: 91.1 grams/m2  LVOT diam: 2.0 cm  LVOT area: 3.1 cm2  EF Biplane: 55.9 %  LA Volume Indexed (AL/bp): 29.1 ml/m2  RV Base: 3.0 cm  RWT: 0.39  TAPSE: 2.8 cm     Time Measurements  MM HR: 62.0 BPM     Doppler Measurements & Calculations  MV E max adryan: 73.3 cm/sec  MV A max adryan: 116.0 cm/sec  MV E/A: 0.63  MV max P.4 mmHg  MV mean P.0 mmHg  MV V2 VTI: 38.0 cm  MVA(VTI): 2.6 cm2  MV dec slope: 261.0 cm/sec2  MV dec time: 0.28 sec  Ao V2 max: 168.0 cm/sec  Ao max P.0 mmHg  Ao V2 mean: 112.0 cm/sec  Ao mean P.0 mmHg  Ao V2 VTI: 37.9  cm  TONYA(I,D): 2.6 cm2  TONYA(V,D): 2.4 cm2  LV V1 max P.9 mmHg  LV V1 max: 131.0 cm/sec  LV V1 VTI: 31.6 cm  SV(LVOT): 99.3 ml  SI(LVOT): 54.6 ml/m2  PA acc time: 0.13 sec  AV Doroteo Ratio (DI): 0.78  TONYA Index (cm2/m2): 1.4  E/E': 8.9  E/E' av.5  Lateral E/e': 8.2  Medial E/e': 8.9  Peak E' Doroteo: 8.3 cm/sec     RV S Doroteo: 12.1 cm/sec     ______________________________________________________________________________  Report approved by: Tray Kellogg 2024 10:25 AM

## 2024-05-30 NOTE — PROGRESS NOTES
Home Oxygen Assessment Tools    Patient's 02 sat on RA at rest 93%     Patient 02 88% sat on  RA with activity after 4 minutes.     Alessia Cabrera RN

## 2024-05-30 NOTE — TREATMENT PLAN
RCAT Treatment Plan    Patient Score: 5  Patient Acuity: 5    Clinical Indication for Therapy: history of bronchospasm    Therapy Ordered: continue current therapy    Assessment Summary: pt here with COPD exacerbation, pneumonia. She is a current tobacco smoker. CT 5/29 mucus debris right mainstem bronchus, emphysema. RR 16, LS diminished bilaterally, NPC, on room air SpO2 91%. Will reassess in 3 days or as needed.      Silverio Dorado, RT  5/30/2024

## 2024-05-30 NOTE — ED NOTES
[facility] ED Handoff Report    ED Chief Complaint: SOB    ED Diagnosis:  (J44.1) COPD exacerbation (H)  Comment: HX COPD, lung cancer  Plan: Continue to monitor.     (R00.1) Sinus bradycardia  Comment: in the 40's. Pt asymptomatic. Provider aware.   Plan: Continue to monitor       PMH:    Past Medical History:   Diagnosis Date    Back pain     low back    COPD (chronic obstructive pulmonary disease) (H)     Depression     Diabetes mellitus (H)     Hyperlipidemia     Hypertension     PAD (peripheral artery disease) (H24)     Peripheral neuropathy     Pulmonary nodules         Code Status:  Full Code     Falls Risk: Yes Band: Applied     Elimination Status: Continent: Yes     Activity Level: SBA    Patients Preferred Language:  English     Needed: No    Vital Signs:  BP (!) 163/73   Pulse (!) 47   Temp 97.8  F (36.6  C) (Oral)   Resp 14   Wt 77.1 kg (170 lb)   SpO2 91%   BMI 27.44 kg/m       Cardiac Rhythm: SB    Pain Score: 0/10    Is the Patient Confused:  No    Focused Assessment:  pt is sob at rest and with exertion. LS clear. Getting nebs.     Tests Performed: Done: Labs and Imaging    Treatments Provided:  see mar    Family Dynamics/Concerns: No    Family Updated On Visitor Policy: Yes    Plan of Care Communicated to Family: Yes    Who Was Updated about Plan of Care: pt updated family    Belongings Checklist Done and Signed by Patient: Yes      Covid: asymptomatic , not tested    Additional Information: Pt continues to smoke.   Pt was originally going to be discharged earlier this evening. Noted O2 sats dropped to 87% on RA,  good waveform.  Pt reported sob. Provider notified. Received another neb. Now being admitted.     RN: Lulu Tompkins RN 5/29/2024 11:12 PM

## 2024-05-30 NOTE — H&P
[facility]    History and Physical - Hospitalist Service       Date of Admission:  5/29/2024    Assessment & Plan      Iglesia Shore is a 64 year old female with a medical history significant for lung cancer, COPD, type II DM hyperlipidemia, obstructive sleep apnea, current nicotine use, hypertension, chronic pain and other medical comorbidities who is admitted with acute hypoxemic respiratory failure due to atypical pneumonia and COPD exacerbation    Patient Active Problem List   Diagnosis    COPD (chronic obstructive pulmonary disease) (H)    Family history of diabetes mellitus    Foot pain    Idiopathic sensorimotor axonal neuropathy    Laryngitis, chronic    Major depressive disorder, recurrent episode, moderate (H)    Migraine without aura    Mixed hyperlipidemia    Obstructive sleep apnea    Other somatoform disorders    PVD (peripheral vascular disease) (H24)    Screening for cervical cancer    Tobacco use disorder    Vitamin B12 deficiency    Hemorrhoids    History of colonic polyps    Major depressive disorder, severe (H)    Diabetes mellitus, type 2 (H)    Altered bowel elimination due to intestinal ostomy (H)    Chronic pain    Diverticulitis of colon    DNR (do not resuscitate)    Malignant neoplasm of lower lobe of right lung (H)    Primary hypertension    Enteritis    Sigmoid stricture (H)    Syrinx of spinal cord (H)    Sinus bradycardia    COPD exacerbation (H)    Acute hypoxemic respiratory failure (H)    Lung cancer (H)    Current nicotine use      Acute hypoxemic respiratory failure-patient was on room air when seen.  She may need oxygen need assist with brought to discharge.  She may have pulmonary hypertension versus pericardial effusion.  Discussed obtaining an echocardiogram with her.    Atypical pneumonia-patient started on doxycycline.  Will continue.  Optimize lung cares with breathing treatments    COPD exacerbation-continue steroids, doxycycline, breathing  treatments    Bradycardia-EKG showed a sinus rhythm.  Rule out ACS.  Patient is a current smoker.  I do not see that she is 100 echocardiogram recently.  Recommend an echocardiogram for tomorrow.  Dose of metoprolol decreased with holding parameters.  Holding parameters placed on diltiazem.  Unclear if the bradycardia is due to increased vagal tone from her GI issues.    Abdominal pain- intermittent. S/p ileostomy with take down 10/24-patient reports intermittent abdominal discomfort, bloating, constipation and diarrhea.  This is quite stable at this time.    Type II BQ-Tuas-Oyneo, sliding scale insulin, blood sugar goal 100-1 20    Obstructive sleep apnea-machines needed.      Lung cancer-status post radiation, with watchful waiting at this time.  Management was at the [facility].  Patient.    Major depression-emotional support, resume selective serotonin reuptake inhibitor    Hypertension-cannula with holding parameters    Current nicotine use-nicotine replacement.  She continues to smoke a pack a day.  Smoking cessation discussed.  Not really ready to quit.  Nicotine replacement as needed    Rest of patient's medical problems management remains unchanged       Diet:  Diabetic diet  DVT Prophylaxis: Pneumatic Compression Devices  Cunningham Catheter: Not present  Lines: None     Cardiac Monitoring: None  Code Status:  Full. Need to be discussed.     Clinically Significant Risk Factors Present on Admission              # Hypoalbuminemia: Lowest albumin = 3.4 g/dL at 5/29/2024  6:25 PM, will monitor as appropriate   # Drug Induced Platelet Defect: home medication list includes an antiplatelet medication   # Hypertension: Noted on problem list                 Disposition Plan     Medically Ready for Discharge: Anticipated Tomorrow           Tara Bell MD  Hospitalist Service  [facility]  Securely message with [employer] (more info)  Text page via [employer] Paging/Directory      ______________________________________________________________________    Chief Complaint   Shortness of breath        History of Present Illness   Iglesia Shore is a 64 year old female with a medical history significant for lung cancer, COPD, type II DM hyperlipidemia, obstructive sleep apnea, current nicotine use, hypertension, chronic pain and other medical comorbidities who is admitted with acute hypoxemic respiratory failure due to atypical pneumonia and COPD exacerbation      Patient was seen in the ER on admission.  She was awake alert oriented to place person and time and could provide a history.  Per history, she was seen by her primary care physician due to progressive shortness of breath.  She was advised to come to the ER for some blood work and further investigation.  Preliminary labs done showed a BMP which was unremarkable.  CBC showed a white count of 8.1, hemoglobin 15.9, platelet 307.  COVID test was done which was negative.  Influenza A was negative influenza B was negative RSV was negative.      Patient had a CT chest which showed results below      Narrative & Impression   EXAM: CT CHEST PULMONARY EMBOLISM W CONTRAST  LOCATION: [facility]  DATE: 5/29/2024     INDICATION: dyspnea  COMPARISON: Noncontrast chest CT 4/19/2024  TECHNIQUE: CT chest pulmonary angiogram during arterial phase injection of IV contrast. Multiplanar reformats and MIP reconstructions were performed. Dose reduction techniques were used.   CONTRAST: isovue 370 90ml     FINDINGS:  ANGIOGRAM CHEST: Pulmonary arteries are normal caliber and negative for pulmonary emboli. Thoracic aorta is negative for dissection. No CT evidence of right heart strain.     LUNGS AND PLEURA: Mild emphysema. Faint groundglass opacities and slight tree-in-bud foci involving posterior aspect of right middle lobe suggests developing atypical pneumonia. There is no dense consolidation or pleural effusion. However, there is    secretions or debris layering within right mainstem bronchus. Mild bronchial wall thickening diffusely.  Resolution of the slight atypical infiltrates previously seen within anterior aspects of upper lobes bilaterally.     MEDIASTINUM/AXILLAE: Normal.     CORONARY ARTERY CALCIFICATION: Severe.     UPPER ABDOMEN: Normal.     MUSCULOSKELETAL: Normal.                                                                      IMPRESSION:  1.  No pulmonary emboli.  2.  New faint groundglass and tree-in-bud opacities right middle lobe consistent with atypical pneumonia.  3.  Mucous secretions/debris layer dependently within right mainstem bronchus. Bronchial wall thickening.  4.  Emphysema.     ER intervention included giving patient a dose of prednisone and doxycycline.  Patient is being admitted for further inpatient cares.  She was also given breathing treatments.    An attempt was made to discharge her however patient was hypoxic at 85% at rest.  She reports a remote history of diverticulitis requiring colectomy and end ileostomy which was subsequently taken down last year.  She reports intermittent abdominal discomfort with constipation and diarrhea.  There is no history of fevers, chills, hematochezia or melena at this time.    Past Medical History    Past Medical History:   Diagnosis Date    Back pain     low back    COPD (chronic obstructive pulmonary disease) (H)     Depression     Diabetes mellitus (H)     Hyperlipidemia     Hypertension     PAD (peripheral artery disease) (H24)     Peripheral neuropathy     Pulmonary nodules        Past Surgical History   Past Surgical History:   Procedure Laterality Date    AORTA - FEMORAL ARTERY BYPASS GRAFT      BRONCHOSCOPY RIGID OR FLEXIBLE W/TRANSENDOSCOPIC ENDOBRONCHIAL ULTRASOUND GUIDED N/A 07/14/2022    Procedure: endbronchial ultrasound, transbronchial biopsy;  Surgeon: Rosendo Dave MD;  Location: [facility] OR    HYSTEROSCOPY W/ ENDOMETRIAL ABLATION      ILEOSTOMY   2023    ILEOSTOMY REVISION  10/19/2023    IR EXTREMITY ANGIOGRAM LEFT  2020    IR EXTREMITY ANGIOGRAM LEFT  2020    IR LOWER EXTREMITY ANGIOGRAM LEFT  2020    IR LOWER EXTREMITY ANGIOGRAM LEFT  2020    OPTICAL TRACKING SYSTEM BRONCHOSCOPY N/A 2022    Procedure: BRONCHOSCOPY, USING OPTICAL TRACKING SYSTEM, .  Ion or veran system, fiducial placement;  Surgeon: Rosendo Dave MD;  Location: [facility] OR    OTHER SURGICAL HISTORY      tubal ligation    OTHER SURGICAL HISTORY      spine fusion    PAIN STELLATE GANGLION BLOCK RIGHT Right 1992    x5, [facility]       Prior to Admission Medications   Prior to Admission Medications   Prescriptions Last Dose Informant Patient Reported? Taking?   DULoxetine (CYMBALTA) 60 MG capsule   Yes No   Sig: Take 60 mg by mouth daily   albuterol (PROAIR HFA/PROVENTIL HFA/VENTOLIN HFA) 108 (90 Base) MCG/ACT inhaler   Yes No   Sig: Inhale 2 puffs into the lungs every 6 hours as needed for shortness of breath, wheezing or cough   albuterol (PROVENTIL) (5 MG/ML) 0.5% neb solution   Yes No   Si.5 mL by nebulizer as needed   aspirin (ASA) 81 MG EC tablet   Yes No   Si tab(s) Orally once a day   atenolol (TENORMIN) 50 MG tablet   Yes No   Sig: Take 75 mg by mouth daily   atorvastatin (LIPITOR) 40 MG tablet   Yes No   Sig: Take 40 mg by mouth daily   budeson-glycopyrrol-formoterol (BREZTRI AEROSPHERE) 160-9-4.8 MCG/ACT AERO inhaler   Yes No   Sig: Inhale 2 puffs into the lungs 2 times daily   diltiazem ER (DILT-XR) 120 MG 24 hr capsule   Yes No   Sig: Take 120 mg by mouth 2 times daily   gabapentin (NEURONTIN) 300 MG capsule   Yes No   Sig: Take 300 mg by mouth daily as needed for neuropathic pain midday   gabapentin (NEURONTIN) 600 MG tablet   Yes No   Sig: Take 1,200 mg by mouth 2 times daily   ipratropium-albuteroL (DUO-NEB) 0.5-2.5 mg/3 mL nebulizer   Yes No   Sig: 3 mLs as needed   losartan-hydrochlorothiazide (HYZAAR) 100-12.5 MG tablet    "Yes No   Sig: Take 1 tablet by mouth daily   oxyCODONE (ROXICODONE) 5 MG tablet   Yes No   Sig: Take 5-10 mg by mouth every 6 hours as needed for severe pain   polyethylene glycol (MIRALAX) 17 GM/Dose powder   Yes No   Sig: Take 17 g by mouth daily      Facility-Administered Medications: None        Review of Systems    The 10 point Review of Systems is negative other than noted in the HPI or here.     Social History   I have reviewed this patient's social history and updated it with pertinent information if needed.  Social History     Tobacco Use    Smoking status: Every Day     Current packs/day: 1.00     Average packs/day: 1 pack/day for 53.9 years (53.9 ttl pk-yrs)     Types: Cigarettes     Start date: 7/17/1970    Smokeless tobacco: Never    Tobacco comments:     \"No more than 1 PPD\"   Vaping Use    Vaping status: Never Used   Substance Use Topics    Alcohol use: No    Drug use: Not Currently         Family History   I have reviewed this patient's family history and updated it with pertinent information if needed.  Family History   Problem Relation Age of Onset    Diabetes Mother     Hypertension Mother     Cancer Father     Heart Disease Father     Diabetes Father          Allergies   Allergies   Allergen Reactions    Bupropion Itching        Physical Exam   Vital Signs: Temp: 97.8  F (36.6  C) Temp src: Oral BP: (!) 163/73 Pulse: (!) 47   Resp: 14 SpO2: 91 % on 2 L of oxygen  Weight: 170 lbs 0 oz      General Aox3, appropriate affect, NAD, on 2 L of oxygen  HEENT  MMM, EOMI, PERRL  Chest Adeq E b/l, mild wheezing  Heart RRR, No M/R/G  Abd- Soft, NT, BS+  - Deferred,   Extremity- Moving all extremities, No digital clubbing,   No edema  Neuro- Aox3, moving all extremities gait not checked  Skin  Has no tattoo, No skin rash     Medical Decision Making       85 MINUTES SPENT BY ME on the date of service doing chart review, history, exam, documentation & further activities per the note.      ------------------ " MEDICAL DECISION MAKING ------------------------------------------------------------------------------------------------------  MANAGEMENT DISCUSSED with the following over the past 24 hours: patient and team       Data   ------------------------- PAST 24 HR DATA REVIEWED -----------------------------------------------    I have personally reviewed the following data over the past 24 hrs:    8.1  \   15.9 (H)   / 307     140 100 11.1 /  97   4.0 30 (H) 0.82 \     ALT: 12 AST: 16 AP: 71 TBILI: 0.3   ALB: 3.4 (L) TOT PROTEIN: 6.8 LIPASE: N/A     Trop: 9 BNP: N/A     TSH: 2.91 T4: N/A A1C: N/A       Imaging results reviewed over the past 24 hrs:   Recent Results (from the past 24 hour(s))   CT Chest Pulmonary Embolism w Contrast    Narrative    EXAM: CT CHEST PULMONARY EMBOLISM W CONTRAST  LOCATION: [facility]  DATE: 5/29/2024    INDICATION: dyspnea  COMPARISON: Noncontrast chest CT 4/19/2024  TECHNIQUE: CT chest pulmonary angiogram during arterial phase injection of IV contrast. Multiplanar reformats and MIP reconstructions were performed. Dose reduction techniques were used.   CONTRAST: isovue 370 90ml    FINDINGS:  ANGIOGRAM CHEST: Pulmonary arteries are normal caliber and negative for pulmonary emboli. Thoracic aorta is negative for dissection. No CT evidence of right heart strain.    LUNGS AND PLEURA: Mild emphysema. Faint groundglass opacities and slight tree-in-bud foci involving posterior aspect of right middle lobe suggests developing atypical pneumonia. There is no dense consolidation or pleural effusion. However, there is   secretions or debris layering within right mainstem bronchus. Mild bronchial wall thickening diffusely.  Resolution of the slight atypical infiltrates previously seen within anterior aspects of upper lobes bilaterally.    MEDIASTINUM/AXILLAE: Normal.    CORONARY ARTERY CALCIFICATION: Severe.    UPPER ABDOMEN: Normal.    MUSCULOSKELETAL: Normal.      Impression     IMPRESSION:  1.  No pulmonary emboli.  2.  New faint groundglass and tree-in-bud opacities right middle lobe consistent with atypical pneumonia.  3.  Mucous secretions/debris layer dependently within right mainstem bronchus. Bronchial wall thickening.  4.  Emphysema.

## 2024-05-30 NOTE — ED NOTES
Road test for patient per MD, ambulated well with no assistive device, saturation between 95-97%, was able to talk in full sentences through duration of walk.

## 2024-05-30 NOTE — PLAN OF CARE
Problem: Adult Inpatient Plan of Care  Goal: Optimal Comfort and Wellbeing  5/30/2024 0637 by Afua Stringer, RN  Outcome: Progressing  5/30/2024 0241 by Afua Stringer, RN  Outcome: Progressing   Goal Outcome Evaluation:       Pt was admitted last night by midnight from the ED, pt is A/O, denied pain when arrived has been pain free all night, was admitted for hypoxia and RR failure, and was found to be bradycardic, las HR was 71, has slept comfortably , Dr Ray Pacheco was in room to see pt , troponin levels to be checked this AM, nicotine patch ordered, and some lasix, pt had nebs in the ED for SOB, has had no episodes of SOB on the floor.

## 2024-05-31 ENCOUNTER — APPOINTMENT (OUTPATIENT)
Dept: OCCUPATIONAL THERAPY | Facility: HOSPITAL | Age: 64
DRG: 193 | End: 2024-05-31
Attending: INTERNAL MEDICINE
Payer: MEDICARE

## 2024-05-31 VITALS
DIASTOLIC BLOOD PRESSURE: 60 MMHG | OXYGEN SATURATION: 94 % | WEIGHT: 164.24 LBS | RESPIRATION RATE: 18 BRPM | HEIGHT: 65 IN | BODY MASS INDEX: 27.36 KG/M2 | TEMPERATURE: 97.9 F | HEART RATE: 72 BPM | SYSTOLIC BLOOD PRESSURE: 132 MMHG

## 2024-05-31 LAB
ATRIAL RATE - MUSE: 49 BPM
DIASTOLIC BLOOD PRESSURE - MUSE: NORMAL MMHG
GLUCOSE BLDC GLUCOMTR-MCNC: 195 MG/DL (ref 70–99)
GLUCOSE BLDC GLUCOMTR-MCNC: 244 MG/DL (ref 70–99)
GLUCOSE BLDC GLUCOMTR-MCNC: 283 MG/DL (ref 70–99)
HBA1C MFR BLD: 6.2 %
INTERPRETATION ECG - MUSE: NORMAL
P AXIS - MUSE: 75 DEGREES
PR INTERVAL - MUSE: 140 MS
QRS DURATION - MUSE: 74 MS
QT - MUSE: 486 MS
QTC - MUSE: 439 MS
R AXIS - MUSE: 5 DEGREES
SYSTOLIC BLOOD PRESSURE - MUSE: NORMAL MMHG
T AXIS - MUSE: 59 DEGREES
VENTRICULAR RATE- MUSE: 49 BPM

## 2024-05-31 PROCEDURE — 94640 AIRWAY INHALATION TREATMENT: CPT | Mod: 76

## 2024-05-31 PROCEDURE — 99239 HOSP IP/OBS DSCHRG MGMT >30: CPT | Performed by: INTERNAL MEDICINE

## 2024-05-31 PROCEDURE — 250N000011 HC RX IP 250 OP 636: Performed by: INTERNAL MEDICINE

## 2024-05-31 PROCEDURE — 97530 THERAPEUTIC ACTIVITIES: CPT | Mod: GO

## 2024-05-31 PROCEDURE — 999N000157 HC STATISTIC RCP TIME EA 10 MIN

## 2024-05-31 PROCEDURE — 97165 OT EVAL LOW COMPLEX 30 MIN: CPT | Mod: GO

## 2024-05-31 PROCEDURE — 94640 AIRWAY INHALATION TREATMENT: CPT

## 2024-05-31 PROCEDURE — 250N000012 HC RX MED GY IP 250 OP 636 PS 637: Performed by: INTERNAL MEDICINE

## 2024-05-31 PROCEDURE — 250N000013 HC RX MED GY IP 250 OP 250 PS 637: Performed by: INTERNAL MEDICINE

## 2024-05-31 PROCEDURE — 250N000009 HC RX 250: Performed by: INTERNAL MEDICINE

## 2024-05-31 PROCEDURE — 99232 SBSQ HOSP IP/OBS MODERATE 35: CPT | Performed by: INTERNAL MEDICINE

## 2024-05-31 RX ORDER — ATENOLOL 25 MG/1
25 TABLET ORAL DAILY
Qty: 30 TABLET | Refills: 0 | Status: ON HOLD | OUTPATIENT
Start: 2024-06-01 | End: 2024-08-26

## 2024-05-31 RX ORDER — METFORMIN HCL 500 MG
500 TABLET, EXTENDED RELEASE 24 HR ORAL 2 TIMES DAILY WITH MEALS
Qty: 60 TABLET | Refills: 0 | Status: SHIPPED | OUTPATIENT
Start: 2024-05-31

## 2024-05-31 RX ORDER — DOXYCYCLINE 100 MG/1
100 CAPSULE ORAL 2 TIMES DAILY
Qty: 10 CAPSULE | Refills: 0 | Status: SHIPPED | OUTPATIENT
Start: 2024-05-31 | End: 2024-08-19

## 2024-05-31 RX ORDER — PREDNISONE 20 MG/1
40 TABLET ORAL DAILY
Qty: 14 TABLET | Refills: 0 | Status: SHIPPED | OUTPATIENT
Start: 2024-05-31 | End: 2024-08-19

## 2024-05-31 RX ADMIN — METHYLPREDNISOLONE SODIUM SUCCINATE 62.5 MG: 125 INJECTION, POWDER, FOR SOLUTION INTRAMUSCULAR; INTRAVENOUS at 08:20

## 2024-05-31 RX ADMIN — INSULIN ASPART 3 UNITS: 100 INJECTION, SOLUTION INTRAVENOUS; SUBCUTANEOUS at 08:42

## 2024-05-31 RX ADMIN — FAMOTIDINE 20 MG: 20 TABLET ORAL at 08:30

## 2024-05-31 RX ADMIN — PREDNISONE 40 MG: 20 TABLET ORAL at 12:34

## 2024-05-31 RX ADMIN — GABAPENTIN 300 MG: 300 CAPSULE ORAL at 12:34

## 2024-05-31 RX ADMIN — FLUTICASONE FUROATE AND VILANTEROL TRIFENATATE 1 PUFF: 200; 25 POWDER RESPIRATORY (INHALATION) at 08:27

## 2024-05-31 RX ADMIN — METHYLPREDNISOLONE SODIUM SUCCINATE 62.5 MG: 125 INJECTION, POWDER, FOR SOLUTION INTRAMUSCULAR; INTRAVENOUS at 00:37

## 2024-05-31 RX ADMIN — NICOTINE 1 PATCH: 21 PATCH, EXTENDED RELEASE TRANSDERMAL at 08:24

## 2024-05-31 RX ADMIN — GABAPENTIN 1200 MG: 400 CAPSULE ORAL at 08:25

## 2024-05-31 RX ADMIN — DULOXETINE HYDROCHLORIDE 60 MG: 60 CAPSULE, DELAYED RELEASE ORAL at 08:28

## 2024-05-31 RX ADMIN — ASPIRIN 81 MG: 81 TABLET, COATED ORAL at 08:30

## 2024-05-31 RX ADMIN — ATORVASTATIN CALCIUM 40 MG: 40 TABLET, FILM COATED ORAL at 08:32

## 2024-05-31 RX ADMIN — UMECLIDINIUM 1 PUFF: 62.5 AEROSOL, POWDER ORAL at 08:27

## 2024-05-31 RX ADMIN — DOXYCYCLINE 100 MG: 100 INJECTION, POWDER, LYOPHILIZED, FOR SOLUTION INTRAVENOUS at 08:41

## 2024-05-31 RX ADMIN — DILTIAZEM HYDROCHLORIDE 120 MG: 60 CAPSULE, EXTENDED RELEASE ORAL at 08:26

## 2024-05-31 RX ADMIN — IPRATROPIUM BROMIDE AND ALBUTEROL SULFATE 3 ML: .5; 3 SOLUTION RESPIRATORY (INHALATION) at 01:12

## 2024-05-31 RX ADMIN — INSULIN ASPART 2 UNITS: 100 INJECTION, SOLUTION INTRAVENOUS; SUBCUTANEOUS at 12:36

## 2024-05-31 RX ADMIN — IPRATROPIUM BROMIDE AND ALBUTEROL SULFATE 3 ML: .5; 3 SOLUTION RESPIRATORY (INHALATION) at 07:46

## 2024-05-31 RX ADMIN — ATENOLOL 25 MG: 25 TABLET ORAL at 08:30

## 2024-05-31 ASSESSMENT — ACTIVITIES OF DAILY LIVING (ADL)
ADLS_ACUITY_SCORE: 27
ADLS_ACUITY_SCORE: 26
ADLS_ACUITY_SCORE: 26
ADLS_ACUITY_SCORE: 27
ADLS_ACUITY_SCORE: 26
ADLS_ACUITY_SCORE: 27

## 2024-05-31 NOTE — PLAN OF CARE
Occupational Therapy Discharge Summary    Reason for therapy discharge:    All goals and outcomes met, no further needs identified. Patient appears at/near baseline.    Progress towards therapy goal(s). See goals on Care Plan in [facility] electronic health record for goal details.  Goals met

## 2024-05-31 NOTE — PROGRESS NOTES
"  Pulmonary/Critical Care Consult Team Note    Iglesia Shore,  1960, MRN [id number]  Admitting Dx: Sinus bradycardia [R00.1]  COPD exacerbation (H) [J44.1]  Date / Time of Admission:  2024  8:02 PM    She is feeling much better  She is ready to discharge    Home Oxygen Assessment Tools  Patient's 02 sat on RA at rest 93%   Patient 02 88% sat on  RA with activity after 4 minutes.    Assessment/Plan: Iglesia Shore is a 64 year old female with PMHx of right lung cancer s/p radiation therapy, COPD, peripheral vascular disease, type 2 diabetes, hyperlipidemia, obstructive sleep apnea, tobacco use disorder, hypertension, chronic pain admitted with acute hypoxemic respiratory failure due to pneumonia causing acute COPD exacerbation.    Pneumonia  - agree with antibiotics  - sending PNA serologies - sputum culture for non-tb mycoplasma    COPD exacerbation  - agree with steroids, changed to prednisone 40mg x 7 days   - Continue doxycycline 100 mg every 12 hours  - Continue Breo Ellipta  - Continue DuoNebs    History of ANDREW  - CPAP at night    Hx of COPD  - will have follow up in pulm clinic with PFTs - arranged    Hx of lung CA  - follows as an outpt    Will sign off, please let our service know if any change in clinical condition or questions.    Medical Care Time excluding procedures and family discussions greater than: 45min    Risk Factors Present on Admission:  Clinically Significant Risk Factors              # Hypoalbuminemia: Lowest albumin = 3 g/dL at 2024  5:39 AM, will monitor as appropriate     # Hypertension: Noted on problem list        # Overweight: Estimated body mass index is 27.33 kg/m  as calculated from the following:    Height as of this encounter: 1.651 m (5' 5\").    Weight as of this encounter: 74.5 kg (164 lb 3.9 oz)., PRESENT ON ADMISSION           Code Status: No CPR- Do NOT Intubate         Nelly Eckert DO  Pulmonary and Critical Care Attending  pgr [phone]    Allergies " "  Allergen Reactions    Bupropion Itching       Meds: See MAR    Physical Exam:  /60 (BP Location: Right arm)   Pulse 72   Temp 97.9  F (36.6  C) (Oral)   Resp 18   Ht 1.651 m (5' 5\")   Wt 74.5 kg (164 lb 3.9 oz)   SpO2 94%   BMI 27.33 kg/m    Intake/Output this shift:  I/O this shift:  In: 320 [P.O.:320]  Out: -   GEN: sitting up in a chair, NAD  HEENT: MMM  CVS: regular rhythm, no murmurs  RESP: trace end ex wheezing, no rhonchi  ABD: Soft, No abdominal pain with palpation, no guarding, no rigidity  EXT: Warm, well perfused, trace LE edema  NEURO: moving all extremities, nonfocal  PSYCH: pleasant    Pertinent Labs: Latest lab results in EHR personally reviewed.   CMP  Recent Labs   Lab 05/31/24  0816 05/31/24  0210 05/30/24  2059 05/30/24  1640 05/30/24  0758 05/30/24  0539 05/30/24  0048 05/29/24  1825   NA  --   --   --   --   --  140  --  140   POTASSIUM  --   --   --   --   --  3.7  --  4.0   CHLORIDE  --   --   --   --   --  103  --  100   CO2  --   --   --   --   --  25  --  30*   ANIONGAP  --   --   --   --   --  12  --  10   * 283* 305* 211*   < > 248*   < > 97   BUN  --   --   --   --   --  13.9  --  11.1   CR  --   --   --   --   --  0.63  --  0.82   GFRESTIMATED  --   --   --   --   --  >90  --  78   VANIA  --   --   --   --   --  9.2  --  9.2   MAG  --   --   --   --   --   --   --  1.9   PROTTOTAL  --   --   --   --   --  5.9*  --  6.8   ALBUMIN  --   --   --   --   --  3.0*  --  3.4*   BILITOTAL  --   --   --   --   --  <0.2  --  0.3   ALKPHOS  --   --   --   --   --  63  --  71   AST  --   --   --   --   --  11  --  16   ALT  --   --   --   --   --  11  --  12    < > = values in this interval not displayed.     CBC  Recent Labs   Lab 05/30/24  0539 05/29/24  1825   WBC 8.3 8.1   RBC 4.71 5.34*   HGB 14.0 15.9*   HCT 42.7 48.2*   MCV 91 90   MCH 29.7 29.8   MCHC 32.8 33.0   RDW 14.0 14.2    307     INRNo lab results found in last 7 days.  Arterial Blood GasNo lab results " found in last 7 days.    Cultures: personally reviewed.   7-Day Micro Results    Collected Updated Procedure Result Status    05/29/2024 1812 05/29/2024 1912 Symptomatic Influenza A/B, RSV, & SARS-CoV2 PCR (COVID-19) Nasopharyngeal [[id number]]    Swab from Nasopharyngeal    Final result Component Value   Influenza A PCR Negative   Influenza B PCR Negative   RSV PCR Negative   SARS CoV2 PCR Negative   NEGATIVE: SARS-CoV-2 (COVID-19) RNA not detected, presumed negative.              Imaging: personally reviewed.   Results for orders placed during the hospital encounter of 07/14/22    XR Chest Port 1 View    Narrative  EXAM: XR CHEST PORT 1 VIEW  7/14/2022 10:00 AM    HISTORY: 62 years Female post bronch with biopsy rll.    COMPARISON: Chest radiograph 8/27/2021. Outside hospital low-dose  chest CT without contrast 6/16/2022. PET CT 6/27/2022.    TECHNIQUE: Portable AP view of the chest.    FINDINGS:  New linear radiopacity in the right mainstem bronchus. Aortic arch  calcification. Midline trachea. Stable normal cardiomediastinal  silhouette. Distinct pulmonary vasculature. Trace bilateral pleural  effusions versus thickening. No discernible pneumothorax. Stable  increased lung volumes. No focal airspace opacities. Unremarkable  upper abdomen. No acute or suspicious osseous abnormalities.  Unremarkable soft tissues.    Impression  IMPRESSION: New right mainstem bronchus linear opacity. No  pneumothorax.    I have personally reviewed the examination and initial interpretation  and I agree with the findings.    RGOER ALEXIS MD      SYSTEM ID:  [id number]      Results for orders placed during the hospital encounter of 08/27/21    XR Chest Port 1 View    Narrative  EXAM: XR CHEST PORT 1 VIEW  LOCATION: [facility]  DATE/TIME: 8/27/2021 8:20 PM    INDICATION: short of breath  COMPARISON: None.    Impression  IMPRESSION: Negative chest.    Results for orders placed during the hospital encounter of  05/29/24    CT Chest Pulmonary Embolism w Contrast    Narrative  EXAM: CT CHEST PULMONARY EMBOLISM W CONTRAST  LOCATION: [facility]  DATE: 5/29/2024    INDICATION: dyspnea  COMPARISON: Noncontrast chest CT 4/19/2024  TECHNIQUE: CT chest pulmonary angiogram during arterial phase injection of IV contrast. Multiplanar reformats and MIP reconstructions were performed. Dose reduction techniques were used.  CONTRAST: isovue 370 90ml    FINDINGS:  ANGIOGRAM CHEST: Pulmonary arteries are normal caliber and negative for pulmonary emboli. Thoracic aorta is negative for dissection. No CT evidence of right heart strain.    LUNGS AND PLEURA: Mild emphysema. Faint groundglass opacities and slight tree-in-bud foci involving posterior aspect of right middle lobe suggests developing atypical pneumonia. There is no dense consolidation or pleural effusion. However, there is  secretions or debris layering within right mainstem bronchus. Mild bronchial wall thickening diffusely.  Resolution of the slight atypical infiltrates previously seen within anterior aspects of upper lobes bilaterally.    MEDIASTINUM/AXILLAE: Normal.    CORONARY ARTERY CALCIFICATION: Severe.    UPPER ABDOMEN: Normal.    MUSCULOSKELETAL: Normal.    Impression  IMPRESSION:  1.  No pulmonary emboli.  2.  New faint groundglass and tree-in-bud opacities right middle lobe consistent with atypical pneumonia.  3.  Mucous secretions/debris layer dependently within right mainstem bronchus. Bronchial wall thickening.  4.  Emphysema.    No results found for this or any previous visit.    No results found for this or any previous visit.      Patient Active Problem List   Diagnosis    COPD (chronic obstructive pulmonary disease) (H)    Family history of diabetes mellitus    Foot pain    Idiopathic sensorimotor axonal neuropathy    Laryngitis, chronic    Major depressive disorder, recurrent episode, moderate (H)    Migraine without aura    Mixed  hyperlipidemia    Obstructive sleep apnea    Other somatoform disorders    PVD (peripheral vascular disease) (H24)    Screening for cervical cancer    Tobacco use disorder    Vitamin B12 deficiency    Hemorrhoids    History of colonic polyps    Major depressive disorder, severe (H)    Diabetes mellitus, type 2 (H)    Altered bowel elimination due to intestinal ostomy (H)    Chronic pain    Diverticulitis of colon    DNR (do not resuscitate)    Malignant neoplasm of lower lobe of right lung (H)    Primary hypertension    Enteritis    Sigmoid stricture (H)    Syrinx of spinal cord (H)    Bradycardia    COPD exacerbation (H)    Acute hypoxemic respiratory failure (H)    Lung cancer (H)    Current nicotine use         Surgical History  She  has a past surgical history that includes IR Lower Extremity Angiogram Left (11/25/2020); IR Lower Extremity Angiogram Left (12/16/2020); Aorta - Femoral Artery Bypass Graft; Hysteroscopy W/ Endometrial Ablation; other surgical history; other surgical history; IR Extremity Angiogram Left (11/25/2020); IR Extremity Angiogram Left (12/16/2020); Optical tracking system bronchoscopy (N/A, 07/14/2022); Bronchoscopy Rigid Or Flexible W/Transendoscopic Endobronchial Ultrasound Guided (N/A, 07/14/2022); PAIN Stellate Ganglion Block Right (Right, 1992); Ileostomy (08/16/2023); and Ileostomy Revision (10/19/2023).    Family History  Reviewed, and family history includes Cancer in her father; Diabetes in her father and mother; Heart Disease in her father; Hypertension in her mother.    Social History  Reviewed, and she  reports that she has been smoking cigarettes. She started smoking about 53 years ago. She has a 53.9 pack-year smoking history. She has never used smokeless tobacco. She reports that she does not currently use drugs. She reports that she does not drink alcohol.    Allergies  Allergies   Allergen Reactions    Bupropion Itching              Nelly Eckert,   Pulmonary and  Critical Care Attending  [facility] [[email or web address]] [phone]    Securely message with the Vocera Web Console (learn more here)

## 2024-05-31 NOTE — PLAN OF CARE
Physical Therapy: Orders received. Chart reviewed and discussed with care team.? Physical Therapy not indicated due to per OT, pt at baseline functional mobility. Ambulating with supervision. Defer discharge recommendations to OT.? Will complete orders.       Laura Celeste, PT, DPT  5/31/2024

## 2024-05-31 NOTE — DISCHARGE SUMMARY
"[facility]  Hospitalist Discharge Summary      Date of Admission:  5/29/2024  Date of Discharge:  5/31/2024  Discharging Provider: Tahir Maradiaga MD  Discharge Service: Hospitalist Service    Discharge Diagnoses   Acute hypoxic respiratory failure  Acute exacerbation of COPD  Atypical pneumonia  Lung cancer                    Clinically Significant Risk Factors     # Overweight: Estimated body mass index is 27.33 kg/m  as calculated from the following:    Height as of this encounter: 1.651 m (5' 5\").    Weight as of this encounter: 74.5 kg (164 lb 3.9 oz).       Follow-ups Needed After Discharge   Follow-up Appointments     Follow-up and recommended labs and tests       Follow up with primary care provider, Jo Hamilton, within 7 days   for hospital follow- up.    Outpatient PFTs as arranged by pulmonologist  Follow-up pulmonology clinic as arranged  Resume routine oncology follow-up at the [facility]  Follow-up sputum culture for non-tb mycoplasma            Unresulted Labs Ordered in the Past 30 Days of this Admission       Date and Time Order Name Status Description    5/30/2024 12:47 PM Histoplasma Galactomannan Antigen Quant by EIA, Urine In process         These results will be followed up by Jo Hamilton, and pulmonary service      Discharge Disposition   Discharged to home  Condition at discharge: Stable    Hospital Course   Igleisa Shore is a 64 year old female with history of right lung cancer s/p radiation therapy, COPD, peripheral vascular disease, type 2 diabetes, hyperlipidemia, obstructive sleep apnea, tobacco use disorder, hypertension, chronic pain admitted with acute hypoxemic respiratory failure due to suspected atypical pneumonia and COPD exacerbation.    Pulmonary CT angiogram revealed new faint groundglass and tree-in-bud opacities right middle lobe consistent with atypical pneumonia, mucous secretions/debris layer dependently within right " mainstem bronchus and bronchial wall thickening and emphysema without evidence of pulmonary embolism.  She was placed on doxycycline, steroid therapy and bronchodilators on admission.  Pulmonologist recommends prednisone for 7 days and continuation of antibiotics with plan to follow-up at the pulmonology clinic for PFTs.    Acute hypoxemic respiratory failure has resolved. Echocardiogram 5/30/2024 was unremarkable.  Metformin was initiated during hospital stay for type 2 diabetes; possibly exacerbated by steroid therapy.  Symptoms have improved and patient is clinically stable for discharge this time.    Consultations This Hospital Stay   PULMONARY IP CONSULT  PHYSICAL THERAPY ADULT IP CONSULT  OCCUPATIONAL THERAPY ADULT IP CONSULT    Code Status   No CPR- Do NOT Intubate    Time Spent on this Encounter   I, Tahir Maradiaga MD, personally saw the patient today and spent greater than 30 minutes discharging this patient.       Tahir Maradiaga MD  [address] [address]  Phone: [phone]  Fax: [phone]  ______________________________________________________________________    Physical Exam   Vital Signs: Temp: 97.9  F (36.6  C) Temp src: Oral BP: 132/60 Pulse: 72   Resp: 18 SpO2: 94 % O2 Device: None (Room air)    Weight: 164 lbs 3.88 oz    General appearance: Awake, Alert, Cooperative, not in any obvious distress and appears stated age   HEENT: Normocephalic, atraumatic, conjunctiva clear without icterus and ears without discharge  Lungs: CTAB   with diminished air entry bilaterally.  Cardiovascular: Regular Rate and Rythm, normal apical impulse, normal S1 and S2, no lower extremity edema bilaterally  Abdomen: Soft, non-tender and Non-distended, active bowel sounds  Skin: Skin color, texture normal and bruising or bleeding. No rashes or lesions over face, neck, arms and legs, turgor normal.  Musculoskeletal: No bony deformities or joint  tenderness. Normal ROM upon flexion & extension.   Neurologic: Alert & Oriented X 3, Facial symmetry preserved and upper & lower extremities moving well with symmetry  Psychiatric: Calm, normal eye contact and normal affect          Primary Care Physician   Jo Hamilton    Discharge Orders      Adult Pulmonary Medicine  Referral      Pulmonary Rehab  Referral      Reason for your hospital stay    Suspected acute exacerbation of COPD  Atypical pneumonia  Lung cancer  Acute hypoxemic respiratory failure     Follow-up and recommended labs and tests     Follow up with primary care provider, Jo Hamilton, within 7 days for hospital follow- up.    Outpatient PFTs as arranged by pulmonologist  Follow-up pulmonology clinic as arranged  Resume routine oncology follow-up at the [facility]  Follow-up sputum culture for non-tb mycoplasma     Activity    Your activity upon discharge: activity as tolerated     General PFT Lab (Please always keep checked)     Pulmonary Function Test    .     Diet    Follow this diet upon discharge: Orders Placed This Encounter      Moderate Consistent Carb (60 g CHO per Meal) Diet       Significant Results and Procedures   Results for orders placed or performed during the hospital encounter of 05/29/24   CT Chest Pulmonary Embolism w Contrast    Narrative    EXAM: CT CHEST PULMONARY EMBOLISM W CONTRAST  LOCATION: [facility]  DATE: 5/29/2024    INDICATION: dyspnea  COMPARISON: Noncontrast chest CT 4/19/2024  TECHNIQUE: CT chest pulmonary angiogram during arterial phase injection of IV contrast. Multiplanar reformats and MIP reconstructions were performed. Dose reduction techniques were used.   CONTRAST: isovue 370 90ml    FINDINGS:  ANGIOGRAM CHEST: Pulmonary arteries are normal caliber and negative for pulmonary emboli. Thoracic aorta is negative for dissection. No CT evidence of right heart strain.    LUNGS AND PLEURA: Mild  emphysema. Faint groundglass opacities and slight tree-in-bud foci involving posterior aspect of right middle lobe suggests developing atypical pneumonia. There is no dense consolidation or pleural effusion. However, there is   secretions or debris layering within right mainstem bronchus. Mild bronchial wall thickening diffusely.  Resolution of the slight atypical infiltrates previously seen within anterior aspects of upper lobes bilaterally.    MEDIASTINUM/AXILLAE: Normal.    CORONARY ARTERY CALCIFICATION: Severe.    UPPER ABDOMEN: Normal.    MUSCULOSKELETAL: Normal.      Impression    IMPRESSION:  1.  No pulmonary emboli.  2.  New faint groundglass and tree-in-bud opacities right middle lobe consistent with atypical pneumonia.  3.  Mucous secretions/debris layer dependently within right mainstem bronchus. Bronchial wall thickening.  4.  Emphysema.   Echocardiogram Complete     Value    LVEF  60-65%    [facility]    [id number]  [id number]  [id number]  [id number]^MILLA^JUAN^[place] [address]     Name: ARLINE COBB  MRN: [id number]  : 1960  Study Date: 2024 08:57 AM  Age: 64 yrs  Gender: Female  Patient Location: [facility]  Reason For Study: Bradycardia - Sinus  Ordering Physician: JUAN LOYOLA  Performed By: MB     BSA: 1.8 m2  Height: 65 in  Weight: 164 lb  HR: 62  BP: 133/66 mmHg  ______________________________________________________________________________  Procedure  Complete Echo Adult.  ______________________________________________________________________________  Interpretation Summary     Left ventricular size, wall motion and function are normal. The ejection  fraction is 60-65%.  Normal right ventricle size and systolic function.  No hemodynamically significant valvular abnormalities on 2D or color flow  imaging.  ______________________________________________________________________________  Left Ventricle  Left ventricular size, wall motion and  function are normal. The ejection  fraction is 60-65%. There is normal left ventricular wall thickness. Left  ventricular diastolic function is normal. No regional wall motion  abnormalities noted.     Right Ventricle  Normal right ventricle size and systolic function.     Atria  Normal left atrial size. Right atrial size is normal. There is no color  Doppler evidence of an atrial shunt.     Mitral Valve  Mitral valve leaflets appear normal. There is no evidence of mitral stenosis  or clinically significant mitral regurgitation.     Tricuspid Valve  Tricuspid valve leaflets appear normal. There is no evidence of tricuspid  stenosis or clinically significant tricuspid regurgitation.     Aortic Valve  Aortic valve leaflets appear normal. There is no evidence of aortic stenosis  or clinically significant aortic regurgitation.     Pulmonic Valve  The pulmonic valve is not well seen, but is grossly normal. This degree of  valvular regurgitation is within normal limits.     Vessels  The aorta root is normal. Normal size ascending aorta. IVC diameter <2.1 cm  collapsing >50% with sniff suggests a normal RA pressure of 3 mmHg.     Pericardium  There is no pericardial effusion.     ______________________________________________________________________________  MMode/2D Measurements & Calculations  IVSd: 1.1 cm  LVIDd: 4.7 cm  LVIDs: 3.2 cm  LVPWd: 0.93 cm  FS: 32.4 %     LV mass(C)d: 165.6 grams  LV mass(C)dI: 91.1 grams/m2  LVOT diam: 2.0 cm  LVOT area: 3.1 cm2  EF Biplane: 55.9 %  LA Volume Indexed (AL/bp): 29.1 ml/m2  RV Base: 3.0 cm  RWT: 0.39  TAPSE: 2.8 cm     Time Measurements  MM HR: 62.0 BPM     Doppler Measurements & Calculations  MV E max adryan: 73.3 cm/sec  MV A max adryan: 116.0 cm/sec  MV E/A: 0.63  MV max P.4 mmHg  MV mean P.0 mmHg  MV V2 VTI: 38.0 cm  MVA(VTI): 2.6 cm2  MV dec slope: 261.0 cm/sec2  MV dec time: 0.28 sec  Ao V2 max: 168.0 cm/sec  Ao max P.0 mmHg  Ao V2 mean: 112.0 cm/sec  Ao mean PG:  6.0 mmHg  Ao V2 VTI: 37.9 cm  TONYA(I,D): 2.6 cm2  TONYA(V,D): 2.4 cm2  LV V1 max P.9 mmHg  LV V1 max: 131.0 cm/sec  LV V1 VTI: 31.6 cm  SV(LVOT): 99.3 ml  SI(LVOT): 54.6 ml/m2  PA acc time: 0.13 sec  AV Doroteo Ratio (DI): 0.78  TONYA Index (cm2/m2): 1.4  E/E': 8.9  E/E' av.5  Lateral E/e': 8.2  Medial E/e': 8.9  Peak E' Doroteo: 8.3 cm/sec     RV S Doroteo: 12.1 cm/sec     ______________________________________________________________________________  Report approved by: Tray Kellogg 2024 10:25 AM             Discharge Medications   Current Discharge Medication List        START taking these medications    Details   !! doxycycline hyclate (VIBRAMYCIN) 100 MG capsule Take 1 capsule (100 mg) by mouth 2 times daily  Qty: 10 capsule, Refills: 0    Associated Diagnoses: Current nicotine use; Malignant neoplasm of lung, unspecified laterality, unspecified part of lung (H); Acute hypoxemic respiratory failure (H); Malignant neoplasm of right lung, unspecified part of lung (H)      !! doxycycline hyclate (VIBRAMYCIN) 100 MG capsule Take 1 capsule (100 mg) by mouth 2 times daily for 7 days  Qty: 14 capsule, Refills: 0      metFORMIN (GLUCOPHAGE XR) 500 MG 24 hr tablet Take 1 tablet (500 mg) by mouth 2 times daily (with meals)  Qty: 60 tablet, Refills: 0    Associated Diagnoses: Malignant neoplasm of lung, unspecified laterality, unspecified part of lung (H); Type 2 diabetes mellitus with chronic kidney disease, without long-term current use of insulin, unspecified CKD stage (H)      predniSONE (DELTASONE) 20 MG tablet Take 2 tablets (40 mg) by mouth daily  Qty: 14 tablet, Refills: 0    Associated Diagnoses: Current nicotine use; Malignant neoplasm of lung, unspecified laterality, unspecified part of lung (H); Acute hypoxemic respiratory failure (H); Malignant neoplasm of right lung, unspecified part of lung (H)       !! - Potential duplicate medications found. Please discuss with provider.        CONTINUE these  medications which have CHANGED    Details   atenolol (TENORMIN) 25 MG tablet Take 1 tablet (25 mg) by mouth daily  Qty: 30 tablet, Refills: 0    Associated Diagnoses: Current nicotine use; Malignant neoplasm of lung, unspecified laterality, unspecified part of lung (H); Acute hypoxemic respiratory failure (H); Malignant neoplasm of right lung, unspecified part of lung (H)           CONTINUE these medications which have NOT CHANGED    Details   albuterol (PROAIR HFA/PROVENTIL HFA/VENTOLIN HFA) 108 (90 Base) MCG/ACT inhaler Inhale 2 puffs into the lungs every 6 hours as needed for shortness of breath, wheezing or cough      albuterol (PROVENTIL) (5 MG/ML) 0.5% neb solution 0.5 mL by nebulizer as needed      aspirin (ASA) 81 MG EC tablet 1 tab(s) Orally once a day      atorvastatin (LIPITOR) 40 MG tablet Take 40 mg by mouth daily      budeson-glycopyrrol-formoterol (BREZTRI AEROSPHERE) 160-9-4.8 MCG/ACT AERO inhaler Inhale 2 puffs into the lungs 2 times daily      diltiazem (CARDIZEM SR) 120 MG CP12 12 hr SR capsule Take 120 mg by mouth 2 times daily      DULoxetine (CYMBALTA) 60 MG capsule Take 60 mg by mouth daily      gabapentin (NEURONTIN) 300 MG capsule Take 300 mg by mouth every evening midday      gabapentin (NEURONTIN) 600 MG tablet Take 1,200 mg by mouth 2 times daily      ipratropium-albuteroL (DUO-NEB) 0.5-2.5 mg/3 mL nebulizer Take 3 mLs by nebulization every 6 hours as needed for shortness of breath, wheezing or cough      oxyCODONE (ROXICODONE) 5 MG tablet Take 5-10 mg by mouth every 6 hours as needed for severe pain      polyethylene glycol (MIRALAX) 17 GM/Dose powder Take 17 g by mouth every other day           STOP taking these medications       furosemide (LASIX) 20 MG tablet Comments:   Reason for Stopping:         losartan-hydrochlorothiazide (HYZAAR) 100-12.5 MG tablet Comments:   Reason for Stopping:             Allergies   Allergies   Allergen Reactions    Bupropion Itching

## 2024-05-31 NOTE — PLAN OF CARE
Problem: Adult Inpatient Plan of Care  Goal: Plan of Care Review  Outcome: Progressing   Goal Outcome Evaluation:  Patient alert x4 and able to communicate her needs. Faint expiratory wheezes throughout. SAO2 92-95% on r/a  Nonproductive cough. Diabetic diet-100%.  Independent with mobility. Patient requesting to go across the street to smoke. Writer educated patient on the importance of smoking cessation and that [facility] is a smoke free facility. Patient became upset and requesting to be discharge and to speak to the Physician.   Gricelda Murillo RN

## 2024-05-31 NOTE — PROGRESS NOTES
Care Management Discharge Note    Discharge Date: 05/31/2024       Discharge Disposition:  home    Discharge Services:  none    Discharge DME:  none    Discharge Transportation: family or friend will provide    Private pay costs discussed: Not applicable    Does the patient's insurance plan have a 3 day qualifying hospital stay waiver?  No    PAS Confirmation Code:    Patient/family educated on Medicare website which has current facility and service quality ratings:      Education Provided on the Discharge Plan:    Persons Notified of Discharge Plans: yes  Patient/Family in Agreement with the Plan:      Handoff Referral Completed: Yes    Additional Information:  Pt adequate to discharge no CM needs identified. CM will sign off.    Gricelda Barfield RN

## 2024-05-31 NOTE — PLAN OF CARE
"  Problem: Adult Inpatient Plan of Care  Goal: Optimal Comfort and Wellbeing  Outcome: Progressing     Problem: Comorbidity Management  Goal: Blood Glucose Levels Within Targeted Range  Outcome: Progressing     Problem: Comorbidity Management  Goal: Maintenance of COPD Symptom Control  Outcome: Progressing     Pt A&OX 4, denies pain, on RA, tele, SR. Pt Ind and is able to let needs be known./69 (BP Location: Right arm)   Pulse 84   Temp 98  F (36.7  C) (Oral)   Resp 18   Ht 1.651 m (5' 5\")   Wt 74.5 kg (164 lb 3.9 oz)   SpO2 92%   BMI 27.33 kg/m                         "

## 2024-05-31 NOTE — PROGRESS NOTES
"Discharge paperwork reviewed with patient and all questions answered. IV discontinued. Prescriptions available at her pharmacy.  Encouraged patient to remove her nicotine patch because of her current smoking use. Replied \"I will do it later\" Friend here to transport her home.  Gricelda Murillo RN  "

## 2024-05-31 NOTE — PROGRESS NOTES
"Patient outside smoking. Refused to come put out cigarette or to return to her room when security approached her. Notified MD and new orders provided to \"discharge to home\"  Gricelda Murillo RN  "

## 2024-06-02 LAB
H CAPSUL AG UR QL IA: NOT DETECTED
H CAPSUL AG UR-MCNC: NOT DETECTED NG/ML

## 2024-06-04 ENCOUNTER — LAB REQUISITION (OUTPATIENT)
Dept: LAB | Facility: CLINIC | Age: 64
End: 2024-06-04
Payer: MEDICARE

## 2024-06-04 ENCOUNTER — TELEPHONE (OUTPATIENT)
Dept: PULMONOLOGY | Facility: CLINIC | Age: 64
End: 2024-06-04
Payer: MEDICARE

## 2024-06-04 DIAGNOSIS — J18.9 PNEUMONIA, UNSPECIFIED ORGANISM: ICD-10-CM

## 2024-06-04 PROCEDURE — 80048 BASIC METABOLIC PNL TOTAL CA: CPT | Mod: ORL | Performed by: FAMILY MEDICINE

## 2024-06-04 NOTE — TELEPHONE ENCOUNTER
Patient Contacted for the patient to call back and schedule the following:    Appointment type: NPULM REFERRAL  Provider: HIMANSHU  Return date: 08/19/2024  Specialty phone number: [phone]  Additional appointment(s) needed: N/A  Additonal Notes: Patient starts Pulm rehab on 6/24, wants to know if she should be seen by a Pulmonologist prior to starting rehab. Message sent to [facility].

## 2024-06-05 LAB
ANION GAP SERPL CALCULATED.3IONS-SCNC: 14 MMOL/L (ref 7–15)
BUN SERPL-MCNC: 17.7 MG/DL (ref 8–23)
CALCIUM SERPL-MCNC: 8.9 MG/DL (ref 8.8–10.2)
CHLORIDE SERPL-SCNC: 98 MMOL/L (ref 98–107)
CREAT SERPL-MCNC: 0.7 MG/DL (ref 0.51–0.95)
DEPRECATED HCO3 PLAS-SCNC: 24 MMOL/L (ref 22–29)
EGFRCR SERPLBLD CKD-EPI 2021: >90 ML/MIN/1.73M2
GLUCOSE SERPL-MCNC: 77 MG/DL (ref 70–99)
POTASSIUM SERPL-SCNC: 3.9 MMOL/L (ref 3.4–5.3)
SODIUM SERPL-SCNC: 136 MMOL/L (ref 135–145)

## 2024-06-18 ENCOUNTER — LAB REQUISITION (OUTPATIENT)
Dept: LAB | Facility: CLINIC | Age: 64
End: 2024-06-18
Payer: MEDICARE

## 2024-06-18 DIAGNOSIS — I11.9 HYPERTENSIVE HEART DISEASE WITHOUT HEART FAILURE: ICD-10-CM

## 2024-06-18 DIAGNOSIS — R60.0 LOCALIZED EDEMA: ICD-10-CM

## 2024-06-18 LAB
BASOPHILS # BLD AUTO: 0.1 10E3/UL (ref 0–0.2)
BASOPHILS NFR BLD AUTO: 1 %
EOSINOPHIL # BLD AUTO: 0.1 10E3/UL (ref 0–0.7)
EOSINOPHIL NFR BLD AUTO: 1 %
ERYTHROCYTE [DISTWIDTH] IN BLOOD BY AUTOMATED COUNT: 14.8 % (ref 10–15)
HCT VFR BLD AUTO: 44.5 % (ref 35–47)
HGB BLD-MCNC: 14.4 G/DL (ref 11.7–15.7)
IMM GRANULOCYTES # BLD: 0 10E3/UL
IMM GRANULOCYTES NFR BLD: 0 %
LYMPHOCYTES # BLD AUTO: 2 10E3/UL (ref 0.8–5.3)
LYMPHOCYTES NFR BLD AUTO: 27 %
MCH RBC QN AUTO: 30.4 PG (ref 26.5–33)
MCHC RBC AUTO-ENTMCNC: 32.4 G/DL (ref 31.5–36.5)
MCV RBC AUTO: 94 FL (ref 78–100)
MONOCYTES # BLD AUTO: 0.7 10E3/UL (ref 0–1.3)
MONOCYTES NFR BLD AUTO: 9 %
NEUTROPHILS # BLD AUTO: 4.6 10E3/UL (ref 1.6–8.3)
NEUTROPHILS NFR BLD AUTO: 62 %
NRBC # BLD AUTO: 0 10E3/UL
NRBC BLD AUTO-RTO: 0 /100
PLATELET # BLD AUTO: 280 10E3/UL (ref 150–450)
RBC # BLD AUTO: 4.74 10E6/UL (ref 3.8–5.2)
WBC # BLD AUTO: 7.5 10E3/UL (ref 4–11)

## 2024-06-18 PROCEDURE — 80048 BASIC METABOLIC PNL TOTAL CA: CPT | Mod: ORL | Performed by: FAMILY MEDICINE

## 2024-06-18 PROCEDURE — 85025 COMPLETE CBC W/AUTO DIFF WBC: CPT | Mod: ORL | Performed by: FAMILY MEDICINE

## 2024-06-19 LAB
ANION GAP SERPL CALCULATED.3IONS-SCNC: 12 MMOL/L (ref 7–15)
BUN SERPL-MCNC: 10.3 MG/DL (ref 8–23)
CALCIUM SERPL-MCNC: 8.8 MG/DL (ref 8.8–10.2)
CHLORIDE SERPL-SCNC: 107 MMOL/L (ref 98–107)
CREAT SERPL-MCNC: 0.58 MG/DL (ref 0.51–0.95)
DEPRECATED HCO3 PLAS-SCNC: 22 MMOL/L (ref 22–29)
EGFRCR SERPLBLD CKD-EPI 2021: >90 ML/MIN/1.73M2
GLUCOSE SERPL-MCNC: 108 MG/DL (ref 70–99)
POTASSIUM SERPL-SCNC: 4.3 MMOL/L (ref 3.4–5.3)
SODIUM SERPL-SCNC: 141 MMOL/L (ref 135–145)

## 2024-06-24 ENCOUNTER — HOSPITAL ENCOUNTER (OUTPATIENT)
Dept: CARDIAC REHAB | Facility: HOSPITAL | Age: 64
Discharge: HOME | End: 2024-06-24
Attending: INTERNAL MEDICINE
Payer: MEDICARE

## 2024-06-24 DIAGNOSIS — Z72.0 CURRENT NICOTINE USE: ICD-10-CM

## 2024-06-24 DIAGNOSIS — C34.90 MALIGNANT NEOPLASM OF LUNG, UNSPECIFIED LATERALITY, UNSPECIFIED PART OF LUNG (H): ICD-10-CM

## 2024-06-24 DIAGNOSIS — J96.01 ACUTE HYPOXEMIC RESPIRATORY FAILURE (H): ICD-10-CM

## 2024-06-24 PROCEDURE — 94626 PHY/QHP OP PULM RHB W/MNTR: CPT

## 2024-06-27 ENCOUNTER — HOSPITAL ENCOUNTER (OUTPATIENT)
Dept: CARDIAC REHAB | Facility: HOSPITAL | Age: 64
Discharge: HOME | End: 2024-06-27
Attending: INTERNAL MEDICINE
Payer: MEDICARE

## 2024-06-27 PROCEDURE — 94625 PHY/QHP OP PULM RHB W/O MNTR: CPT

## 2024-07-02 ENCOUNTER — HOSPITAL ENCOUNTER (OUTPATIENT)
Dept: CARDIAC REHAB | Facility: HOSPITAL | Age: 64
Discharge: HOME | End: 2024-07-02
Attending: INTERNAL MEDICINE
Payer: MEDICARE

## 2024-07-02 PROCEDURE — 94625 PHY/QHP OP PULM RHB W/O MNTR: CPT

## 2024-07-09 ENCOUNTER — HOSPITAL ENCOUNTER (OUTPATIENT)
Dept: CARDIAC REHAB | Facility: HOSPITAL | Age: 64
Discharge: HOME | End: 2024-07-09
Attending: INTERNAL MEDICINE
Payer: MEDICARE

## 2024-07-09 PROCEDURE — 94625 PHY/QHP OP PULM RHB W/O MNTR: CPT

## 2024-07-11 ENCOUNTER — HOSPITAL ENCOUNTER (OUTPATIENT)
Dept: CARDIAC REHAB | Facility: HOSPITAL | Age: 64
Discharge: HOME | End: 2024-07-11
Attending: INTERNAL MEDICINE
Payer: MEDICARE

## 2024-07-11 PROCEDURE — 94625 PHY/QHP OP PULM RHB W/O MNTR: CPT

## 2024-07-16 ENCOUNTER — HOSPITAL ENCOUNTER (OUTPATIENT)
Dept: CARDIAC REHAB | Facility: HOSPITAL | Age: 64
Discharge: HOME | End: 2024-07-16
Attending: INTERNAL MEDICINE
Payer: MEDICARE

## 2024-07-16 PROCEDURE — 94625 PHY/QHP OP PULM RHB W/O MNTR: CPT

## 2024-07-18 ENCOUNTER — HOSPITAL ENCOUNTER (OUTPATIENT)
Dept: CARDIAC REHAB | Facility: HOSPITAL | Age: 64
Discharge: HOME | End: 2024-07-18
Attending: INTERNAL MEDICINE
Payer: MEDICARE

## 2024-07-18 PROCEDURE — 94625 PHY/QHP OP PULM RHB W/O MNTR: CPT

## 2024-07-19 ENCOUNTER — HOSPITAL ENCOUNTER (OUTPATIENT)
Dept: CT IMAGING | Facility: HOSPITAL | Age: 64
Discharge: HOME | End: 2024-07-19
Attending: PHYSICIAN ASSISTANT | Admitting: PHYSICIAN ASSISTANT
Payer: MEDICARE

## 2024-07-19 DIAGNOSIS — F17.200 TOBACCO USE DISORDER: ICD-10-CM

## 2024-07-19 DIAGNOSIS — R91.1 LUNG NODULE: ICD-10-CM

## 2024-07-19 DIAGNOSIS — C34.31 MALIGNANT NEOPLASM OF LOWER LOBE OF RIGHT LUNG (H): ICD-10-CM

## 2024-07-19 PROCEDURE — 71250 CT THORAX DX C-: CPT | Mod: MG

## 2024-07-23 ENCOUNTER — VIRTUAL VISIT (OUTPATIENT)
Dept: RADIATION ONCOLOGY | Facility: CLINIC | Age: 64
End: 2024-07-23
Attending: PHYSICIAN ASSISTANT
Payer: MEDICARE

## 2024-07-23 ENCOUNTER — HOSPITAL ENCOUNTER (OUTPATIENT)
Dept: CARDIAC REHAB | Facility: HOSPITAL | Age: 64
Discharge: HOME | End: 2024-07-23
Attending: INTERNAL MEDICINE
Payer: MEDICARE

## 2024-07-23 DIAGNOSIS — C34.31 MALIGNANT NEOPLASM OF LOWER LOBE OF RIGHT LUNG (H): Primary | ICD-10-CM

## 2024-07-23 PROCEDURE — 94625 PHY/QHP OP PULM RHB W/O MNTR: CPT

## 2024-07-23 NOTE — PROGRESS NOTES
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     A 1.6 cm RLL nodule was discovered on low-dose screening lung CT (22), located in the paraspinal region. Staging PET/CT (22) demonstrated a SUV max 2.2  FDG uptake associated with this nodule and no other findings. She also had an unremarkable brain MRI (22). She completed mediastinal staging with bronchoscopy and EBUS (22). FNA of a 4R node and a 11R node were negative. She was medically inoperable and underwent SBRT(EOT 2022).    The patient  also underwent a reconstructive bowel surgery on 2023.  She has had a bowel obstruction for about a month with only small amounts passing through. The patient noticed some blood passing after surgery. The patient is due for a follow up.    A follow up CT of chest (2023), showed an interval increased size of the previously seen at medial right lower lobe pulmonary nodule which now measures 1.9 x 0.6 cm, previously 1 x 0.5 cm. However, a follow up PET/CT (2023) showed no evidence of disease.    Chest CT(2024) showed:  1.  Focal scarring right lower lobe superior segment along the mediastinal pleura near a metallic fiducial. No new nodules    2.  Unchanged emphysema and chronic airway wall thickening consistent with lung and airway manifestations of COPD.  3.  Moderate to severe three-vessel atheromatous coronary  "calcifications.    4/19/24 CT Chest  MPRESSION:   1.  Unchanged focal scarring in the medial right lower lobe and near a fiducial marker.  2.  New areas of patchy and nodular consolidation within the anterior right upper lobe and left upper lobe suggestive of an infectious/inflammatory process.  3.  Severe coronary artery calcifications.    7/19/24 CT Chest  1. Unchanged focal fibrosis adjacent to metallic fiducial in the mediastinal pleura right lower lobe superior segment. No findings to suggest residual or recurrent tumor in the chest.  2.  Severe atheromatous coronary calcifications.  3.  Emphysema and central airway wall thickening. No superimposed acute process    She has JOHNSON but is stable. She is able to walk 50 -100 feet before stopping due to \"claudications/neuropathy.\"   She is doing pulmomonary therapy twice weekly. This tires her out but she knows its good for her.         Pre SBRT 7/22/2022   6M post SBRT (2/13/23)       12M post (8/11/23)            17M post (1/12/24)     IMPRESSION: cNED     RECOMMENDATIONS: I reviewed the CT scan  By my review RLL is unchanged and reflects post radiation changes. Recommend for her to reach out to her PCP for coronary calcifications. She agrees.   I recommend a F/U in 3M with a CT scan of chest. Orders placed.     GHAZALA Rader  Department of Radiation Oncology  [facility]     10 minutes on the phone with the patient  1:30 to 1:40 pm    "

## 2024-07-23 NOTE — LETTER
2024      Iglesia Shore   [address]  [place] [address]      Dear Colleague,    Thank you for referring your patient, Iglesia Shore, to the [facility] RADIATION ONCOLOGY. Please see a copy of my visit note below.    Follow-up Note by Phone  2024    Iglesia Shore  MRN: [id number]  : 1960    Radiation Oncologist: Kamron Ruvalcaba MD  Provider: GHAZALA Rader  LCV: 24 with me    [place] in [place], provider in clinic    DISEASE TREATED: Presumed NSCLC of the RLL, [id number], stage IA2    INTERVAL SINCE COMPLETION OF RADIATION THERAPY:  1 year and 5 months (completed 22)     TYPE OF RADIATION THERAPY DELIVERED: 50 Gy in 5 fractions, SBRT, Rx=82%           SUBJECTIVE:  Iglesia Shore is a 64-year-old woman with a history of COPD who was found to have a right lower lobe nodule highly concerning for lung cancer.     A 1.6 cm RLL nodule was discovered on low-dose screening lung CT (22), located in the paraspinal region. Staging PET/CT (22) demonstrated a SUV max 2.2  FDG uptake associated with this nodule and no other findings. She also had an unremarkable brain MRI (22). She completed mediastinal staging with bronchoscopy and EBUS (22). FNA of a 4R node and a 11R node were negative. She was medically inoperable and underwent SBRT(EOT 2022).    The patient  also underwent a reconstructive bowel surgery on 2023.  She has had a bowel obstruction for about a month with only small amounts passing through. The patient noticed some blood passing after surgery. The patient is due for a follow up.    A follow up CT of chest (2023), showed an interval increased size of the previously seen at medial right lower lobe pulmonary nodule which now measures 1.9 x 0.6 cm, previously 1 x 0.5 cm. However, a follow up PET/CT (2023) showed no evidence of disease.    Chest CT(2024) showed:  1.  Focal scarring right lower lobe superior segment  "along the mediastinal pleura near a metallic fiducial. No new nodules    2.  Unchanged emphysema and chronic airway wall thickening consistent with lung and airway manifestations of COPD.  3.  Moderate to severe three-vessel atheromatous coronary calcifications.    4/19/24 CT Chest  MPRESSION:   1.  Unchanged focal scarring in the medial right lower lobe and near a fiducial marker.  2.  New areas of patchy and nodular consolidation within the anterior right upper lobe and left upper lobe suggestive of an infectious/inflammatory process.  3.  Severe coronary artery calcifications.    7/19/24 CT Chest  1. Unchanged focal fibrosis adjacent to metallic fiducial in the mediastinal pleura right lower lobe superior segment. No findings to suggest residual or recurrent tumor in the chest.  2.  Severe atheromatous coronary calcifications.  3.  Emphysema and central airway wall thickening. No superimposed acute process    She has JOHNSON but is stable. She is able to walk 50 -100 feet before stopping due to \"claudications/neuropathy.\"   She is doing pulmomonary therapy twice weekly. This tires her out but she knows its good for her.         Pre SBRT 7/22/2022   6M post SBRT (2/13/23)       12M post (8/11/23)            17M post (1/12/24)     IMPRESSION: cNED     RECOMMENDATIONS: I reviewed the CT scan  By my review RLL is unchanged and reflects post radiation changes. Recommend for her to reach out to her PCP for coronary calcifications. She agrees.   I recommend a F/U in 3M with a CT scan of chest. Orders placed.     GHAZALA Rader  Department of Radiation Oncology  [facility]     10 minutes on the phone with the patient  1:30 to 1:40 pm      Again, thank you for allowing me to participate in the care of your patient.        Sincerely,        BENJI RaderC  "

## 2024-07-25 ENCOUNTER — HOSPITAL ENCOUNTER (OUTPATIENT)
Dept: CARDIAC REHAB | Facility: HOSPITAL | Age: 64
Discharge: HOME | End: 2024-07-25
Attending: INTERNAL MEDICINE
Payer: MEDICARE

## 2024-07-25 PROCEDURE — 94625 PHY/QHP OP PULM RHB W/O MNTR: CPT

## 2024-07-30 ENCOUNTER — HOSPITAL ENCOUNTER (OUTPATIENT)
Dept: CARDIAC REHAB | Facility: HOSPITAL | Age: 64
Discharge: HOME | End: 2024-07-30
Attending: INTERNAL MEDICINE
Payer: MEDICARE

## 2024-07-30 PROCEDURE — 94625 PHY/QHP OP PULM RHB W/O MNTR: CPT

## 2024-08-01 ENCOUNTER — HOSPITAL ENCOUNTER (OUTPATIENT)
Dept: CARDIAC REHAB | Facility: HOSPITAL | Age: 64
Discharge: HOME | End: 2024-08-01
Attending: INTERNAL MEDICINE
Payer: MEDICARE

## 2024-08-01 PROCEDURE — 94625 PHY/QHP OP PULM RHB W/O MNTR: CPT

## 2024-08-06 ENCOUNTER — HOSPITAL ENCOUNTER (OUTPATIENT)
Dept: CARDIAC REHAB | Facility: HOSPITAL | Age: 64
Discharge: HOME | End: 2024-08-06
Attending: INTERNAL MEDICINE
Payer: MEDICARE

## 2024-08-06 PROCEDURE — 94625 PHY/QHP OP PULM RHB W/O MNTR: CPT

## 2024-08-08 ENCOUNTER — HOSPITAL ENCOUNTER (OUTPATIENT)
Dept: CARDIAC REHAB | Facility: HOSPITAL | Age: 64
Discharge: HOME | End: 2024-08-08
Attending: INTERNAL MEDICINE
Payer: MEDICARE

## 2024-08-08 PROCEDURE — 94625 PHY/QHP OP PULM RHB W/O MNTR: CPT

## 2024-08-15 ENCOUNTER — HOSPITAL ENCOUNTER (OUTPATIENT)
Dept: CARDIAC REHAB | Facility: HOSPITAL | Age: 64
Discharge: HOME | End: 2024-08-15
Attending: INTERNAL MEDICINE
Payer: MEDICARE

## 2024-08-15 PROCEDURE — 94625 PHY/QHP OP PULM RHB W/O MNTR: CPT

## 2024-08-19 ENCOUNTER — APPOINTMENT (OUTPATIENT)
Dept: CT IMAGING | Facility: HOSPITAL | Age: 64
DRG: 190 | End: 2024-08-19
Attending: EMERGENCY MEDICINE
Payer: MEDICARE

## 2024-08-19 ENCOUNTER — APPOINTMENT (OUTPATIENT)
Dept: RADIOLOGY | Facility: HOSPITAL | Age: 64
DRG: 190 | End: 2024-08-19
Attending: EMERGENCY MEDICINE
Payer: MEDICARE

## 2024-08-19 ENCOUNTER — HOSPITAL ENCOUNTER (INPATIENT)
Facility: HOSPITAL | Age: 64
LOS: 7 days | Discharge: HOME | DRG: 190 | End: 2024-08-26
Attending: EMERGENCY MEDICINE | Admitting: STUDENT IN AN ORGANIZED HEALTH CARE EDUCATION/TRAINING PROGRAM
Payer: MEDICARE

## 2024-08-19 DIAGNOSIS — J44.1 COPD EXACERBATION (H): Primary | ICD-10-CM

## 2024-08-19 DIAGNOSIS — I10 ESSENTIAL HYPERTENSION: ICD-10-CM

## 2024-08-19 DIAGNOSIS — R06.00 DYSPNEA, UNSPECIFIED TYPE: ICD-10-CM

## 2024-08-19 DIAGNOSIS — J44.1 COPD WITH ACUTE EXACERBATION (H): ICD-10-CM

## 2024-08-19 LAB
ANION GAP SERPL CALCULATED.3IONS-SCNC: 13 MMOL/L (ref 7–15)
BASE EXCESS BLDV CALC-SCNC: 5.6 MMOL/L (ref -3–3)
BUN SERPL-MCNC: 7.8 MG/DL (ref 8–23)
CALCIUM SERPL-MCNC: 9.1 MG/DL (ref 8.8–10.4)
CHLORIDE SERPL-SCNC: 103 MMOL/L (ref 98–107)
CREAT SERPL-MCNC: 0.64 MG/DL (ref 0.51–0.95)
CRP SERPL-MCNC: 21.8 MG/L
EGFRCR SERPLBLD CKD-EPI 2021: >90 ML/MIN/1.73M2
ERYTHROCYTE [DISTWIDTH] IN BLOOD BY AUTOMATED COUNT: 13.1 % (ref 10–15)
FLUAV RNA SPEC QL NAA+PROBE: NEGATIVE
FLUBV RNA RESP QL NAA+PROBE: NEGATIVE
GLUCOSE BLDC GLUCOMTR-MCNC: 235 MG/DL (ref 70–99)
GLUCOSE SERPL-MCNC: 123 MG/DL (ref 70–99)
GROUP A STREP BY PCR: NOT DETECTED
HBA1C MFR BLD: 6 %
HCO3 BLDV-SCNC: 30 MMOL/L (ref 21–28)
HCO3 SERPL-SCNC: 25 MMOL/L (ref 22–29)
HCT VFR BLD AUTO: 44.7 % (ref 35–47)
HGB BLD-MCNC: 14.8 G/DL (ref 11.7–15.7)
MAGNESIUM SERPL-MCNC: 1.6 MG/DL (ref 1.7–2.3)
MCH RBC QN AUTO: 29.2 PG (ref 26.5–33)
MCHC RBC AUTO-ENTMCNC: 33.1 G/DL (ref 31.5–36.5)
MCV RBC AUTO: 88 FL (ref 78–100)
NT-PROBNP SERPL-MCNC: 211 PG/ML (ref 0–900)
O2/TOTAL GAS SETTING VFR VENT: 36 %
OXYHGB MFR BLDV: 73 % (ref 70–75)
PCO2 BLDV: 48 MM HG (ref 40–50)
PH BLDV: 7.41 [PH] (ref 7.32–7.43)
PLATELET # BLD AUTO: 202 10E3/UL (ref 150–450)
PO2 BLDV: 42 MM HG (ref 25–47)
POTASSIUM SERPL-SCNC: 3.9 MMOL/L (ref 3.4–5.3)
PROCALCITONIN SERPL IA-MCNC: <0.02 NG/ML
RBC # BLD AUTO: 5.07 10E6/UL (ref 3.8–5.2)
RSV RNA SPEC NAA+PROBE: NEGATIVE
SAO2 % BLDV: 76 % (ref 70–75)
SARS-COV-2 RNA RESP QL NAA+PROBE: NEGATIVE
SODIUM SERPL-SCNC: 141 MMOL/L (ref 135–145)
TROPONIN T SERPL HS-MCNC: 7 NG/L
WBC # BLD AUTO: 8.5 10E3/UL (ref 4–11)

## 2024-08-19 PROCEDURE — 36415 COLL VENOUS BLD VENIPUNCTURE: CPT | Performed by: EMERGENCY MEDICINE

## 2024-08-19 PROCEDURE — 71275 CT ANGIOGRAPHY CHEST: CPT | Mod: MA

## 2024-08-19 PROCEDURE — 250N000012 HC RX MED GY IP 250 OP 636 PS 637: Performed by: PHYSICIAN ASSISTANT

## 2024-08-19 PROCEDURE — 250N000011 HC RX IP 250 OP 636: Performed by: STUDENT IN AN ORGANIZED HEALTH CARE EDUCATION/TRAINING PROGRAM

## 2024-08-19 PROCEDURE — 99207 PR APP CREDIT; MD BILLING SHARED VISIT: CPT | Performed by: PHYSICIAN ASSISTANT

## 2024-08-19 PROCEDURE — 94640 AIRWAY INHALATION TREATMENT: CPT

## 2024-08-19 PROCEDURE — 999N000156 HC STATISTIC RCP CONSULT EA 30 MIN

## 2024-08-19 PROCEDURE — 258N000003 HC RX IP 258 OP 636: Performed by: EMERGENCY MEDICINE

## 2024-08-19 PROCEDURE — 93005 ELECTROCARDIOGRAM TRACING: CPT | Performed by: EMERGENCY MEDICINE

## 2024-08-19 PROCEDURE — 99418 PROLNG IP/OBS E/M EA 15 MIN: CPT | Mod: FS | Performed by: STUDENT IN AN ORGANIZED HEALTH CARE EDUCATION/TRAINING PROGRAM

## 2024-08-19 PROCEDURE — 250N000013 HC RX MED GY IP 250 OP 250 PS 637: Performed by: PHYSICIAN ASSISTANT

## 2024-08-19 PROCEDURE — 84145 PROCALCITONIN (PCT): CPT | Performed by: EMERGENCY MEDICINE

## 2024-08-19 PROCEDURE — 82805 BLOOD GASES W/O2 SATURATION: CPT | Performed by: EMERGENCY MEDICINE

## 2024-08-19 PROCEDURE — 87637 SARSCOV2&INF A&B&RSV AMP PRB: CPT | Performed by: EMERGENCY MEDICINE

## 2024-08-19 PROCEDURE — 94640 AIRWAY INHALATION TREATMENT: CPT | Mod: 76

## 2024-08-19 PROCEDURE — 999N000157 HC STATISTIC RCP TIME EA 10 MIN

## 2024-08-19 PROCEDURE — 250N000011 HC RX IP 250 OP 636: Performed by: EMERGENCY MEDICINE

## 2024-08-19 PROCEDURE — 86140 C-REACTIVE PROTEIN: CPT | Performed by: PHYSICIAN ASSISTANT

## 2024-08-19 PROCEDURE — 83880 ASSAY OF NATRIURETIC PEPTIDE: CPT | Performed by: PHYSICIAN ASSISTANT

## 2024-08-19 PROCEDURE — 99223 1ST HOSP IP/OBS HIGH 75: CPT | Mod: FS | Performed by: STUDENT IN AN ORGANIZED HEALTH CARE EDUCATION/TRAINING PROGRAM

## 2024-08-19 PROCEDURE — 36415 COLL VENOUS BLD VENIPUNCTURE: CPT | Performed by: PHYSICIAN ASSISTANT

## 2024-08-19 PROCEDURE — 250N000009 HC RX 250: Performed by: STUDENT IN AN ORGANIZED HEALTH CARE EDUCATION/TRAINING PROGRAM

## 2024-08-19 PROCEDURE — 96375 TX/PRO/DX INJ NEW DRUG ADDON: CPT

## 2024-08-19 PROCEDURE — 250N000009 HC RX 250: Performed by: EMERGENCY MEDICINE

## 2024-08-19 PROCEDURE — 96374 THER/PROPH/DIAG INJ IV PUSH: CPT | Mod: 59

## 2024-08-19 PROCEDURE — 71045 X-RAY EXAM CHEST 1 VIEW: CPT

## 2024-08-19 PROCEDURE — 83735 ASSAY OF MAGNESIUM: CPT | Performed by: PHYSICIAN ASSISTANT

## 2024-08-19 PROCEDURE — 87651 STREP A DNA AMP PROBE: CPT | Performed by: EMERGENCY MEDICINE

## 2024-08-19 PROCEDURE — 250N000013 HC RX MED GY IP 250 OP 250 PS 637: Performed by: STUDENT IN AN ORGANIZED HEALTH CARE EDUCATION/TRAINING PROGRAM

## 2024-08-19 PROCEDURE — 85027 COMPLETE CBC AUTOMATED: CPT | Performed by: EMERGENCY MEDICINE

## 2024-08-19 PROCEDURE — 99285 EMERGENCY DEPT VISIT HI MDM: CPT | Mod: 25

## 2024-08-19 PROCEDURE — 120N000001 HC R&B MED SURG/OB

## 2024-08-19 PROCEDURE — 83036 HEMOGLOBIN GLYCOSYLATED A1C: CPT | Performed by: STUDENT IN AN ORGANIZED HEALTH CARE EDUCATION/TRAINING PROGRAM

## 2024-08-19 PROCEDURE — 84484 ASSAY OF TROPONIN QUANT: CPT | Performed by: PHYSICIAN ASSISTANT

## 2024-08-19 PROCEDURE — 82374 ASSAY BLOOD CARBON DIOXIDE: CPT | Performed by: EMERGENCY MEDICINE

## 2024-08-19 RX ORDER — CARBAMAZEPINE 100 MG/1
100 TABLET, EXTENDED RELEASE ORAL 2 TIMES DAILY
Status: DISCONTINUED | OUTPATIENT
Start: 2024-08-19 | End: 2024-08-19

## 2024-08-19 RX ORDER — FUROSEMIDE 20 MG
20 TABLET ORAL
COMMUNITY
Start: 2024-06-21

## 2024-08-19 RX ORDER — MONTELUKAST SODIUM 10 MG/1
10 TABLET ORAL AT BEDTIME
Status: DISCONTINUED | OUTPATIENT
Start: 2024-08-19 | End: 2024-08-26 | Stop reason: HOSPADM

## 2024-08-19 RX ORDER — DEXTROSE MONOHYDRATE 25 G/50ML
25-50 INJECTION, SOLUTION INTRAVENOUS
Status: DISCONTINUED | OUTPATIENT
Start: 2024-08-19 | End: 2024-08-26 | Stop reason: HOSPADM

## 2024-08-19 RX ORDER — ASPIRIN 81 MG/1
81 TABLET ORAL EVERY MORNING
Status: DISCONTINUED | OUTPATIENT
Start: 2024-08-20 | End: 2024-08-26 | Stop reason: HOSPADM

## 2024-08-19 RX ORDER — IPRATROPIUM BROMIDE AND ALBUTEROL SULFATE 2.5; .5 MG/3ML; MG/3ML
3 SOLUTION RESPIRATORY (INHALATION)
Status: DISCONTINUED | OUTPATIENT
Start: 2024-08-19 | End: 2024-08-19

## 2024-08-19 RX ORDER — DILTIAZEM HYDROCHLORIDE 60 MG/1
120 CAPSULE, EXTENDED RELEASE ORAL 2 TIMES DAILY
Status: DISCONTINUED | OUTPATIENT
Start: 2024-08-19 | End: 2024-08-26 | Stop reason: HOSPADM

## 2024-08-19 RX ORDER — ALBUTEROL SULFATE 5 MG/ML
2.5 SOLUTION RESPIRATORY (INHALATION) EVERY 4 HOURS PRN
Status: DISCONTINUED | OUTPATIENT
Start: 2024-08-19 | End: 2024-08-26 | Stop reason: HOSPADM

## 2024-08-19 RX ORDER — AMOXICILLIN 250 MG
2 CAPSULE ORAL 2 TIMES DAILY PRN
Status: DISCONTINUED | OUTPATIENT
Start: 2024-08-19 | End: 2024-08-24

## 2024-08-19 RX ORDER — METHYLPREDNISOLONE SODIUM SUCCINATE 40 MG/ML
40 INJECTION, POWDER, LYOPHILIZED, FOR SOLUTION INTRAMUSCULAR; INTRAVENOUS EVERY 6 HOURS
Status: DISCONTINUED | OUTPATIENT
Start: 2024-08-19 | End: 2024-08-22

## 2024-08-19 RX ORDER — BUDESONIDE 0.5 MG/2ML
0.5 INHALANT ORAL 2 TIMES DAILY
Status: DISCONTINUED | OUTPATIENT
Start: 2024-08-19 | End: 2024-08-26 | Stop reason: HOSPADM

## 2024-08-19 RX ORDER — METFORMIN HCL 500 MG
500 TABLET, EXTENDED RELEASE 24 HR ORAL 2 TIMES DAILY WITH MEALS
Status: DISCONTINUED | OUTPATIENT
Start: 2024-08-19 | End: 2024-08-26 | Stop reason: HOSPADM

## 2024-08-19 RX ORDER — GABAPENTIN 600 MG/1
1200 TABLET ORAL 2 TIMES DAILY
Status: DISCONTINUED | OUTPATIENT
Start: 2024-08-19 | End: 2024-08-19

## 2024-08-19 RX ORDER — METHYLPREDNISOLONE SODIUM SUCCINATE 125 MG/2ML
125 INJECTION, POWDER, LYOPHILIZED, FOR SOLUTION INTRAMUSCULAR; INTRAVENOUS ONCE
Status: COMPLETED | OUTPATIENT
Start: 2024-08-19 | End: 2024-08-19

## 2024-08-19 RX ORDER — CEFTRIAXONE 1 G/1
1 INJECTION, POWDER, FOR SOLUTION INTRAMUSCULAR; INTRAVENOUS EVERY 24 HOURS
Status: DISCONTINUED | OUTPATIENT
Start: 2024-08-20 | End: 2024-08-23

## 2024-08-19 RX ORDER — NICOTINE 21 MG/24HR
1 PATCH, TRANSDERMAL 24 HOURS TRANSDERMAL DAILY
Status: DISCONTINUED | OUTPATIENT
Start: 2024-08-20 | End: 2024-08-26 | Stop reason: HOSPADM

## 2024-08-19 RX ORDER — CALCIUM CARBONATE 500 MG/1
1000 TABLET, CHEWABLE ORAL 4 TIMES DAILY PRN
Status: DISCONTINUED | OUTPATIENT
Start: 2024-08-19 | End: 2024-08-26 | Stop reason: HOSPADM

## 2024-08-19 RX ORDER — MAGNESIUM SULFATE 4 G/50ML
4 INJECTION INTRAVENOUS ONCE
Status: COMPLETED | OUTPATIENT
Start: 2024-08-19 | End: 2024-08-20

## 2024-08-19 RX ORDER — AMOXICILLIN 250 MG
1 CAPSULE ORAL 2 TIMES DAILY PRN
Status: DISCONTINUED | OUTPATIENT
Start: 2024-08-19 | End: 2024-08-24

## 2024-08-19 RX ORDER — BENZONATATE 100 MG/1
100 CAPSULE ORAL 3 TIMES DAILY PRN
Status: DISCONTINUED | OUTPATIENT
Start: 2024-08-19 | End: 2024-08-26 | Stop reason: HOSPADM

## 2024-08-19 RX ORDER — GUAIFENESIN/DEXTROMETHORPHAN 100-10MG/5
10 SYRUP ORAL EVERY 4 HOURS PRN
Status: DISCONTINUED | OUTPATIENT
Start: 2024-08-19 | End: 2024-08-26 | Stop reason: HOSPADM

## 2024-08-19 RX ORDER — CEFTRIAXONE 1 G/1
1 INJECTION, POWDER, FOR SOLUTION INTRAMUSCULAR; INTRAVENOUS ONCE
Status: COMPLETED | OUTPATIENT
Start: 2024-08-19 | End: 2024-08-19

## 2024-08-19 RX ORDER — PROCHLORPERAZINE 25 MG
12.5 SUPPOSITORY, RECTAL RECTAL EVERY 12 HOURS PRN
Status: DISCONTINUED | OUTPATIENT
Start: 2024-08-19 | End: 2024-08-26 | Stop reason: HOSPADM

## 2024-08-19 RX ORDER — PROCHLORPERAZINE MALEATE 5 MG
5 TABLET ORAL EVERY 6 HOURS PRN
Status: DISCONTINUED | OUTPATIENT
Start: 2024-08-19 | End: 2024-08-26 | Stop reason: HOSPADM

## 2024-08-19 RX ORDER — ATORVASTATIN CALCIUM 40 MG/1
40 TABLET, FILM COATED ORAL EVERY EVENING
Status: DISCONTINUED | OUTPATIENT
Start: 2024-08-19 | End: 2024-08-26 | Stop reason: HOSPADM

## 2024-08-19 RX ORDER — ALBUTEROL SULFATE 0.83 MG/ML
SOLUTION RESPIRATORY (INHALATION)
Status: DISCONTINUED
Start: 2024-08-19 | End: 2024-08-19 | Stop reason: WASHOUT

## 2024-08-19 RX ORDER — ONDANSETRON 4 MG/1
4 TABLET, ORALLY DISINTEGRATING ORAL EVERY 6 HOURS PRN
Status: DISCONTINUED | OUTPATIENT
Start: 2024-08-19 | End: 2024-08-26 | Stop reason: HOSPADM

## 2024-08-19 RX ORDER — NICOTINE POLACRILEX 4 MG
15-30 LOZENGE BUCCAL
Status: DISCONTINUED | OUTPATIENT
Start: 2024-08-19 | End: 2024-08-26 | Stop reason: HOSPADM

## 2024-08-19 RX ORDER — CARBAMAZEPINE 100 MG/1
100 TABLET, EXTENDED RELEASE ORAL 2 TIMES DAILY
COMMUNITY
Start: 2024-07-24 | End: 2025-07-24

## 2024-08-19 RX ORDER — IOPAMIDOL 755 MG/ML
90 INJECTION, SOLUTION INTRAVASCULAR ONCE
Status: COMPLETED | OUTPATIENT
Start: 2024-08-19 | End: 2024-08-19

## 2024-08-19 RX ORDER — ONDANSETRON 2 MG/ML
4 INJECTION INTRAMUSCULAR; INTRAVENOUS EVERY 6 HOURS PRN
Status: DISCONTINUED | OUTPATIENT
Start: 2024-08-19 | End: 2024-08-26 | Stop reason: HOSPADM

## 2024-08-19 RX ORDER — LIDOCAINE 40 MG/G
CREAM TOPICAL
Status: DISCONTINUED | OUTPATIENT
Start: 2024-08-19 | End: 2024-08-26 | Stop reason: HOSPADM

## 2024-08-19 RX ORDER — ACETAMINOPHEN 325 MG/1
650 TABLET ORAL EVERY 4 HOURS PRN
Status: DISCONTINUED | OUTPATIENT
Start: 2024-08-19 | End: 2024-08-26 | Stop reason: HOSPADM

## 2024-08-19 RX ORDER — FLUTICASONE FUROATE AND VILANTEROL 200; 25 UG/1; UG/1
1 POWDER RESPIRATORY (INHALATION) DAILY
Status: DISCONTINUED | OUTPATIENT
Start: 2024-08-19 | End: 2024-08-26 | Stop reason: HOSPADM

## 2024-08-19 RX ORDER — PREDNISONE 20 MG/1
40 TABLET ORAL DAILY
Status: DISCONTINUED | OUTPATIENT
Start: 2024-08-20 | End: 2024-08-19

## 2024-08-19 RX ORDER — ALBUTEROL SULFATE 0.83 MG/ML
5 SOLUTION RESPIRATORY (INHALATION) ONCE
Status: COMPLETED | OUTPATIENT
Start: 2024-08-19 | End: 2024-08-19

## 2024-08-19 RX ORDER — OXYCODONE HYDROCHLORIDE 5 MG/1
5-10 TABLET ORAL EVERY 6 HOURS PRN
Status: DISCONTINUED | OUTPATIENT
Start: 2024-08-19 | End: 2024-08-20

## 2024-08-19 RX ORDER — DULOXETIN HYDROCHLORIDE 60 MG/1
60 CAPSULE, DELAYED RELEASE ORAL EVERY MORNING
Status: DISCONTINUED | OUTPATIENT
Start: 2024-08-20 | End: 2024-08-26 | Stop reason: HOSPADM

## 2024-08-19 RX ORDER — ALBUTEROL SULFATE 90 UG/1
2 AEROSOL, METERED RESPIRATORY (INHALATION) EVERY 6 HOURS PRN
Status: DISCONTINUED | OUTPATIENT
Start: 2024-08-19 | End: 2024-08-26 | Stop reason: HOSPADM

## 2024-08-19 RX ORDER — IPRATROPIUM BROMIDE AND ALBUTEROL SULFATE 2.5; .5 MG/3ML; MG/3ML
3 SOLUTION RESPIRATORY (INHALATION)
Status: DISCONTINUED | OUTPATIENT
Start: 2024-08-20 | End: 2024-08-25

## 2024-08-19 RX ORDER — ATENOLOL 25 MG/1
25 TABLET ORAL DAILY
Status: DISCONTINUED | OUTPATIENT
Start: 2024-08-19 | End: 2024-08-25

## 2024-08-19 RX ORDER — GABAPENTIN 400 MG/1
1200 CAPSULE ORAL 2 TIMES DAILY
Status: DISCONTINUED | OUTPATIENT
Start: 2024-08-19 | End: 2024-08-19

## 2024-08-19 RX ORDER — IPRATROPIUM BROMIDE AND ALBUTEROL SULFATE 2.5; .5 MG/3ML; MG/3ML
SOLUTION RESPIRATORY (INHALATION)
Status: COMPLETED
Start: 2024-08-19 | End: 2024-08-19

## 2024-08-19 RX ORDER — FUROSEMIDE 20 MG
20 TABLET ORAL
Status: DISCONTINUED | OUTPATIENT
Start: 2024-08-22 | End: 2024-08-26 | Stop reason: HOSPADM

## 2024-08-19 RX ORDER — ACETAMINOPHEN 650 MG/1
650 SUPPOSITORY RECTAL EVERY 4 HOURS PRN
Status: DISCONTINUED | OUTPATIENT
Start: 2024-08-19 | End: 2024-08-26 | Stop reason: HOSPADM

## 2024-08-19 RX ADMIN — INSULIN ASPART 1 UNITS: 100 INJECTION, SOLUTION INTRAVENOUS; SUBCUTANEOUS at 23:16

## 2024-08-19 RX ADMIN — ATENOLOL 25 MG: 25 TABLET ORAL at 23:14

## 2024-08-19 RX ADMIN — IOPAMIDOL 90 ML: 755 INJECTION, SOLUTION INTRAVENOUS at 20:02

## 2024-08-19 RX ADMIN — METHYLPREDNISOLONE SODIUM SUCCINATE 125 MG: 125 INJECTION, POWDER, FOR SOLUTION INTRAMUSCULAR; INTRAVENOUS at 17:10

## 2024-08-19 RX ADMIN — AZITHROMYCIN DIHYDRATE 500 MG: 500 INJECTION, POWDER, LYOPHILIZED, FOR SOLUTION INTRAVENOUS at 20:13

## 2024-08-19 RX ADMIN — ATORVASTATIN CALCIUM 40 MG: 40 TABLET, FILM COATED ORAL at 23:14

## 2024-08-19 RX ADMIN — METHYLPREDNISOLONE SODIUM SUCCINATE 40 MG: 40 INJECTION, POWDER, FOR SOLUTION INTRAMUSCULAR; INTRAVENOUS at 23:01

## 2024-08-19 RX ADMIN — IPRATROPIUM BROMIDE AND ALBUTEROL SULFATE 6 ML: .5; 3 SOLUTION RESPIRATORY (INHALATION) at 17:11

## 2024-08-19 RX ADMIN — CEFTRIAXONE SODIUM 1 G: 1 INJECTION, POWDER, FOR SOLUTION INTRAMUSCULAR; INTRAVENOUS at 19:36

## 2024-08-19 RX ADMIN — DILTIAZEM HYDROCHLORIDE 120 MG: 60 CAPSULE, EXTENDED RELEASE ORAL at 23:15

## 2024-08-19 RX ADMIN — BUDESONIDE INHALATION 0.5 MG: 0.5 SUSPENSION RESPIRATORY (INHALATION) at 22:58

## 2024-08-19 RX ADMIN — ALBUTEROL SULFATE 5 MG: 2.5 SOLUTION RESPIRATORY (INHALATION) at 17:29

## 2024-08-19 RX ADMIN — MAGNESIUM SULFATE HEPTAHYDRATE 4 G: 80 INJECTION, SOLUTION INTRAVENOUS at 22:56

## 2024-08-19 RX ADMIN — MONTELUKAST 10 MG: 10 TABLET, FILM COATED ORAL at 23:14

## 2024-08-19 ASSESSMENT — COLUMBIA-SUICIDE SEVERITY RATING SCALE - C-SSRS
6. HAVE YOU EVER DONE ANYTHING, STARTED TO DO ANYTHING, OR PREPARED TO DO ANYTHING TO END YOUR LIFE?: NO
1. IN THE PAST MONTH, HAVE YOU WISHED YOU WERE DEAD OR WISHED YOU COULD GO TO SLEEP AND NOT WAKE UP?: NO
2. HAVE YOU ACTUALLY HAD ANY THOUGHTS OF KILLING YOURSELF IN THE PAST MONTH?: NO

## 2024-08-19 ASSESSMENT — ACTIVITIES OF DAILY LIVING (ADL)
ADLS_ACUITY_SCORE: 35

## 2024-08-19 NOTE — ED NOTES
Pt oxygen went down to 89% while resting on 2 liters. Pt states breathing feels better now. Noted to be wheezing and adjusted oxygen via nc back to 4 liters and now 94%.

## 2024-08-19 NOTE — ED PROVIDER NOTES
EMERGENCY DEPARTMENT ENCOUNTER      NAME: Iglesia Shore  AGE: 64 year old female  YOB: 1960  MRN: [id number]  EVALUATION DATE & TIME: 8/19/2024  4:40 PM    PCP: Jo Hamilton    ED PROVIDER: Wolfgang Tejada M.D.      Chief Complaint   Patient presents with    Shortness of Breath         FINAL IMPRESSION:  1. COPD with acute exacerbation (H)    2. Dyspnea, unspecified type          ED COURSE & MEDICAL DECISION MAKING:    Pertinent Labs & Imaging studies reviewed below.  All EKGs below represent my independent interpretation.   ED Course as of 08/19/24 2106   Mon Aug 19, 2024   1656 Patient is a 64-year-old female with history of COPD who presents with symptoms of increasing shortness of breath.  He is also had a sore throat over this period of time.  No relief with DuoNebs this morning.  On arrival she is hypertensive 178/83 with a normal temperature.  She is mildly tachycardic to 115, mildly tachypneic, and oxygen is 93%.  She is uncomfortable appearing, has bilateral expiratory wheezing, prolonged expiratory phase.  She was placed on 1 to 2 L of nasal cannula oxygen for comfort, will give another 2 DuoNebs here, IV Solu-Medrol.  Chest x-ray to screen for infectious etiology.  COVID testing, strep testing ordered.   1715 Shows sinus tachycardia with a rate of 105.  Incomplete right bundle branch block.  Left axis deviation, normal intervals.  When compared to prior EKG on May 29, 2020 2024, heart rate has increased otherwise no significant change.  Impression: Sinus tachycardia   1718 XR Chest Port 1 View  Based on my interpretation of the x-ray, infiltrate versus atelectasis of the right lower lobe.   1723 Ph Venous: 7.41   1723 PCO2 Venous: 48   1727 WBC: 8.5   1730 RR improving, I ordered 3rd neb, this time albuterol only   1814 Symptomatic Influenza A/B, RSV, & SARS-CoV2 PCR (COVID-19) Nasopharyngeal  Negative panel   Remained slight tachypnea, had persistent wheezing and oxygen requirement  to stay above 90%.  The radiologist read the x-ray is negative, I am concerned about a possible right lower lobe infiltrate, plan to give IV antibiotics as a precaution.  Will admit for continued management of COPD exacerbation.    I spoke to the hospitalist, he recommended a CT of the chest to rule out PE and further clarify infectious lung better.  I think this is reasonable, she is high risk for PE given her tachycardia and hypoxia and would not be a good candidate to screen a D-dimer.  The test was ordered.    Additional ED Course Timestamps:  4:47 PM I met with the patient, obtained history, performed an initial exam, and discussed options and plan for diagnostics and treatment here in the ED.     Medical Decision Making  Obtained supplemental history:Supplemental history obtained?: Documented in chart and Family Member/Significant Other  Reviewed external records: External records reviewed?: Documented in chart  Care impacted by chronic illness:Chronic Lung Disease, Diabetes, Hyperlipidemia, Hypertension, and Peripheral Vascular Disease  Care significantly affected by social determinants of health:N/A  Did you consider but not order tests?: Work up considered but not performed and documented in chart, if applicable  Did you interpret images independently?: Independent interpretation of ECG and images noted in documentation, when applicable.  Consultation discussion with other provider: Phone conversation with consultants will be documented in the ED Course  Admit.    PE:The patient is moderate or high risk for pulmonary embolism, thus CT PE study was ordered to evaluate for pulmonary emboli.     At the conclusion of the encounter I discussed the results of all of the tests and the disposition. The questions were answered. The patient or family acknowledged understanding and was agreeable with the care plan.     MEDICATIONS GIVEN IN THE EMERGENCY:  Medications   azithromycin (ZITHROMAX) 500 mg in sodium  chloride 0.9 % 250 mL intermittent infusion (500 mg Intravenous $New Bag 8/19/24 2013)   lidocaine 1 % 0.1-1 mL (has no administration in time range)   lidocaine (LMX4) cream (has no administration in time range)   sodium chloride (PF) 0.9% PF flush 3 mL (has no administration in time range)   sodium chloride (PF) 0.9% PF flush 3 mL (has no administration in time range)   senna-docusate (SENOKOT-S/PERICOLACE) 8.6-50 MG per tablet 1 tablet (has no administration in time range)     Or   senna-docusate (SENOKOT-S/PERICOLACE) 8.6-50 MG per tablet 2 tablet (has no administration in time range)   calcium carbonate (TUMS) chewable tablet 1,000 mg (has no administration in time range)   acetaminophen (TYLENOL) tablet 650 mg (has no administration in time range)     Or   acetaminophen (TYLENOL) Suppository 650 mg (has no administration in time range)   ondansetron (ZOFRAN ODT) ODT tab 4 mg (has no administration in time range)     Or   ondansetron (ZOFRAN) injection 4 mg (has no administration in time range)   prochlorperazine (COMPAZINE) injection 5 mg (has no administration in time range)     Or   prochlorperazine (COMPAZINE) tablet 5 mg (has no administration in time range)     Or   prochlorperazine (COMPAZINE) suppository 12.5 mg (has no administration in time range)   albuterol (PROVENTIL HFA/VENTOLIN HFA) inhaler (has no administration in time range)   albuterol (PROVENTIL) neb solution 2.5 mg (has no administration in time range)   aspirin EC tablet 81 mg (has no administration in time range)   atorvastatin (LIPITOR) tablet 40 mg (has no administration in time range)   atenolol (TENORMIN) tablet 25 mg (has no administration in time range)   fluticasone-vilanterol (BREO ELLIPTA) 200-25 MCG/ACT inhaler 1 puff (has no administration in time range)     And   umeclidinium (INCRUSE ELLIPTA) 62.5 MCG/ACT inhaler 1 puff (has no administration in time range)   diltiazem ER (CARDIZEM SR) 12 hr capsule 120 mg (has no  administration in time range)   DULoxetine (CYMBALTA) DR capsule 60 mg (has no administration in time range)   metFORMIN (GLUCOPHAGE XR) 24 hr tablet 500 mg ( Oral Automatically Held 8/22/24 1800)   oxyCODONE (ROXICODONE) tablet 5-10 mg (has no administration in time range)   glucose gel 15-30 g (has no administration in time range)     Or   dextrose 50 % injection 25-50 mL (has no administration in time range)     Or   glucagon injection 1 mg (has no administration in time range)   insulin aspart (NovoLOG) injection (RAPID ACTING) (has no administration in time range)   insulin aspart (NovoLOG) injection (RAPID ACTING) (has no administration in time range)   predniSONE (DELTASONE) tablet 40 mg (has no administration in time range)   ipratropium - albuterol 0.5 mg/2.5 mg/3 mL (DUONEB) neb solution 3 mL (has no administration in time range)   nicotine (NICODERM CQ) 21 MG/24HR 24 hr patch 1 patch (has no administration in time range)   azithromycin (ZITHROMAX) 500 mg in sodium chloride 0.9 % 250 mL intermittent infusion (has no administration in time range)   cefTRIAXone (ROCEPHIN) 1 g vial to attach to  mL bag for ADULTS or NS 50 mL bag for PEDS (has no administration in time range)   gabapentin (NEURONTIN) capsule 1,200 mg (has no administration in time range)   ipratropium - albuterol 0.5 mg/2.5 mg/3 mL (DUONEB) 0.5-2.5 (3) MG/3ML neb solution (6 mLs  $Given 8/19/24 1711)   methylPREDNISolone Na Suc (solu-MEDROL) injection 125 mg (125 mg Intravenous $Given 8/19/24 1710)   albuterol (PROVENTIL) neb solution 5 mg (5 mg Nebulization $Given 8/19/24 1729)   cefTRIAXone (ROCEPHIN) 1 g vial to attach to  mL bag for ADULTS or NS 50 mL bag for PEDS (0 g Intravenous Stopped 8/19/24 2016)   iopamidol (ISOVUE-370) solution 90 mL (90 mLs Intravenous $Given 8/19/24 2002)         NEW PRESCRIPTIONS STARTED AT TODAY'S ER VISIT  Current Discharge Medication List              =================================================================    HPI    Iglesia Shore is a 64 year old female who presents to this ED for evaluation of shortness of breath.    Per patient, starting on Saturday (08/17/2024) she experienced a sudden shortness of breath which has gone into yesterday (08/18/2024) and today (08/19/2024). Along with her shortness of breath her throat has been sore. Due to her shortness of breath she laid around all day yesterday/today. Overnight she awoke gasping and tried using her nebulizer which provided minimal relief. Her present family member noted that he thought she sounded that she was wheezing on the way here. She has had no fever but did note that she has been getting hot/cold. She also mentioned that the skin on her legs hurts and is dry.    She is a diabetic. In the past she has had a femoral bypass. At home she doesn't use O2 but she does have a nebulizer and albuterol inhaler.  She is not vaccinated for COVID. Normally she calls her primary doctor when she needs more prednisone. She tried calling today two times and upon the second call it was suggested that she go to an emergency department because she sounded much worse when compared to her original call. She has not had her COVID vaccines. She does take Asprin and also recently started metformin.    Chart review:  Per Chart Review, the patient was seen from 05/29/2024-05/31/2024 at [facility] for evaluation of acute hypoxemic respiratory failure due to suspected atypical pneumonia and COPD exacerbation . A pulmonary CT angiogram showed new faint groundglass and tree-in-bud opacities of right middle lobe which was consistent with atypical pneumonia. There was not evidence of a pulmonary embolism. She was placed on doxycycline, steroid therapy and bronchodilators on admission. The pulmonologist reccommended that she be put on seven days of prednisone and to continue her antibiotics. The wanted her to  follow-up at the pulmonology clinic for PFTs. Her acute hypoxemic respiratory failure had resolved and on 05/30/2024 her echocardiogram was unremarkable. Metformin was initiated in the hospital for her type 2 diabetes. Her symptoms improved and she was clinically stable upon discharge.    VITALS:  /65 (BP Location: Left arm, Patient Position: Semi-[place] La Rosa's)   Pulse 99   Temp 97.8  F (36.6  C) (Temporal)   Resp 22   Wt 75.6 kg (166 lb 9.6 oz)   SpO2 95%   BMI 27.72 kg/m      PHYSICAL EXAM    Constitutional: Well developed, well nourished. Uncomfortable appearing.  HENT: Atraumatic, mucous membranes moist, nose normal. Neck- Supple, gross ROM intact.   Eyes: Pupils mid-range, conjunctiva without injection, no discharge.   Respiratory: Clear to auscultation bilaterally, no respiratory distress, speaks full sentences easily. No cough. Expiatory wheezing bilaterally, decreased inspiratory volumes.  Cardiovascular: Tachycardic heart rate, regular rhythm, no murmurs.   GI: Soft, no tenderness to deep palpation in all quadrants, no masses.  Musculoskeletal: Moving all 4 extremities intentionally and without pain. No obvious deformity.  Skin: Warm, dry, no rash.  Neurologic: Alert & oriented x 3, cranial nerves grossly intact.  Psychiatric: Affect normal, cooperative.      I, Yasmani Cortez am serving as a scribe to document services personally performed by Dr. Wolfgang Tejada based on my observation and the provider's statements to me. IWolfgang MD attest that Yasmani Cortez is acting in a scribe capacity, has observed my performance of the services and has documented them in accordance with my direction.    Wolfgang Tejada M.D.  Emergency Medicine  [facility] EMERGENCY DEPARTMENT  [address] [place] [id number] [phone]  Dept: [phone]      Wolfgang Tejada MD  08/19/24 8097

## 2024-08-19 NOTE — ED TRIAGE NOTES
W/c to triage.  Pt sttes has not been breathing well for 3 days.  Tried nebs and rescue inhaler without relief.  Productive cough and tightness on chest/sore throat.  Pt with short sentences.  Extremely dysneic on exertion.       Triage Assessment (Adult)       Row Name 08/19/24 1638          Triage Assessment    Airway WDL WDL        Respiratory WDL    Respiratory WDL X;all;cough     Rhythm/Pattern, Respiratory dyspnea upon exertion;tachypneic;shortness of breath;labored        Skin Circulation/Temperature WDL    Skin Circulation/Temperature WDL WDL        Cardiac WDL    Cardiac WDL X;all     Pulse Rate & Regularity tachycardic        Peripheral/Neurovascular WDL    Peripheral Neurovascular WDL WDL        Cognitive/Neuro/Behavioral WDL    Cognitive/Neuro/Behavioral WDL WDL

## 2024-08-20 ENCOUNTER — APPOINTMENT (OUTPATIENT)
Dept: OCCUPATIONAL THERAPY | Facility: HOSPITAL | Age: 64
DRG: 190 | End: 2024-08-20
Attending: PHYSICIAN ASSISTANT
Payer: MEDICARE

## 2024-08-20 ENCOUNTER — APPOINTMENT (OUTPATIENT)
Dept: PHYSICAL THERAPY | Facility: HOSPITAL | Age: 64
DRG: 190 | End: 2024-08-20
Attending: PHYSICIAN ASSISTANT
Payer: MEDICARE

## 2024-08-20 LAB
ALBUMIN UR-MCNC: 300 MG/DL
ANION GAP SERPL CALCULATED.3IONS-SCNC: 13 MMOL/L (ref 7–15)
APPEARANCE UR: CLEAR
BACTERIA #/AREA URNS HPF: ABNORMAL /HPF
BILIRUB UR QL STRIP: NEGATIVE
BUN SERPL-MCNC: 9.6 MG/DL (ref 8–23)
CALCIUM SERPL-MCNC: 9 MG/DL (ref 8.8–10.4)
CHLORIDE SERPL-SCNC: 103 MMOL/L (ref 98–107)
COLOR UR AUTO: ABNORMAL
CREAT SERPL-MCNC: 0.54 MG/DL (ref 0.51–0.95)
CRP SERPL-MCNC: 28.7 MG/L
EGFRCR SERPLBLD CKD-EPI 2021: >90 ML/MIN/1.73M2
ERYTHROCYTE [DISTWIDTH] IN BLOOD BY AUTOMATED COUNT: 13.2 % (ref 10–15)
GLUCOSE BLDC GLUCOMTR-MCNC: 155 MG/DL (ref 70–99)
GLUCOSE BLDC GLUCOMTR-MCNC: 176 MG/DL (ref 70–99)
GLUCOSE BLDC GLUCOMTR-MCNC: 179 MG/DL (ref 70–99)
GLUCOSE BLDC GLUCOMTR-MCNC: 202 MG/DL (ref 70–99)
GLUCOSE BLDC GLUCOMTR-MCNC: 220 MG/DL (ref 70–99)
GLUCOSE SERPL-MCNC: 172 MG/DL (ref 70–99)
GLUCOSE UR STRIP-MCNC: NEGATIVE MG/DL
HCO3 SERPL-SCNC: 24 MMOL/L (ref 22–29)
HCT VFR BLD AUTO: 40.1 % (ref 35–47)
HGB BLD-MCNC: 13.2 G/DL (ref 11.7–15.7)
HGB UR QL STRIP: ABNORMAL
KETONES UR STRIP-MCNC: NEGATIVE MG/DL
L PNEUMO1 AG UR QL IA: NEGATIVE
LEUKOCYTE ESTERASE UR QL STRIP: NEGATIVE
MAGNESIUM SERPL-MCNC: 2.2 MG/DL (ref 1.7–2.3)
MCH RBC QN AUTO: 29 PG (ref 26.5–33)
MCHC RBC AUTO-ENTMCNC: 32.9 G/DL (ref 31.5–36.5)
MCV RBC AUTO: 88 FL (ref 78–100)
NITRATE UR QL: NEGATIVE
PH UR STRIP: 6 [PH] (ref 5–7)
PLATELET # BLD AUTO: 236 10E3/UL (ref 150–450)
POTASSIUM SERPL-SCNC: 3.9 MMOL/L (ref 3.4–5.3)
RBC # BLD AUTO: 4.55 10E6/UL (ref 3.8–5.2)
RBC URINE: <1 /HPF
S PNEUM AG SPEC QL: NEGATIVE
SODIUM SERPL-SCNC: 140 MMOL/L (ref 135–145)
SP GR UR STRIP: 1.02 (ref 1–1.03)
SQUAMOUS EPITHELIAL: 1 /HPF
UROBILINOGEN UR STRIP-MCNC: <2 MG/DL
WBC # BLD AUTO: 7 10E3/UL (ref 4–11)
WBC URINE: 6 /HPF

## 2024-08-20 PROCEDURE — 250N000013 HC RX MED GY IP 250 OP 250 PS 637: Performed by: PHYSICIAN ASSISTANT

## 2024-08-20 PROCEDURE — 87899 AGENT NOS ASSAY W/OPTIC: CPT | Performed by: STUDENT IN AN ORGANIZED HEALTH CARE EDUCATION/TRAINING PROGRAM

## 2024-08-20 PROCEDURE — 99233 SBSQ HOSP IP/OBS HIGH 50: CPT | Performed by: INTERNAL MEDICINE

## 2024-08-20 PROCEDURE — 86140 C-REACTIVE PROTEIN: CPT | Performed by: STUDENT IN AN ORGANIZED HEALTH CARE EDUCATION/TRAINING PROGRAM

## 2024-08-20 PROCEDURE — 120N000001 HC R&B MED SURG/OB

## 2024-08-20 PROCEDURE — 83735 ASSAY OF MAGNESIUM: CPT | Performed by: INTERNAL MEDICINE

## 2024-08-20 PROCEDURE — 94640 AIRWAY INHALATION TREATMENT: CPT

## 2024-08-20 PROCEDURE — 97116 GAIT TRAINING THERAPY: CPT | Mod: GP | Performed by: PHYSICAL THERAPIST

## 2024-08-20 PROCEDURE — 250N000011 HC RX IP 250 OP 636: Performed by: PHYSICIAN ASSISTANT

## 2024-08-20 PROCEDURE — 85027 COMPLETE CBC AUTOMATED: CPT | Performed by: PHYSICIAN ASSISTANT

## 2024-08-20 PROCEDURE — 36415 COLL VENOUS BLD VENIPUNCTURE: CPT | Performed by: PHYSICIAN ASSISTANT

## 2024-08-20 PROCEDURE — 250N000011 HC RX IP 250 OP 636: Performed by: STUDENT IN AN ORGANIZED HEALTH CARE EDUCATION/TRAINING PROGRAM

## 2024-08-20 PROCEDURE — 80048 BASIC METABOLIC PNL TOTAL CA: CPT | Performed by: PHYSICIAN ASSISTANT

## 2024-08-20 PROCEDURE — 250N000009 HC RX 250: Performed by: STUDENT IN AN ORGANIZED HEALTH CARE EDUCATION/TRAINING PROGRAM

## 2024-08-20 PROCEDURE — 258N000003 HC RX IP 258 OP 636: Performed by: PHYSICIAN ASSISTANT

## 2024-08-20 PROCEDURE — 272N000202 HC AEROBIKA WITH MANOMETER

## 2024-08-20 PROCEDURE — 94640 AIRWAY INHALATION TREATMENT: CPT | Mod: 76

## 2024-08-20 PROCEDURE — 94799 UNLISTED PULMONARY SVC/PX: CPT

## 2024-08-20 PROCEDURE — 97165 OT EVAL LOW COMPLEX 30 MIN: CPT | Mod: GO

## 2024-08-20 PROCEDURE — 97530 THERAPEUTIC ACTIVITIES: CPT | Mod: GP | Performed by: PHYSICAL THERAPIST

## 2024-08-20 PROCEDURE — 97535 SELF CARE MNGMENT TRAINING: CPT | Mod: GO

## 2024-08-20 PROCEDURE — 999N000157 HC STATISTIC RCP TIME EA 10 MIN

## 2024-08-20 PROCEDURE — 97161 PT EVAL LOW COMPLEX 20 MIN: CPT | Mod: GP | Performed by: PHYSICAL THERAPIST

## 2024-08-20 PROCEDURE — 81003 URINALYSIS AUTO W/O SCOPE: CPT | Performed by: STUDENT IN AN ORGANIZED HEALTH CARE EDUCATION/TRAINING PROGRAM

## 2024-08-20 PROCEDURE — 250N000013 HC RX MED GY IP 250 OP 250 PS 637: Performed by: STUDENT IN AN ORGANIZED HEALTH CARE EDUCATION/TRAINING PROGRAM

## 2024-08-20 RX ORDER — NALOXONE HYDROCHLORIDE 0.4 MG/ML
0.2 INJECTION, SOLUTION INTRAMUSCULAR; INTRAVENOUS; SUBCUTANEOUS
Status: DISCONTINUED | OUTPATIENT
Start: 2024-08-20 | End: 2024-08-26 | Stop reason: HOSPADM

## 2024-08-20 RX ORDER — PANTOPRAZOLE SODIUM 40 MG/1
40 TABLET, DELAYED RELEASE ORAL
Status: DISCONTINUED | OUTPATIENT
Start: 2024-08-21 | End: 2024-08-26 | Stop reason: HOSPADM

## 2024-08-20 RX ORDER — NALOXONE HYDROCHLORIDE 0.4 MG/ML
0.4 INJECTION, SOLUTION INTRAMUSCULAR; INTRAVENOUS; SUBCUTANEOUS
Status: DISCONTINUED | OUTPATIENT
Start: 2024-08-20 | End: 2024-08-26 | Stop reason: HOSPADM

## 2024-08-20 RX ORDER — OXYCODONE HYDROCHLORIDE 5 MG/1
5-10 TABLET ORAL EVERY 6 HOURS PRN
Status: DISCONTINUED | OUTPATIENT
Start: 2024-08-20 | End: 2024-08-26 | Stop reason: HOSPADM

## 2024-08-20 RX ADMIN — ASPIRIN 81 MG: 81 TABLET, COATED ORAL at 09:06

## 2024-08-20 RX ADMIN — METHYLPREDNISOLONE SODIUM SUCCINATE 40 MG: 40 INJECTION, POWDER, FOR SOLUTION INTRAMUSCULAR; INTRAVENOUS at 07:16

## 2024-08-20 RX ADMIN — IPRATROPIUM BROMIDE AND ALBUTEROL SULFATE 3 ML: 2.5; .5 SOLUTION RESPIRATORY (INHALATION) at 00:48

## 2024-08-20 RX ADMIN — ACETAMINOPHEN 650 MG: 325 TABLET ORAL at 21:35

## 2024-08-20 RX ADMIN — BUDESONIDE INHALATION 0.5 MG: 0.5 SUSPENSION RESPIRATORY (INHALATION) at 19:35

## 2024-08-20 RX ADMIN — GUAIFENESIN AND DEXTROMETHORPHAN 10 ML: 100; 10 SYRUP ORAL at 02:47

## 2024-08-20 RX ADMIN — IPRATROPIUM BROMIDE AND ALBUTEROL SULFATE 3 ML: 2.5; .5 SOLUTION RESPIRATORY (INHALATION) at 11:45

## 2024-08-20 RX ADMIN — NICOTINE 1 PATCH: 21 PATCH, EXTENDED RELEASE TRANSDERMAL at 09:12

## 2024-08-20 RX ADMIN — METHYLPREDNISOLONE SODIUM SUCCINATE 40 MG: 40 INJECTION, POWDER, FOR SOLUTION INTRAMUSCULAR; INTRAVENOUS at 12:06

## 2024-08-20 RX ADMIN — METHYLPREDNISOLONE SODIUM SUCCINATE 40 MG: 40 INJECTION, POWDER, FOR SOLUTION INTRAMUSCULAR; INTRAVENOUS at 18:22

## 2024-08-20 RX ADMIN — DILTIAZEM HYDROCHLORIDE 120 MG: 60 CAPSULE, EXTENDED RELEASE ORAL at 20:45

## 2024-08-20 RX ADMIN — IPRATROPIUM BROMIDE AND ALBUTEROL SULFATE 3 ML: 2.5; .5 SOLUTION RESPIRATORY (INHALATION) at 16:12

## 2024-08-20 RX ADMIN — AZITHROMYCIN DIHYDRATE 500 MG: 500 INJECTION, POWDER, LYOPHILIZED, FOR SOLUTION INTRAVENOUS at 21:29

## 2024-08-20 RX ADMIN — BUDESONIDE INHALATION 0.5 MG: 0.5 SUSPENSION RESPIRATORY (INHALATION) at 07:55

## 2024-08-20 RX ADMIN — ATENOLOL 25 MG: 25 TABLET ORAL at 09:05

## 2024-08-20 RX ADMIN — CEFTRIAXONE SODIUM 1 G: 1 INJECTION, POWDER, FOR SOLUTION INTRAMUSCULAR; INTRAVENOUS at 20:47

## 2024-08-20 RX ADMIN — PANTOPRAZOLE SODIUM 40 MG: 40 INJECTION, POWDER, FOR SOLUTION INTRAVENOUS at 09:06

## 2024-08-20 RX ADMIN — DILTIAZEM HYDROCHLORIDE 120 MG: 60 CAPSULE, EXTENDED RELEASE ORAL at 09:05

## 2024-08-20 RX ADMIN — DULOXETINE HYDROCHLORIDE 60 MG: 60 CAPSULE, DELAYED RELEASE PELLETS ORAL at 09:05

## 2024-08-20 RX ADMIN — IPRATROPIUM BROMIDE AND ALBUTEROL SULFATE 3 ML: 2.5; .5 SOLUTION RESPIRATORY (INHALATION) at 19:34

## 2024-08-20 RX ADMIN — ATORVASTATIN CALCIUM 40 MG: 40 TABLET, FILM COATED ORAL at 20:46

## 2024-08-20 RX ADMIN — IPRATROPIUM BROMIDE AND ALBUTEROL SULFATE 3 ML: 2.5; .5 SOLUTION RESPIRATORY (INHALATION) at 05:44

## 2024-08-20 RX ADMIN — IPRATROPIUM BROMIDE AND ALBUTEROL SULFATE 3 ML: 2.5; .5 SOLUTION RESPIRATORY (INHALATION) at 07:56

## 2024-08-20 RX ADMIN — MONTELUKAST 10 MG: 10 TABLET, FILM COATED ORAL at 20:45

## 2024-08-20 ASSESSMENT — ACTIVITIES OF DAILY LIVING (ADL)
ADLS_ACUITY_SCORE: 35
ADLS_ACUITY_SCORE: 35
ADLS_ACUITY_SCORE: 21
DIFFICULTY_EATING/SWALLOWING: NO
ADLS_ACUITY_SCORE: 35
HEARING_DIFFICULTY_OR_DEAF: NO
ADLS_ACUITY_SCORE: 35
ADLS_ACUITY_SCORE: 35
TOILETING_ISSUES: NO
ADLS_ACUITY_SCORE: 35
ADLS_ACUITY_SCORE: 21
ADLS_ACUITY_SCORE: 21
WEAR_GLASSES_OR_BLIND: NO
DIFFICULTY_COMMUNICATING: NO
ADLS_ACUITY_SCORE: 21
DOING_ERRANDS_INDEPENDENTLY_DIFFICULTY: YES
FALL_HISTORY_WITHIN_LAST_SIX_MONTHS: NO
ADLS_ACUITY_SCORE: 21
WALKING_OR_CLIMBING_STAIRS_DIFFICULTY: NO
ADLS_ACUITY_SCORE: 35
ADLS_ACUITY_SCORE: 21
DRESSING/BATHING_DIFFICULTY: NO
CONCENTRATING,_REMEMBERING_OR_MAKING_DECISIONS_DIFFICULTY: YES
ADLS_ACUITY_SCORE: 35
ADLS_ACUITY_SCORE: 35
ADLS_ACUITY_SCORE: 21
ADLS_ACUITY_SCORE: 35
PREVIOUS_RESPONSIBILITIES: MEAL PREP;HOUSEKEEPING;LAUNDRY
CHANGE_IN_FUNCTIONAL_STATUS_SINCE_ONSET_OF_CURRENT_ILLNESS/INJURY: NO
ADLS_ACUITY_SCORE: 35

## 2024-08-20 NOTE — PROGRESS NOTES
[facility]    Medicine Progress Note - Hospitalist Service    Date of Admission:  8/19/2024    Assessment & Plan   64 year old female with history of COPD, Lung cancer s/p radiation, PVD, hx of diverticular disease and sigmoid stricture s/p primary anastomosis w/ diverting ileostomy s/p take down (10/2023), DMII, HLD, ANDREW on CPAP, HTN, chronic pain and tobacco abuse admitted on 8/19/2024 with COPD exacerbation and possible CAP.       Acute hypoxemic respiratory failure secondary to COPD exacerbation and possible acuity acquired pneumonia.  COVID/FLU/RSV negative  Group A strep negative  CT chest negative for PE, no infiltrates, shows emphysema with airway thickening, retained secretions.   -- Continue steroids, scheduled nebulizers, Singulair started on admission  -- unclear if patient has pneumonia with no evidence of infiltrate, normal procal and wbc but with respiratory and productive cough with green sputum will continue current ceftriaxone and azithromycin  -- follow up sputum culture, respiratory viral panel and urine legionella Ag  -- wean oxygen as tolerated  -- hold home breo ellipta for now in place of duonebs      Dysuria:  -- check UA      H/O HTN: continhe home diltiazem, atenolol and lasix      DM2:  -- hold home metformin while inpatient  -- monitor for steroid induced hyperglycemia  -- continue current mealtime insulin and sliding scale insulin and escalate insulin needs pending clinical course  -- continue home gabapentin        Hypomagnesemia: replaced      PVD: continue home statin and ASA      H/O julian cancer: Presumed NSCLC the RLL, [id number], stage IA2 . S/p radiation as deemed non-operable.   -- Follow up with Oncology as previously planned      ANDREW: continue CPAP per home settings      Tobacco dependence:  replacement Rx is ordered.        Diet: Advance Diet as Tolerated: Clear Liquid Diet    DVT Prophylaxis: Enoxaparin (Lovenox) SQ  Cunningham Catheter: Not present  Lines:  None     Cardiac Monitoring: None  Code Status: No CPR- Do NOT Intubate    Clinically Significant Risk Factors Present on Admission            # Hypomagnesemia: Lowest Mg = 1.6 mg/dL in last 2 days, will replace as needed     # Drug Induced Platelet Defect: home medication list includes an antiplatelet medication   # Hypertension: Noted on problem list                          Disposition Plan   Medically Ready for Discharge: Anticipated in 2-4 Days      Korey Mahmood DO  Hospitalist Service  [facility]  Securely message with [facility] (more info)  Text page via [employer] Paging/Directory   ______________________________________________________________________    Interval History   NAD. Breathing improved but not baseline    Physical Exam   Vital Signs: Temp: 97.6  F (36.4  C) Temp src: Oral BP: 129/69 Pulse: 60   Resp: 20 SpO2: 99 % O2 Device: Nasal cannula Oxygen Delivery: 3 LPM  Weight: 166 lbs 9.6 oz  General: NAD  RESPIRATORY: Breathing nonlabored  CARDIOVASCULAR: No le edema bilat.   NEUROLOGIC: Motor and sensory intact, speech clear        >50 MINUTES SPENT BY ME on the date of service doing chart review, history, exam, documentation & further activities per the note.  Data

## 2024-08-20 NOTE — PROGRESS NOTES
"   08/20/24 0975   Appointment Info   Signing Clinician's Name / Credentials (PT) Ayaka Newton, PT, DPT   Living Environment   People in Home friend(s);child(akila), adult  (son and son's friend is staying with patient)   Current Living Arrangements house   Home Accessibility stairs to enter home;stairs within home   Number of Stairs, Main Entrance 7   Stair Railings, Main Entrance railings safe and in good condition   Number of Stairs, Within Home, Primary greater than 10 stairs   Stair Railings, Within Home, Primary railings safe and in good condition   Self-Care   Equipment Currently Used at Home   (patient has a cane and a walker available if needed)   Fall history within last six months no   Activity/Exercise/Self-Care Comment Patient independent with ADLs at baseline. Patient's son and son's friend assist with IADLs.   General Information   Onset of Illness/Injury or Date of Surgery 08/19/24   Referring Physician Naheed Gallegos PA-C   Patient/Family Therapy Goals Statement (PT) None stated.   Pertinent History of Current Problem (include personal factors and/or comorbidities that impact the POC) Per H&P: \"64 year old female admitted on 8/19/2024. She Has PMH of COPD, right  Lung cancer s/p radiation, PVD, hx of diverticular disease and sigmoid stricture s/p primary anastomosis w/ diverting ileostomy s/p take down (10/2023), DMII, HLD, ANDREW on CPAP, HTN, and tobacco abuse, chronic pain with recent admission 5/29-5/31/24 for COPD exacerbation; atypical PNA who presents to the ED for evaluation of shortness of breath and cough. Patient admitted to Medicine for further evaluation and care of suspected COPD exacerbation and CAP. \"   Existing Precautions/Restrictions fall   Range of Motion (ROM)   Range of Motion ROM is WFL   Strength (Manual Muscle Testing)   Strength (Manual Muscle Testing) strength is WFL   Bed Mobility   Comment, (Bed Mobility) Patient seated in chair upon therapist entry. Appears to be " moving well with no assist.   Transfers   Transfers sit-stand transfer   Sit-Stand Transfer   Sit-Stand Delta (Transfers) supervision   Gait/Stairs (Locomotion)   Delta Level (Gait) supervision   Distance in Feet (Gait) 12'   Pattern (Gait) step-through   Deviations/Abnormal Patterns (Gait) jorge decreased;gait speed decreased   Clinical Impression   Criteria for Skilled Therapeutic Intervention Yes, treatment indicated   PT Diagnosis (PT) impaired functional mobility   Influenced by the following impairments decreased strength, endurance, impaired balance   Functional limitations due to impairments transfers, ambulation, stairs   Clinical Presentation (PT Evaluation Complexity) stable   Clinical Presentation Rationale Clinical judgment.   Clinical Decision Making (Complexity) low complexity   Planned Therapy Interventions (PT) bed mobility training;balance training;gait training;home exercise program;neuromuscular re-education;patient/family education;strengthening;stair training;transfer training   Risk & Benefits of therapy have been explained evaluation/treatment results reviewed;participants voiced agreement with care plan;participants included;patient   PT Total Evaluation Time   PT Eval, Low Complexity Minutes (59262) 10   Physical Therapy Goals   PT Frequency Daily   PT Predicted Duration/Target Date for Goal Attainment 08/27/24   PT Goals Bed Mobility;Transfers;Gait;Stairs   PT: Bed Mobility Independent;Supine to/from sit   PT: Transfers Supervision/stand-by assist;Sit to/from stand   PT: Gait Supervision/stand-by assist;Greater than 200 feet   PT: Stairs Supervision/stand-by assist;10 stairs   Interventions   Interventions Quick Adds Gait Training;Therapeutic Activity   Therapeutic Activity   Therapeutic Activities: dynamic activities to improve functional performance Minutes (13930) 10   Symptoms Noted During/After Treatment Fatigue   Treatment Detail/Skilled Intervention Patient transfers  sit <> stand from toilet, SBA. Able to complete cares and clothing management, SBA. Ambulates 12' to chair without device, SBA. Pt seated in chair at end of session, call light in reach, chair alarm engaged.   Gait Training   Gait Training Minutes (92954) 15   Symptoms Noted During/After Treatment (Gait Training) fatigue;shortness of breath   Treatment Detail/Skilled Intervention Without device. Pt takes seated rest break at correction point. O2 sats > 90% when spot checked throughout walk on 3L O2.   Distance in Feet 500'   [place] Level (Gait Training) stand-by assist   Physical Assistance Level (Gait Training) supervision;verbal cues   Gait Analysis Deviations decreased jorge;decreased step length   Impairments (Gait Analysis/Training) balance impaired;strength decreased   PT Discharge Planning   PT Plan gait without device, stairs   PT Discharge Recommendation (DC Rec) home with assist   PT Rationale for DC Rec Patient able to complete all transfers and ambulation with stand-by assist. Patient has assist available from son and son's friend. Anticipate pt will be safe to discharge home when medically ready.   PT Brief overview of current status Sit <> stand, SBA. Ambulates 500' without device, SBA.   Total Session Time   Timed Code Treatment Minutes 25   Total Session Time (sum of timed and untimed services) 35

## 2024-08-20 NOTE — H&P
[facility]    History and Physical - Hospitalist Service       Date of Admission:  2024    Assessment & Plan      Iglesia Shore is a 64 year old female admitted on 2024. She Has PMH of COPD, right  Lung cancer s/p radiation, PVD, hx of diverticular disease and sigmoid stricture s/p primary anastomosis w/ diverting ileostomy s/p take down (10/2023), DMII, HLD, ANDREW on CPAP, HTN, and tobacco abuse, chronic pain with recent admission -24 for COPD exacerbation; atypical PNA who presents to the ED for evaluation of shortness of breath and cough. Patient admitted to Medicine for further evaluation and care of suspected COPD exacerbation and CAP.     ## Acute hypoxic respiratory failure  ## COPD w/ suspected exacerbation  ## CAP  ## Current tobacco abuse (1ppd): Presents to the ED for evaluation of ~ 3 days hx of progressive dyspnea, and shortness of breath that is reported refractory to nebulizer PTA. ED eval: O2 sats 89% on RA, improved to 90s with 2L O2 via NC; CXR w/o acute pathology, BP elevated, mild tachycardia- , Afebrile, Negative COVID, Influenza, RSV, CT PE on admission: NO acute PE or acute findings to explain sx; emphysema and airway thickening; retained secretions, VB.41/48/42/30, Received Nebs, steroids and antibiotics in the ED with mild improvement in sx.   - Solumedrol 40 mg q6h for mor  - Add Singulair, Pulmicort nebs, flutter valve, IS,   - Consider Pulmonology consult if not improving  - Continue PTA Albuterol  - Continue PTA Breo ellipta  - Duoneb q4h   - RCAT  - O2 to maintain sats 90-92%  - Add CRP and Procal, NTBNP, troponin  - Sputum culture  - RVP PCR  - Droplet precautions     ## Hypomagnesemia: Mg 1.6 on admission.  - Monitor and replace per protocol    ## HTN: BP stable on admission. PTA Cardizem,atenolol  - Continue PTA medications      ## DMII  ## Peripheral neuropathy: A1c 6.2 (2024). PTA metformin and gabapentin  - Continue  gabapentin  - MSSI ITD w/ meals and qhs  - MOD CHO diet  - BG checks TID with meals and at bedtime  - Hypoglycemia protocol    ## HLD: PTA statin  - Continue PTA medication     ## ANDREW: PTA CPAP  - Continue per stable home settings    ## Hx of lung cancer: Presumed NSCLC the RLL, [id number], stage IA2 . S/p radiation as deemed non-operable.   - Follow up with Oncology as scheduled     ## Current smoker: 1ppd  - recommend cessation  - Nicotine patch ordered              Diet: Regular Diet Adult    DVT Prophylaxis: Pneumatic Compression Devices  Cunningham Catheter: Not present  Lines: None     Cardiac Monitoring: None  Code Status:  Full Code     Clinically Significant Risk Factors Present on Admission                # Drug Induced Platelet Defect: home medication list includes an antiplatelet medication   # Hypertension: Noted on problem list                          Disposition Plan     Medically Ready for Discharge: Anticipated in 2-4 Days         The patient's care was discussed with the Attending Physician, Dr. Gondal .    Naheed Gallegos PA-C  Hospitalist Service  [facility]  Securely message with [employer] (more info)  Text page via [employer] Paging/Directory     ______________________________________________________________________    Chief Complaint   Dyspnea and shortness of breath    History is obtained from the patient and EMR     History of Present Illness   Iglesia Shore is a 64 year old female w/ PMH as outlined above who present to the ED for evaluation of ~ 3 day hx of acute onset, progressive sob, dunaway, productive cough of white sputum and wheezing. States sx similar to previous COPD exacerbations. Patient attempted to alleviate sx with nebulizer, though unsuccessful. No fever, though with chills. No CP, HA, abdominal pain, dysuria, recent travel. Does report possible sick contact with roommate. We dicussed POC as outlined above and patient agreeable.       Past Medical History     Past Medical History:   Diagnosis Date    Back pain     low back    COPD (chronic obstructive pulmonary disease) (H)     Depression     Diabetes mellitus (H)     Hyperlipidemia     Hypertension     PAD (peripheral artery disease) (H24)     Peripheral neuropathy     Pulmonary nodules        Past Surgical History   Past Surgical History:   Procedure Laterality Date    AORTA - FEMORAL ARTERY BYPASS GRAFT      BRONCHOSCOPY RIGID OR FLEXIBLE W/TRANSENDOSCOPIC ENDOBRONCHIAL ULTRASOUND GUIDED N/A 2022    Procedure: endbronchial ultrasound, transbronchial biopsy;  Surgeon: Rosendo Dave MD;  Location: [facility] OR    HYSTEROSCOPY W/ ENDOMETRIAL ABLATION      ILEOSTOMY  2023    ILEOSTOMY REVISION  10/19/2023    IR EXTREMITY ANGIOGRAM LEFT  2020    IR EXTREMITY ANGIOGRAM LEFT  2020    IR LOWER EXTREMITY ANGIOGRAM LEFT  2020    IR LOWER EXTREMITY ANGIOGRAM LEFT  2020    OPTICAL TRACKING SYSTEM BRONCHOSCOPY N/A 2022    Procedure: BRONCHOSCOPY, USING OPTICAL TRACKING SYSTEM, .  Ion or veran system, fiducial placement;  Surgeon: Rosendo Dave MD;  Location: [facility] OR    OTHER SURGICAL HISTORY      tubal ligation    OTHER SURGICAL HISTORY      spine fusion    PAIN STELLATE GANGLION BLOCK RIGHT Right 1992    x5, [facility]       Prior to Admission Medications   Prior to Admission Medications   Prescriptions Last Dose Informant Patient Reported? Taking?   DULoxetine (CYMBALTA) 60 MG capsule  Self Yes No   Sig: Take 60 mg by mouth daily   albuterol (PROAIR HFA/PROVENTIL HFA/VENTOLIN HFA) 108 (90 Base) MCG/ACT inhaler  Self Yes No   Sig: Inhale 2 puffs into the lungs every 6 hours as needed for shortness of breath, wheezing or cough   albuterol (PROVENTIL) (5 MG/ML) 0.5% neb solution  Self Yes No   Si.5 mL by nebulizer as needed   aspirin (ASA) 81 MG EC tablet  Self Yes No   Si tab(s) Orally once a day   atenolol (TENORMIN) 25 MG tablet   No No   Sig: Take 1 tablet (25 mg) by  mouth daily   atorvastatin (LIPITOR) 40 MG tablet  Self Yes No   Sig: Take 40 mg by mouth daily   budeson-glycopyrrol-formoterol (BREZTRI AEROSPHERE) 160-9-4.8 MCG/ACT AERO inhaler  Self Yes No   Sig: Inhale 2 puffs into the lungs 2 times daily   diltiazem (CARDIZEM SR) 120 MG CP12 12 hr SR capsule  Self Yes No   Sig: Take 120 mg by mouth 2 times daily   doxycycline hyclate (VIBRAMYCIN) 100 MG capsule   No No   Sig: Take 1 capsule (100 mg) by mouth 2 times daily   gabapentin (NEURONTIN) 300 MG capsule  Self Yes No   Sig: Take 300 mg by mouth every evening midday   gabapentin (NEURONTIN) 600 MG tablet  Self Yes No   Sig: Take 1,200 mg by mouth 2 times daily   ipratropium-albuteroL (DUO-NEB) 0.5-2.5 mg/3 mL nebulizer  Self Yes No   Sig: Take 3 mLs by nebulization every 6 hours as needed for shortness of breath, wheezing or cough   metFORMIN (GLUCOPHAGE XR) 500 MG 24 hr tablet   No No   Sig: Take 1 tablet (500 mg) by mouth 2 times daily (with meals)   oxyCODONE (ROXICODONE) 5 MG tablet  Self Yes No   Sig: Take 5-10 mg by mouth every 6 hours as needed for severe pain   polyethylene glycol (MIRALAX) 17 GM/Dose powder  Self Yes No   Sig: Take 17 g by mouth every other day   predniSONE (DELTASONE) 20 MG tablet   No No   Sig: Take 2 tablets (40 mg) by mouth daily      Facility-Administered Medications: None        Review of Systems    The 10 point Review of Systems is negative other than noted in the HPI or here.      Physical Exam   Vital Signs: Temp: 97.8  F (36.6  C) Temp src: Temporal BP: (!) 166/81 Pulse: 107   Resp: 25 SpO2: 93 % O2 Device: Nasal cannula Oxygen Delivery: 4 LPM  Weight: 166 lbs 9.6 oz      Physical Exam   Constitutional: Pleasant female sitting up in wheelchair. Appears mildly dyspneic on exam. Audible wheeze.   Well nourished, well developed, resting comfortably   HEENT:   Head: Normocephalic and atraumatic.   Eyes: Conjunctivae are normal. Pupils are equal, round, and reactive to light.  Pharynx has  no erythema or exudate, mucous membranes are moist  Neck:   No adenopathy, no bony tenderness  Cardiovascular: Regular rate and rhythm without murmurs or gallops  Pulmonary/Chest: Exp wheeze in bilateral lung field. Mild increase in wob on 2L O2 via NC  GI: Soft with good bowel sounds.  Non-tender, non-distended, with no guarding, no rebound, no peritoneal signs.   Back:  No bony or CVA tenderness   Musculoskeletal:  No edema or clubbing   Skin: Skin is warm and dry. No rash noted.   Neurological: Alert and oriented to person, place, and time. Nonfocal exam  Psychiatric:  Normal mood and affect.      Medical Decision Making       75 MINUTES SPENT BY ME on the date of service doing chart review, history, exam, documentation & further activities per the note.      Data     I have personally reviewed the following data over the past 24 hrs:    8.5  \   14.8   / 202     141 103 7.8 (L) /  123 (H)   3.9 25 0.64 \     Trop: N/A BNP: 211     Procal: <0.02 CRP: 21.80 (H) Lactic Acid: N/A         Imaging results reviewed over the past 24 hrs:   Recent Results (from the past 24 hour(s))   XR Chest Port 1 View    Narrative    EXAM: XR CHEST PORT 1 VIEW  LOCATION: [facility]  DATE: 8/19/2024    INDICATION: SOB, cough, COPD  COMPARISON: CT 7/19/2024      Impression    IMPRESSION: Negative chest.   CT Chest Pulmonary Embolism w Contrast    Narrative    EXAM: CT CHEST PULMONARY EMBOLISM W CONTRAST  LOCATION: [facility]  DATE: 8/19/2024    INDICATION: Acute hypoxic resp failure, COPD, concern for possible PE.  COMPARISON: 7/19/2024.  TECHNIQUE: CT chest pulmonary angiogram during arterial phase injection of IV contrast. Multiplanar reformats and MIP reconstructions were performed. Dose reduction techniques were used.   CONTRAST: isovue 370 90ml    FINDINGS:  ANGIOGRAM CHEST: No pulmonary embolism. Nonaneurysmal aorta without dissection.    LUNGS AND PLEURA: Stable right lower lobe  superior segment fiducial marker and adjacent thickening (series 6, image 121). Stable emphysema. Mild airway thickening. Mild retained airway secretions. Mild basilar atelectasis. No pleural effusion or   pneumothorax.    MEDIASTINUM/AXILLAE: Stable upper normal mediastinal and hilar nodes. No pericardial effusion.    CORONARY ARTERY CALCIFICATION: Severe.    UPPER ABDOMEN: Nothing acute.    MUSCULOSKELETAL: Nothing acute.      Impression    IMPRESSION:    1.  No obvious acute findings to explain symptoms.    2.  No pulmonary embolism.    3.  Emphysema. Airway thickening. Retained airway secretions.

## 2024-08-20 NOTE — PLAN OF CARE
Problem: Adult Inpatient Plan of Care  Goal: Plan of Care Review  Description: The Plan of Care Review/Shift note should be completed every shift.  The Outcome Evaluation is a brief statement about your assessment that the patient is improving, declining, or no change.  This information will be displayed automatically on your shift  note.  Outcome: Progressing   Goal Outcome Evaluation:               Pt alert and oriented x4, denies pain, coughing, PRN robitussin, effective, slept between cares,

## 2024-08-20 NOTE — TREATMENT PLAN
RCAT Treatment Plan    Patient Score: 11  Patient Acuity: 3    Clinical Indication for Therapy: bronchospasm    Therapy Ordered: flutter valve TID, Duoneb Q4H    Assessment Summary: aerations improved post treatment. Unable to titrate O2. Pt remains on 3 lpm supplemental oxygen. Deep breath and coughing encouraged.    Rocky Mendoza, RT  8/20/2024

## 2024-08-20 NOTE — PROGRESS NOTES
"   08/20/24 0845   Appointment Info   Signing Clinician's Name / Credentials (OT) Adry Bashir OTR/L   Living Environment   People in Home friend(s);child(akila), adult   Current Living Arrangements house   Home Accessibility stairs to enter home;stairs within home  (pt has a walk-in-shower)   Number of Stairs, Main Entrance 7   Stair Railings, Main Entrance railings safe and in good condition   Number of Stairs, Within Home, Primary greater than 10 stairs   Stair Railings, Within Home, Primary railings safe and in good condition   Transportation Anticipated family or friend will provide   Living Environment Comments Pt lives at home, has a friend of her son's staying with her that helps with home mgmt tasks   Self-Care   Usual Activity Tolerance fair   Current Activity Tolerance poor   Regular Exercise Other (see comments)  (pt goes to pulmonary rehab 2x/wk)   Equipment Currently Used at Home none   Fall history within last six months no   Activity/Exercise/Self-Care Comment Patient independent with ADLs at baseline. Patient's son and son's friend assist with IADLs.   Instrumental Activities of Daily Living (IADL)   Previous Responsibilities meal prep;housekeeping;laundry   IADL Comments son and son's friend, Roxanne assist   General Information   Onset of Illness/Injury or Date of Surgery 08/19/24   Referring Physician Naheed Gallegos PA-C   Patient/Family Therapy Goal Statement (OT) I want to get back home, I would consider stopping smoking   Additional Occupational Profile Info/Pertinent History of Current Problem Per H&P: \"64 year old female admitted on 8/19/2024. She Has PMH of COPD, right  Lung cancer s/p radiation, PVD, hx of diverticular disease and sigmoid stricture s/p primary anastomosis w/ diverting ileostomy s/p take down (10/2023), DMII, HLD, ANDREW on CPAP, HTN, and tobacco abuse, chronic pain with recent admission 5/29-5/31/24 for COPD exacerbation; atypical PNA who presents to the ED for " "evaluation of shortness of breath and cough. Patient admitted to Medicine for further evaluation and care of suspected COPD exacerbation and CAP. \"   Existing Precautions/Restrictions fall   Existing Precautions/Restrictions oxygen therapy device and L/min   Heart Disease Risk Factors Smoking;Diabetes;Medical history   General Observations and Info pt presents with generalized deconditioning   Cognitive Status Examination   Orientation Status orientation to person, place and time   Affect/Mental Status (Cognitive) WFL   Follows Commands WFL   Pain Assessment   Patient Currently in Pain No   Posture   Posture not impaired   Range of Motion Comprehensive   General Range of Motion no range of motion deficits identified   Strength Comprehensive (MMT)   General Manual Muscle Testing (MMT) Assessment no strength deficits identified   Transfers   Transfers toilet transfer   Transfer Comments SBA   Toilet Transfer   [place] Level (Toilet Transfer) supervision   Clinical Impression   Criteria for Skilled Therapeutic Interventions Met (OT) Yes, treatment indicated   OT Diagnosis impaired ADLs/ IADLs   OT Problem List-Impairments impacting ADL problems related to;activity tolerance impaired   Assessment of Occupational Performance 1-3 Performance Deficits   Planned Therapy Interventions (OT) transfer training;strengthening;ADL retraining;IADL retraining;progressive activity/exercise;risk factor education   Clinical Decision Making Complexity (OT) problem focused assessment/low complexity   Risk & Benefits of therapy have been explained evaluation/treatment results reviewed;care plan/treatment goals reviewed   Clinical Impression Comments Pt presents with generalized weakness and decreased stamina, now utilizing O2 and needing education for energy mgmt   OT Total Evaluation Time   OT Eval, Low Complexity Minutes (39264) 10   OT Goals   Therapy Frequency (OT) 5 times/week   OT Predicted Duration/Target Date for Goal " Attainment 08/27/24   OT Goals Hygiene/Grooming;Lower Body Dressing;Toilet Transfer/Toileting;Home Management   OT: Hygiene/Grooming modified independent   OT: Lower Body Dressing Modified independent   OT: Toilet Transfer/Toileting Modified independent   OT: Home Management Modified independent   Self-Care/Home Management   Self-Care/Home Mgmt/ADL, Compensatory, Meal Prep Minutes (05660) 25   Symptoms Noted During/After Treatment (Meal Preparation/Planning Training) fatigue   Treatment Detail/Skilled Intervention pt provided with written handout for enrgy mgmt with initial review.  Pt able to cite examples of breaking tasks into smaller steps.  Pt presents with decreased standing tolerance for self cares- standing limited to 2 min- O2 sats above 95% on 3L.  SBA for toileting/ grooming in stance   OT Discharge Planning   OT Plan energy mgmt training, activities to increase stamina   OT Discharge Recommendation (DC Rec) home;home with assist;home with outpatient pulmonary rehab   OT Rationale for DC Rec family can provide assist at home for home mgmt   OT Brief overview of current status SBA   OT Equipment Needed at Discharge shower chair   Total Session Time   Timed Code Treatment Minutes 25   Total Session Time (sum of timed and untimed services) 35

## 2024-08-20 NOTE — ED NOTES
[facility] ED Handoff Report    ED Chief Complaint: SOB    ED Diagnosis:  (J44.1) COPD with acute exacerbation (H)    (R06.00) Dyspnea, unspecified type    PMH:    Past Medical History:   Diagnosis Date    Back pain     low back    COPD (chronic obstructive pulmonary disease) (H)     Depression     Diabetes mellitus (H)     Hyperlipidemia     Hypertension     PAD (peripheral artery disease) (H24)     Peripheral neuropathy     Pulmonary nodules         Code Status:  Prior     Falls Risk: No Band: Not applicable    Current Living Situation/Residence: lives with a significant other     Elimination Status: Continent: Yes     Activity Level: SBA    Patients Preferred Language:  English     Needed: No    Vital Signs:  BP (!) 166/81   Pulse 107   Temp 97.8  F (36.6  C) (Temporal)   Resp 25   Wt 75.6 kg (166 lb 9.6 oz)   SpO2 93%   BMI 27.72 kg/m       Cardiac Rhythm: Sinus tachycardia     Pain Score: 0/10    Is the Patient Confused:  No    Last Food or Drink: 08/19/24    Focused Assessment: Pt reported she has not been breathing well for 3 days. Tried nebs and rescue inhaler without relief. Productive cough and tightness on chest/sore throat and extremely dyspneic on exertion on arrival to ED. Pt currently on 4L via nasal cannula and tolerating well with oxygen saturation 93-65%. Pt not normally on oxygen. Received ceftriaxone and azithromycin in ER.     Tests Performed: Done: Labs and Imaging    Treatments Provided: Oxygen, antibiotics    Family Dynamics/Concerns: No    Family Updated On Visitor Policy: No    Plan of Care Communicated to Family: No    Who Was Updated about Plan of Care: Patient    Belongings Checklist Done and Signed by Patient: Yes    Covid: symptomatic, negative    RN: Migdalia Graham RN  8/19/2024 7:47 PM

## 2024-08-20 NOTE — MEDICATION SCRIBE - ADMISSION MEDICATION HISTORY
Medication Scribe Admission Medication History    Admission medication history is complete. The information provided in this note is only as accurate as the sources available at the time of the update.    Information Source(s): Patient and CareEverywhere/SureScripts via in-person    Pertinent Information: per pt hasn't had her med's past couple weeks   Pt states hasn't yet started carbamazepine, hasn't picked up from the pharmacy that was sent over last month   Per pt no longer taking losartan-hydrochlorothiazide, med was discontinued back in may, however it was then refilled 7/3 per pt there was a refill error and pt is off med, hasn't had past couple months     Changes made to PTA medication list:  Added: None  Deleted: None  Changed: None    Allergies reviewed with patient and updates made in EHR: yes    Medication History Completed By: FERNANDO ASHER 8/19/2024 8:00 PM    PTA Med List   Medication Sig Last Dose    albuterol (PROAIR HFA/PROVENTIL HFA/VENTOLIN HFA) 108 (90 Base) MCG/ACT inhaler Inhale 2 puffs into the lungs every 6 hours as needed for shortness of breath, wheezing or cough 8/19/2024 at am    albuterol (PROVENTIL) (5 MG/ML) 0.5% neb solution 0.5 mL by nebulizer as needed 8/19/2024 at am    aspirin (ASA) 81 MG EC tablet 1 tab(s) Orally once a day Past Week    atenolol (TENORMIN) 25 MG tablet Take 1 tablet (25 mg) by mouth daily Past Week    atorvastatin (LIPITOR) 40 MG tablet Take 40 mg by mouth daily Past Week    budeson-glycopyrrol-formoterol (BREZTRI AEROSPHERE) 160-9-4.8 MCG/ACT AERO inhaler Inhale 2 puffs into the lungs 2 times daily Past Week    diltiazem (CARDIZEM SR) 120 MG CP12 12 hr SR capsule Take 120 mg by mouth 2 times daily Past Week    DULoxetine (CYMBALTA) 60 MG capsule Take 60 mg by mouth daily Past Week    furosemide (LASIX) 20 MG tablet Take 20 mg by mouth twice a week Past Week    gabapentin (NEURONTIN) 300 MG capsule Take 600 mg by mouth every evening midday Past Week     gabapentin (NEURONTIN) 600 MG tablet Take 1,200 mg by mouth 2 times daily Past Week    ipratropium-albuteroL (DUO-NEB) 0.5-2.5 mg/3 mL nebulizer Take 3 mLs by nebulization every 6 hours as needed for shortness of breath, wheezing or cough Past Week at prn    metFORMIN (GLUCOPHAGE XR) 500 MG 24 hr tablet Take 1 tablet (500 mg) by mouth 2 times daily (with meals) Past Week    oxyCODONE (ROXICODONE) 5 MG tablet Take 5-10 mg by mouth every 6 hours as needed for severe pain Past Month at prn    polyethylene glycol (MIRALAX) 17 GM/Dose powder Take 17 g by mouth every other day Past Week

## 2024-08-21 ENCOUNTER — APPOINTMENT (OUTPATIENT)
Dept: OCCUPATIONAL THERAPY | Facility: HOSPITAL | Age: 64
DRG: 190 | End: 2024-08-21
Payer: MEDICARE

## 2024-08-21 LAB
ATRIAL RATE - MUSE: 105 BPM
DIASTOLIC BLOOD PRESSURE - MUSE: NORMAL MMHG
GLUCOSE BLDC GLUCOMTR-MCNC: 126 MG/DL (ref 70–99)
GLUCOSE BLDC GLUCOMTR-MCNC: 153 MG/DL (ref 70–99)
GLUCOSE BLDC GLUCOMTR-MCNC: 158 MG/DL (ref 70–99)
GLUCOSE BLDC GLUCOMTR-MCNC: 203 MG/DL (ref 70–99)
GLUCOSE BLDC GLUCOMTR-MCNC: 289 MG/DL (ref 70–99)
INTERPRETATION ECG - MUSE: NORMAL
MAGNESIUM SERPL-MCNC: 2.1 MG/DL (ref 1.7–2.3)
P AXIS - MUSE: 87 DEGREES
PR INTERVAL - MUSE: 136 MS
QRS DURATION - MUSE: 62 MS
QT - MUSE: 326 MS
QTC - MUSE: 430 MS
R AXIS - MUSE: -78 DEGREES
SYSTOLIC BLOOD PRESSURE - MUSE: NORMAL MMHG
T AXIS - MUSE: 88 DEGREES
VENTRICULAR RATE- MUSE: 105 BPM

## 2024-08-21 PROCEDURE — 120N000001 HC R&B MED SURG/OB

## 2024-08-21 PROCEDURE — 36415 COLL VENOUS BLD VENIPUNCTURE: CPT | Performed by: INTERNAL MEDICINE

## 2024-08-21 PROCEDURE — 94640 AIRWAY INHALATION TREATMENT: CPT | Mod: 76

## 2024-08-21 PROCEDURE — 250N000013 HC RX MED GY IP 250 OP 250 PS 637: Performed by: INTERNAL MEDICINE

## 2024-08-21 PROCEDURE — 250N000009 HC RX 250: Performed by: STUDENT IN AN ORGANIZED HEALTH CARE EDUCATION/TRAINING PROGRAM

## 2024-08-21 PROCEDURE — 250N000013 HC RX MED GY IP 250 OP 250 PS 637: Performed by: PHYSICIAN ASSISTANT

## 2024-08-21 PROCEDURE — 99232 SBSQ HOSP IP/OBS MODERATE 35: CPT | Performed by: INTERNAL MEDICINE

## 2024-08-21 PROCEDURE — 83735 ASSAY OF MAGNESIUM: CPT | Performed by: INTERNAL MEDICINE

## 2024-08-21 PROCEDURE — 250N000011 HC RX IP 250 OP 636: Performed by: STUDENT IN AN ORGANIZED HEALTH CARE EDUCATION/TRAINING PROGRAM

## 2024-08-21 PROCEDURE — 999N000157 HC STATISTIC RCP TIME EA 10 MIN

## 2024-08-21 PROCEDURE — 250N000013 HC RX MED GY IP 250 OP 250 PS 637: Performed by: STUDENT IN AN ORGANIZED HEALTH CARE EDUCATION/TRAINING PROGRAM

## 2024-08-21 PROCEDURE — 258N000003 HC RX IP 258 OP 636: Performed by: PHYSICIAN ASSISTANT

## 2024-08-21 PROCEDURE — 250N000011 HC RX IP 250 OP 636: Performed by: PHYSICIAN ASSISTANT

## 2024-08-21 PROCEDURE — 94640 AIRWAY INHALATION TREATMENT: CPT

## 2024-08-21 PROCEDURE — 250N000011 HC RX IP 250 OP 636: Performed by: INTERNAL MEDICINE

## 2024-08-21 PROCEDURE — 97535 SELF CARE MNGMENT TRAINING: CPT | Mod: GO

## 2024-08-21 PROCEDURE — 94799 UNLISTED PULMONARY SVC/PX: CPT

## 2024-08-21 PROCEDURE — 87205 SMEAR GRAM STAIN: CPT | Performed by: PHYSICIAN ASSISTANT

## 2024-08-21 PROCEDURE — 97110 THERAPEUTIC EXERCISES: CPT | Mod: GO

## 2024-08-21 RX ORDER — ENOXAPARIN SODIUM 100 MG/ML
1 INJECTION SUBCUTANEOUS EVERY 12 HOURS
Status: DISCONTINUED | OUTPATIENT
Start: 2024-08-21 | End: 2024-08-21 | Stop reason: DRUGHIGH

## 2024-08-21 RX ORDER — ENOXAPARIN SODIUM 100 MG/ML
40 INJECTION SUBCUTANEOUS EVERY 24 HOURS
Status: DISCONTINUED | OUTPATIENT
Start: 2024-08-21 | End: 2024-08-26 | Stop reason: HOSPADM

## 2024-08-21 RX ADMIN — MONTELUKAST 10 MG: 10 TABLET, FILM COATED ORAL at 20:07

## 2024-08-21 RX ADMIN — BENZONATATE 100 MG: 100 CAPSULE ORAL at 12:03

## 2024-08-21 RX ADMIN — IPRATROPIUM BROMIDE AND ALBUTEROL SULFATE 3 ML: 2.5; .5 SOLUTION RESPIRATORY (INHALATION) at 15:28

## 2024-08-21 RX ADMIN — ATENOLOL 25 MG: 25 TABLET ORAL at 08:46

## 2024-08-21 RX ADMIN — DULOXETINE HYDROCHLORIDE 60 MG: 60 CAPSULE, DELAYED RELEASE PELLETS ORAL at 08:46

## 2024-08-21 RX ADMIN — ATORVASTATIN CALCIUM 40 MG: 40 TABLET, FILM COATED ORAL at 20:07

## 2024-08-21 RX ADMIN — NICOTINE 1 PATCH: 21 PATCH, EXTENDED RELEASE TRANSDERMAL at 08:50

## 2024-08-21 RX ADMIN — METHYLPREDNISOLONE SODIUM SUCCINATE 40 MG: 40 INJECTION, POWDER, FOR SOLUTION INTRAMUSCULAR; INTRAVENOUS at 00:04

## 2024-08-21 RX ADMIN — BUDESONIDE INHALATION 0.5 MG: 0.5 SUSPENSION RESPIRATORY (INHALATION) at 07:52

## 2024-08-21 RX ADMIN — CEFTRIAXONE SODIUM 1 G: 1 INJECTION, POWDER, FOR SOLUTION INTRAMUSCULAR; INTRAVENOUS at 21:14

## 2024-08-21 RX ADMIN — METHYLPREDNISOLONE SODIUM SUCCINATE 40 MG: 40 INJECTION, POWDER, FOR SOLUTION INTRAMUSCULAR; INTRAVENOUS at 06:31

## 2024-08-21 RX ADMIN — IPRATROPIUM BROMIDE AND ALBUTEROL SULFATE 3 ML: 2.5; .5 SOLUTION RESPIRATORY (INHALATION) at 07:52

## 2024-08-21 RX ADMIN — DILTIAZEM HYDROCHLORIDE 120 MG: 60 CAPSULE, EXTENDED RELEASE ORAL at 08:46

## 2024-08-21 RX ADMIN — BUDESONIDE INHALATION 0.5 MG: 0.5 SUSPENSION RESPIRATORY (INHALATION) at 20:41

## 2024-08-21 RX ADMIN — ASPIRIN 81 MG: 81 TABLET, COATED ORAL at 08:46

## 2024-08-21 RX ADMIN — DILTIAZEM HYDROCHLORIDE 120 MG: 60 CAPSULE, EXTENDED RELEASE ORAL at 20:07

## 2024-08-21 RX ADMIN — IPRATROPIUM BROMIDE AND ALBUTEROL SULFATE 3 ML: 2.5; .5 SOLUTION RESPIRATORY (INHALATION) at 20:41

## 2024-08-21 RX ADMIN — ENOXAPARIN SODIUM 40 MG: 40 INJECTION SUBCUTANEOUS at 18:22

## 2024-08-21 RX ADMIN — PANTOPRAZOLE SODIUM 40 MG: 40 TABLET, DELAYED RELEASE ORAL at 06:34

## 2024-08-21 RX ADMIN — AZITHROMYCIN DIHYDRATE 500 MG: 500 INJECTION, POWDER, LYOPHILIZED, FOR SOLUTION INTRAVENOUS at 21:36

## 2024-08-21 RX ADMIN — IPRATROPIUM BROMIDE AND ALBUTEROL SULFATE 3 ML: 2.5; .5 SOLUTION RESPIRATORY (INHALATION) at 11:26

## 2024-08-21 RX ADMIN — IPRATROPIUM BROMIDE AND ALBUTEROL SULFATE 3 ML: 2.5; .5 SOLUTION RESPIRATORY (INHALATION) at 00:47

## 2024-08-21 RX ADMIN — METHYLPREDNISOLONE SODIUM SUCCINATE 40 MG: 40 INJECTION, POWDER, FOR SOLUTION INTRAMUSCULAR; INTRAVENOUS at 18:22

## 2024-08-21 RX ADMIN — METHYLPREDNISOLONE SODIUM SUCCINATE 40 MG: 40 INJECTION, POWDER, FOR SOLUTION INTRAMUSCULAR; INTRAVENOUS at 12:03

## 2024-08-21 RX ADMIN — IPRATROPIUM BROMIDE AND ALBUTEROL SULFATE 3 ML: 2.5; .5 SOLUTION RESPIRATORY (INHALATION) at 23:30

## 2024-08-21 ASSESSMENT — ACTIVITIES OF DAILY LIVING (ADL)
ADLS_ACUITY_SCORE: 24
ADLS_ACUITY_SCORE: 21
ADLS_ACUITY_SCORE: 24
ADLS_ACUITY_SCORE: 21
ADLS_ACUITY_SCORE: 22
ADLS_ACUITY_SCORE: 22
ADLS_ACUITY_SCORE: 21
ADLS_ACUITY_SCORE: 24
ADLS_ACUITY_SCORE: 21
ADLS_ACUITY_SCORE: 24
ADLS_ACUITY_SCORE: 22
ADLS_ACUITY_SCORE: 21
ADLS_ACUITY_SCORE: 24
ADLS_ACUITY_SCORE: 21
ADLS_ACUITY_SCORE: 24

## 2024-08-21 NOTE — PLAN OF CARE
Problem: Adult Inpatient Plan of Care  Goal: Plan of Care Review  Description: The Plan of Care Review/Shift note should be completed every shift.  The Outcome Evaluation is a brief statement about your assessment that the patient is improving, declining, or no change.  This information will be displayed automatically on your shift  note.  8/21/2024 0537 by Ayaka Hall RN  Outcome: Progressing  8/20/2024 2213 by Ayaka Hall RN  Outcome: Progressing     Problem: Gas Exchange Impaired  Goal: Optimal Gas Exchange  Outcome: Progressing     Problem: Pneumonia  Goal: Effective Oxygenation and Ventilation  8/21/2024 0537 by Ayaka Hall RN  Outcome: Progressing  8/20/2024 2213 by Ayaka Hall RN  Outcome: Progressing  Intervention: Promote Airway Secretion Clearance  Recent Flowsheet Documentation  Taken 8/21/2024 0100 by Ayaka Hall RN  Cough And Deep Breathing: done independently per patient  Taken 8/20/2024 1600 by Ayaka Hall RN  Cough And Deep Breathing: done independently per patient   Goal Outcome Evaluation:       Denies pain. On 3L O2 sats mid 90s. Frequent dry cough. Using IS.

## 2024-08-21 NOTE — PROGRESS NOTES
[facility]    Medicine Progress Note - Hospitalist Service    Date of Admission:  8/19/2024    Assessment & Plan   64 year old female with history of COPD, Lung cancer s/p radiation, PVD, hx of diverticular disease and sigmoid stricture s/p primary anastomosis w/ diverting ileostomy s/p take down (10/2023), DMII, HLD, ANDREW on CPAP, HTN, chronic pain and tobacco abuse admitted on 8/19/2024 with COPD exacerbation and possible CAP.        Acute hypoxemic respiratory failure secondary to COPD exacerbation and possible acuity acquired pneumonia.  COVID/FLU/RSV negative  Group A strep negative  CT chest negative for PE, no infiltrates, shows emphysema with airway thickening, retained secretions.   -- Continue steroids, scheduled nebulizers, Singulair started on admission  -- unclear if patient has pneumonia with no evidence of infiltrate, normal procal and wbc but with respiratory and productive cough with green sputum will continue current ceftriaxone and azithromycin  -- respiratory viral panel- pending. Urine legionella Ag- negative  -- wean oxygen as tolerated  -- hold home breo ellipta for now in place of duonebs     Dysuria:  -- checked UA- negative     H/O HTN: continhe home diltiazem, atenolol and lasix     DM2:  -- hold home metformin while inpatient  -- monitor for steroid induced hyperglycemia  -- continue current mealtime insulin and sliding scale insulin and escalate insulin needs pending clinical course  -- continue home gabapentin       Hypomagnesemia: replaced     PVD: continue home statin and ASA        H/O lung cancer: Presumed NSCLC the RLL, [id number], stage IA2 . S/p radiation as deemed non-operable.   -- Follow up with Oncology as previously planned     ANDREW: continue CPAP per home settings     Tobacco dependence:  replacement Rx is ordered.     Severe coronary calcification on CT  -- No chest pain, but does have significant risk for CAD. Will refer to cardiology service upon  discharge.           Diet: Moderate Consistent Carb (60 g CHO per Meal) Diet    DVT Prophylaxis: Enoxaparin (Lovenox) SQ  C[place] Catheter: Not present  Lines: None     Cardiac Monitoring: None  Code Status: No CPR- Do NOT Intubate      Clinically Significant Risk Factors            # Hypomagnesemia: Lowest Mg = 1.6 mg/dL in last 2 days, will replace as needed       # Hypertension: Noted on problem list                            Disposition Plan     Medically Ready for Discharge: 1-2 days             LJ Recinos  Hospitalist Service  [facility]  Securely message with [employer] (more info)  Text page via [facility] Paging/Directory   ______________________________________________________________________    Interval History   Patient is new to me today. Patient reports doing somewhat better today. Continues to have dry cough. Still on 3L of O2 through NC.     Physical Exam   Vital Signs: Temp: 97.6  F (36.4  C) Temp src: Oral BP: 133/62 Pulse: 69   Resp: 20 SpO2: 97 % O2 Device: Nasal cannula Oxygen Delivery: 3 LPM  Weight: 168 lbs 6.4 oz      General: Not in obvious distress.  HEENT: NC, AT   Chest: Diminished breath sounds bilaterally, no wheezing on my exam  Heart: S1S2 normal, regular. No M/R/G  Abdomen: Soft. NT, ND. Bowel sounds- active.  Extremities: No legs swelling  Neuro: Alert and awake, grossly non-focal      Medical Decision Making             Data         Imaging results reviewed over the past 24 hrs:   No results found for this or any previous visit (from the past 24 hour(s)).

## 2024-08-21 NOTE — PROGRESS NOTES
RESPIRATORY CARE NOTE   Patient is on 3L NC, BS diminished with expiratory wheeze, pt has a dry nonproductive cough, gave duoneb treatment as scheduled, patient perceives moderate improvement, patient tolerated well..     Nayely Leon, RT

## 2024-08-21 NOTE — PLAN OF CARE
Problem: Adult Inpatient Plan of Care  Goal: Plan of Care Review  Description: The Plan of Care Review/Shift note should be completed every shift.  The Outcome Evaluation is a brief statement about your assessment that the patient is improving, declining, or no change.  This information will be displayed automatically on your shift  note.  Outcome: Progressing     Problem: Adult Inpatient Plan of Care  Goal: Absence of Hospital-Acquired Illness or Injury  Outcome: Progressing     Problem: Adult Inpatient Plan of Care  Goal: Absence of Hospital-Acquired Illness or Injury  Intervention: Prevent Skin Injury  Recent Flowsheet Documentation  Taken 8/21/2024 0800 by Ej Lakhani, RN  Body Position: position changed independently     Problem: Gas Exchange Impaired  Goal: Optimal Gas Exchange  Outcome: Progressing   Goal Outcome Evaluation:       No verbal or nonverbal expressions of pain, O2 sats 96% 3 L, Independent in room, received PRN Tessalon for a coughing episode which was effective, Magnesium 2.1, recheck in the a.m, , 126.

## 2024-08-21 NOTE — PLAN OF CARE
Problem: Adult Inpatient Plan of Care  Goal: Plan of Care Review  Description: The Plan of Care Review/Shift note should be completed every shift.  The Outcome Evaluation is a brief statement about your assessment that the patient is improving, declining, or no change.  This information will be displayed automatically on your shift  note.  Outcome: Progressing     Problem: Comorbidity Management  Goal: Blood Pressure in Desired Range  Outcome: Progressing  Intervention: Maintain Blood Pressure Management  Recent Flowsheet Documentation  Taken 8/20/2024 1600 by Ayaka Hall RN  Medication Review/Management: medications reviewed     Problem: Pneumonia  Goal: Effective Oxygenation and Ventilation  Outcome: Progressing  Intervention: Promote Airway Secretion Clearance  Recent Flowsheet Documentation  Taken 8/20/2024 1600 by Ayaka Hall RN  Cough And Deep Breathing: done independently per patient   Goal Outcome Evaluation:       AxO4. Afebrile. Frequent dry cough - unable to obtain sputum sample. 3L O2 NC. SOB with exertion. Using IS.

## 2024-08-21 NOTE — PROGRESS NOTES
3:45 PM  SW attempted to visit Pt to completed an Initial CM Assessment but Pt was busy meeting with a provider. CM to follow up with Pt tomorrow.     SERGIO Roche

## 2024-08-21 NOTE — PROGRESS NOTES
"CLINICAL NUTRITION SERVICES - ASSESSMENT NOTE     Nutrition Prescription    RECOMMENDATIONS FOR MDs/PROVIDERS TO ORDER:    Malnutrition Status:    Patient does not meet two of the criteria necessary for diagnosing malnutrition    Recommendations already ordered by Registered Dietitian (RD):  Pt to order ONS Glucerna PRN     Future/Additional Recommendations:  Monitor po intake, weight, labs, I/Os.      REASON FOR ASSESSMENT  Iglesia Shore is a/an 64 year old female assessed by the dietitian for Admission Nutrition Risk Screen for unsure weight loss.     HPI: Pt with history of COPD, Lung cancer s/p radiation, PVD, hx of diverticular disease and sigmoid stricture s/p primary anastomosis w/ diverting ileostomy s/p take down (10/2023), DMII, HLD, ANDREW on CPAP, HTN, chronic pain and tobacco abuse admitted on 8/19/2024 with COPD exacerbation and possible CAP.      NUTRITION HISTORY  Met with pt at bedside this AM. Pt reports consuming 1-2 meals/day at baseline, appetite not the best. Pt reports current weight up from usual weight. Pt reports weight gain stating it is likely r/t fluid retention- current weight down x1 year per chart. Pt reports she is likely chronically dehydrated, states she has been working to consume adequate fluids po, progress has been made. Pt notes on and off diarrhea versus constipation- current constipation with last BM Sunday 8/18. Pt reports various strategies to manage stools- bowel meds, fluids, fiber, diet changes.   NKFA    CURRENT NUTRITION ORDERS  Diet: Moderate Consistent Carbohydrate  Intake/Tolerance:   8/20: Po 100% at dinner, X2 meals + chermeenu gordono ordered    LABS  Labs reviewed  -289 last 24 hrs, has scheduled steroids     MEDICATIONS  Medications reviewed  Scheduled tenormin, zithromax, rocephin, cardizem sr, cymbalta, lasix, novolog, solu-medrol, singulair, protonix    ANTHROPOMETRICS  Height: 5'5\"   Most Recent Weight: 76.4 kg (168 lb 6.4 oz)    IBW: 56.8 kg  BMI: " Overweight BMI 25-29.9  Weight History:   5.7% wt loss x1 year, not clinically significant   08/21/24  76.4 kg (168 lb 6.4 oz)   08/19/24  75.6 kg (166 lb 9.6 oz)   05/30/24  74.5 kg (164 lb 3.9 oz)   10/24/23 81.1 kg (178 lb 11.2 oz)   08/21/23  81.1 kg (178 lb 11.2 oz)   08/16/23  81 kg (178 lb 9.2 oz)   03/20/23 80.7 kg (178 lb)   02/16/23 77.8 kg (171 lb 9.6 oz)     Dosing Weight: 61.7 kg adjusted wt    ASSESSED NUTRITION NEEDS  Estimated Energy Needs: 1590-7753 kcals/day (25 - 30+ kcals/kg)  Justification: Increased needs  Estimated Protein Needs: 61-74 grams protein/day (1 - 1.2 grams of pro/kg)  Justification: Increased needs  Estimated Fluid Needs: 4284-4833 mL/day (25 - 30 mL/kg)   Justification: Maintenance    PHYSICAL FINDINGS  See malnutrition section below.  GI: Constipation  BM: None, last noted 8/18  Edema: Trace feet    MALNUTRITION:  % Weight Loss:  Weight loss does not meet criteria for malnutrition   % Intake:  Decreased intake does not meet criteria for malnutrition   Subcutaneous Fat Loss:  None observed  Muscle Loss:  Clavicle bone region moderate depletion and Acromion bone region moderate depletion  Fluid Retention:  Trace feet    Malnutrition Diagnosis: Patient does not meet two of the criteria necessary for diagnosing malnutrition    NUTRITION DIAGNOSIS  Unintended weight loss related to chronic illness as evidenced by 5.7% wt loss x1 year      INTERVENTIONS  Implementation  Pt to order ONS Glucerna PRN     Education- provided carbohydrate content of menu handout  Discussed and reviewed sources of carbohydrates, adequate oral intakes to meet needs, adequate fluid, diet adjustments with diverticular flair     Goals  Pt to meet >75% nutritional needs   Electrolytes WNL   BG < 180      Monitoring/Evaluation  Progress toward goals will be monitored and evaluated per protocol.

## 2024-08-22 ENCOUNTER — APPOINTMENT (OUTPATIENT)
Dept: PHYSICAL THERAPY | Facility: HOSPITAL | Age: 64
DRG: 190 | End: 2024-08-22
Payer: MEDICARE

## 2024-08-22 LAB
CREAT SERPL-MCNC: 0.62 MG/DL (ref 0.51–0.95)
EGFRCR SERPLBLD CKD-EPI 2021: >90 ML/MIN/1.73M2
GLUCOSE BLDC GLUCOMTR-MCNC: 152 MG/DL (ref 70–99)
GLUCOSE BLDC GLUCOMTR-MCNC: 153 MG/DL (ref 70–99)
GLUCOSE BLDC GLUCOMTR-MCNC: 159 MG/DL (ref 70–99)
GLUCOSE BLDC GLUCOMTR-MCNC: 197 MG/DL (ref 70–99)
MAGNESIUM SERPL-MCNC: 2.1 MG/DL (ref 1.7–2.3)
PLATELET # BLD AUTO: 250 10E3/UL (ref 150–450)

## 2024-08-22 PROCEDURE — 258N000003 HC RX IP 258 OP 636: Performed by: PHYSICIAN ASSISTANT

## 2024-08-22 PROCEDURE — 94640 AIRWAY INHALATION TREATMENT: CPT

## 2024-08-22 PROCEDURE — 250N000011 HC RX IP 250 OP 636: Performed by: INTERNAL MEDICINE

## 2024-08-22 PROCEDURE — 250N000012 HC RX MED GY IP 250 OP 636 PS 637: Performed by: INTERNAL MEDICINE

## 2024-08-22 PROCEDURE — 999N000156 HC STATISTIC RCP CONSULT EA 30 MIN

## 2024-08-22 PROCEDURE — 94640 AIRWAY INHALATION TREATMENT: CPT | Mod: 76

## 2024-08-22 PROCEDURE — 36415 COLL VENOUS BLD VENIPUNCTURE: CPT | Performed by: INTERNAL MEDICINE

## 2024-08-22 PROCEDURE — 97116 GAIT TRAINING THERAPY: CPT | Mod: GP | Performed by: PHYSICAL THERAPIST

## 2024-08-22 PROCEDURE — 250N000013 HC RX MED GY IP 250 OP 250 PS 637: Performed by: INTERNAL MEDICINE

## 2024-08-22 PROCEDURE — 250N000013 HC RX MED GY IP 250 OP 250 PS 637: Performed by: STUDENT IN AN ORGANIZED HEALTH CARE EDUCATION/TRAINING PROGRAM

## 2024-08-22 PROCEDURE — 83735 ASSAY OF MAGNESIUM: CPT | Performed by: INTERNAL MEDICINE

## 2024-08-22 PROCEDURE — 999N000157 HC STATISTIC RCP TIME EA 10 MIN

## 2024-08-22 PROCEDURE — 250N000011 HC RX IP 250 OP 636: Performed by: STUDENT IN AN ORGANIZED HEALTH CARE EDUCATION/TRAINING PROGRAM

## 2024-08-22 PROCEDURE — 82565 ASSAY OF CREATININE: CPT | Performed by: INTERNAL MEDICINE

## 2024-08-22 PROCEDURE — 36415 COLL VENOUS BLD VENIPUNCTURE: CPT | Performed by: PHYSICIAN ASSISTANT

## 2024-08-22 PROCEDURE — 120N000001 HC R&B MED SURG/OB

## 2024-08-22 PROCEDURE — 250N000011 HC RX IP 250 OP 636: Performed by: PHYSICIAN ASSISTANT

## 2024-08-22 PROCEDURE — 85049 AUTOMATED PLATELET COUNT: CPT | Performed by: PHYSICIAN ASSISTANT

## 2024-08-22 PROCEDURE — 94799 UNLISTED PULMONARY SVC/PX: CPT

## 2024-08-22 PROCEDURE — 250N000009 HC RX 250: Performed by: STUDENT IN AN ORGANIZED HEALTH CARE EDUCATION/TRAINING PROGRAM

## 2024-08-22 PROCEDURE — 250N000013 HC RX MED GY IP 250 OP 250 PS 637: Performed by: PHYSICIAN ASSISTANT

## 2024-08-22 PROCEDURE — 99232 SBSQ HOSP IP/OBS MODERATE 35: CPT | Performed by: INTERNAL MEDICINE

## 2024-08-22 RX ORDER — POLYETHYLENE GLYCOL 3350 17 G/17G
17 POWDER, FOR SOLUTION ORAL DAILY
Status: DISCONTINUED | OUTPATIENT
Start: 2024-08-22 | End: 2024-08-24

## 2024-08-22 RX ORDER — PREDNISONE 20 MG/1
40 TABLET ORAL DAILY
Status: DISCONTINUED | OUTPATIENT
Start: 2024-08-22 | End: 2024-08-26 | Stop reason: HOSPADM

## 2024-08-22 RX ADMIN — DILTIAZEM HYDROCHLORIDE 120 MG: 60 CAPSULE, EXTENDED RELEASE ORAL at 20:50

## 2024-08-22 RX ADMIN — AZITHROMYCIN DIHYDRATE 500 MG: 500 INJECTION, POWDER, LYOPHILIZED, FOR SOLUTION INTRAVENOUS at 21:14

## 2024-08-22 RX ADMIN — BUDESONIDE INHALATION 0.5 MG: 0.5 SUSPENSION RESPIRATORY (INHALATION) at 20:19

## 2024-08-22 RX ADMIN — ASPIRIN 81 MG: 81 TABLET, COATED ORAL at 09:35

## 2024-08-22 RX ADMIN — SENNOSIDES AND DOCUSATE SODIUM 1 TABLET: 8.6; 5 TABLET ORAL at 20:50

## 2024-08-22 RX ADMIN — METHYLPREDNISOLONE SODIUM SUCCINATE 40 MG: 40 INJECTION, POWDER, FOR SOLUTION INTRAMUSCULAR; INTRAVENOUS at 05:40

## 2024-08-22 RX ADMIN — PANTOPRAZOLE SODIUM 40 MG: 40 TABLET, DELAYED RELEASE ORAL at 07:02

## 2024-08-22 RX ADMIN — DILTIAZEM HYDROCHLORIDE 120 MG: 60 CAPSULE, EXTENDED RELEASE ORAL at 09:36

## 2024-08-22 RX ADMIN — ATORVASTATIN CALCIUM 40 MG: 40 TABLET, FILM COATED ORAL at 20:50

## 2024-08-22 RX ADMIN — PREDNISONE 40 MG: 20 TABLET ORAL at 09:42

## 2024-08-22 RX ADMIN — NICOTINE 1 PATCH: 21 PATCH, EXTENDED RELEASE TRANSDERMAL at 09:37

## 2024-08-22 RX ADMIN — IPRATROPIUM BROMIDE AND ALBUTEROL SULFATE 3 ML: 2.5; .5 SOLUTION RESPIRATORY (INHALATION) at 04:48

## 2024-08-22 RX ADMIN — METHYLPREDNISOLONE SODIUM SUCCINATE 40 MG: 40 INJECTION, POWDER, FOR SOLUTION INTRAMUSCULAR; INTRAVENOUS at 00:27

## 2024-08-22 RX ADMIN — IPRATROPIUM BROMIDE AND ALBUTEROL SULFATE 3 ML: 2.5; .5 SOLUTION RESPIRATORY (INHALATION) at 16:17

## 2024-08-22 RX ADMIN — MONTELUKAST 10 MG: 10 TABLET, FILM COATED ORAL at 20:50

## 2024-08-22 RX ADMIN — IPRATROPIUM BROMIDE AND ALBUTEROL SULFATE 3 ML: 2.5; .5 SOLUTION RESPIRATORY (INHALATION) at 08:30

## 2024-08-22 RX ADMIN — DULOXETINE HYDROCHLORIDE 60 MG: 60 CAPSULE, DELAYED RELEASE PELLETS ORAL at 09:35

## 2024-08-22 RX ADMIN — ENOXAPARIN SODIUM 40 MG: 40 INJECTION SUBCUTANEOUS at 17:35

## 2024-08-22 RX ADMIN — IPRATROPIUM BROMIDE AND ALBUTEROL SULFATE 3 ML: 2.5; .5 SOLUTION RESPIRATORY (INHALATION) at 11:17

## 2024-08-22 RX ADMIN — IPRATROPIUM BROMIDE AND ALBUTEROL SULFATE 3 ML: 2.5; .5 SOLUTION RESPIRATORY (INHALATION) at 20:18

## 2024-08-22 RX ADMIN — ATENOLOL 25 MG: 25 TABLET ORAL at 09:35

## 2024-08-22 RX ADMIN — POLYETHYLENE GLYCOL 3350 17 G: 17 POWDER, FOR SOLUTION ORAL at 09:42

## 2024-08-22 RX ADMIN — FUROSEMIDE 20 MG: 20 TABLET ORAL at 09:35

## 2024-08-22 RX ADMIN — BUDESONIDE INHALATION 0.5 MG: 0.5 SUSPENSION RESPIRATORY (INHALATION) at 08:30

## 2024-08-22 RX ADMIN — CEFTRIAXONE SODIUM 1 G: 1 INJECTION, POWDER, FOR SOLUTION INTRAMUSCULAR; INTRAVENOUS at 20:21

## 2024-08-22 ASSESSMENT — ACTIVITIES OF DAILY LIVING (ADL)
ADLS_ACUITY_SCORE: 23
ADLS_ACUITY_SCORE: 23
ADLS_ACUITY_SCORE: 24
ADLS_ACUITY_SCORE: 24
ADLS_ACUITY_SCORE: 23
ADLS_ACUITY_SCORE: 24
ADLS_ACUITY_SCORE: 23
ADLS_ACUITY_SCORE: 24
ADLS_ACUITY_SCORE: 24
ADLS_ACUITY_SCORE: 23
ADLS_ACUITY_SCORE: 23
ADLS_ACUITY_SCORE: 24
DEPENDENT_IADLS:: COOKING;LAUNDRY;CLEANING
ADLS_ACUITY_SCORE: 23
ADLS_ACUITY_SCORE: 24
ADLS_ACUITY_SCORE: 23
ADLS_ACUITY_SCORE: 23
ADLS_ACUITY_SCORE: 24
ADLS_ACUITY_SCORE: 23
ADLS_ACUITY_SCORE: 24
ADLS_ACUITY_SCORE: 23
ADLS_ACUITY_SCORE: 23

## 2024-08-22 NOTE — PLAN OF CARE
Goal Outcome Evaluation:      Plan of Care Reviewed With: patient          Outcome Evaluation: Plan is for Pt to discharge home with family when medically ready.    CRYSTAL CandelarioW

## 2024-08-22 NOTE — CONSULTS
Care Management Initial Consult    General Information  Assessment completed with: Patient,    Type of CM/SW Visit: Initial Assessment    Primary Care Provider verified and updated as needed: No   Readmission within the last 30 days: no previous admission in last 30 days      Reason for Consult: discharge planning  Advance Care Planning:            Communication Assessment  Patient's communication style: spoken language (English or Bilingual)    Hearing Difficulty or Deaf: no   Wear Glasses or Blind: no    Cognitive  Cognitive/Neuro/Behavioral: WDL        Orientation: oriented x 4             Living Environment:   People in home: friend(s)  Roxanne  Current living Arrangements: house      Able to return to prior arrangements: yes       Family/Social Support:  Care provided by: self, child(akila), friend  Provides care for: no one  Marital Status:   Children          Description of Support System: Involved, Supportive    Support Assessment: Adequate family and caregiver support    Current Resources:   Patient receiving home care services: No     Community Resources: None  Equipment currently used at home: none  Supplies currently used at home: Nebulizer tubing    Employment/Financial:  Employment Status:          Financial Concerns: none           Does the patient's insurance plan have a 3 day qualifying hospital stay waiver?  Yes     Which insurance plan 3 day waiver is available? [employer]    Will the waiver be used for post-acute placement? Undetermined at this time    Lifestyle & Psychosocial Needs:  Social Determinants of Health     Food Insecurity: No Food Insecurity (10/19/2023)    Received from [employer] [facility], [employer] [facility]    Food Insecurity     Worried About Running Out of Food in the Last Year: 1   Depression: At risk (7/7/2022)    PHQ-2     PHQ-2 Score: 4   Housing Stability: Low Risk  (10/19/2023)    Received from [facility], [employer] [facility]    Housing Stability     Unable to Pay for Housing in the Last Year: 1   Tobacco Use: High Risk (5/29/2024)    Patient History     Smoking Tobacco Use: Every Day     Smokeless Tobacco Use: Never     Passive Exposure: Not on file   Financial Resource Strain: Low Risk  (10/19/2023)    Received from [facility], [facility]    Financial Resource Strain     Difficulty of Paying Living Expenses: 3     Difficulty of Paying Living Expenses: Not on file   Alcohol Use: Not on file   Transportation Needs: No Transportation Needs (10/19/2023)    Received from [employer] [facility], [facility]    Transportation Needs     Lack of Transportation (Medical): 1   Physical Activity: Not on file   Interpersonal Safety: Not on file   Stress: Not on file   Social Connections: Socially Integrated (10/19/2023)    Received from [employer] [facility], [facility]    Social Connections     Frequency of Communication with Friends and Family: 0   Health Literacy: Not on file       Functional Status:  Prior to admission patient needed assistance:   Dependent ADLs:: Independent  Dependent IADLs:: Cooking, Laundry, Cleaning       Mental Health Status:  Mental Health Status: No Current Concerns       Chemical Dependency Status:  Chemical Dependency Status: No Current Concerns             Values/Beliefs:  Spiritual, Cultural Beliefs, Druze Practices, Values that affect care: no  Description of Beliefs that Will Affect Care: no            Additional Information:  SW met with Pt at bedside to complete initial assessment. SW introduced self and explained CM role. Pt reports living in a house; her son Yoel comes over often, and a family friend Roxanne stays at her home. Pt reports she is independent with  ADLs and dependent with some IADLS; Roxanne helps with cooking, and Yoel helps her take the laundry downstairs. Pt denies any current home/community services. Pt reports using a CPAP and nebulizer. Pt is not on oxygen at baseline. Goal is home. Family to transport.     CRYSTAL CandelarioW

## 2024-08-22 NOTE — PROGRESS NOTES
02 sats 94%   when checked on her with 02 on at 2 liters.  Pt. Is hoping to go home with no 02.  States she feels she is doing pretty good.   Legs/ ankles are a little swollen when examined. She states she takes lasix prn two times a week as needed.  She does look like she needs it  and on schedule says Thursday and Monday on MAR.  She says due to neuropathy they are painful to touch.   Pt. Changes her position independently and walks to bathroom.

## 2024-08-22 NOTE — PLAN OF CARE
"  Problem: Adult Inpatient Plan of Care  Goal: Plan of Care Review  Description: The Plan of Care Review/Shift note should be completed every shift.  The Outcome Evaluation is a brief statement about your assessment that the patient is improving, declining, or no change.  This information will be displayed automatically on your shift  note.  Outcome: Progressing     Problem: Adult Inpatient Plan of Care  Goal: Patient-Specific Goal (Individualized)  Description: You can add care plan individualizations to a care plan. Examples of Individualization might be:  \"Parent requests to be called daily at 9am for status\", \"I have a hard time hearing out of my right ear\", or \"Do not touch me to wake me up as it startles  me\".  Outcome: Progressing     Problem: Adult Inpatient Plan of Care  Goal: Absence of Hospital-Acquired Illness or Injury  Intervention: Identify and Manage Fall Risk  Recent Flowsheet Documentation  Taken 8/22/2024 0800 by Ej Lakhani, RN  Safety Promotion/Fall Prevention: room door open     Problem: Comorbidity Management  Goal: Maintenance of COPD Symptom Control  Outcome: Progressing   Goal Outcome Evaluation:  No verbal or nonverbal expressions of pain, CT negative for PE, magnesium protocol, recheck in a.m. Souleymane has poor appetite, ,159.                      "

## 2024-08-22 NOTE — PLAN OF CARE
Problem: Adult Inpatient Plan of Care  Goal: Absence of Hospital-Acquired Illness or Injury  Outcome: Progressing  Intervention: Identify and Manage Fall Risk  Recent Flowsheet Documentation  Taken 8/21/2024 1658 by Ej Zayas RN  Safety Promotion/Fall Prevention:   assistive device/personal items within reach   clutter free environment maintained   lighting adjusted   nonskid shoes/slippers when out of bed   patient and family education   safety round/check completed     Problem: Adult Inpatient Plan of Care  Goal: Optimal Comfort and Wellbeing  Outcome: Progressing     Problem: Comorbidity Management  Goal: Maintenance of COPD Symptom Control  Outcome: Progressing  Goal: Blood Glucose Levels Within Targeted Range  Outcome: Progressing  Goal: Blood Pressure in Desired Range  Outcome: Progressing     Problem: Gas Exchange Impaired  Goal: Optimal Gas Exchange  Outcome: Progressing     Problem: Pneumonia  Goal: Fluid Balance  Outcome: Progressing  Goal: Resolution of Infection Signs and Symptoms  Outcome: Progressing  Goal: Effective Oxygenation and Ventilation  Outcome: Progressing    Patient was alert and oriented. Denied pan thus far. Patient stated her breathing is improving. LS were diminished. O2 sat 92% on 2 L via NC. RT performing scheduled nebs and was able to send a sputum sample. New PIV placed by ETHEL RN. IV azithromycin and IV ceftriaxone administered this shift. Patient had a very poor appetite and ate 1 blueberry muffin and drank 480 mL of lemonade for dinner. Pre-prandial BG was 153 and HS BG was 203. Patient is independent in the room with transfers and cares.

## 2024-08-22 NOTE — TREATMENT PLAN
RCAT Treatment Plan    Patient Score: 9  Patient Acuity: 4    Clinical Indication for Therapy: bronchospasm    Therapy Ordered: continue current therapy    Assessment Summary: pt here with COPD exacerbation and possible CAP. She is a current 1 pack/day smoker. CT chest 8/19 stable emphysema. RR 16, LS diminished expiratory wheezes, NPC, on 2L SpO2 95%. Per MD, will hold home inhalers in place of Duonebs. Will reassess in 72 hours or as needed.      Silverio Dorado, RT  8/22/2024

## 2024-08-22 NOTE — PROGRESS NOTES
[facility]    Medicine Progress Note - Hospitalist Service    Date of Admission:  8/19/2024    Assessment & Plan   64 year old female with history of COPD, Lung cancer s/p radiation, PVD, hx of diverticular disease and sigmoid stricture s/p primary anastomosis w/ diverting ileostomy s/p take down (10/2023), DMII, HLD, ANDREW on CPAP, HTN, chronic pain and tobacco abuse admitted on 8/19/2024 with COPD exacerbation and possible CAP.        Acute hypoxemic respiratory failure secondary to COPD exacerbation and possible acuity acquired pneumonia.  COVID/FLU/RSV negative  Group A strep negative  CT chest negative for PE, no infiltrates, shows emphysema with airway thickening, retained secretions.   -- Continue steroids, scheduled nebulizers, Singulair started on admission  -- unclear if patient has pneumonia with no evidence of infiltrate, normal procal and wbc but with respiratory and productive cough with green sputum will continue current ceftriaxone and azithromycin  -- sputum culture- pending. Urine legionella Ag- negative  -- wean oxygen as tolerated  -- hold home breo ellipta for now in place of duonebs  -- clinically doing much better. IV solumedrol switched to PO prednisone today.      Dysuria:  -- checked UA- negative     H/O HTN: continhe home diltiazem, atenolol and lasix     DM2:  -- hold home metformin while inpatient  -- monitor for steroid induced hyperglycemia  -- continue current mealtime insulin and sliding scale insulin and escalate insulin needs pending clinical course  -- continue home gabapentin       Hypomagnesemia: replaced     PVD: continue home statin and ASA     H/O lung cancer: Presumed NSCLC the RLL, [id number], stage IA2 . S/p radiation as deemed non-operable.   -- Follow up with Oncology as previously planned     ANDREW: continue CPAP per home settings     Tobacco dependence:  replacement Rx is ordered.     Severe coronary calcification on CT  -- No chest pain, but does  have significant risk for CAD. Will refer to cardiology service upon discharge.     Constipation  -- Start Miralax          Diet: Moderate Consistent Carb (60 g CHO per Meal) Diet    DVT Prophylaxis: Enoxaparin (Lovenox) SQ  Cunningham Catheter: Not present  Lines: None     Cardiac Monitoring: None  Code Status: No CPR- Do NOT Intubate      Clinically Significant Risk Factors                  # Hypertension: Noted on problem list               # Financial/Environmental Concerns: none               Disposition Plan     Medically Ready for Discharge: 1-2 days             LJ Recinos  Hospitalist Service  [facility]  Securely message with [employer] (more info)  Text page via [facility] Paging/Directory   ______________________________________________________________________    Interval History   Patient seen and examined this morning. Doing better. No wheezing on my exam today. No fever or chills. Continues to have cough, which is turning productive now.     Physical Exam   Vital Signs: Temp: 97.5  F (36.4  C) Temp src: Oral BP: (!) 147/69 Pulse: 57   Resp: 18 SpO2: 95 % O2 Device: Nasal cannula Oxygen Delivery: 2 LPM  Weight: 168 lbs 6.4 oz      General: Not in obvious distress.  HEENT: NC, AT   Chest: Diminished breath sounds bilaterally, no wheezing on my exam  Heart: S1S2 normal, regular. No M/R/G  Abdomen: Soft. NT, ND. Bowel sounds- active.  Extremities: No legs swelling  Neuro: Alert and awake, grossly non-focal      Medical Decision Making             Data     I have personally reviewed the following data over the past 24 hrs:    N/A  \   N/A   / N/A     N/A N/A N/A /  159 (H)   N/A N/A 0.62 \       Imaging results reviewed over the past 24 hrs:   No results found for this or any previous visit (from the past 24 hour(s)).

## 2024-08-23 ENCOUNTER — APPOINTMENT (OUTPATIENT)
Dept: PHYSICAL THERAPY | Facility: HOSPITAL | Age: 64
DRG: 190 | End: 2024-08-23
Payer: MEDICARE

## 2024-08-23 ENCOUNTER — APPOINTMENT (OUTPATIENT)
Dept: OCCUPATIONAL THERAPY | Facility: HOSPITAL | Age: 64
DRG: 190 | End: 2024-08-23
Payer: MEDICARE

## 2024-08-23 LAB
ALBUMIN UR-MCNC: 100 MG/DL
APPEARANCE UR: CLEAR
BACTERIA SPT CULT: NORMAL
BASE EXCESS BLDV CALC-SCNC: 2 MMOL/L (ref -3–3)
BILIRUB UR QL STRIP: NEGATIVE
COLOR UR AUTO: ABNORMAL
GLUCOSE BLDC GLUCOMTR-MCNC: 135 MG/DL (ref 70–99)
GLUCOSE BLDC GLUCOMTR-MCNC: 139 MG/DL (ref 70–99)
GLUCOSE BLDC GLUCOMTR-MCNC: 149 MG/DL (ref 70–99)
GLUCOSE BLDC GLUCOMTR-MCNC: 156 MG/DL (ref 70–99)
GLUCOSE BLDC GLUCOMTR-MCNC: 164 MG/DL (ref 70–99)
GLUCOSE UR STRIP-MCNC: NEGATIVE MG/DL
GRAM STAIN RESULT: NORMAL
HCO3 BLDV-SCNC: 25 MMOL/L (ref 21–28)
HGB UR QL STRIP: ABNORMAL
KETONES UR STRIP-MCNC: NEGATIVE MG/DL
LEUKOCYTE ESTERASE UR QL STRIP: NEGATIVE
MAGNESIUM SERPL-MCNC: 2 MG/DL (ref 1.7–2.3)
MUCOUS THREADS #/AREA URNS LPF: PRESENT /LPF
NITRATE UR QL: NEGATIVE
O2/TOTAL GAS SETTING VFR VENT: 0 %
OXYHGB MFR BLDV: 99 % (ref 70–75)
PCO2 BLDV: 31 MM HG (ref 40–50)
PH BLDV: 7.51 [PH] (ref 7.32–7.43)
PH UR STRIP: 6 [PH] (ref 5–7)
PO2 BLDV: 136 MM HG (ref 25–47)
RBC URINE: 1 /HPF
SAO2 % BLDV: 99.8 % (ref 70–75)
SP GR UR STRIP: 1.03 (ref 1–1.03)
SQUAMOUS EPITHELIAL: 2 /HPF
UROBILINOGEN UR STRIP-MCNC: <2 MG/DL
WBC URINE: 1 /HPF

## 2024-08-23 PROCEDURE — 250N000013 HC RX MED GY IP 250 OP 250 PS 637: Performed by: INTERNAL MEDICINE

## 2024-08-23 PROCEDURE — 94640 AIRWAY INHALATION TREATMENT: CPT | Mod: 76

## 2024-08-23 PROCEDURE — 82805 BLOOD GASES W/O2 SATURATION: CPT | Performed by: INTERNAL MEDICINE

## 2024-08-23 PROCEDURE — 999N000157 HC STATISTIC RCP TIME EA 10 MIN

## 2024-08-23 PROCEDURE — 97110 THERAPEUTIC EXERCISES: CPT | Mod: GP | Performed by: PHYSICAL THERAPIST

## 2024-08-23 PROCEDURE — 81001 URINALYSIS AUTO W/SCOPE: CPT | Performed by: INTERNAL MEDICINE

## 2024-08-23 PROCEDURE — 250N000011 HC RX IP 250 OP 636: Performed by: INTERNAL MEDICINE

## 2024-08-23 PROCEDURE — 250N000012 HC RX MED GY IP 250 OP 636 PS 637: Performed by: INTERNAL MEDICINE

## 2024-08-23 PROCEDURE — 99232 SBSQ HOSP IP/OBS MODERATE 35: CPT | Performed by: INTERNAL MEDICINE

## 2024-08-23 PROCEDURE — 250N000009 HC RX 250: Performed by: STUDENT IN AN ORGANIZED HEALTH CARE EDUCATION/TRAINING PROGRAM

## 2024-08-23 PROCEDURE — 83735 ASSAY OF MAGNESIUM: CPT | Performed by: INTERNAL MEDICINE

## 2024-08-23 PROCEDURE — 36415 COLL VENOUS BLD VENIPUNCTURE: CPT | Performed by: INTERNAL MEDICINE

## 2024-08-23 PROCEDURE — 120N000001 HC R&B MED SURG/OB

## 2024-08-23 PROCEDURE — 97535 SELF CARE MNGMENT TRAINING: CPT | Mod: GO

## 2024-08-23 PROCEDURE — 94640 AIRWAY INHALATION TREATMENT: CPT

## 2024-08-23 PROCEDURE — 97116 GAIT TRAINING THERAPY: CPT | Mod: GP | Performed by: PHYSICAL THERAPIST

## 2024-08-23 PROCEDURE — 250N000013 HC RX MED GY IP 250 OP 250 PS 637: Performed by: PHYSICIAN ASSISTANT

## 2024-08-23 PROCEDURE — 250N000013 HC RX MED GY IP 250 OP 250 PS 637: Performed by: STUDENT IN AN ORGANIZED HEALTH CARE EDUCATION/TRAINING PROGRAM

## 2024-08-23 RX ORDER — CEFDINIR 300 MG/1
300 CAPSULE ORAL EVERY 12 HOURS SCHEDULED
Status: DISCONTINUED | OUTPATIENT
Start: 2024-08-23 | End: 2024-08-26 | Stop reason: HOSPADM

## 2024-08-23 RX ORDER — AZITHROMYCIN 250 MG/1
250 TABLET, FILM COATED ORAL DAILY
Status: COMPLETED | OUTPATIENT
Start: 2024-08-23 | End: 2024-08-24

## 2024-08-23 RX ORDER — POLYETHYLENE GLYCOL 3350 17 G/17G
17 POWDER, FOR SOLUTION ORAL 2 TIMES DAILY PRN
Status: DISCONTINUED | OUTPATIENT
Start: 2024-08-23 | End: 2024-08-24

## 2024-08-23 RX ADMIN — DILTIAZEM HYDROCHLORIDE 120 MG: 60 CAPSULE, EXTENDED RELEASE ORAL at 21:04

## 2024-08-23 RX ADMIN — PANTOPRAZOLE SODIUM 40 MG: 40 TABLET, DELAYED RELEASE ORAL at 06:47

## 2024-08-23 RX ADMIN — ATORVASTATIN CALCIUM 40 MG: 40 TABLET, FILM COATED ORAL at 21:04

## 2024-08-23 RX ADMIN — BUDESONIDE INHALATION 0.5 MG: 0.5 SUSPENSION RESPIRATORY (INHALATION) at 20:11

## 2024-08-23 RX ADMIN — ASPIRIN 81 MG: 81 TABLET, COATED ORAL at 08:40

## 2024-08-23 RX ADMIN — IPRATROPIUM BROMIDE AND ALBUTEROL SULFATE 3 ML: 2.5; .5 SOLUTION RESPIRATORY (INHALATION) at 12:03

## 2024-08-23 RX ADMIN — IPRATROPIUM BROMIDE AND ALBUTEROL SULFATE 3 ML: 2.5; .5 SOLUTION RESPIRATORY (INHALATION) at 00:47

## 2024-08-23 RX ADMIN — NICOTINE 1 PATCH: 21 PATCH, EXTENDED RELEASE TRANSDERMAL at 08:39

## 2024-08-23 RX ADMIN — IPRATROPIUM BROMIDE AND ALBUTEROL SULFATE 3 ML: 2.5; .5 SOLUTION RESPIRATORY (INHALATION) at 07:54

## 2024-08-23 RX ADMIN — IPRATROPIUM BROMIDE AND ALBUTEROL SULFATE 3 ML: 2.5; .5 SOLUTION RESPIRATORY (INHALATION) at 15:49

## 2024-08-23 RX ADMIN — IPRATROPIUM BROMIDE AND ALBUTEROL SULFATE 3 ML: 2.5; .5 SOLUTION RESPIRATORY (INHALATION) at 20:11

## 2024-08-23 RX ADMIN — BUDESONIDE INHALATION 0.5 MG: 0.5 SUSPENSION RESPIRATORY (INHALATION) at 07:54

## 2024-08-23 RX ADMIN — ATENOLOL 25 MG: 25 TABLET ORAL at 08:40

## 2024-08-23 RX ADMIN — ENOXAPARIN SODIUM 40 MG: 40 INJECTION SUBCUTANEOUS at 18:11

## 2024-08-23 RX ADMIN — MONTELUKAST 10 MG: 10 TABLET, FILM COATED ORAL at 21:04

## 2024-08-23 RX ADMIN — POLYETHYLENE GLYCOL 3350 17 G: 17 POWDER, FOR SOLUTION ORAL at 21:05

## 2024-08-23 RX ADMIN — IPRATROPIUM BROMIDE AND ALBUTEROL SULFATE 3 ML: 2.5; .5 SOLUTION RESPIRATORY (INHALATION) at 04:54

## 2024-08-23 RX ADMIN — DULOXETINE HYDROCHLORIDE 60 MG: 60 CAPSULE, DELAYED RELEASE PELLETS ORAL at 08:40

## 2024-08-23 RX ADMIN — DILTIAZEM HYDROCHLORIDE 120 MG: 60 CAPSULE, EXTENDED RELEASE ORAL at 08:41

## 2024-08-23 RX ADMIN — CEFDINIR 300 MG: 300 CAPSULE ORAL at 09:56

## 2024-08-23 RX ADMIN — PREDNISONE 40 MG: 20 TABLET ORAL at 08:40

## 2024-08-23 RX ADMIN — POLYETHYLENE GLYCOL 3350 17 G: 17 POWDER, FOR SOLUTION ORAL at 08:38

## 2024-08-23 RX ADMIN — AZITHROMYCIN DIHYDRATE 250 MG: 250 TABLET, FILM COATED ORAL at 09:56

## 2024-08-23 RX ADMIN — CEFDINIR 300 MG: 300 CAPSULE ORAL at 21:04

## 2024-08-23 ASSESSMENT — ACTIVITIES OF DAILY LIVING (ADL)
ADLS_ACUITY_SCORE: 23

## 2024-08-23 NOTE — PROGRESS NOTES
RESPIRATORY CARE NOTE     Patient was on 2 lpm at rest and titrated down to 1L at rest and had an Sp02 of 94%. They received Duoneb x3 and pulmicort x1.   Breath sounds prior to treatment where diminished and post treatment prominent expiratory wheezing throughout.      Patient has a strong cough.  They have completed aerobika & IS therapy and demonstrated good technique.    Non compliant with CPAP per patient report it makes her trigeminal neuralgia worse.       Lulu Rosa, RT

## 2024-08-23 NOTE — PLAN OF CARE
Problem: Adult Inpatient Plan of Care  Goal: Plan of Care Review  Description: The Plan of Care Review/Shift note should be completed every shift.  The Outcome Evaluation is a brief statement about your assessment that the patient is improving, declining, or no change.  This information will be displayed automatically on your shift  note.  Outcome: Progressing  Goal: Absence of Hospital-Acquired Illness or Injury  Intervention: Identify and Manage Fall Risk  Recent Flowsheet Documentation  Taken 8/23/2024 0100 by Dionisio Urena RN  Safety Promotion/Fall Prevention:   clutter free environment maintained   safety round/check completed     Problem: Comorbidity Management  Goal: Maintenance of COPD Symptom Control  Outcome: Progressing  Goal: Blood Glucose Levels Within Targeted Range  Outcome: Progressing  Goal: Blood Pressure in Desired Range  Outcome: Progressing     Problem: Pneumonia  Goal: Effective Oxygenation and Ventilation  Outcome: Progressing  Intervention: Promote Airway Secretion Clearance  Recent Flowsheet Documentation  Taken 8/23/2024 0100 by Dionisio Urena RN  Cough And Deep Breathing: done independently per patient   Goal Outcome Evaluation:       Pt alert & oriented. Denies any pain. Oxygen at 2LPM. Independent in room. Vital Signs stable.

## 2024-08-23 NOTE — PLAN OF CARE
Problem: Adult Inpatient Plan of Care  Goal: Absence of Hospital-Acquired Illness or Injury  Outcome: Progressing  Intervention: Identify and Manage Fall Risk  Recent Flowsheet Documentation  Taken 8/22/2024 1612 by Ej Zayas RN  Safety Promotion/Fall Prevention:   assistive device/personal items within reach   clutter free environment maintained   lighting adjusted   mobility aid in reach   nonskid shoes/slippers when out of bed   patient and family education   safety round/check completed     Problem: Adult Inpatient Plan of Care  Goal: Optimal Comfort and Wellbeing  Outcome: Progressing     Problem: Comorbidity Management  Goal: Maintenance of COPD Symptom Control  Outcome: Progressing  Goal: Blood Glucose Levels Within Targeted Range  Outcome: Progressing  Goal: Blood Pressure in Desired Range  Outcome: Progressing     Problem: Gas Exchange Impaired  Goal: Optimal Gas Exchange  Outcome: Progressing     Problem: Pneumonia  Goal: Fluid Balance  Outcome: Progressing  Goal: Resolution of Infection Signs and Symptoms  Outcome: Progressing  Goal: Effective Oxygenation and Ventilation  Outcome: Progressing     Problem: Obstructive Sleep Apnea Risk or Actual  Goal: Unobstructed Breathing During Sleep  Outcome: Progressing    Patient was alert and oriented. Denied pain thus far. O2 sat 94% on 2 L via NC. LS diminished. Pre-prandial BG was 152 and HS BG was 153. Edema +1-2 observed to BLE. IV azithromycin and IV ceftriaxone administered this shift. Patient was independent and steady during transfers in the room.

## 2024-08-23 NOTE — PLAN OF CARE
"  Problem: Adult Inpatient Plan of Care  Goal: Plan of Care Review  Description: The Plan of Care Review/Shift note should be completed every shift.  The Outcome Evaluation is a brief statement about your assessment that the patient is improving, declining, or no change.  This information will be displayed automatically on your shift  note.  Outcome: Progressing     Problem: Adult Inpatient Plan of Care  Goal: Patient-Specific Goal (Individualized)  Description: You can add care plan individualizations to a care plan. Examples of Individualization might be:  \"Parent requests to be called daily at 9am for status\", \"I have a hard time hearing out of my right ear\", or \"Do not touch me to wake me up as it startles  me\".  Outcome: Progressing     Problem: Adult Inpatient Plan of Care  Goal: Absence of Hospital-Acquired Illness or Injury  Intervention: Identify and Manage Fall Risk  Recent Flowsheet Documentation  Taken 8/23/2024 0800 by Ej Lakhani, RN  Safety Promotion/Fall Prevention: room door open     Problem: Adult Inpatient Plan of Care  Goal: Absence of Hospital-Acquired Illness or Injury  Intervention: Prevent Skin Injury  Recent Flowsheet Documentation  Taken 8/23/2024 0800 by Ej Lakhani, RN  Body Position: position changed independently   Goal Outcome Evaluation:       No verbal or nonverbal expressions of pain, Independent in room, magnesium protocol, recheck in the a.m. , 2 L O2. Home O2 eval completed.                 "

## 2024-08-23 NOTE — PROGRESS NOTES
Patient has been assessed for Home Oxygen needs.     Pulse oximetry (SpO2) and Oxygen flow readings:    SpO2 = 89% on room air at rest while awake.    SpO2 improved to 93% on 3 liters/minute at rest.    SpO2 = 85% on room air during activity/with exercise.    *SpO2 improved to 92% on 3 liters/minute during activity/with exercise.    Patient qualifies for home O2.

## 2024-08-23 NOTE — PROGRESS NOTES
CLINICAL NUTRITION SERVICES - BRIEF NOTE    EVALUATION OF THE PROGRESS TOWARD GOALS   Diet: Moderate Consistent Carbohydrate   Intake: Poor   Pt refusing meals, eating x1-2 meals/day  Pt meeting <75% estimated nutrition needs x4-5 days.      NEW FINDINGS   Met with pt at bedside this AM. Pt reports consuming x1 meal yesterday 8/22- beef pot roast, turkey breast, toast. Pt reports abdominal fullness following meal, states she still feels full. Pt declined breakfast this AM or scheduled nutrition supplements. Pt taking miralax with coffee. Constipation ongoing.     Edema: Trace BLE   BM: last noted 8/18 per pt report   Most Recent Weight: 76.4 kg (168 lb 6.4 oz)    Weight History: 5.7% wt loss x1 year, not clinically significant   08/21/24          76.4 kg (168 lb 6.4 oz)   08/19/24          75.6 kg (166 lb 9.6 oz)   05/30/24          74.5 kg (164 lb 3.9 oz)   10/24/23          81.1 kg (178 lb 11.2 oz)   08/21/23          81.1 kg (178 lb 11.2 oz)     Dosing Weight: 61.7 kg adjusted wt     ASSESSED NUTRITION NEEDS  Estimated Energy Needs: 3384-4027 kcals/day (25 - 30+ kcals/kg)  Justification: Increased needs  Estimated Protein Needs: 61-74 grams protein/day (1 - 1.2 grams of pro/kg)  Justification: Increased needs  Estimated Fluid Needs: 3108-4446 mL/day (25 - 30 mL/kg)   Justification: Maintenance    MALNUTRITION  Malnutrition Diagnosis: Patient does not meet two of the criteria necessary for diagnosing malnutrition     INTERVENTIONS  Implementation  Recommend continue to adjust bowel regimen with ongoing constipation     Pt to order ONS Glucerna PRN     Goals  Pt to meet >75% nutritional needs - Not Met   Electrolytes WNL - Met  BG < 180  - Met     Monitoring/Evaluation  Progress toward goals will be monitored and evaluated per protocol.

## 2024-08-23 NOTE — PLAN OF CARE
Occupational Therapy Discharge Summary    Reason for therapy discharge:    All goals and outcomes met, no further needs identified.    Progress towards therapy goal(s). See goals on Care Plan in [facility] electronic health record for goal details.  Goals met    Therapy recommendation(s):    Continued therapy is recommended.  Rationale/Recommendations:  outpatient pulmonary rehab.

## 2024-08-23 NOTE — PROGRESS NOTES
[facility]    Medicine Progress Note - Hospitalist Service    Date of Admission:  8/19/2024    Assessment & Plan     64 year old female with history of COPD, Lung cancer s/p radiation, PVD, hx of diverticular disease and sigmoid stricture s/p primary anastomosis w/ diverting ileostomy s/p take down (10/2023), DMII, HLD, ANDREW on CPAP, HTN, chronic pain and tobacco abuse admitted on 8/19/2024 with COPD exacerbation and possible CAP.        Acute hypoxemic respiratory failure secondary to COPD exacerbation and possible community acquired pneumonia.  -CT chest negative for PE, no infiltrates, shows emphysema with airway thickening, retained secretions.  -COVID/FLU/RSV negative  -Group A strep negative   -Urine legionella Ag- negative  -sputum culture normal jae  -nt-, Procalcitonin <0.02  -Stop IV CTX  -start Cefdininir 300mg BID x 6 more days  -Azithromycin 250mg x 2 more days  -Prednisone 40mg every day with slow taper on discharge  -continue Duonebs q4h  -Budesonide nebs BID   -Singulair  -smoking cessation  -will likely require home O2 on discharge based on home O2 screening assessment from today    #H/O lung cancer   -Presumed NSCLC the RLL, [id number], stage IA2   -S/p radiation as deemed non-operable.   -Follow up with Oncology as previously planned outpatient    #ANDREW  -non-compliant with CPAP as reportedly worsens chronic trigeminal neuralgia     #DM2 with steroid induced hyperglycemia  #Peripheral neuropathy  -A1C 6%  -hold home metformin   -novolog 1U:10g CHO AC TID  -medium intensity sliding scale insulin  -glucose checks AC/HS  -continue home gabapentin       #HTN  -diltiazem, atenolol and lasix     #Severe coronary calcification on CT  -No chest pain  -recommend outpatient cardiology referral and consideration for stress testing once acute illness resolved  -ASA 81mg every day, Lipitor 40mg every day, Atenolol    #PVD  -statin and ASA    #Hypomagnesemia  -monitor and replete  accordingly    #Dysuria:  -check UA    #Tobacco dependence    -nicotine patch  -cessation strongly advised     #Constipation  -bowel regimen          Diet: Moderate Consistent Carb (60 g CHO per Meal) Diet    DVT Prophylaxis: Enoxaparin (Lovenox) SQ  Cunningham Catheter: Not present  Lines: None     Cardiac Monitoring: None  Code Status: No CPR- Do NOT Intubate      Clinically Significant Risk Factors                  # Hypertension: Noted on problem list               # Financial/Environmental Concerns: none               Disposition Plan     Medically Ready for Discharge: Anticipated in 2-4 Days             Kayode Lopes DO, DO  Hospitalist Service  [facility]  Securely message with [employer] (more info)  Text page via [employer] Paging/Directory   ______________________________________________________________________    Interval History   Afebrile. Events overnight noted.    Physical Exam   Vital Signs: Temp: 97.6  F (36.4  C) Temp src: Oral BP: 132/63 Pulse: 60   Resp: 20 SpO2: 94 % O2 Device: Nasal cannula Oxygen Delivery: 2 LPM  Weight: 168 lbs 6.4 oz    Physical Examination:   General appearance - nad  Eyes - sclera anicteric  Mouth - mucous membranes moist, pharynx normal without lesions  Lungs - diffusely decreased throughout, faint wheezing, pauses for breath after 1 complete sentence  Heart - normal rate, regular rhythm, normal S1, S2, no murmurs, rubs, clicks or gallops. No peripheral edema.  Abdomen - soft, nontender, nondistended, BS+  Neurological - a&ox3, no acute neurological changes  Skin - no c/c/p    Lab/imaging reviewed

## 2024-08-24 LAB
ANION GAP SERPL CALCULATED.3IONS-SCNC: 9 MMOL/L (ref 7–15)
BASOPHILS # BLD AUTO: 0.1 10E3/UL (ref 0–0.2)
BASOPHILS NFR BLD AUTO: 1 %
BUN SERPL-MCNC: 19.5 MG/DL (ref 8–23)
CALCIUM SERPL-MCNC: 8.5 MG/DL (ref 8.8–10.4)
CHLORIDE SERPL-SCNC: 100 MMOL/L (ref 98–107)
CREAT SERPL-MCNC: 0.64 MG/DL (ref 0.51–0.95)
EGFRCR SERPLBLD CKD-EPI 2021: >90 ML/MIN/1.73M2
EOSINOPHIL # BLD AUTO: 1.5 10E3/UL (ref 0–0.7)
EOSINOPHIL NFR BLD AUTO: 13 %
ERYTHROCYTE [DISTWIDTH] IN BLOOD BY AUTOMATED COUNT: 13.2 % (ref 10–15)
GLUCOSE BLDC GLUCOMTR-MCNC: 112 MG/DL (ref 70–99)
GLUCOSE BLDC GLUCOMTR-MCNC: 112 MG/DL (ref 70–99)
GLUCOSE BLDC GLUCOMTR-MCNC: 123 MG/DL (ref 70–99)
GLUCOSE BLDC GLUCOMTR-MCNC: 139 MG/DL (ref 70–99)
GLUCOSE BLDC GLUCOMTR-MCNC: 154 MG/DL (ref 70–99)
GLUCOSE SERPL-MCNC: 117 MG/DL (ref 70–99)
HCO3 SERPL-SCNC: 28 MMOL/L (ref 22–29)
HCT VFR BLD AUTO: 37.2 % (ref 35–47)
HGB BLD-MCNC: 12.3 G/DL (ref 11.7–15.7)
IMM GRANULOCYTES # BLD: 0.3 10E3/UL
IMM GRANULOCYTES NFR BLD: 2 %
LYMPHOCYTES # BLD AUTO: 2.3 10E3/UL (ref 0.8–5.3)
LYMPHOCYTES NFR BLD AUTO: 20 %
MAGNESIUM SERPL-MCNC: 2 MG/DL (ref 1.7–2.3)
MCH RBC QN AUTO: 28.9 PG (ref 26.5–33)
MCHC RBC AUTO-ENTMCNC: 33.1 G/DL (ref 31.5–36.5)
MCV RBC AUTO: 88 FL (ref 78–100)
MONOCYTES # BLD AUTO: 1 10E3/UL (ref 0–1.3)
MONOCYTES NFR BLD AUTO: 9 %
NEUTROPHILS # BLD AUTO: 6.2 10E3/UL (ref 1.6–8.3)
NEUTROPHILS NFR BLD AUTO: 55 %
NRBC # BLD AUTO: 0 10E3/UL
NRBC BLD AUTO-RTO: 0 /100
PLAT MORPH BLD: NORMAL
PLATELET # BLD AUTO: 231 10E3/UL (ref 150–450)
POTASSIUM SERPL-SCNC: 3.8 MMOL/L (ref 3.4–5.3)
RBC # BLD AUTO: 4.25 10E6/UL (ref 3.8–5.2)
RBC MORPH BLD: NORMAL
SODIUM SERPL-SCNC: 137 MMOL/L (ref 135–145)
WBC # BLD AUTO: 11.3 10E3/UL (ref 4–11)

## 2024-08-24 PROCEDURE — 36415 COLL VENOUS BLD VENIPUNCTURE: CPT | Performed by: INTERNAL MEDICINE

## 2024-08-24 PROCEDURE — 99232 SBSQ HOSP IP/OBS MODERATE 35: CPT | Performed by: INTERNAL MEDICINE

## 2024-08-24 PROCEDURE — 94640 AIRWAY INHALATION TREATMENT: CPT | Mod: 76

## 2024-08-24 PROCEDURE — 85025 COMPLETE CBC W/AUTO DIFF WBC: CPT | Performed by: INTERNAL MEDICINE

## 2024-08-24 PROCEDURE — 80048 BASIC METABOLIC PNL TOTAL CA: CPT | Performed by: INTERNAL MEDICINE

## 2024-08-24 PROCEDURE — 999N000157 HC STATISTIC RCP TIME EA 10 MIN

## 2024-08-24 PROCEDURE — 120N000001 HC R&B MED SURG/OB

## 2024-08-24 PROCEDURE — 250N000013 HC RX MED GY IP 250 OP 250 PS 637: Performed by: INTERNAL MEDICINE

## 2024-08-24 PROCEDURE — 94640 AIRWAY INHALATION TREATMENT: CPT

## 2024-08-24 PROCEDURE — 250N000011 HC RX IP 250 OP 636: Performed by: INTERNAL MEDICINE

## 2024-08-24 PROCEDURE — 250N000013 HC RX MED GY IP 250 OP 250 PS 637: Performed by: PHYSICIAN ASSISTANT

## 2024-08-24 PROCEDURE — 83735 ASSAY OF MAGNESIUM: CPT | Performed by: INTERNAL MEDICINE

## 2024-08-24 PROCEDURE — 250N000013 HC RX MED GY IP 250 OP 250 PS 637: Performed by: STUDENT IN AN ORGANIZED HEALTH CARE EDUCATION/TRAINING PROGRAM

## 2024-08-24 PROCEDURE — 250N000009 HC RX 250: Performed by: STUDENT IN AN ORGANIZED HEALTH CARE EDUCATION/TRAINING PROGRAM

## 2024-08-24 PROCEDURE — 94799 UNLISTED PULMONARY SVC/PX: CPT

## 2024-08-24 PROCEDURE — 250N000012 HC RX MED GY IP 250 OP 636 PS 637: Performed by: INTERNAL MEDICINE

## 2024-08-24 RX ORDER — AMOXICILLIN 250 MG
2 CAPSULE ORAL 2 TIMES DAILY
Status: DISCONTINUED | OUTPATIENT
Start: 2024-08-24 | End: 2024-08-26 | Stop reason: HOSPADM

## 2024-08-24 RX ORDER — BISACODYL 10 MG
10 SUPPOSITORY, RECTAL RECTAL DAILY PRN
Status: DISCONTINUED | OUTPATIENT
Start: 2024-08-24 | End: 2024-08-26 | Stop reason: HOSPADM

## 2024-08-24 RX ORDER — SODIUM PHOSPHATE,MONO-DIBASIC 19G-7G/118
1 ENEMA (ML) RECTAL DAILY PRN
Status: DISCONTINUED | OUTPATIENT
Start: 2024-08-24 | End: 2024-08-26 | Stop reason: HOSPADM

## 2024-08-24 RX ORDER — POLYETHYLENE GLYCOL 3350 17 G/17G
17 POWDER, FOR SOLUTION ORAL 2 TIMES DAILY
Status: DISCONTINUED | OUTPATIENT
Start: 2024-08-24 | End: 2024-08-26 | Stop reason: HOSPADM

## 2024-08-24 RX ADMIN — PREDNISONE 40 MG: 20 TABLET ORAL at 08:16

## 2024-08-24 RX ADMIN — POLYETHYLENE GLYCOL 3350 17 G: 17 POWDER, FOR SOLUTION ORAL at 08:18

## 2024-08-24 RX ADMIN — ASPIRIN 81 MG: 81 TABLET, COATED ORAL at 08:18

## 2024-08-24 RX ADMIN — MONTELUKAST 10 MG: 10 TABLET, FILM COATED ORAL at 21:17

## 2024-08-24 RX ADMIN — DILTIAZEM HYDROCHLORIDE 120 MG: 60 CAPSULE, EXTENDED RELEASE ORAL at 21:16

## 2024-08-24 RX ADMIN — SENNOSIDES AND DOCUSATE SODIUM 2 TABLET: 8.6; 5 TABLET ORAL at 21:17

## 2024-08-24 RX ADMIN — BUDESONIDE INHALATION 0.5 MG: 0.5 SUSPENSION RESPIRATORY (INHALATION) at 19:55

## 2024-08-24 RX ADMIN — DILTIAZEM HYDROCHLORIDE 120 MG: 60 CAPSULE, EXTENDED RELEASE ORAL at 08:19

## 2024-08-24 RX ADMIN — IPRATROPIUM BROMIDE AND ALBUTEROL SULFATE 3 ML: 2.5; .5 SOLUTION RESPIRATORY (INHALATION) at 00:09

## 2024-08-24 RX ADMIN — IPRATROPIUM BROMIDE AND ALBUTEROL SULFATE 3 ML: 2.5; .5 SOLUTION RESPIRATORY (INHALATION) at 04:21

## 2024-08-24 RX ADMIN — CEFDINIR 300 MG: 300 CAPSULE ORAL at 21:17

## 2024-08-24 RX ADMIN — PANTOPRAZOLE SODIUM 40 MG: 40 TABLET, DELAYED RELEASE ORAL at 06:30

## 2024-08-24 RX ADMIN — IPRATROPIUM BROMIDE AND ALBUTEROL SULFATE 3 ML: 2.5; .5 SOLUTION RESPIRATORY (INHALATION) at 08:56

## 2024-08-24 RX ADMIN — ATORVASTATIN CALCIUM 40 MG: 40 TABLET, FILM COATED ORAL at 21:17

## 2024-08-24 RX ADMIN — IPRATROPIUM BROMIDE AND ALBUTEROL SULFATE 3 ML: 2.5; .5 SOLUTION RESPIRATORY (INHALATION) at 23:24

## 2024-08-24 RX ADMIN — SENNOSIDES AND DOCUSATE SODIUM 2 TABLET: 8.6; 5 TABLET ORAL at 12:21

## 2024-08-24 RX ADMIN — IPRATROPIUM BROMIDE AND ALBUTEROL SULFATE 3 ML: 2.5; .5 SOLUTION RESPIRATORY (INHALATION) at 15:56

## 2024-08-24 RX ADMIN — POLYETHYLENE GLYCOL 3350 17 G: 17 POWDER, FOR SOLUTION ORAL at 21:17

## 2024-08-24 RX ADMIN — ENOXAPARIN SODIUM 40 MG: 40 INJECTION SUBCUTANEOUS at 19:12

## 2024-08-24 RX ADMIN — CEFDINIR 300 MG: 300 CAPSULE ORAL at 08:17

## 2024-08-24 RX ADMIN — NICOTINE 1 PATCH: 21 PATCH, EXTENDED RELEASE TRANSDERMAL at 08:18

## 2024-08-24 RX ADMIN — DULOXETINE HYDROCHLORIDE 60 MG: 60 CAPSULE, DELAYED RELEASE PELLETS ORAL at 08:17

## 2024-08-24 RX ADMIN — IPRATROPIUM BROMIDE AND ALBUTEROL SULFATE 3 ML: 2.5; .5 SOLUTION RESPIRATORY (INHALATION) at 12:38

## 2024-08-24 RX ADMIN — IPRATROPIUM BROMIDE AND ALBUTEROL SULFATE 3 ML: 2.5; .5 SOLUTION RESPIRATORY (INHALATION) at 19:55

## 2024-08-24 RX ADMIN — AZITHROMYCIN DIHYDRATE 250 MG: 250 TABLET, FILM COATED ORAL at 08:19

## 2024-08-24 RX ADMIN — BUDESONIDE INHALATION 0.5 MG: 0.5 SUSPENSION RESPIRATORY (INHALATION) at 08:56

## 2024-08-24 ASSESSMENT — ACTIVITIES OF DAILY LIVING (ADL)
ADLS_ACUITY_SCORE: 23

## 2024-08-24 NOTE — PLAN OF CARE
Problem: Adult Inpatient Plan of Care  Goal: Plan of Care Review  Description: The Plan of Care Review/Shift note should be completed every shift.  The Outcome Evaluation is a brief statement about your assessment that the patient is improving, declining, or no change.  This information will be displayed automatically on your shift  note.  Outcome: Progressing  Flowsheets (Taken 8/24/2024 2549)  Plan of Care Reviewed With: patient   Goal Outcome Evaluation:      Plan of Care Reviewed With: patient

## 2024-08-24 NOTE — PROGRESS NOTES
Assumed care for this patient on 8/23/24 at 2300. Patient is admitted inpatient with COPD with acute exacerbation.   RT following for scheduled nebs and patient also on PO prednisone.  BP has been elevated but trending down. Patient is no symptomatic.  On Magnesium protocol, labs this morning. Last magnesium value 2. UA resulted. Negative for nitrite.  This patient is diabetic.  Last blood glucose 135 mg/dl. Spot check of blood glucose at 0200.

## 2024-08-24 NOTE — PLAN OF CARE
Problem: Adult Inpatient Plan of Care  Goal: Optimal Comfort and Wellbeing  Outcome: Progressing   Goal Outcome Evaluation:         Pt. Pleasant and cooperative, denies pain, up independently in room, remains on 1L per NC sat 93% and does become sob with exertion but recovers quickly, ate well for breakfast but refused lunch, increased bowel regimen to encourage BM and pt. Accepting, mg protocol for recheck in am, will cont to monitor.

## 2024-08-24 NOTE — PROGRESS NOTES
[facility]    Medicine Progress Note - Hospitalist Service    Date of Admission:  8/19/2024    Assessment & Plan   64 year old female with history of COPD, Lung cancer s/p radiation, PVD, hx of diverticular disease and sigmoid stricture s/p primary anastomosis w/ diverting ileostomy s/p take down (10/2023), DMII, HLD, ANDREW on CPAP, HTN, chronic pain and tobacco abuse admitted on 8/19/2024 with COPD exacerbation and possible CAP.        Acute hypoxemic respiratory failure secondary to COPD exacerbation and possible community acquired pneumonia.  -CT chest negative for PE, no infiltrates, shows emphysema with airway thickening, retained secretions.  -COVID/FLU/RSV negative  -Group A strep negative   -Urine legionella Ag- negative  -sputum culture normal jae  -nt-, Procalcitonin <0.02  -Stopped IV CTX on 8/23/24  -Cefdininir 300mg BID x 5 more days  -Azithromycin 250mg x 1 more day  -Prednisone 40mg every day with slow taper on discharge  -continue Duonebs q4h  -Budesonide nebs BID   -Singulair  -smoking cessation  -reassess Home O2 screen on day of discharge  -improved to 1L PNC of O2 today. Avoid hyperoxia.    #H/O lung cancer   -Presumed NSCLC the RLL, [id number], stage IA2   -S/p radiation as deemed non-operable.   -Follow up with Oncology as previously planned outpatient    #ANDREW  -non-compliant with CPAP as reportedly worsens chronic trigeminal neuralgia     #DM2 with steroid induced hyperglycemia  #Peripheral neuropathy  -A1C 6%  -hold home metformin   -novolog 1U:10g CHO AC TID  -medium intensity sliding scale insulin  -glucose checks AC/HS  -continue home gabapentin       #HTN  -diltiazem, atenolol and lasix     #Severe coronary calcification on CT  -No chest pain  -recommend outpatient cardiology referral and consideration for stress testing once acute illness resolved  -ASA 81mg every day, Lipitor 40mg every day, Atenolol    #PVD  -statin and ASA    #Hypomagnesemia  -monitor and  replete accordingly  -Mg 2 today    #Dysuria:  -UA negative for infection    #Tobacco dependence    -nicotine patch  -cessation strongly advised     #Constipation  -bowel regimen          Diet: Moderate Consistent Carb (60 g CHO per Meal) Diet    DVT Prophylaxis: Enoxaparin (Lovenox) SQ  Cunningham Catheter: Not present  Lines: None     Cardiac Monitoring: None  Code Status: No CPR- Do NOT Intubate      Clinically Significant Risk Factors                  # Hypertension: Noted on problem list               # Financial/Environmental Concerns: none               Disposition Plan     Medically Ready for Discharge: Anticipated Tomorrow             Kayode Lopes DO, DO  Hospitalist Service  [facility]  Securely message with [employer] (more info)  Text page via [employer] Paging/Directory   ______________________________________________________________________    Interval History   Afebrile. No acute events overnight.    Physical Exam   Vital Signs: Temp: 97.3  F (36.3  C) Temp src: Oral BP: (!) 158/70 Pulse: 57   Resp: 17 SpO2: 93 % O2 Device: Nasal cannula Oxygen Delivery: 1 LPM  Weight: 168 lbs 6.4 oz    General appearance - nad  Eyes - sclera anicteric  Mouth - mucous membranes moist, pharynx normal without lesions  Lungs - diffusely decreased throughout, faint wheezing, less labored while talking compared to yesterday  Heart - rrr. No peripheral edema.  Abdomen - soft, nontender, nondistended, BS+  Neurological - a&ox3, no acute neurological changes     Labs reviewed

## 2024-08-24 NOTE — PROGRESS NOTES
Care Management Follow Up    Length of Stay (days): 5    Expected Discharge Date: 08/25/2024    Anticipated Discharge Plan:       Transportation: Anticipate Family/friend    PT Recommendations: home with assist  OT Recommendations:  home with outpatient pulmonary rehab     Barriers to Discharge: medical stability    Prior Living Situation: house with friend(s)     Patient/Spokesperson Updated: No    Additional Information:  Chart Reviewed. Plan is for Pt to discharge home when medically ready. Pt lives with her friend and has her son to assist her as needed after discharge. Therapy recommends outpatient pulmonary rehab. Home O2 evaluation needed on date of discharge. Per MD, not medically ready today but possible discharge tomorrow.     CM will continue to follow Pt's medical progression for Care Team recommendations and assist with discharge planning as needed.      SERGIO Roche

## 2024-08-24 NOTE — PLAN OF CARE
Problem: Pneumonia  Goal: Fluid Balance  Outcome: Progressing   Goal Outcome Evaluation:       Pt alert and oriented. Reports dyspnea on exertion. On oxygen at 1 LPM. Denies pain. Prn Miralax given for constipation. Will continue to monitor.

## 2024-08-25 LAB
ANION GAP SERPL CALCULATED.3IONS-SCNC: 12 MMOL/L (ref 7–15)
BASOPHILS # BLD AUTO: 0.1 10E3/UL (ref 0–0.2)
BASOPHILS NFR BLD AUTO: 1 %
BUN SERPL-MCNC: 14.2 MG/DL (ref 8–23)
CALCIUM SERPL-MCNC: 8.6 MG/DL (ref 8.8–10.4)
CHLORIDE SERPL-SCNC: 99 MMOL/L (ref 98–107)
CREAT SERPL-MCNC: 0.59 MG/DL (ref 0.51–0.95)
EGFRCR SERPLBLD CKD-EPI 2021: >90 ML/MIN/1.73M2
EOSINOPHIL # BLD AUTO: 0 10E3/UL (ref 0–0.7)
EOSINOPHIL NFR BLD AUTO: 0 %
ERYTHROCYTE [DISTWIDTH] IN BLOOD BY AUTOMATED COUNT: 13.2 % (ref 10–15)
GLUCOSE BLDC GLUCOMTR-MCNC: 118 MG/DL (ref 70–99)
GLUCOSE BLDC GLUCOMTR-MCNC: 211 MG/DL (ref 70–99)
GLUCOSE BLDC GLUCOMTR-MCNC: 212 MG/DL (ref 70–99)
GLUCOSE BLDC GLUCOMTR-MCNC: 85 MG/DL (ref 70–99)
GLUCOSE BLDC GLUCOMTR-MCNC: 92 MG/DL (ref 70–99)
GLUCOSE SERPL-MCNC: 97 MG/DL (ref 70–99)
HCO3 SERPL-SCNC: 28 MMOL/L (ref 22–29)
HCT VFR BLD AUTO: 45.2 % (ref 35–47)
HGB BLD-MCNC: 14.9 G/DL (ref 11.7–15.7)
IMM GRANULOCYTES # BLD: 0.3 10E3/UL
IMM GRANULOCYTES NFR BLD: 2 %
LYMPHOCYTES # BLD AUTO: 1.7 10E3/UL (ref 0.8–5.3)
LYMPHOCYTES NFR BLD AUTO: 13 %
MAGNESIUM SERPL-MCNC: 1.9 MG/DL (ref 1.7–2.3)
MCH RBC QN AUTO: 28.9 PG (ref 26.5–33)
MCHC RBC AUTO-ENTMCNC: 33 G/DL (ref 31.5–36.5)
MCV RBC AUTO: 88 FL (ref 78–100)
MONOCYTES # BLD AUTO: 0.8 10E3/UL (ref 0–1.3)
MONOCYTES NFR BLD AUTO: 6 %
NEUTROPHILS # BLD AUTO: 10.1 10E3/UL (ref 1.6–8.3)
NEUTROPHILS NFR BLD AUTO: 78 %
NRBC # BLD AUTO: 0 10E3/UL
NRBC BLD AUTO-RTO: 0 /100
PLATELET # BLD AUTO: 292 10E3/UL (ref 150–450)
POTASSIUM SERPL-SCNC: 4.1 MMOL/L (ref 3.4–5.3)
RBC # BLD AUTO: 5.16 10E6/UL (ref 3.8–5.2)
SODIUM SERPL-SCNC: 139 MMOL/L (ref 135–145)
WBC # BLD AUTO: 13 10E3/UL (ref 4–11)

## 2024-08-25 PROCEDURE — 999N000157 HC STATISTIC RCP TIME EA 10 MIN

## 2024-08-25 PROCEDURE — 250N000013 HC RX MED GY IP 250 OP 250 PS 637: Performed by: INTERNAL MEDICINE

## 2024-08-25 PROCEDURE — 83735 ASSAY OF MAGNESIUM: CPT | Performed by: INTERNAL MEDICINE

## 2024-08-25 PROCEDURE — 120N000001 HC R&B MED SURG/OB

## 2024-08-25 PROCEDURE — 94799 UNLISTED PULMONARY SVC/PX: CPT

## 2024-08-25 PROCEDURE — 94640 AIRWAY INHALATION TREATMENT: CPT | Mod: 76

## 2024-08-25 PROCEDURE — 85025 COMPLETE CBC W/AUTO DIFF WBC: CPT | Performed by: INTERNAL MEDICINE

## 2024-08-25 PROCEDURE — 250N000009 HC RX 250: Performed by: STUDENT IN AN ORGANIZED HEALTH CARE EDUCATION/TRAINING PROGRAM

## 2024-08-25 PROCEDURE — 80048 BASIC METABOLIC PNL TOTAL CA: CPT | Performed by: INTERNAL MEDICINE

## 2024-08-25 PROCEDURE — 250N000013 HC RX MED GY IP 250 OP 250 PS 637: Performed by: STUDENT IN AN ORGANIZED HEALTH CARE EDUCATION/TRAINING PROGRAM

## 2024-08-25 PROCEDURE — 250N000011 HC RX IP 250 OP 636: Performed by: INTERNAL MEDICINE

## 2024-08-25 PROCEDURE — 250N000013 HC RX MED GY IP 250 OP 250 PS 637: Performed by: PHYSICIAN ASSISTANT

## 2024-08-25 PROCEDURE — 99232 SBSQ HOSP IP/OBS MODERATE 35: CPT | Performed by: INTERNAL MEDICINE

## 2024-08-25 PROCEDURE — 36415 COLL VENOUS BLD VENIPUNCTURE: CPT | Performed by: INTERNAL MEDICINE

## 2024-08-25 PROCEDURE — 94640 AIRWAY INHALATION TREATMENT: CPT

## 2024-08-25 PROCEDURE — 250N000012 HC RX MED GY IP 250 OP 636 PS 637: Performed by: INTERNAL MEDICINE

## 2024-08-25 RX ORDER — LOSARTAN POTASSIUM 50 MG/1
50 TABLET ORAL DAILY
Status: DISCONTINUED | OUTPATIENT
Start: 2024-08-25 | End: 2024-08-26 | Stop reason: HOSPADM

## 2024-08-25 RX ORDER — IPRATROPIUM BROMIDE AND ALBUTEROL SULFATE 2.5; .5 MG/3ML; MG/3ML
3 SOLUTION RESPIRATORY (INHALATION) 4 TIMES DAILY
Status: DISCONTINUED | OUTPATIENT
Start: 2024-08-26 | End: 2024-08-26

## 2024-08-25 RX ADMIN — BUDESONIDE INHALATION 0.5 MG: 0.5 SUSPENSION RESPIRATORY (INHALATION) at 19:31

## 2024-08-25 RX ADMIN — LOSARTAN POTASSIUM 50 MG: 50 TABLET, FILM COATED ORAL at 12:24

## 2024-08-25 RX ADMIN — MONTELUKAST 10 MG: 10 TABLET, FILM COATED ORAL at 21:00

## 2024-08-25 RX ADMIN — DULOXETINE HYDROCHLORIDE 60 MG: 60 CAPSULE, DELAYED RELEASE PELLETS ORAL at 08:54

## 2024-08-25 RX ADMIN — IPRATROPIUM BROMIDE AND ALBUTEROL SULFATE 3 ML: 2.5; .5 SOLUTION RESPIRATORY (INHALATION) at 11:14

## 2024-08-25 RX ADMIN — POLYETHYLENE GLYCOL 3350 17 G: 17 POWDER, FOR SOLUTION ORAL at 09:02

## 2024-08-25 RX ADMIN — BUDESONIDE INHALATION 0.5 MG: 0.5 SUSPENSION RESPIRATORY (INHALATION) at 08:04

## 2024-08-25 RX ADMIN — CEFDINIR 300 MG: 300 CAPSULE ORAL at 08:54

## 2024-08-25 RX ADMIN — SENNOSIDES AND DOCUSATE SODIUM 2 TABLET: 8.6; 5 TABLET ORAL at 08:54

## 2024-08-25 RX ADMIN — ENOXAPARIN SODIUM 40 MG: 40 INJECTION SUBCUTANEOUS at 17:00

## 2024-08-25 RX ADMIN — PANTOPRAZOLE SODIUM 40 MG: 40 TABLET, DELAYED RELEASE ORAL at 08:55

## 2024-08-25 RX ADMIN — DILTIAZEM HYDROCHLORIDE 120 MG: 60 CAPSULE, EXTENDED RELEASE ORAL at 08:55

## 2024-08-25 RX ADMIN — IPRATROPIUM BROMIDE AND ALBUTEROL SULFATE 3 ML: 2.5; .5 SOLUTION RESPIRATORY (INHALATION) at 19:31

## 2024-08-25 RX ADMIN — IPRATROPIUM BROMIDE AND ALBUTEROL SULFATE 3 ML: 2.5; .5 SOLUTION RESPIRATORY (INHALATION) at 03:19

## 2024-08-25 RX ADMIN — ASPIRIN 81 MG: 81 TABLET, COATED ORAL at 08:55

## 2024-08-25 RX ADMIN — ATORVASTATIN CALCIUM 40 MG: 40 TABLET, FILM COATED ORAL at 21:00

## 2024-08-25 RX ADMIN — BISACODYL 10 MG: 10 SUPPOSITORY RECTAL at 16:20

## 2024-08-25 RX ADMIN — ATENOLOL 25 MG: 25 TABLET ORAL at 08:54

## 2024-08-25 RX ADMIN — DILTIAZEM HYDROCHLORIDE 120 MG: 60 CAPSULE, EXTENDED RELEASE ORAL at 21:00

## 2024-08-25 RX ADMIN — IPRATROPIUM BROMIDE AND ALBUTEROL SULFATE 3 ML: 2.5; .5 SOLUTION RESPIRATORY (INHALATION) at 16:41

## 2024-08-25 RX ADMIN — CEFDINIR 300 MG: 300 CAPSULE ORAL at 21:00

## 2024-08-25 RX ADMIN — NICOTINE 1 PATCH: 21 PATCH, EXTENDED RELEASE TRANSDERMAL at 09:06

## 2024-08-25 RX ADMIN — PREDNISONE 40 MG: 20 TABLET ORAL at 08:54

## 2024-08-25 ASSESSMENT — ACTIVITIES OF DAILY LIVING (ADL)
ADLS_ACUITY_SCORE: 23

## 2024-08-25 NOTE — PLAN OF CARE
Problem: Adult Inpatient Plan of Care  Goal: Absence of Hospital-Acquired Illness or Injury  Intervention: Prevent and Manage VTE (Venous Thromboembolism) Risk  Recent Flowsheet Documentation  Taken 8/25/2024 0015 by Fariba Julio RN  VTE Prevention/Management: SCDs off (sequential compression devices)  Intervention: Prevent Infection  Recent Flowsheet Documentation  Taken 8/25/2024 0015 by Fariba Julio RN  Infection Prevention: hand hygiene promoted     Problem: Pneumonia  Goal: Effective Oxygenation and Ventilation  Intervention: Promote Airway Secretion Clearance  Recent Flowsheet Documentation  Taken 8/25/2024 0015 by Fariba Julio RN  Cough And Deep Breathing: done independently per patient   Goal Outcome Evaluation:  Patient is A &O x4, VSS and afebrile. Patient BP levated but, trending  down with deep breathing and lavander oil. She is independent in the room.

## 2024-08-25 NOTE — PLAN OF CARE
Problem: Adult Inpatient Plan of Care  Goal: Absence of Hospital-Acquired Illness or Injury  Intervention: Prevent Infection  Recent Flowsheet Documentation  Taken 8/25/2024 0849 by Delmy Galvan RN  Infection Prevention: hand hygiene promoted     Problem: Comorbidity Management  Goal: Maintenance of COPD Symptom Control  Intervention: Maintain COPD Symptom Control  Recent Flowsheet Documentation  Taken 8/25/2024 0849 by Delmy Galvan RN  Medication Review/Management: medications reviewed   Goal Outcome Evaluation: Pt alert and oriented, denied pain, elevated BP, BP medication given, blood sugar at 85 in the morning , one cup apple juice given. BS at noon 118, On 2 litre's oxygen, O2 SATs above 90%.

## 2024-08-25 NOTE — PROGRESS NOTES
[facility]    Medicine Progress Note - Hospitalist Service    Date of Admission:  8/19/2024    Assessment & Plan   64 year old female with history of COPD, Lung cancer s/p radiation, PVD, hx of diverticular disease and sigmoid stricture s/p primary anastomosis w/ diverting ileostomy s/p take down (10/2023), DMII, HLD, ANDREW on CPAP, HTN, chronic pain and tobacco abuse admitted on 8/19/2024 with COPD exacerbation and possible CAP.        Acute hypoxemic respiratory failure secondary to COPD exacerbation and possible community acquired pneumonia.  -CT chest negative for PE, no infiltrates, shows emphysema with airway thickening, retained secretions.  -COVID/FLU/RSV negative  -Group A strep negative   -Urine legionella Ag- negative  -sputum culture normal jae  -nt-, Procalcitonin <0.02  -Stopped IV CTX on 8/23/24  -Cefdininir 300mg BID x 4 more days  -Azithromycin 250mg x 1 more day  -Prednisone 40mg every day with slow taper on discharge  -continue Duonebs q4h  -Budesonide nebs BID   -Singulair  -smoking cessation  -reassess Home O2 screen on day of discharge  -Supplemental O2 prn.  Avoid hyperoxia.    #H/O lung cancer   -Presumed NSCLC the RLL, [id number], stage IA2   -S/p radiation as deemed non-operable.   -Follow up with Oncology as previously planned outpatient    #ANDREW  -non-compliant with CPAP as reportedly worsens chronic trigeminal neuralgia     #DM2 with steroid induced hyperglycemia  #Peripheral neuropathy  -A1C 6%  -hold home metformin   -novolog 1U:10g CHO AC TID  -medium intensity sliding scale insulin  -glucose checks AC/HS  -continue home gabapentin       #Hypertensive urgency  -diltiazem, and lasix  -stop Atenolol (no indication for dual AV noel blocking drugs  -start Losartan 50mg daily today  -Hydralazine prn  -monitor closely and adjust meds accordingly     #Severe coronary calcification on CT  -No chest pain  -recommend outpatient cardiology referral and consideration  for stress testing once acute illness resolved  -ASA 81mg every day, Lipitor 40mg every day, Atenolol    #PVD  -statin and ASA    #Hypomagnesemia  -monitor and replete accordingly  -Mg 1.9 today    #Dysuria:  -UA negative for infection    #Tobacco dependence    -nicotine patch  -cessation strongly advised     #Constipation  -bowel regimen          Diet: Moderate Consistent Carb (60 g CHO per Meal) Diet    DVT Prophylaxis: Enoxaparin (Lovenox) SQ  Cunningham Catheter: Not present  Lines: None     Cardiac Monitoring: None  Code Status: No CPR- Do NOT Intubate      Clinically Significant Risk Factors                  # Hypertension: Noted on problem list               # Financial/Environmental Concerns: none               Disposition Plan     Medically Ready for Discharge: Anticipated Tomorrow Home with outpatient Pulm Rehab per PT/OT assessments.             Kayode Lopes DO, DO  Hospitalist Service  [facility]  Securely message with [employer] (more info)  Text page via [employer] Paging/Directory   ______________________________________________________________________    Interval History   Afebrile. Sob continues to slowly improve and O2 needs decreasing to 1L PNC with sPO2 in upper 90's. BP's high. No acute neuro changes or symptoms. No CP, SOB, abd pain, n/v.    Physical Exam   Vital Signs: Temp: 98  F (36.7  C) Temp src: Oral BP: (!) 172/78 Pulse: 56   Resp: 18 SpO2: 90 % O2 Device: None (Room air) Oxygen Delivery: 1 LPM  Weight: 168 lbs 6.4 oz    General appearance - nad  Eyes - sclera anicteric  Mouth - mucous membranes moist, pharynx normal without lesions  Lungs - diffusely decreased throughout, no wheezing, no pauses after complete sentence  Heart - rrr. No peripheral edema.  Abdomen - soft, nontender, nondistended, BS+  Neurological - a&ox3, no acute neurological changes     Labs reviewed

## 2024-08-25 NOTE — PLAN OF CARE
Problem: Gas Exchange Impaired  Goal: Optimal Gas Exchange  Outcome: Progressing     Problem: Pneumonia  Goal: Effective Oxygenation and Ventilation  Intervention: Promote Airway Secretion Clearance  Recent Flowsheet Documentation  Taken 8/24/2024 1600 by Rosendo Barrera RN  Cough And Deep Breathing: done independently per patient   Goal Outcome Evaluation:                      Patient A and O times 4. Independent in her room. On 2 liter O2 via nasal canula. Patient verbalized no pain this shift. [facility] protocol, recheck in morning.  Blood sugars 154 before dinner and 139 at HS. No bowel movement this shift. Miralax and senna given. Results pending.

## 2024-08-25 NOTE — PLAN OF CARE
Problem: Adult Inpatient Plan of Care  Goal: Absence of Hospital-Acquired Illness or Injury  Intervention: Identify and Manage Fall Risk  Recent Flowsheet Documentation  Taken 8/25/2024 0849 by Delmy Galvan RN  Safety Promotion/Fall Prevention: room door open     Problem: Adult Inpatient Plan of Care  Goal: Absence of Hospital-Acquired Illness or Injury  Intervention: Prevent Skin Injury  Recent Flowsheet Documentation  Taken 8/25/2024 0849 by Delmy Galvan RN  Body Position: position changed independently   Goal Outcome Evaluation: Pt refused lunch, said she is not hungry. Carb count not done, coverage skipped.

## 2024-08-26 ENCOUNTER — APPOINTMENT (OUTPATIENT)
Dept: PHYSICAL THERAPY | Facility: HOSPITAL | Age: 64
DRG: 190 | End: 2024-08-26
Payer: MEDICARE

## 2024-08-26 VITALS
HEART RATE: 55 BPM | TEMPERATURE: 97.9 F | BODY MASS INDEX: 28.02 KG/M2 | SYSTOLIC BLOOD PRESSURE: 149 MMHG | DIASTOLIC BLOOD PRESSURE: 74 MMHG | OXYGEN SATURATION: 93 % | WEIGHT: 168.4 LBS | RESPIRATION RATE: 18 BRPM

## 2024-08-26 LAB
GLUCOSE BLDC GLUCOMTR-MCNC: 85 MG/DL (ref 70–99)
HOLD SPECIMEN: NORMAL
MAGNESIUM SERPL-MCNC: 1.9 MG/DL (ref 1.7–2.3)

## 2024-08-26 PROCEDURE — 999N000157 HC STATISTIC RCP TIME EA 10 MIN

## 2024-08-26 PROCEDURE — 94640 AIRWAY INHALATION TREATMENT: CPT | Mod: 76

## 2024-08-26 PROCEDURE — 99239 HOSP IP/OBS DSCHRG MGMT >30: CPT | Performed by: INTERNAL MEDICINE

## 2024-08-26 PROCEDURE — 94640 AIRWAY INHALATION TREATMENT: CPT

## 2024-08-26 PROCEDURE — 83735 ASSAY OF MAGNESIUM: CPT | Performed by: INTERNAL MEDICINE

## 2024-08-26 PROCEDURE — 250N000009 HC RX 250: Performed by: STUDENT IN AN ORGANIZED HEALTH CARE EDUCATION/TRAINING PROGRAM

## 2024-08-26 PROCEDURE — 250N000009 HC RX 250: Performed by: INTERNAL MEDICINE

## 2024-08-26 PROCEDURE — 250N000013 HC RX MED GY IP 250 OP 250 PS 637: Performed by: INTERNAL MEDICINE

## 2024-08-26 PROCEDURE — 250N000009 HC RX 250

## 2024-08-26 PROCEDURE — 36415 COLL VENOUS BLD VENIPUNCTURE: CPT | Performed by: INTERNAL MEDICINE

## 2024-08-26 PROCEDURE — 250N000013 HC RX MED GY IP 250 OP 250 PS 637: Performed by: PHYSICIAN ASSISTANT

## 2024-08-26 PROCEDURE — 94799 UNLISTED PULMONARY SVC/PX: CPT

## 2024-08-26 PROCEDURE — 97116 GAIT TRAINING THERAPY: CPT | Mod: GP

## 2024-08-26 PROCEDURE — 250N000012 HC RX MED GY IP 250 OP 636 PS 637: Performed by: INTERNAL MEDICINE

## 2024-08-26 PROCEDURE — 97110 THERAPEUTIC EXERCISES: CPT | Mod: GP

## 2024-08-26 RX ORDER — NICOTINE 21 MG/24HR
1 PATCH, TRANSDERMAL 24 HOURS TRANSDERMAL DAILY
Qty: 7 PATCH | Refills: 0 | Status: SHIPPED | OUTPATIENT
Start: 2024-08-27

## 2024-08-26 RX ORDER — LOSARTAN POTASSIUM 50 MG/1
50 TABLET ORAL DAILY
Qty: 30 TABLET | Refills: 1 | Status: SHIPPED | OUTPATIENT
Start: 2024-08-27

## 2024-08-26 RX ORDER — CEFDINIR 300 MG/1
300 CAPSULE ORAL EVERY 12 HOURS
Qty: 6 CAPSULE | Refills: 0 | Status: SHIPPED | OUTPATIENT
Start: 2024-08-26 | End: 2024-08-29

## 2024-08-26 RX ORDER — PANTOPRAZOLE SODIUM 40 MG/1
40 TABLET, DELAYED RELEASE ORAL
Qty: 30 TABLET | Refills: 0 | Status: SHIPPED | OUTPATIENT
Start: 2024-08-27

## 2024-08-26 RX ORDER — IPRATROPIUM BROMIDE AND ALBUTEROL SULFATE 2.5; .5 MG/3ML; MG/3ML
3 SOLUTION RESPIRATORY (INHALATION)
Status: DISCONTINUED | OUTPATIENT
Start: 2024-08-26 | End: 2024-08-26 | Stop reason: HOSPADM

## 2024-08-26 RX ORDER — PREDNISONE 10 MG/1
TABLET ORAL
Qty: 30 TABLET | Refills: 0 | Status: SHIPPED | OUTPATIENT
Start: 2024-08-26 | End: 2024-09-07

## 2024-08-26 RX ORDER — MONTELUKAST SODIUM 10 MG/1
10 TABLET ORAL AT BEDTIME
Qty: 30 TABLET | Refills: 0 | Status: SHIPPED | OUTPATIENT
Start: 2024-08-26

## 2024-08-26 RX ADMIN — IPRATROPIUM BROMIDE AND ALBUTEROL SULFATE 3 ML: .5; 3 SOLUTION RESPIRATORY (INHALATION) at 11:44

## 2024-08-26 RX ADMIN — IPRATROPIUM BROMIDE AND ALBUTEROL SULFATE 3 ML: .5; 3 SOLUTION RESPIRATORY (INHALATION) at 07:39

## 2024-08-26 RX ADMIN — ASPIRIN 81 MG: 81 TABLET, COATED ORAL at 09:39

## 2024-08-26 RX ADMIN — BUDESONIDE INHALATION 0.5 MG: 0.5 SUSPENSION RESPIRATORY (INHALATION) at 07:37

## 2024-08-26 RX ADMIN — LOSARTAN POTASSIUM 50 MG: 50 TABLET, FILM COATED ORAL at 09:39

## 2024-08-26 RX ADMIN — FUROSEMIDE 20 MG: 20 TABLET ORAL at 09:39

## 2024-08-26 RX ADMIN — PANTOPRAZOLE SODIUM 40 MG: 40 TABLET, DELAYED RELEASE ORAL at 09:44

## 2024-08-26 RX ADMIN — PREDNISONE 40 MG: 20 TABLET ORAL at 09:38

## 2024-08-26 RX ADMIN — DULOXETINE HYDROCHLORIDE 60 MG: 60 CAPSULE, DELAYED RELEASE PELLETS ORAL at 09:38

## 2024-08-26 RX ADMIN — CEFDINIR 300 MG: 300 CAPSULE ORAL at 09:37

## 2024-08-26 RX ADMIN — NICOTINE 1 PATCH: 21 PATCH, EXTENDED RELEASE TRANSDERMAL at 09:40

## 2024-08-26 ASSESSMENT — ACTIVITIES OF DAILY LIVING (ADL)
ADLS_ACUITY_SCORE: 23

## 2024-08-26 NOTE — PLAN OF CARE
Problem: Gas Exchange Impaired  Goal: Optimal Gas Exchange  Outcome: Progressing     Problem: Pneumonia  Goal: Effective Oxygenation and Ventilation  Intervention: Promote Airway Secretion Clearance  Recent Flowsheet Documentation  Taken 8/25/2024 1800 by Rosendo Barrera RN  Cough And Deep Breathing: done independently per patient   Goal Outcome Evaluation:                      Patient A and  O times 4. Independent in her room. Patient weaned off O2 and was on room air successfully, satting in mid to low 90s. Nearing HS, sats began to drop and patient put back in 1 liter, sats in mid 90s. Blood sugars 212 at dinner and 211 at HS. Suppository given one time for constipation. Proved effective, patient had two small bowel movements this shift. Patient refused scheduled senna and miralax.

## 2024-08-26 NOTE — PROGRESS NOTES
Care Management Discharge Note    Discharge Date: 08/26/2024       Discharge Disposition:  Home    Discharge Services:  OP Pulm rehab    Discharge DME:      Discharge Transportation: family or friend will provide      Additional Information:  PT discharging home with outpatient pulmonary rehab.     CRYSTAL LafleurW

## 2024-08-26 NOTE — SIGNIFICANT EVENT
Patient left without discharge instructions. Patient stated that her son couldn't wait. I called her at home and went over all medications that were ordered. Including stopping Atenolol. She knows to  her medications from [facility]. She states she should be able to see all of her discharge instructions on my chart. Dr. Mckeon updated. Saline lock was removed. Patient took all belongings.

## 2024-08-26 NOTE — PROGRESS NOTES
[facility] Treatment Plan    Patient Score: 5  Patient Acuity: 4    Clinical Indication for Therapy: history of bronchospasm    Therapy Ordered: Duoneb Q4 with pulmicort BID    Assessment Summary: Pt weaned to RA with sats > 90%, lung sounds clear/diminished, SOB improved, pt states breathing feels much better.  Will change Duoneb to QID and keep pulmicort at BID.  Pt would like to continue scheduled nebs at this time.     Lulu Ham, RT  8/25/2024

## 2024-08-26 NOTE — PROGRESS NOTES
Patient has been assessed for Home Oxygen needs.     Pulse oximetry (SpO2) and Oxygen flow readings:    SpO2 = 92% on room air at rest while awake.    SpO2 improved to 93% on 1 liters/minute at rest.    SpO2 = 93% on room air during activity/with exercise.    *SpO2 improved to 92% on 3 liters/minute during activity/with exercise.

## 2024-08-26 NOTE — DISCHARGE SUMMARY
[facility] MEDICINE  DISCHARGE SUMMARY     Primary Care Physician: Jo Hamilton  Admission Date: 8/19/2024   Discharge Provider: LJ Recinos Discharge Date: 8/26/2024   Diet: as below   Code Status: No CPR- Do NOT Intubate   Activity: DCACTIVITY: Activity as tolerated        Condition at Discharge: Stable     REASON FOR PRESENTATION(See Admission Note for Details)   Shortness of breath    PRINCIPAL & ACTIVE DISCHARGE DIAGNOSES     Active Problems:    COPD with acute exacerbation (H)    Dyspnea, unspecified type      PENDING LABS     Unresulted Labs Ordered in the Past 30 Days of this Admission       No orders found from 7/20/2024 to 8/20/2024.              PROCEDURES ( this hospitalization only)          RECOMMENDATIONS TO OUTPATIENT PROVIDER FOR F/U VISIT     Follow-up Appointments     Follow-up and recommended labs and tests       Follow up with primary care provider, Jo Hamilton, within 7 days   for hospital follow- up.                DISPOSITION     Home    SUMMARY OF HOSPITAL COURSE:      64 year old female with history of COPD, Lung cancer s/p radiation, PVD, hx of diverticular disease and sigmoid stricture s/p primary anastomosis w/ diverting ileostomy s/p take down (10/2023), DMII, HLD, ANDREW on CPAP, HTN, chronic pain and tobacco abuse admitted on 8/19/2024 with COPD exacerbation and possible CAP.        Acute hypoxemic respiratory failure secondary to COPD exacerbation and possible community acquired pneumonia.  -CT chest negative for PE, no infiltrates, shows emphysema with airway thickening, retained secretions.  -COVID/FLU/RSV negative  -Group A strep negative   -Urine legionella Ag- negative  -sputum culture normal jae  -nt-, Procalcitonin <0.02  -Stopped IV CTX on 8/23/24  -Cefdininir 300mg BID x 3 more days upon discharge  -Completed azithromycin course here  -Prednisone 40mg every day with slow taper on discharge  -Smoking  cessation  -Home O2 eval- didn't qualify for home O2.      #H/O lung cancer   -Presumed NSCLC the RLL, [id number], stage IA2   -S/p radiation as deemed non-operable.   -Follow up with Oncology as previously planned outpatient     #ANRDEW  -Non-compliant with CPAP as reportedly worsens chronic trigeminal neuralgia     #DM2 with steroid induced hyperglycemia  #Peripheral neuropathy  -A1C 6%  -hold home metformin   -novolog 1U:10g CHO AC TID  -medium intensity sliding scale insulin  -glucose checks AC/HS  -continue home gabapentin       #Hypertensive urgency  -diltiazem, and lasix  -stopped Atenolol (no indication for dual AV noel blocking drugs  -started Losartan 50mg daily today  -Hydralazine prn while here  -monitor closely and adjust meds accordingly     #Severe coronary calcification on CT  -No chest pain  -recommend outpatient cardiology referral and consideration for stress testing once acute illness resolved  -ASA 81mg every day, Lipitor 40mg every day, Atenolol     #PVD  -statin and ASA     #Hypomagnesemia  -monitor and replete accordingly  -Mg 1.9 today     #Dysuria:  -UA negative for infection     #Tobacco dependence    -nicotine patch  -cessation strongly advised     #Constipation  -bowel regimen    #H/O trigeminal neuralgia  -Follows up with Dr Clayton Ribeiro  -Resumed home carbamazepime and neurontin    Discharge Medications with Med changes:     Discharge Medication List as of 8/26/2024  2:20 PM        START taking these medications    Details   cefdinir (OMNICEF) 300 MG capsule Take 1 capsule (300 mg) by mouth every 12 hours for 3 days., Disp-6 capsule, R-0, E-Prescribe      losartan (COZAAR) 50 MG tablet Take 1 tablet (50 mg) by mouth daily., Disp-30 tablet, R-1, E-Prescribe      montelukast (SINGULAIR) 10 MG tablet Take 1 tablet (10 mg) by mouth at bedtime., Disp-30 tablet, R-0, E-Prescribe      nicotine (NICODERM CQ) 21 MG/24HR 24 hr patch Place 1 patch over 24 hours onto the skin daily., Disp-7 patch,  R-0, E-Prescribe      pantoprazole (PROTONIX) 40 MG EC tablet Take 1 tablet (40 mg) by mouth every morning (before breakfast)., Disp-30 tablet, R-0, E-Prescribe      predniSONE (DELTASONE) 10 MG tablet Take 4 tablets (40 mg) by mouth daily for 3 days, THEN 3 tablets (30 mg) daily for 3 days, THEN 2 tablets (20 mg) daily for 3 days, THEN 1 tablet (10 mg) daily for 3 days., Disp-30 tablet, R-0, E-Prescribe           CONTINUE these medications which have NOT CHANGED    Details   albuterol (PROAIR HFA/PROVENTIL HFA/VENTOLIN HFA) 108 (90 Base) MCG/ACT inhaler Inhale 2 puffs into the lungs every 6 hours as needed for shortness of breath, wheezing or cough, Historical      albuterol (PROVENTIL) (5 MG/ML) 0.5% neb solution 0.5 mL by nebulizer as needed, Historical      aspirin (ASA) 81 MG EC tablet 1 tab(s) Orally once a day, Historical      atorvastatin (LIPITOR) 40 MG tablet Take 40 mg by mouth daily, Historical      budeson-glycopyrrol-formoterol (BREZTRI AEROSPHERE) 160-9-4.8 MCG/ACT AERO inhaler Inhale 2 puffs into the lungs 2 times daily, Historical      diltiazem (CARDIZEM SR) 120 MG CP12 12 hr SR capsule Take 120 mg by mouth 2 times daily, Historical      DULoxetine (CYMBALTA) 60 MG capsule Take 60 mg by mouth daily, Historical      furosemide (LASIX) 20 MG tablet Take 20 mg by mouth twice a week, Historical      gabapentin (NEURONTIN) 300 MG capsule Take 600 mg by mouth every evening midday, Historical      gabapentin (NEURONTIN) 600 MG tablet Take 1,200 mg by mouth 2 times daily, Historical      ipratropium-albuteroL (DUO-NEB) 0.5-2.5 mg/3 mL nebulizer Take 3 mLs by nebulization every 6 hours as needed for shortness of breath, wheezing or cough, Historical      metFORMIN (GLUCOPHAGE XR) 500 MG 24 hr tablet Take 1 tablet (500 mg) by mouth 2 times daily (with meals), Disp-60 tablet, R-0, E-Prescribe      oxyCODONE (ROXICODONE) 5 MG tablet Take 5-10 mg by mouth every 6 hours as needed for severe pain, Historical     "  polyethylene glycol (MIRALAX) 17 GM/Dose powder Take 17 g by mouth every other day, Historical      carBAMazepine (TEGRETOL XR) 100 MG 12 hr tablet Take 100 mg by mouth 2 times daily, Historical           STOP taking these medications       atenolol (TENORMIN) 25 MG tablet Comments:   Reason for Stopping:                     Rationale for medication changes:      Please see above        Consults   None      Immunizations given this encounter     Most Recent Immunizations   Administered Date(s) Administered    Influenza (H1N1) 02/22/2010    Influenza (IIV3) PF 08/29/2012    Influenza Vaccine >6 months,quad, PF 10/16/2020    Influenza Vaccine, 6+MO IM (QUADRIVALENT W/PRESERVATIVES) 11/05/2019    Influenza, seasonal, injectable, PF 09/14/2010    Pneumo Conj 13-V (2010&after) 11/05/2019    Pneumococcal 23 valent 01/31/2014    TDAP (Adacel,Boostrix) 12/30/2010    Td (Adult), Adsorbed 10/10/2002           Anticoagulation Information      Recent INR results: No results for input(s): \"INR\" in the last 168 hours.  Warfarin doses (if applicable) or name of other anticoagulant: NA      SIGNIFICANT IMAGING FINDINGS     Results for orders placed or performed during the hospital encounter of 08/19/24   XR Chest Port 1 View    Impression    IMPRESSION: Negative chest.   CT Chest Pulmonary Embolism w Contrast    Impression    IMPRESSION:    1.  No obvious acute findings to explain symptoms.    2.  No pulmonary embolism.    3.  Emphysema. Airway thickening. Retained airway secretions.         SIGNIFICANT LABORATORY FINDINGS     Most Recent 3 CBC's:  Recent Labs   Lab Test 08/25/24  1307 08/24/24  0613 08/22/24  2349 08/20/24  0550   WBC 13.0* 11.3*  --  7.0   HGB 14.9 12.3  --  13.2   MCV 88 88  --  88    231 250 236     Most Recent 3 BMP's:  Recent Labs   Lab Test 08/26/24  0801 08/25/24  2111 08/25/24  1648 08/25/24  0604 08/25/24  0558 08/24/24  0734 08/24/24  0613 08/22/24  0742 08/22/24  0638 08/20/24  0752 " 08/20/24  0550   NA  --   --   --   --  139  --  137  --   --   --  140   POTASSIUM  --   --   --   --  4.1  --  3.8  --   --   --  3.9   CHLORIDE  --   --   --   --  99  --  100  --   --   --  103   CO2  --   --   --   --  28  --  28  --   --   --  24   BUN  --   --   --   --  14.2  --  19.5  --   --   --  9.6   CR  --   --   --   --  0.59  --  0.64  --  0.62  --  0.54   ANIONGAP  --   --   --   --  12  --  9  --   --   --  13   VANIA  --   --   --   --  8.6*  --  8.5*  --   --   --  9.0   GLC 85 211* 212*   < > 97   < > 117*   < >  --    < > 172*    < > = values in this interval not displayed.     Most Recent 2 LFT's:  Recent Labs   Lab Test 05/30/24  0539 05/29/24  1825   AST 11 16   ALT 11 12   ALKPHOS 63 71   BILITOTAL <0.2 0.3     Most Recent 3 INR's:  Recent Labs   Lab Test 12/04/23  1151 12/16/20  0725 11/25/20  0913   INR 1.04 1.09 0.99         Discharge Orders        Pulmonary Rehab  Referral      Primary Care - Care Coordination Referral      Reason for your hospital stay    Shortness of breath     Follow-up and recommended labs and tests     Follow up with primary care provider, Jo Hamilton, within 7 days for hospital follow- up.     Activity    Your activity upon discharge: activity as tolerated     Diet    Follow this diet upon discharge: Current Diet:Orders Placed This Encounter      Moderate Consistent Carb (60 g CHO per Meal) Diet       Examination   BP (!) 149/74 (BP Location: Left arm)   Pulse 55   Temp 97.9  F (36.6  C) (Oral)   Resp 18   Wt 76.4 kg (168 lb 6.4 oz)   SpO2 93%   BMI 28.02 kg/m        General: Not in obvious distress.  HEENT: NC, AT   Chest: Diminished breath sounds on auscultation bilaterally  Heart: S1S2 normal, regular. No M/R/G  Abdomen: Soft. NT, ND. Bowel sounds- active.  Extremities: No legs swelling  Neuro: Alert and awake, grossly non-focal    Please see EMR for more detailed significant labs, imaging, consultant notes etc.    I, LJ Recinos,  personally saw the patient today and spent greater than 30 minutes discharging this patient.    LJ Recinos  [facility]    CC:Jo Hamilton

## 2024-08-26 NOTE — PLAN OF CARE
Problem: Pneumonia  Goal: Fluid Balance  Outcome: Progressing  Goal: Resolution of Infection Signs and Symptoms  Outcome: Progressing  Goal: Effective Oxygenation and Ventilation  Outcome: Progressing  Intervention: Optimize Oxygenation and Ventilation  Recent Flowsheet Documentation  Taken 8/26/2024 0045 by Maya Ying RN  Head of Bed (HOB) Positioning: HOB at 30-45 degrees   Goal Outcome Evaluation:    Patient is alert and oriented and able to make needs known.  Patient complained of itching due to sweating in bed and not showering.  Provided patient with shampoo cap as well as washcloths to clean body and changed bedding.  Patient reports feeling much better after getting cleaned up.  Slept through the night.      BP (!) 146/69 (BP Location: Left arm)   Pulse 63   Temp 97.8  F (36.6  C) (Oral)   Resp 18   Wt 76.4 kg (168 lb 6.4 oz)   SpO2 96%   BMI 28.02 kg/m      Maya Ying RN

## 2024-08-26 NOTE — CONSULTS
SPIRITUAL HEALTH SERVICES Consult Note  [facility]  [address]    Saw pt Iglesia Shore per her answer that her Gnosticism beliefs are important in her care.  Initially, Iglesia didn't think she wanted to see the  but when I asked her a few questions, she opened up talked a lot.    Illness Narrative - COPD exacerbation and possible pneumonia.    Distress -  Iglesia spoke about all of the health issues that she is confronting including cancer, along with the stress of losing her mother.  She noted that she doesn't have a lot of support from anyone.  Her son lives with her but she didn't mention him as being supportive. She doesn't go out much because it tends to be stressful for her.      Coping - She mentioned listening to Arian Iniguez but other than that, she doesn't do much in terms of Latter day.  She did mention that she does like to sit outside and look at her flowers.  She said she likes simple things.    Meaning-Making - She is anxious to get home and find some routine in her life.      Plan - No plans to follow since she told me she thinks she is getting discharged today.    Reyna Chang  Associate   Pager number:  [phone]  [facility] remains available 24/7 for emergent requests/referrals, either by having the on-call  paged or by entering an ASAP/STAT consult in Epic (this will also page the on-call ). Routine Epic consults receive an initial response within 24 hours.

## 2024-08-27 DIAGNOSIS — Z09 HOSPITAL DISCHARGE FOLLOW-UP: ICD-10-CM

## 2024-08-27 NOTE — PLAN OF CARE
Physical Therapy Discharge Summary    Reason for therapy discharge:    Discharged to home with outpatient therapy.    Progress towards therapy goal(s). See goals on Care Plan in [facility] electronic health record for goal details.  Goals met    Therapy recommendation(s):    No further therapy is recommended.

## 2024-09-03 ENCOUNTER — TRANSFERRED RECORDS (OUTPATIENT)
Dept: HEALTH INFORMATION MANAGEMENT | Facility: CLINIC | Age: 64
End: 2024-09-03

## 2024-09-03 LAB — HBA1C MFR BLD: 6.7 % (ref 4.2–6.1)

## 2024-09-05 ENCOUNTER — HOSPITAL ENCOUNTER (OUTPATIENT)
Dept: CARDIAC REHAB | Facility: HOSPITAL | Age: 64
Discharge: HOME | End: 2024-09-05
Attending: INTERNAL MEDICINE
Payer: MEDICARE

## 2024-09-05 PROCEDURE — 94625 PHY/QHP OP PULM RHB W/O MNTR: CPT

## 2024-09-10 ENCOUNTER — HOSPITAL ENCOUNTER (OUTPATIENT)
Dept: CARDIAC REHAB | Facility: HOSPITAL | Age: 64
Discharge: HOME | End: 2024-09-10
Attending: INTERNAL MEDICINE
Payer: MEDICARE

## 2024-09-10 PROCEDURE — 94625 PHY/QHP OP PULM RHB W/O MNTR: CPT

## 2024-09-12 ENCOUNTER — HOSPITAL ENCOUNTER (OUTPATIENT)
Dept: CARDIAC REHAB | Facility: HOSPITAL | Age: 64
Discharge: HOME | End: 2024-09-12
Attending: INTERNAL MEDICINE
Payer: MEDICARE

## 2024-09-12 PROCEDURE — 94625 PHY/QHP OP PULM RHB W/O MNTR: CPT

## 2024-09-17 ENCOUNTER — HOSPITAL ENCOUNTER (OUTPATIENT)
Dept: CARDIAC REHAB | Facility: HOSPITAL | Age: 64
Discharge: HOME | End: 2024-09-17
Attending: INTERNAL MEDICINE
Payer: MEDICARE

## 2024-09-17 PROCEDURE — 94625 PHY/QHP OP PULM RHB W/O MNTR: CPT

## 2024-09-19 ENCOUNTER — TRANSFERRED RECORDS (OUTPATIENT)
Dept: HEALTH INFORMATION MANAGEMENT | Facility: CLINIC | Age: 64
End: 2024-09-19

## 2024-09-19 ENCOUNTER — HOSPITAL ENCOUNTER (OUTPATIENT)
Dept: CARDIAC REHAB | Facility: HOSPITAL | Age: 64
Discharge: HOME | End: 2024-09-19
Attending: INTERNAL MEDICINE
Payer: MEDICARE

## 2024-09-19 PROCEDURE — 94625 PHY/QHP OP PULM RHB W/O MNTR: CPT

## 2024-09-26 ENCOUNTER — HOSPITAL ENCOUNTER (OUTPATIENT)
Dept: CARDIAC REHAB | Facility: HOSPITAL | Age: 64
Discharge: HOME | End: 2024-09-26
Attending: INTERNAL MEDICINE
Payer: MEDICARE

## 2024-09-26 PROCEDURE — 94625 PHY/QHP OP PULM RHB W/O MNTR: CPT

## 2024-10-01 ENCOUNTER — HOSPITAL ENCOUNTER (OUTPATIENT)
Dept: CARDIAC REHAB | Facility: HOSPITAL | Age: 64
Discharge: HOME | End: 2024-10-01
Attending: INTERNAL MEDICINE
Payer: MEDICARE

## 2024-10-01 PROCEDURE — 94625 PHY/QHP OP PULM RHB W/O MNTR: CPT

## 2024-10-03 ENCOUNTER — HOSPITAL ENCOUNTER (OUTPATIENT)
Dept: CARDIAC REHAB | Facility: HOSPITAL | Age: 64
Discharge: HOME | End: 2024-10-03
Attending: INTERNAL MEDICINE
Payer: MEDICARE

## 2024-10-03 PROCEDURE — 94625 PHY/QHP OP PULM RHB W/O MNTR: CPT

## 2024-10-04 ENCOUNTER — OFFICE VISIT (OUTPATIENT)
Dept: PULMONOLOGY | Facility: CLINIC | Age: 64
End: 2024-10-04
Payer: MEDICARE

## 2024-10-04 VITALS
WEIGHT: 162 LBS | BODY MASS INDEX: 26.03 KG/M2 | HEIGHT: 66 IN | OXYGEN SATURATION: 96 % | HEART RATE: 86 BPM | DIASTOLIC BLOOD PRESSURE: 74 MMHG | SYSTOLIC BLOOD PRESSURE: 128 MMHG

## 2024-10-04 DIAGNOSIS — C34.90 MALIGNANT NEOPLASM OF LUNG, UNSPECIFIED LATERALITY, UNSPECIFIED PART OF LUNG (H): ICD-10-CM

## 2024-10-04 DIAGNOSIS — J44.9 COPD, GROUP E, BY GOLD 2023 CLASSIFICATION (H): Primary | ICD-10-CM

## 2024-10-04 DIAGNOSIS — J44.9 COPD, SEVERE (H): ICD-10-CM

## 2024-10-04 DIAGNOSIS — Z72.0 CURRENT NICOTINE USE: ICD-10-CM

## 2024-10-04 DIAGNOSIS — J44.1 COPD EXACERBATION (H): ICD-10-CM

## 2024-10-04 DIAGNOSIS — J96.01 ACUTE HYPOXEMIC RESPIRATORY FAILURE (H): ICD-10-CM

## 2024-10-04 DIAGNOSIS — J43.2 CENTRILOBULAR EMPHYSEMA (H): ICD-10-CM

## 2024-10-04 LAB
DLCOCOR-%PRED-PRE: 67 %
DLCOCOR-PRE: 13.64 ML/MIN/MMHG
DLCOUNC-%PRED-PRE: 68 %
DLCOUNC-PRE: 13.88 ML/MIN/MMHG
DLCOUNC-PRED: 20.21 ML/MIN/MMHG
ERV-%PRED-PRE: 20 %
ERV-PRE: 0.22 L
ERV-PRED: 1.11 L
EXPTIME-PRE: 7.06 SEC
FEF2575-%PRED-PRE: 21 %
FEF2575-PRE: 0.42 L/SEC
FEF2575-PRED: 1.97 L/SEC
FEFMAX-%PRED-PRE: 51 %
FEFMAX-PRE: 3.22 L/SEC
FEFMAX-PRED: 6.19 L/SEC
FEV1-%PRED-PRE: 45 %
FEV1-PRE: 1.03 L
FEV1FEV6-PRE: 56 %
FEV1FEV6-PRED: 80 %
FEV1FVC-PRE: 60 %
FEV1FVC-PRED: 79 %
FEV1SVC-PRE: 54 %
FEV1SVC-PRED: 68 %
FIFMAX-PRE: 2.81 L/SEC
FRCPLETH-%PRED-PRE: 134 %
FRCPLETH-PRE: 3.81 L
FRCPLETH-PRED: 2.82 L
FVC-%PRED-PRE: 59 %
FVC-PRE: 1.73 L
FVC-PRED: 2.91 L
IC-%PRED-PRE: 75 %
IC-PRE: 1.67 L
IC-PRED: 2.21 L
RVPLETH-%PRED-PRE: 170 %
RVPLETH-PRE: 3.58 L
RVPLETH-PRED: 2.1 L
TLCPLETH-%PRED-PRE: 103 %
TLCPLETH-PRE: 5.48 L
TLCPLETH-PRED: 5.27 L
VA-%PRED-PRE: 81 %
VA-PRE: 4.01 L
VC-%PRED-PRE: 56 %
VC-PRE: 1.9 L
VC-PRED: 3.36 L

## 2024-10-04 PROCEDURE — 99204 OFFICE O/P NEW MOD 45 MIN: CPT | Performed by: NURSE PRACTITIONER

## 2024-10-04 RX ORDER — IPRATROPIUM BROMIDE AND ALBUTEROL SULFATE 2.5; .5 MG/3ML; MG/3ML
3 SOLUTION RESPIRATORY (INHALATION) EVERY 6 HOURS PRN
Qty: 180 ML | Refills: 11 | Status: SHIPPED | OUTPATIENT
Start: 2024-10-04

## 2024-10-04 RX ORDER — AZITHROMYCIN 250 MG/1
TABLET, FILM COATED ORAL
Qty: 6 TABLET | Refills: 0 | Status: SHIPPED | OUTPATIENT
Start: 2024-10-04 | End: 2024-10-09

## 2024-10-04 RX ORDER — PREDNISONE 20 MG/1
40 TABLET ORAL DAILY
Qty: 10 TABLET | Refills: 0 | Status: SHIPPED | OUTPATIENT
Start: 2024-10-04 | End: 2024-10-09

## 2024-10-04 NOTE — PATIENT INSTRUCTIONS
It was a pleasure to see you in clinic today.   Here is what we discussed:    Continue Breztri two puffs twice daily, rinse/gargle after use.  Continue Duonebs or Albuterol inhaler every 4-6 hours as needed for shortness of breath or wheezing.  Consider starting Duonebs twice daily scheduled.   Recommended for this fall:  annual influenza vaccine, COVID booster, RSV vaccine.  Continue to work on smoking cessation if able - use patches and lozenges when you are ready.   Call my nurse, Rajiv ([phone]) with any change or worsening of your breathing.  You can start the action plan medications if you need (prednisone + antibiotic).   Follow-up in 3 months.     Yelena Sandra, CNP  Pulmonary Medicine  [facility]  [phone]

## 2024-10-04 NOTE — PROGRESS NOTES
Pulmonary Clinic Consultation          Assessment/Plan:     64 year old female with a history of COPD, presumed NSCLC in RLL s/p SBRT, ANDREW on cpap, GERD, PVD, DM II, HLD, HTN, chronic pain, presenting for evaluation of hospital follow-up.      COPD - GOLD E  Emphysema  Nicotine dependence  Current smoker with 50+ pack year smoking hx.  She presents today for hospital follow-up and to establish care in pulmonary clinic.    Hospitalized in May with COPD exacerbation and pneumonia. Pulmonary CT angiogram revealed new faint groundglass and tree-in-bud opacities right middle lobe consistent with atypical pneumonia, mucous secretions/debris layer dependently within right mainstem bronchus and bronchial wall thickening and emphysema without evidence of pulmonary embolism.  Treated with antibiotics and prednisone.   Hospitalized in August with COPD exacerbation and possible CAP.  Treated with azithromycin + cefdinir, prednisone.  Echocardiogram 5/2024 unremarkable.  Eosinophils 0.1 in 6/2024.     Plan:  - reviewed PFT results with patient today.  - chest CT scheduled for 10/23/24, follow-up from hospitalization.   - continue Breztri two puffs twice daily, rinse/gargle after use.   - start Duonebs BID and PRN.  - continue Albuterol inhaler PRN.  - discussed smoking cessation, she is not ready at this time, she does have patches and lozenges.available.  - consider referral back to sleep medicine, she is not using her cpap currently.  - she is UTD with prevnar 20.  Recommended for this fall:  annual influenza vaccine, COVID booster, RSV vaccine.  - Action plan: prednisone 40mg x5 days, + azithromycin x5 days.  Gave her script for these to use if needed.      Follow-up:  - 3 months    Yelena Sandra CNP  Pulmonary Medicine  [facility]  [phone]       CC:     Hospital follow-up     HPI:     64 year old female with a history of COPD, presumed NSCLC in RLL s/p SBRT, ANDREW on cpap, GERD,  PVD, DM II, HLD, HTN, chronic pain, presenting for evaluation of hospital follow-up.      Hospitalized in May with COPD exacerbation and pneumonia. Pulmonary CT angiogram revealed new faint groundglass and tree-in-bud opacities right middle lobe consistent with atypical pneumonia, mucous secretions/debris layer dependently within right mainstem bronchus and bronchial wall thickening and emphysema without evidence of pulmonary embolism.  Treated with antibiotics and prednisone.     Hospitalized in August with COPD exacerbation and possible CAP.  Treated with azithromycin + cefdinir, prednisone.    Some years has exacerbations a lot, then some years nothing.   Not as weak as she was previously.   Currently participating in pulmonary rehab, is helping.   A pedro trying to get conditioned again.  Breztri in May, some improvement.   Has tried multiple inhalers, but ends up quitting as she notices doesn't really work and costly.  Has ventolin, doesn't have to do this very often  Enjoys the nebulizer.   Not a lot of mucous.   Shortness of breath, chest tightness.   Currently happy with how breathing is - at times.   Sleeping a lot.  Hasn't been waking up SOB, but wakes up at 3-4am.  Unsure what wakes her up.  Feels lousy when first wakes up.  Goes back to bed after taking meds.   Can go out and run errands.  Gets tired, tries not to do errands unless she is out for doctors appointments.   Has cpap but doesn't use.   1 PPD off and on.  Has thought of quitting, has patches.     Medical records reviewed for this visit include hospital discharge summaries, PCP notes.    Patient supplied answers from flow sheet for:  COPD Assessment Test (CAT)  2009 [employer]. All rights reserved.      8/15/2024    11:16 AM 10/3/2024    11:47 AM   COPD assessment test (CAT)   Cough 3 2   Phlegm 2 2   Chest tightness 2 3   Walk up hill 3 4   Limited activities 3 4   Leaving my home 2 2   Sleep 2 0   Energy 3 3   Total Score 20 20      CAT Key:  The  CAT consist of 8 items which are each scored 0-5. The total score ranges from 0-40 with higher scores representing a poorer health status. When interpreting CAT scores, the individual s disease severity should be considered.   Low impact  (1-9)  Medium impact  (10-20)  High impact  (21-30)  Very high impact  (31-40)         ROS:     A 12-system review was obtained and was negative with the exception of the symptoms endorsed in the HPI.       Medical history:       PMH:  Past Medical History:   Diagnosis Date    Back pain     low back    COPD (chronic obstructive pulmonary disease) (H)     Depression     Diabetes mellitus (H)     Hyperlipidemia     Hypertension     PAD (peripheral artery disease) (H)     Peripheral neuropathy     Pulmonary nodules        PSH:  Past Surgical History:   Procedure Laterality Date    AORTA - FEMORAL ARTERY BYPASS GRAFT      BRONCHOSCOPY RIGID OR FLEXIBLE W/TRANSENDOSCOPIC ENDOBRONCHIAL ULTRASOUND GUIDED N/A 07/14/2022    Procedure: endbronchial ultrasound, transbronchial biopsy;  Surgeon: Rosendo Dave MD;  Location: [facility] OR    HYSTEROSCOPY W/ ENDOMETRIAL ABLATION      ILEOSTOMY  08/16/2023    ILEOSTOMY REVISION  10/19/2023    IR EXTREMITY ANGIOGRAM LEFT  11/25/2020    IR EXTREMITY ANGIOGRAM LEFT  12/16/2020    IR LOWER EXTREMITY ANGIOGRAM LEFT  11/25/2020    IR LOWER EXTREMITY ANGIOGRAM LEFT  12/16/2020    OPTICAL TRACKING SYSTEM BRONCHOSCOPY N/A 07/14/2022    Procedure: BRONCHOSCOPY, USING OPTICAL TRACKING SYSTEM, .  Ion or veran system, fiducial placement;  Surgeon: Rosendo Dave MD;  Location: [facility] OR    OTHER SURGICAL HISTORY      tubal ligation    OTHER SURGICAL HISTORY      spine fusion    PAIN STELLATE GANGLION BLOCK RIGHT Right 1992    x5, [facility]'s       Allergies:  Allergies   Allergen Reactions    Bupropion Itching       Family Hx:  Family History   Problem Relation Age of Onset    Diabetes Mother     Hypertension Mother     Cancer Father     Heart Disease  "Father     Diabetes Father        Social Hx:  Social History     Socioeconomic History    Marital status:      Spouse name: Not on file    Number of children: Not on file    Years of education: Not on file    Highest education level: Not on file   Occupational History    Not on file   Tobacco Use    Smoking status: Every Day     Current packs/day: 1.00     Average packs/day: 1 pack/day for 54.2 years (54.2 ttl pk-yrs)     Types: Cigarettes     Start date: 7/17/1970     Passive exposure: Past (Parents were non smokers; rest of family were smokers)    Smokeless tobacco: Never    Tobacco comments:     \"No more than 1 PPD\"   Vaping Use    Vaping status: Never Used   Substance and Sexual Activity    Alcohol use: No    Drug use: Not Currently    Sexual activity: Not on file   Other Topics Concern    Not on file   Social History Narrative    Not on file     Social Determinants of Health     Financial Resource Strain: Low Risk  (8/24/2024)    Financial Resource Strain     Within the past 12 months, have you or your family members you live with been unable to get utilities (heat, electricity) when it was really needed?: No   Food Insecurity: Low Risk  (8/24/2024)    Food Insecurity     Within the past 12 months, did you worry that your food would run out before you got money to buy more?: No     Within the past 12 months, did the food you bought just not last and you didn t have money to get more?: No   Transportation Needs: Low Risk  (8/24/2024)    Transportation Needs     Within the past 12 months, has lack of transportation kept you from medical appointments, getting your medicines, non-medical meetings or appointments, work, or from getting things that you need?: No   Physical Activity: Not on file   Stress: Not on file   Social Connections: Socially Integrated (10/19/2023)    Received from [facility] [facility], [facility]    Social Connections     " Frequency of Communication with Friends and Family: 0   Interpersonal Safety: Low Risk  (8/24/2024)    Interpersonal Safety     Do you feel physically and emotionally safe where you currently live?: Yes     Within the past 12 months, have you been hit, slapped, kicked or otherwise physically hurt by someone?: No     Within the past 12 months, have you been humiliated or emotionally abused in other ways by your partner or ex-partner?: No   Housing Stability: Low Risk  (8/24/2024)    Housing Stability     Do you have housing? : Yes     Are you worried about losing your housing?: No       Current Meds:  Current Outpatient Medications   Medication Sig Dispense Refill    albuterol (PROAIR HFA/PROVENTIL HFA/VENTOLIN HFA) 108 (90 Base) MCG/ACT inhaler Inhale 2 puffs into the lungs every 6 hours as needed for shortness of breath, wheezing or cough      aspirin (ASA) 81 MG EC tablet 1 tab(s) Orally once a day      atorvastatin (LIPITOR) 40 MG tablet Take 40 mg by mouth daily      budeson-glycopyrrol-formoterol (BREZTRI AEROSPHERE) 160-9-4.8 MCG/ACT AERO inhaler Inhale 2 puffs into the lungs 2 times daily      diltiazem (CARDIZEM SR) 120 MG CP12 12 hr SR capsule Take 120 mg by mouth 2 times daily      DULoxetine (CYMBALTA) 60 MG capsule Take 60 mg by mouth daily      furosemide (LASIX) 20 MG tablet Take 20 mg by mouth twice a week      losartan (COZAAR) 50 MG tablet Take 1 tablet (50 mg) by mouth daily. 30 tablet 1    metFORMIN (GLUCOPHAGE XR) 500 MG 24 hr tablet Take 1 tablet (500 mg) by mouth 2 times daily (with meals) 60 tablet 0    montelukast (SINGULAIR) 10 MG tablet Take 1 tablet (10 mg) by mouth at bedtime. 30 tablet 0    pantoprazole (PROTONIX) 40 MG EC tablet Take 1 tablet (40 mg) by mouth every morning (before breakfast). 30 tablet 0    polyethylene glycol (MIRALAX) 17 GM/Dose powder Take 17 g by mouth every other day      albuterol (PROVENTIL) (5 MG/ML) 0.5% neb solution 0.5 mL by nebulizer as needed (Patient not  "taking: Reported on 10/4/2024)      carBAMazepine (TEGRETOL XR) 100 MG 12 hr tablet Take 100 mg by mouth 2 times daily      gabapentin (NEURONTIN) 300 MG capsule Take 600 mg by mouth every evening midday      gabapentin (NEURONTIN) 600 MG tablet Take 1,200 mg by mouth 2 times daily      ipratropium-albuteroL (DUO-NEB) 0.5-2.5 mg/3 mL nebulizer Take 3 mLs by nebulization every 6 hours as needed for shortness of breath, wheezing or cough (Patient not taking: Reported on 10/4/2024)      nicotine (NICODERM CQ) 21 MG/24HR 24 hr patch Place 1 patch over 24 hours onto the skin daily. (Patient not taking: Reported on 10/4/2024) 7 patch 0    oxyCODONE (ROXICODONE) 5 MG tablet Take 5-10 mg by mouth every 6 hours as needed for severe pain (Patient not taking: Reported on 10/4/2024)            Physical Exam:     /74 (BP Location: Left arm, Patient Position: Sitting, Cuff Size: Adult Regular)   Pulse 86   Ht 1.676 m (5' 6\")   Wt 73.5 kg (162 lb)   SpO2 96%   BMI 26.15 kg/m    Gen: adult female, appears in NAD  HEENT: clear conjunctivae, moist mucous membranes  CV: RRR, no M/G/R  Resp: CTAB, no focal crackles or wheezes.  Respirations even and unlabored.  On RA.   Skin: no apparent rashes on visible skin  Ext: no cyanosis, clubbing or edema  Neuro: alert and answering questions appropriately       Data:     Labs:  reviewed    Imaging studies:  I have personally reviewed all pertinent imaging studies and PFT results unless otherwise noted.    EXAM: CT CHEST PULMONARY EMBOLISM W CONTRAST  LOCATION: [facility]  DATE: 8/19/2024     INDICATION: Acute hypoxic resp failure, COPD, concern for possible PE.  COMPARISON: 7/19/2024.  TECHNIQUE: CT chest pulmonary angiogram during arterial phase injection of IV contrast. Multiplanar reformats and MIP reconstructions were performed. Dose reduction techniques were used.   CONTRAST: isovue 370 90ml     FINDINGS:  ANGIOGRAM CHEST: No pulmonary embolism. " Nonaneurysmal aorta without dissection.     LUNGS AND PLEURA: Stable right lower lobe superior segment fiducial marker and adjacent thickening (series 6, image 121). Stable emphysema. Mild airway thickening. Mild retained airway secretions. Mild basilar atelectasis. No pleural effusion or   pneumothorax.     MEDIASTINUM/AXILLAE: Stable upper normal mediastinal and hilar nodes. No pericardial effusion.     CORONARY ARTERY CALCIFICATION: Severe.     UPPER ABDOMEN: Nothing acute.     MUSCULOSKELETAL: Nothing acute.                                                                      IMPRESSION:     1.  No obvious acute findings to explain symptoms.     2.  No pulmonary embolism.     3.  Emphysema. Airway thickening. Retained airway secretions.      Pulmonary Function Testing    10/4/24:        2022:

## 2024-10-08 ENCOUNTER — HOSPITAL ENCOUNTER (OUTPATIENT)
Dept: CARDIAC REHAB | Facility: HOSPITAL | Age: 64
Discharge: HOME | End: 2024-10-08
Attending: INTERNAL MEDICINE
Payer: MEDICARE

## 2024-10-08 PROCEDURE — 94625 PHY/QHP OP PULM RHB W/O MNTR: CPT

## 2024-10-10 ENCOUNTER — HOSPITAL ENCOUNTER (OUTPATIENT)
Dept: CARDIAC REHAB | Facility: HOSPITAL | Age: 64
Discharge: HOME | End: 2024-10-10
Attending: INTERNAL MEDICINE
Payer: MEDICARE

## 2024-10-10 PROCEDURE — 94625 PHY/QHP OP PULM RHB W/O MNTR: CPT

## 2024-10-23 ENCOUNTER — HOSPITAL ENCOUNTER (OUTPATIENT)
Dept: CT IMAGING | Facility: HOSPITAL | Age: 64
Discharge: HOME | End: 2024-10-23
Attending: PHYSICIAN ASSISTANT | Admitting: PHYSICIAN ASSISTANT
Payer: MEDICARE

## 2024-10-23 DIAGNOSIS — C34.31 MALIGNANT NEOPLASM OF LOWER LOBE OF RIGHT LUNG (H): ICD-10-CM

## 2024-10-23 PROCEDURE — 71250 CT THORAX DX C-: CPT | Mod: MG

## 2024-10-25 ENCOUNTER — VIRTUAL VISIT (OUTPATIENT)
Dept: RADIATION THERAPY | Facility: OUTPATIENT CENTER | Age: 64
End: 2024-10-25
Payer: MEDICARE

## 2024-10-25 DIAGNOSIS — C34.31 MALIGNANT NEOPLASM OF LOWER LOBE OF RIGHT LUNG (H): Primary | ICD-10-CM

## 2024-10-25 NOTE — PROGRESS NOTES
Follow-up Note by Phone  Oct 25, 2024    Annette Shore  MRN: [id number]  : 1960    Radiation Oncologist: Kamron Ruvalcaba MD  Provider: GHAZALA Rader  LCV: 24 with me    Pt in [place], provider in clinic    DISEASE TREATED: Presumed NSCLC of the RLL, [id number], stage IA2    INTERVAL SINCE COMPLETION OF RADIATION THERAPY:  2 years 2 mo (completed 22)     TYPE OF RADIATION THERAPY DELIVERED: 50 Gy in 5 fractions, SBRT, Rx=82%           SUBJECTIVE:  Annette Shore is a 64-year-old woman with a history of COPD who was found to have a right lower lobe nodule highly concerning for lung cancer.     A 1.6 cm RLL nodule was discovered on low-dose screening lung CT (22), located in the paraspinal region. Staging PET/CT (22) demonstrated a SUV max 2.2  FDG uptake associated with this nodule and no other findings. She also had an unremarkable brain MRI (22). She completed mediastinal staging with bronchoscopy and EBUS (22). FNA of a 4R node and a 11R node were negative. She was medically inoperable and underwent SBRT(EOT 2022).    The patient  also underwent a reconstructive bowel surgery on 2023.  She has had a bowel obstruction for about a month with only small amounts passing through. The patient noticed some blood passing after surgery. The patient is due for a follow up.    A follow up CT of chest (2023), showed an interval increased size of the previously seen at medial right lower lobe pulmonary nodule which now measures 1.9 x 0.6 cm, previously 1 x 0.5 cm. However, a follow up PET/CT (2023) showed no evidence of disease.    Chest CT(2024) showed:  1.  Focal scarring right lower lobe superior segment along the mediastinal pleura near a metallic fiducial. No new nodules    2.  Unchanged emphysema and chronic airway wall thickening consistent with lung and airway manifestations of COPD.  3.  Moderate to severe three-vessel atheromatous coronary  "calcifications.    4/19/24 CT Chest  MPRESSION:   1.  Unchanged focal scarring in the medial right lower lobe and near a fiducial marker.  2.  New areas of patchy and nodular consolidation within the anterior right upper lobe and left upper lobe suggestive of an infectious/inflammatory process.  3.  Severe coronary artery calcifications.    7/19/24 CT Chest  1. Unchanged focal fibrosis adjacent to metallic fiducial in the mediastinal pleura right lower lobe superior segment. No findings to suggest residual or recurrent tumor in the chest.  2.  Severe atheromatous coronary calcifications.  3.  Emphysema and central airway wall thickening. No superimposed acute process    10/23/24  FINDINGS:   LUNGS AND PLEURA: There are scattered 1 to 3 mm stable pulmonary nodules including right upper lobe, image 113 series 4. Posttreatment changes in the medial right lower lobe adjacent to the fiducial marker are stable in appearance. Underlying changes of   centrilobular. Stable nodular biapical pleural-parenchymal scarring.  MEDIASTINUM/AXILLAE: No significant mediastinal or hilar adenopathy.  CORONARY ARTERY CALCIFICATION: Severe.  UPPER ABDOMEN: Nonobstructing nephrolithiasis.  MUSCULOSKELETAL: No suspicious lytic or blastic lesions.                                                                 IMPRESSION:   1.  Stable findings without CT evidence of local recurrence or metastatic disease in the chest.    She has JOHNSON but is stable. She is able to walk 50 -100 feet before stopping due to \"claudications/neuropathy.\"   She is doing pulmomonary therapy twice weekly. This tires her out but she knows its good for her.         Pre SBRT 7/22/2022   6M post SBRT (2/13/23)       12M post (8/11/23)            17M post (1/12/24)     IMPRESSION: cNED     RECOMMENDATIONS: I reviewed the CT scan  By my review RLL is unchanged and reflects post radiation changes. Smoking cessation discussed. Smoking 1/2 ppd, not ready to quit  I recommend a " F/U in 4 mo.     GHAZALA Rader  Department of Radiation Oncology  [facility]     10 minutes on the phone with the patient  1:00 to 1:25 pm

## 2024-11-01 ENCOUNTER — TELEPHONE (OUTPATIENT)
Dept: PULMONOLOGY | Facility: CLINIC | Age: 64
End: 2024-11-01
Payer: MEDICARE

## 2024-11-01 NOTE — TELEPHONE ENCOUNTER
[facility] Call Center    Phone Message    May a detailed message be left on voicemail: yes     Reason for Call: Other: Per pt wanted provider to know she start prednisone and vancomycin today. She has not been feeling well.         Action Taken: Other: Pulm      Travel Screening: Not Applicable     Date of Service:

## 2024-11-01 NOTE — TELEPHONE ENCOUNTER
Spoke with Iglesia. She is having sore throat, congested, chest heaviness, coughing, headache and feeling hot/cold. She does not have a thermometer to check temp. Phlegm is clear and thick.   She did start her on-hand action plan yesterday. She did not test for Covid, has no test at home.   She will continue the action plan and use neb treatments up to 4 times a day. She agrees to go to the urgent care or ER if symptoms do not improve.

## 2024-12-21 ENCOUNTER — HEALTH MAINTENANCE LETTER (OUTPATIENT)
Age: 64
End: 2024-12-21

## 2025-01-06 ENCOUNTER — OFFICE VISIT (OUTPATIENT)
Dept: PULMONOLOGY | Facility: CLINIC | Age: 65
End: 2025-01-06
Attending: NURSE PRACTITIONER
Payer: MEDICARE

## 2025-01-06 VITALS
WEIGHT: 165 LBS | HEART RATE: 68 BPM | SYSTOLIC BLOOD PRESSURE: 132 MMHG | DIASTOLIC BLOOD PRESSURE: 80 MMHG | OXYGEN SATURATION: 96 % | BODY MASS INDEX: 26.63 KG/M2

## 2025-01-06 DIAGNOSIS — J43.2 CENTRILOBULAR EMPHYSEMA (H): ICD-10-CM

## 2025-01-06 DIAGNOSIS — J44.9 COPD, SEVERE (H): Primary | ICD-10-CM

## 2025-01-06 DIAGNOSIS — I25.10 CORONARY ARTERY DISEASE INVOLVING NATIVE CORONARY ARTERY OF NATIVE HEART WITHOUT ANGINA PECTORIS: ICD-10-CM

## 2025-01-06 DIAGNOSIS — J44.1 COPD EXACERBATION (H): ICD-10-CM

## 2025-01-06 DIAGNOSIS — F17.218 NICOTINE DEPENDENCE, CIGARETTES, WITH OTHER NICOTINE-INDUCED DISORDERS: ICD-10-CM

## 2025-01-06 PROCEDURE — 99214 OFFICE O/P EST MOD 30 MIN: CPT | Performed by: NURSE PRACTITIONER

## 2025-01-06 RX ORDER — PREDNISONE 20 MG/1
40 TABLET ORAL DAILY
Qty: 10 TABLET | Refills: 0 | Status: SHIPPED | OUTPATIENT
Start: 2025-01-06 | End: 2025-01-11

## 2025-01-06 RX ORDER — AZITHROMYCIN 250 MG/1
TABLET, FILM COATED ORAL
Qty: 6 TABLET | Refills: 0 | Status: SHIPPED | OUTPATIENT
Start: 2025-01-06 | End: 2025-01-11

## 2025-01-06 NOTE — PROGRESS NOTES
Pulmonary Clinic Follow-up          Assessment/Plan:     65 year old female with a history of COPD, presumed NSCLC in RLL s/p SBRT, ANDREW on cpap, GERD, PVD, DM II, HLD, HTN, chronic pain, presenting for 3 month follow-up.    COPD - GOLD E  Emphysema  Nicotine dependence  Current smoker with 50+ pack year smoking hx.  She presented 10/2024 for hospital follow-up and to establish care in pulmonary clinic.    Hospitalized 5/2024 with COPD exacerbation and pneumonia. Pulmonary CT angiogram revealed new faint groundglass and tree-in-bud opacities right middle lobe consistent with atypical pneumonia, mucous secretions/debris layer dependently within right mainstem bronchus and bronchial wall thickening and emphysema without evidence of pulmonary embolism.  Treated with antibiotics and prednisone.  Echocardiogram 5/2024 unremarkable.  Eosinophils 0.1 in 6/2024.   Hospitalized 8/2024 with COPD exacerbation and possible CAP.  Treated with azithromycin + cefdinir, prednisone.  Pulmonary function testing with severe airway obstruction (FEV1 45%), air-trapping, and mildly reduced diffusion capacity defect (DLCO 67%).   She has been stable since last visit, one exacerbation and recovered well with action plan.  CAT score high at 25 today, but she has ongoing construction in her house that is causing increased cough and mucous.     Plan:  - refilled action plan today in case, due to high CAT score and construction dust in home.   - referred to cardiology due to severe coronary artery calcifications on chest CT.   - continue Breztri two puffs twice daily, rinse/gargle after use.   - continue Duonebs BID and PRN.  - continue Albuterol inhaler PRN.  - discussed smoking cessation, she is now ready to quit, she does have patches and is planning on starting these.  Chantix worked for her in past but is quite expensive.   - consider referral back to sleep medicine, she is not using her cpap currently.  - she is UTD with prevnar 20,  annual influenza vaccine, RSV vaccine.  She declines COVID vaccines.   - Action plan: prednisone 40mg x5 days, + azithromycin x5 days.        Follow-up:  - 6 months    Yelena Sandra CNP  Pulmonary Medicine  [facility]  [phone]       CC:     Follow-up     HPI:     65 year old female with a history of COPD, presumed NSCLC in RLL s/p SBRT, ANDREW on cpap, GERD, PVD, DM II, HLD, HTN, chronic pain, presenting for 3 month follow-up.    Since last visit, required action plan.   Using Breztri twice daily, rinses/mouth.  Does Duonebs, not often, but they help.   Doing construction on house, a lot of dust, changing air filters often.   Continues to smoke 1 PPD.  She is now interested in quitting.   Has taken wellbutrin for cessation - itching, didn't work.  Has taken chantix, worked well - but expensive.       Previous HPI:  Hospitalized in May with COPD exacerbation and pneumonia. Pulmonary CT angiogram revealed new faint groundglass and tree-in-bud opacities right middle lobe consistent with atypical pneumonia, mucous secretions/debris layer dependently within right mainstem bronchus and bronchial wall thickening and emphysema without evidence of pulmonary embolism.  Treated with antibiotics and prednisone.   Hospitalized in August with COPD exacerbation and possible CAP.  Treated with azithromycin + cefdinir, prednisone.  Some years has exacerbations a lot, then some years nothing.   Not as weak as she was previously.   Currently participating in pulmonary rehab, is helping.   A pedro trying to get conditioned again.  Breztri in May, some improvement.   Has tried multiple inhalers, but ends up quitting as she notices doesn't really work and costly.  Has ventolin, doesn't have to do this very often  Enjoys the nebulizer.   Not a lot of mucous.   Shortness of breath, chest tightness.   Currently happy with how breathing is - at times.   Sleeping a lot.  Hasn't been waking up SOB,  but wakes up at 3-4am.  Unsure what wakes her up.  Feels lousy when first wakes up.  Goes back to bed after taking meds.   Can go out and run errands.  Gets tired, tries not to do errands unless she is out for doctors appointments.   Has cpap but doesn't use.   1 PPD off and on.  Has thought of quitting, has patches.       Patient supplied answers from flow sheet for:  COPD Assessment Test (CAT)  2009 [employer]. All rights reserved.      8/15/2024    11:16 AM 10/3/2024    11:47 AM 1/4/2025     4:51 PM   COPD assessment test (CAT)   Cough 3 2 3   Phlegm 2 2 3   Chest tightness 2 3 2   Walk up hill 3 4 4   Limited activities 3 4 4   Leaving my home 2 2 1   Sleep 2 0 3   Energy 3 3 5   Total Score 20 20 25        Patient-reported      CAT Key:  The CAT consist of 8 items which are each scored 0-5. The total score ranges from 0-40 with higher scores representing a poorer health status. When interpreting CAT scores, the individual s disease severity should be considered.   Low impact  (1-9)  Medium impact  (10-20)  High impact  (21-30)  Very high impact  (31-40)         ROS:     A 6-system review was obtained and was negative with the exception of the symptoms endorsed in the HPI.       Medical history:       PMH:  Past Medical History:   Diagnosis Date    Back pain     low back    COPD (chronic obstructive pulmonary disease) (H)     Depression     Diabetes mellitus (H)     Hyperlipidemia     Hypertension     PAD (peripheral artery disease) (H)     Peripheral neuropathy     Pulmonary nodules        PSH:  Past Surgical History:   Procedure Laterality Date    AORTA - FEMORAL ARTERY BYPASS GRAFT      BRONCHOSCOPY RIGID OR FLEXIBLE W/TRANSENDOSCOPIC ENDOBRONCHIAL ULTRASOUND GUIDED N/A 07/14/2022    Procedure: endbronchial ultrasound, transbronchial biopsy;  Surgeon: Rosendo Dave MD;  Location: [facility] OR    HYSTEROSCOPY W/ ENDOMETRIAL ABLATION      ILEOSTOMY  08/16/2023    ILEOSTOMY REVISION  10/19/2023    IR EXTREMITY ANGIOGRAM  "LEFT  11/25/2020    IR EXTREMITY ANGIOGRAM LEFT  12/16/2020    IR LOWER EXTREMITY ANGIOGRAM LEFT  11/25/2020    IR LOWER EXTREMITY ANGIOGRAM LEFT  12/16/2020    OPTICAL TRACKING SYSTEM BRONCHOSCOPY N/A 07/14/2022    Procedure: BRONCHOSCOPY, USING OPTICAL TRACKING SYSTEM, .  Ion or veran system, fiducial placement;  Surgeon: Rosendo Dave MD;  Location: [facility] OR    OTHER SURGICAL HISTORY      tubal ligation    OTHER SURGICAL HISTORY      spine fusion    PAIN STELLATE GANGLION BLOCK RIGHT Right 1992    x5, [facility]       Allergies:  Allergies   Allergen Reactions    Bupropion Itching       Family Hx:  Family History   Problem Relation Age of Onset    Diabetes Mother     Hypertension Mother     Cancer Father     Heart Disease Father     Diabetes Father        Social Hx:  Social History     Socioeconomic History    Marital status:      Spouse name: Not on file    Number of children: Not on file    Years of education: Not on file    Highest education level: Not on file   Occupational History    Not on file   Tobacco Use    Smoking status: Every Day     Current packs/day: 1.00     Average packs/day: 1 pack/day for 54.5 years (54.5 ttl pk-yrs)     Types: Cigarettes     Start date: 7/17/1970     Passive exposure: Past (Parents were non smokers; rest of family were smokers)    Smokeless tobacco: Never    Tobacco comments:     \"No more than 1 PPD\"   Vaping Use    Vaping status: Never Used   Substance and Sexual Activity    Alcohol use: No    Drug use: Not Currently    Sexual activity: Not on file   Other Topics Concern    Not on file   Social History Narrative    Not on file     Social Drivers of Health     Financial Resource Strain: Low Risk  (8/24/2024)    Financial Resource Strain     Within the past 12 months, have you or your family members you live with been unable to get utilities (heat, electricity) when it was really needed?: No   Food Insecurity: Low Risk  (8/24/2024)    Food Insecurity     " Within the past 12 months, did you worry that your food would run out before you got money to buy more?: No     Within the past 12 months, did the food you bought just not last and you didn t have money to get more?: No   Transportation Needs: Low Risk  (8/24/2024)    Transportation Needs     Within the past 12 months, has lack of transportation kept you from medical appointments, getting your medicines, non-medical meetings or appointments, work, or from getting things that you need?: No   Physical Activity: Not on file   Stress: Not on file   Social Connections: Socially Integrated (10/19/2023)    Received from [employer] [facility], [employer] [facility]    Social Connections     Frequency of Communication with Friends and Family: 0   Interpersonal Safety: Low Risk  (8/24/2024)    Interpersonal Safety     Do you feel physically and emotionally safe where you currently live?: Yes     Within the past 12 months, have you been hit, slapped, kicked or otherwise physically hurt by someone?: No     Within the past 12 months, have you been humiliated or emotionally abused in other ways by your partner or ex-partner?: No   Housing Stability: Low Risk  (8/24/2024)    Housing Stability     Do you have housing? : Yes     Are you worried about losing your housing?: No       Current Meds:  Current Outpatient Medications   Medication Sig Dispense Refill    albuterol (PROAIR HFA/PROVENTIL HFA/VENTOLIN HFA) 108 (90 Base) MCG/ACT inhaler Inhale 2 puffs into the lungs every 6 hours as needed for shortness of breath, wheezing or cough      albuterol (PROVENTIL) (5 MG/ML) 0.5% neb solution       aspirin (ASA) 81 MG EC tablet 1 tab(s) Orally once a day      atorvastatin (LIPITOR) 40 MG tablet Take 40 mg by mouth daily      budeson-glycopyrrol-formoterol (BREZTRI AEROSPHERE) 160-9-4.8 MCG/ACT AERO inhaler Inhale 2 puffs into the lungs 2 times daily      diltiazem (CARDIZEM SR) 120 MG CP12  12 hr SR capsule Take 120 mg by mouth 2 times daily      DULoxetine (CYMBALTA) 60 MG capsule Take 60 mg by mouth daily      furosemide (LASIX) 20 MG tablet Take 20 mg by mouth as needed.      ipratropium - albuterol 0.5 mg/2.5 mg/3 mL (DUONEB) 0.5-2.5 (3) MG/3ML neb solution Take 1 vial (3 mLs) by nebulization every 6 hours as needed for shortness of breath, wheezing or cough. 180 mL 11    losartan (COZAAR) 50 MG tablet Take 1 tablet (50 mg) by mouth daily. 30 tablet 1    metFORMIN (GLUCOPHAGE XR) 500 MG 24 hr tablet Take 1 tablet (500 mg) by mouth 2 times daily (with meals) 60 tablet 0    montelukast (SINGULAIR) 10 MG tablet Take 1 tablet (10 mg) by mouth at bedtime. 30 tablet 0    oxyCODONE (ROXICODONE) 5 MG tablet Take 5-10 mg by mouth every 6 hours as needed for severe pain.      pantoprazole (PROTONIX) 40 MG EC tablet Take 1 tablet (40 mg) by mouth every morning (before breakfast). 30 tablet 0    polyethylene glycol (MIRALAX) 17 GM/Dose powder Take 17 g by mouth every other day      carBAMazepine (TEGRETOL XR) 100 MG 12 hr tablet Take 100 mg by mouth 2 times daily      gabapentin (NEURONTIN) 300 MG capsule Take 600 mg by mouth every evening midday      gabapentin (NEURONTIN) 600 MG tablet Take 1,200 mg by mouth 2 times daily      nicotine (NICODERM CQ) 21 MG/24HR 24 hr patch Place 1 patch over 24 hours onto the skin daily. 7 patch 0          Physical Exam:     /80   Pulse 68   Wt 74.8 kg (165 lb)   SpO2 96%   BMI 26.63 kg/m    Gen: adult female, appears in NAD  HEENT: clear conjunctivae, moist mucous membranes  CV: RRR, no M/G/R  Resp: CTAB, no focal crackles or wheezes.  Respirations even and unlabored.  On RA.   Skin: no apparent rashes on visible skin  Ext: no cyanosis, clubbing or edema  Neuro: alert and answering questions appropriately       Data:     Labs:  reviewed    Imaging studies:  I have personally reviewed all pertinent imaging studies and PFT results unless otherwise noted.    EXAM: CT  CHEST PULMONARY EMBOLISM W CONTRAST  LOCATION: [facility]  DATE: 8/19/2024     INDICATION: Acute hypoxic resp failure, COPD, concern for possible PE.  COMPARISON: 7/19/2024.  TECHNIQUE: CT chest pulmonary angiogram during arterial phase injection of IV contrast. Multiplanar reformats and MIP reconstructions were performed. Dose reduction techniques were used.   CONTRAST: isovue 370 90ml     FINDINGS:  ANGIOGRAM CHEST: No pulmonary embolism. Nonaneurysmal aorta without dissection.     LUNGS AND PLEURA: Stable right lower lobe superior segment fiducial marker and adjacent thickening (series 6, image 121). Stable emphysema. Mild airway thickening. Mild retained airway secretions. Mild basilar atelectasis. No pleural effusion or   pneumothorax.     MEDIASTINUM/AXILLAE: Stable upper normal mediastinal and hilar nodes. No pericardial effusion.     CORONARY ARTERY CALCIFICATION: Severe.     UPPER ABDOMEN: Nothing acute.     MUSCULOSKELETAL: Nothing acute.                                                                      IMPRESSION:     1.  No obvious acute findings to explain symptoms.     2.  No pulmonary embolism.     3.  Emphysema. Airway thickening. Retained airway secretions.      Pulmonary Function Testing    10/4/24:        2022:

## 2025-01-06 NOTE — PATIENT INSTRUCTIONS
It was a pleasure to see you in clinic today.   Here is what we discussed:    Continue Breztri two puffs twice daily, rinse/gargle after use.   Continue Duonebs or Albuterol inhaler every 4-6 hours as needed for shortness of breath or wheezing.  I have referred you to cardiology.   Continue to work on smoking cessation with patches - you can do it!  Call my nurse, Rajiv ([phone]) with any change or worsening of your breathing.  You can start the prednisone + azithromycin if you need it.   Follow-up in 6 months.    Yelena Sandra, CNP  Pulmonary Medicine  [facility]  [phone]

## 2025-01-18 ENCOUNTER — HOSPITAL ENCOUNTER (INPATIENT)
Facility: HOSPITAL | Age: 65
LOS: 1 days | Discharge: HOME | DRG: 193 | End: 2025-01-20
Attending: EMERGENCY MEDICINE | Admitting: INTERNAL MEDICINE
Payer: MEDICARE

## 2025-01-18 ENCOUNTER — APPOINTMENT (OUTPATIENT)
Dept: RADIOLOGY | Facility: HOSPITAL | Age: 65
DRG: 193 | End: 2025-01-18
Attending: EMERGENCY MEDICINE
Payer: MEDICARE

## 2025-01-18 DIAGNOSIS — J44.1 COPD EXACERBATION (H): ICD-10-CM

## 2025-01-18 DIAGNOSIS — J44.1 COPD WITH ACUTE EXACERBATION (H): Primary | ICD-10-CM

## 2025-01-18 DIAGNOSIS — J10.1 INFLUENZA A: ICD-10-CM

## 2025-01-18 LAB
ALBUMIN SERPL BCG-MCNC: 3.7 G/DL (ref 3.5–5.2)
ALP SERPL-CCNC: 70 U/L (ref 40–150)
ALT SERPL W P-5'-P-CCNC: 10 U/L (ref 0–50)
ANION GAP SERPL CALCULATED.3IONS-SCNC: 12 MMOL/L (ref 7–15)
APTT PPP: 25 SECONDS (ref 22–38)
AST SERPL W P-5'-P-CCNC: 27 U/L (ref 0–45)
BASOPHILS # BLD AUTO: 0 10E3/UL (ref 0–0.2)
BASOPHILS NFR BLD AUTO: 0 %
BILIRUB SERPL-MCNC: 0.2 MG/DL
BUN SERPL-MCNC: 14.9 MG/DL (ref 8–23)
CALCIUM SERPL-MCNC: 9.4 MG/DL (ref 8.8–10.4)
CHLORIDE SERPL-SCNC: 95 MMOL/L (ref 98–107)
CREAT SERPL-MCNC: 0.6 MG/DL (ref 0.51–0.95)
EGFRCR SERPLBLD CKD-EPI 2021: >90 ML/MIN/1.73M2
EOSINOPHIL # BLD AUTO: 0 10E3/UL (ref 0–0.7)
EOSINOPHIL NFR BLD AUTO: 0 %
ERYTHROCYTE [DISTWIDTH] IN BLOOD BY AUTOMATED COUNT: 13.2 % (ref 10–15)
EST. AVERAGE GLUCOSE BLD GHB EST-MCNC: 108 MG/DL
FLUAV RNA SPEC QL NAA+PROBE: POSITIVE
FLUBV RNA RESP QL NAA+PROBE: NEGATIVE
GLUCOSE BLDC GLUCOMTR-MCNC: 142 MG/DL (ref 70–99)
GLUCOSE BLDC GLUCOMTR-MCNC: 155 MG/DL (ref 70–99)
GLUCOSE SERPL-MCNC: 129 MG/DL (ref 70–99)
HBA1C MFR BLD: 5.4 %
HCO3 SERPL-SCNC: 28 MMOL/L (ref 22–29)
HCT VFR BLD AUTO: 38.6 % (ref 35–47)
HGB BLD-MCNC: 13 G/DL (ref 11.7–15.7)
HOLD SPECIMEN: NORMAL
IMM GRANULOCYTES # BLD: 0.1 10E3/UL
IMM GRANULOCYTES NFR BLD: 1 %
INR PPP: 1.15 (ref 0.85–1.15)
LYMPHOCYTES # BLD AUTO: 0.4 10E3/UL (ref 0.8–5.3)
LYMPHOCYTES NFR BLD AUTO: 4 %
MAGNESIUM SERPL-MCNC: 1.6 MG/DL (ref 1.7–2.3)
MCH RBC QN AUTO: 29.1 PG (ref 26.5–33)
MCHC RBC AUTO-ENTMCNC: 33.7 G/DL (ref 31.5–36.5)
MCV RBC AUTO: 86 FL (ref 78–100)
MONOCYTES # BLD AUTO: 0.6 10E3/UL (ref 0–1.3)
MONOCYTES NFR BLD AUTO: 5 %
NEUTROPHILS # BLD AUTO: 9.9 10E3/UL (ref 1.6–8.3)
NEUTROPHILS NFR BLD AUTO: 90 %
NRBC # BLD AUTO: 0 10E3/UL
NRBC BLD AUTO-RTO: 0 /100
NT-PROBNP SERPL-MCNC: 635 PG/ML (ref 0–900)
PLATELET # BLD AUTO: 241 10E3/UL (ref 150–450)
POTASSIUM SERPL-SCNC: 3.7 MMOL/L (ref 3.4–5.3)
PROCALCITONIN SERPL IA-MCNC: 0.08 NG/ML
PROT SERPL-MCNC: 6.7 G/DL (ref 6.4–8.3)
RBC # BLD AUTO: 4.47 10E6/UL (ref 3.8–5.2)
RSV RNA SPEC NAA+PROBE: NEGATIVE
SARS-COV-2 RNA RESP QL NAA+PROBE: NEGATIVE
SODIUM SERPL-SCNC: 135 MMOL/L (ref 135–145)
TROPONIN T SERPL HS-MCNC: 16 NG/L
TROPONIN T SERPL HS-MCNC: 17 NG/L
TROPONIN T SERPL HS-MCNC: 19 NG/L
WBC # BLD AUTO: 11 10E3/UL (ref 4–11)

## 2025-01-18 PROCEDURE — 250N000013 HC RX MED GY IP 250 OP 250 PS 637: Performed by: EMERGENCY MEDICINE

## 2025-01-18 PROCEDURE — 82310 ASSAY OF CALCIUM: CPT | Performed by: EMERGENCY MEDICINE

## 2025-01-18 PROCEDURE — 250N000013 HC RX MED GY IP 250 OP 250 PS 637

## 2025-01-18 PROCEDURE — 85004 AUTOMATED DIFF WBC COUNT: CPT | Performed by: EMERGENCY MEDICINE

## 2025-01-18 PROCEDURE — 94640 AIRWAY INHALATION TREATMENT: CPT

## 2025-01-18 PROCEDURE — 96374 THER/PROPH/DIAG INJ IV PUSH: CPT

## 2025-01-18 PROCEDURE — 99285 EMERGENCY DEPT VISIT HI MDM: CPT | Mod: 25

## 2025-01-18 PROCEDURE — 250N000011 HC RX IP 250 OP 636: Performed by: STUDENT IN AN ORGANIZED HEALTH CARE EDUCATION/TRAINING PROGRAM

## 2025-01-18 PROCEDURE — 250N000011 HC RX IP 250 OP 636: Performed by: EMERGENCY MEDICINE

## 2025-01-18 PROCEDURE — G0378 HOSPITAL OBSERVATION PER HR: HCPCS

## 2025-01-18 PROCEDURE — 83036 HEMOGLOBIN GLYCOSYLATED A1C: CPT | Performed by: STUDENT IN AN ORGANIZED HEALTH CARE EDUCATION/TRAINING PROGRAM

## 2025-01-18 PROCEDURE — 85041 AUTOMATED RBC COUNT: CPT | Performed by: EMERGENCY MEDICINE

## 2025-01-18 PROCEDURE — 82565 ASSAY OF CREATININE: CPT | Performed by: EMERGENCY MEDICINE

## 2025-01-18 PROCEDURE — 999N000157 HC STATISTIC RCP TIME EA 10 MIN

## 2025-01-18 PROCEDURE — 36415 COLL VENOUS BLD VENIPUNCTURE: CPT | Performed by: EMERGENCY MEDICINE

## 2025-01-18 PROCEDURE — 36415 COLL VENOUS BLD VENIPUNCTURE: CPT | Performed by: STUDENT IN AN ORGANIZED HEALTH CARE EDUCATION/TRAINING PROGRAM

## 2025-01-18 PROCEDURE — 85730 THROMBOPLASTIN TIME PARTIAL: CPT | Performed by: EMERGENCY MEDICINE

## 2025-01-18 PROCEDURE — 96375 TX/PRO/DX INJ NEW DRUG ADDON: CPT

## 2025-01-18 PROCEDURE — 84484 ASSAY OF TROPONIN QUANT: CPT | Performed by: EMERGENCY MEDICINE

## 2025-01-18 PROCEDURE — 250N000009 HC RX 250: Performed by: STUDENT IN AN ORGANIZED HEALTH CARE EDUCATION/TRAINING PROGRAM

## 2025-01-18 PROCEDURE — 83880 ASSAY OF NATRIURETIC PEPTIDE: CPT | Performed by: EMERGENCY MEDICINE

## 2025-01-18 PROCEDURE — 84145 PROCALCITONIN (PCT): CPT

## 2025-01-18 PROCEDURE — 85610 PROTHROMBIN TIME: CPT | Performed by: EMERGENCY MEDICINE

## 2025-01-18 PROCEDURE — 87637 SARSCOV2&INF A&B&RSV AMP PRB: CPT | Performed by: EMERGENCY MEDICINE

## 2025-01-18 PROCEDURE — 99207 PR APP CREDIT; MD BILLING SHARED VISIT: CPT | Mod: FS

## 2025-01-18 PROCEDURE — 250N000011 HC RX IP 250 OP 636

## 2025-01-18 PROCEDURE — 93005 ELECTROCARDIOGRAM TRACING: CPT | Performed by: EMERGENCY MEDICINE

## 2025-01-18 PROCEDURE — 99223 1ST HOSP IP/OBS HIGH 75: CPT | Mod: AI | Performed by: STUDENT IN AN ORGANIZED HEALTH CARE EDUCATION/TRAINING PROGRAM

## 2025-01-18 PROCEDURE — 83735 ASSAY OF MAGNESIUM: CPT | Performed by: EMERGENCY MEDICINE

## 2025-01-18 PROCEDURE — 84484 ASSAY OF TROPONIN QUANT: CPT | Performed by: STUDENT IN AN ORGANIZED HEALTH CARE EDUCATION/TRAINING PROGRAM

## 2025-01-18 PROCEDURE — 82962 GLUCOSE BLOOD TEST: CPT

## 2025-01-18 PROCEDURE — 71046 X-RAY EXAM CHEST 2 VIEWS: CPT

## 2025-01-18 PROCEDURE — 250N000009 HC RX 250: Performed by: EMERGENCY MEDICINE

## 2025-01-18 PROCEDURE — 999N000156 HC STATISTIC RCP CONSULT EA 30 MIN

## 2025-01-18 PROCEDURE — 250N000013 HC RX MED GY IP 250 OP 250 PS 637: Performed by: STUDENT IN AN ORGANIZED HEALTH CARE EDUCATION/TRAINING PROGRAM

## 2025-01-18 RX ORDER — LOSARTAN POTASSIUM 50 MG/1
50 TABLET ORAL EVERY EVENING
Status: DISCONTINUED | OUTPATIENT
Start: 2025-01-18 | End: 2025-01-20 | Stop reason: HOSPADM

## 2025-01-18 RX ORDER — FLUTICASONE FUROATE AND VILANTEROL 200; 25 UG/1; UG/1
1 POWDER RESPIRATORY (INHALATION) DAILY
Status: DISCONTINUED | OUTPATIENT
Start: 2025-01-19 | End: 2025-01-20 | Stop reason: HOSPADM

## 2025-01-18 RX ORDER — NICOTINE POLACRILEX 4 MG
15-30 LOZENGE BUCCAL
Status: DISCONTINUED | OUTPATIENT
Start: 2025-01-18 | End: 2025-01-20 | Stop reason: HOSPADM

## 2025-01-18 RX ORDER — DULOXETIN HYDROCHLORIDE 60 MG/1
60 CAPSULE, DELAYED RELEASE ORAL EVERY MORNING
Status: DISCONTINUED | OUTPATIENT
Start: 2025-01-19 | End: 2025-01-20 | Stop reason: HOSPADM

## 2025-01-18 RX ORDER — ONDANSETRON 2 MG/ML
4 INJECTION INTRAMUSCULAR; INTRAVENOUS EVERY 6 HOURS PRN
Status: DISCONTINUED | OUTPATIENT
Start: 2025-01-18 | End: 2025-01-20 | Stop reason: HOSPADM

## 2025-01-18 RX ORDER — METHYLPREDNISOLONE SODIUM SUCCINATE 125 MG/2ML
40 INJECTION INTRAMUSCULAR; INTRAVENOUS EVERY 6 HOURS
Status: DISCONTINUED | OUTPATIENT
Start: 2025-01-18 | End: 2025-01-19

## 2025-01-18 RX ORDER — PREDNISONE 20 MG/1
40 TABLET ORAL EVERY MORNING
Status: ON HOLD | COMMUNITY
Start: 2025-01-16 | End: 2025-01-20

## 2025-01-18 RX ORDER — FUROSEMIDE 20 MG/1
20 TABLET ORAL DAILY PRN
Status: DISCONTINUED | OUTPATIENT
Start: 2025-01-18 | End: 2025-01-20 | Stop reason: HOSPADM

## 2025-01-18 RX ORDER — ACETAMINOPHEN 650 MG/1
650 SUPPOSITORY RECTAL EVERY 4 HOURS PRN
Status: DISCONTINUED | OUTPATIENT
Start: 2025-01-18 | End: 2025-01-20 | Stop reason: HOSPADM

## 2025-01-18 RX ORDER — ASPIRIN 81 MG/1
81 TABLET ORAL EVERY MORNING
Status: DISCONTINUED | OUTPATIENT
Start: 2025-01-19 | End: 2025-01-20 | Stop reason: HOSPADM

## 2025-01-18 RX ORDER — NICOTINE 21 MG/24HR
1 PATCH, TRANSDERMAL 24 HOURS TRANSDERMAL DAILY PRN
Status: DISCONTINUED | OUTPATIENT
Start: 2025-01-18 | End: 2025-01-20 | Stop reason: HOSPADM

## 2025-01-18 RX ORDER — DEXTROSE MONOHYDRATE 25 G/50ML
25-50 INJECTION, SOLUTION INTRAVENOUS
Status: DISCONTINUED | OUTPATIENT
Start: 2025-01-18 | End: 2025-01-20 | Stop reason: HOSPADM

## 2025-01-18 RX ORDER — ONDANSETRON 4 MG/1
4 TABLET, ORALLY DISINTEGRATING ORAL EVERY 6 HOURS PRN
Status: DISCONTINUED | OUTPATIENT
Start: 2025-01-18 | End: 2025-01-20 | Stop reason: HOSPADM

## 2025-01-18 RX ORDER — AMOXICILLIN 250 MG
2 CAPSULE ORAL 2 TIMES DAILY PRN
Status: DISCONTINUED | OUTPATIENT
Start: 2025-01-18 | End: 2025-01-20 | Stop reason: HOSPADM

## 2025-01-18 RX ORDER — ATORVASTATIN CALCIUM 40 MG/1
40 TABLET, FILM COATED ORAL EVERY EVENING
Status: DISCONTINUED | OUTPATIENT
Start: 2025-01-18 | End: 2025-01-20 | Stop reason: HOSPADM

## 2025-01-18 RX ORDER — LOSARTAN POTASSIUM 50 MG/1
50 TABLET ORAL EVERY EVENING
COMMUNITY

## 2025-01-18 RX ORDER — OSELTAMIVIR PHOSPHATE 75 MG/1
75 CAPSULE ORAL ONCE
Status: COMPLETED | OUTPATIENT
Start: 2025-01-18 | End: 2025-01-18

## 2025-01-18 RX ORDER — PROCHLORPERAZINE MALEATE 5 MG/1
5 TABLET ORAL EVERY 6 HOURS PRN
Status: DISCONTINUED | OUTPATIENT
Start: 2025-01-18 | End: 2025-01-20 | Stop reason: HOSPADM

## 2025-01-18 RX ORDER — CARBAMAZEPINE 100 MG/1
100 TABLET, EXTENDED RELEASE ORAL 2 TIMES DAILY
COMMUNITY

## 2025-01-18 RX ORDER — AMOXICILLIN 250 MG
1 CAPSULE ORAL 2 TIMES DAILY PRN
Status: DISCONTINUED | OUTPATIENT
Start: 2025-01-18 | End: 2025-01-20 | Stop reason: HOSPADM

## 2025-01-18 RX ORDER — DOXYCYCLINE 100 MG/10ML
100 INJECTION, POWDER, LYOPHILIZED, FOR SOLUTION INTRAVENOUS EVERY 12 HOURS
Status: DISCONTINUED | OUTPATIENT
Start: 2025-01-18 | End: 2025-01-19

## 2025-01-18 RX ORDER — DILTIAZEM HYDROCHLORIDE 60 MG/1
120 CAPSULE, EXTENDED RELEASE ORAL 2 TIMES DAILY
Status: DISCONTINUED | OUTPATIENT
Start: 2025-01-18 | End: 2025-01-20 | Stop reason: HOSPADM

## 2025-01-18 RX ORDER — IPRATROPIUM BROMIDE AND ALBUTEROL SULFATE 2.5; .5 MG/3ML; MG/3ML
3 SOLUTION RESPIRATORY (INHALATION)
Status: DISCONTINUED | OUTPATIENT
Start: 2025-01-19 | End: 2025-01-20 | Stop reason: HOSPADM

## 2025-01-18 RX ORDER — POLYETHYLENE GLYCOL 3350 17 G/17G
17 POWDER, FOR SOLUTION ORAL 2 TIMES DAILY PRN
Status: DISCONTINUED | OUTPATIENT
Start: 2025-01-18 | End: 2025-01-20 | Stop reason: HOSPADM

## 2025-01-18 RX ORDER — IPRATROPIUM BROMIDE AND ALBUTEROL SULFATE 2.5; .5 MG/3ML; MG/3ML
3 SOLUTION RESPIRATORY (INHALATION) ONCE
Status: COMPLETED | OUTPATIENT
Start: 2025-01-18 | End: 2025-01-18

## 2025-01-18 RX ORDER — OSELTAMIVIR PHOSPHATE 75 MG/1
75 CAPSULE ORAL 2 TIMES DAILY
Status: DISCONTINUED | OUTPATIENT
Start: 2025-01-19 | End: 2025-01-20 | Stop reason: HOSPADM

## 2025-01-18 RX ORDER — NICOTINE 21 MG/24HR
1 PATCH, TRANSDERMAL 24 HOURS TRANSDERMAL DAILY
Status: DISCONTINUED | OUTPATIENT
Start: 2025-01-18 | End: 2025-01-18

## 2025-01-18 RX ORDER — METHYLPREDNISOLONE SODIUM SUCCINATE 125 MG/2ML
125 INJECTION INTRAMUSCULAR; INTRAVENOUS ONCE
Status: COMPLETED | OUTPATIENT
Start: 2025-01-18 | End: 2025-01-18

## 2025-01-18 RX ORDER — CARBAMAZEPINE 100 MG/1
100 TABLET, EXTENDED RELEASE ORAL 2 TIMES DAILY
Status: DISCONTINUED | OUTPATIENT
Start: 2025-01-18 | End: 2025-01-20 | Stop reason: HOSPADM

## 2025-01-18 RX ORDER — MONTELUKAST SODIUM 10 MG/1
10 TABLET ORAL AT BEDTIME
Status: DISCONTINUED | OUTPATIENT
Start: 2025-01-18 | End: 2025-01-20 | Stop reason: HOSPADM

## 2025-01-18 RX ORDER — IPRATROPIUM BROMIDE AND ALBUTEROL SULFATE 2.5; .5 MG/3ML; MG/3ML
3 SOLUTION RESPIRATORY (INHALATION)
Status: DISCONTINUED | OUTPATIENT
Start: 2025-01-18 | End: 2025-01-18

## 2025-01-18 RX ORDER — MAGNESIUM SULFATE 4 G/50ML
4 INJECTION INTRAVENOUS ONCE
Status: COMPLETED | OUTPATIENT
Start: 2025-01-18 | End: 2025-01-18

## 2025-01-18 RX ORDER — PANTOPRAZOLE SODIUM 40 MG/1
40 TABLET, DELAYED RELEASE ORAL
Status: DISCONTINUED | OUTPATIENT
Start: 2025-01-19 | End: 2025-01-20 | Stop reason: HOSPADM

## 2025-01-18 RX ORDER — ACETAMINOPHEN 325 MG/1
650 TABLET ORAL EVERY 4 HOURS PRN
Status: DISCONTINUED | OUTPATIENT
Start: 2025-01-18 | End: 2025-01-20 | Stop reason: HOSPADM

## 2025-01-18 RX ADMIN — MONTELUKAST 10 MG: 10 TABLET, FILM COATED ORAL at 21:27

## 2025-01-18 RX ADMIN — CARBAMAZEPINE 100 MG: 100 TABLET, EXTENDED RELEASE ORAL at 21:27

## 2025-01-18 RX ADMIN — NICOTINE 1 PATCH: 14 PATCH, EXTENDED RELEASE TRANSDERMAL at 18:05

## 2025-01-18 RX ADMIN — LOSARTAN POTASSIUM 50 MG: 50 TABLET, FILM COATED ORAL at 21:28

## 2025-01-18 RX ADMIN — OSELTAMIVIR PHOSPHATE 75 MG: 75 CAPSULE ORAL at 17:07

## 2025-01-18 RX ADMIN — METHYLPREDNISOLONE SODIUM SUCCINATE 37.5 MG: 125 INJECTION, POWDER, FOR SOLUTION INTRAMUSCULAR; INTRAVENOUS at 21:28

## 2025-01-18 RX ADMIN — DILTIAZEM HYDROCHLORIDE 120 MG: 60 CAPSULE, EXTENDED RELEASE ORAL at 21:27

## 2025-01-18 RX ADMIN — METHYLPREDNISOLONE SODIUM SUCCINATE 125 MG: 125 INJECTION, POWDER, FOR SOLUTION INTRAMUSCULAR; INTRAVENOUS at 13:53

## 2025-01-18 RX ADMIN — MAGNESIUM SULFATE HEPTAHYDRATE 4 G: 80 INJECTION, SOLUTION INTRAVENOUS at 17:46

## 2025-01-18 RX ADMIN — ATORVASTATIN CALCIUM 40 MG: 40 TABLET, FILM COATED ORAL at 21:28

## 2025-01-18 RX ADMIN — DOXYCYCLINE 100 MG: 100 INJECTION, POWDER, LYOPHILIZED, FOR SOLUTION INTRAVENOUS at 21:27

## 2025-01-18 RX ADMIN — UMECLIDINIUM 1 PUFF: 62.5 AEROSOL, POWDER ORAL at 19:10

## 2025-01-18 RX ADMIN — IPRATROPIUM BROMIDE AND ALBUTEROL SULFATE 3 ML: .5; 3 SOLUTION RESPIRATORY (INHALATION) at 13:57

## 2025-01-18 ASSESSMENT — ENCOUNTER SYMPTOMS
FEVER: 1
VOMITING: 0
CHEST TIGHTNESS: 1
NAUSEA: 1
COUGH: 1
SHORTNESS OF BREATH: 1
CHILLS: 1
WEAKNESS: 1
DIZZINESS: 1

## 2025-01-18 ASSESSMENT — ACTIVITIES OF DAILY LIVING (ADL)
ADLS_ACUITY_SCORE: 56
ADLS_ACUITY_SCORE: 60
ADLS_ACUITY_SCORE: 60
ADLS_ACUITY_SCORE: 56
ADLS_ACUITY_SCORE: 56
ADLS_ACUITY_SCORE: 60
ADLS_ACUITY_SCORE: 56
ADLS_ACUITY_SCORE: 60
ADLS_ACUITY_SCORE: 56

## 2025-01-18 NOTE — H&P
[facility]    History and Physical - Hospitalist Service       Date of Admission:  1/18/2025    Assessment & Plan    Iglesia Shore is a 65 year old female with PMHx of right lung cancer s/p radiation therapy, COPD, peripheral vascular disease, type 2 diabetes, hyperlipidemia, obstructive sleep apnea, tobacco use disorder, hypertension, chronic pain who was admitted on 1/18/2025 for acute hypoxic respiratory failure due to influenza A infection, +/- COPD exacerbation    Acute Hypoxic Respiratory Failure   Influenza A Infection   COPD Exacerbation  Several days of worsening SOB, increasing cough  -- CXR without acute infiltrate   -- Procal 0.08 . Afebrile, normal white count   -- Influenza A positive   -- Started on Tamiflu in the ED ; continue   -- Empiric doxycycline for now ; took a Zpak course outpatient   -- SoluMedrol scheduled  -- Singulair nightly    -- Duonebs QID RT   -- Continue PTA inhaler  -- On 3 L supp O2 ; continue PRN / wean as able   -- May need home O2 eval prior to discharge  -- Follows with pulmonology, last seen on 1/6/25     H/o Lung Cancer   Presumed NSCLC the RLL, [id number], stage IA2   -- S/p radiation as deemed non-operable.   -- Follow up with Oncology as previously planned outpatient     Type 2 DM    -- Holding PTA metformin while inpatient   -- Sliding scale ordered   -- Glucose checks ; hypoglycemia protocol    Essential Hypertension   -- continue PTA losartan, diltiazem     Hypomagnesemia  Low mag also noted on recent admissions   -- Replacement protocols     H/O trigeminal neuralgia  -Follows up with Dr Clayton Ribeiro  -Resumed home carbamazepime and neurontin    Tobacco Use - NRT ordered   Hyperlipidemia - continue PTA statin   ANDREW - Non-compliant with CPAP   Mood - continue PTA Cymbalta        Observation Goals: -diagnostic tests and consults completed and resulted, -vital signs normal or at patient baseline, -tolerating oral antibiotics or has plans for home  "infusion setup, -infection is improving, -dyspnea improved and O2 sats greater than 88% on room air or prior home oxygen levels, Nurse to notify provider when observation goals have been met and patient is ready for discharge.  Diet: Moderate Consistent Carb (60 g CHO per Meal) Diet    DVT Prophylaxis: Pneumatic Compression Devices  Cunningham Catheter: Not present  Lines: None     Cardiac Monitoring: None  Code Status: No CPR- Do NOT Intubate - discussed and confirmed with patient     Clinically Significant Risk Factors Present on Admission          # Hypochloremia: Lowest Cl = 95 mmol/L in last 2 days, will monitor as appropriate    # Hypomagnesemia: Lowest Mg = 1.6 mg/dL in last 2 days, will replace as needed     # Drug Induced Platelet Defect: home medication list includes an antiplatelet medication   # Hypertension: Noted on problem list           # Overweight: Estimated body mass index is 25.91 kg/m  as calculated from the following:    Height as of this encounter: 1.676 m (5' 6\").    Weight as of this encounter: 72.8 kg (160 lb 8 oz).         # Financial/Environmental Concerns:           Disposition Plan     Medically Ready for Discharge: Anticipated in 2-4 Days        The patient's care was discussed with the Attending Physician, Dr. Saad Gondal who independently met with and assessed the patient and is in agreement with the assessment and plan     Vielka Melchor PA-C  Hospitalist [facility]  Securely message with [employer] (more info)  Text page via [facility] Paging/Directory     ______________________________________________________________________    Chief Complaint   Shortness of breath   Cough    History is obtained from the patient    History of Present Illness   Iglesia Shore is a 65 year old female with PMHx of right lung cancer s/p radiation therapy, COPD, peripheral vascular disease, type 2 diabetes, hyperlipidemia, obstructive sleep apnea, tobacco use disorder, hypertension, " chronic pain who presented to the ED on 1/18/2025 for evaluation of shortness of breath and cough.  Patient reports increasing difficulty breathing with shortness of breath and increasing cough over the past several days.  She started taking her Z-Duane and prednisone as previously prescribed by her pulmonologist but still noticed progressive symptoms.  Using nebulizers at home with mild improvement.  Describes cough is intermittently productive.  Reports associated Chills, myalgias, generalized weakness. No chest pain, leg swelling or leg pain.  No fevers, chills, sweats.  No baseline home O2    Past Medical History    Past Medical History:   Diagnosis Date    Back pain     low back    COPD (chronic obstructive pulmonary disease) (H)     Depression     Diabetes mellitus (H)     Hyperlipidemia     Hypertension     PAD (peripheral artery disease)     Peripheral neuropathy     Pulmonary nodules        Past Surgical History   Past Surgical History:   Procedure Laterality Date    AORTA - FEMORAL ARTERY BYPASS GRAFT      BRONCHOSCOPY RIGID OR FLEXIBLE W/TRANSENDOSCOPIC ENDOBRONCHIAL ULTRASOUND GUIDED N/A 07/14/2022    Procedure: endbronchial ultrasound, transbronchial biopsy;  Surgeon: Rosendo Dave MD;  Location: [facility] OR    HYSTEROSCOPY W/ ENDOMETRIAL ABLATION      ILEOSTOMY  08/16/2023    ILEOSTOMY REVISION  10/19/2023    IR EXTREMITY ANGIOGRAM LEFT  11/25/2020    IR EXTREMITY ANGIOGRAM LEFT  12/16/2020    IR LOWER EXTREMITY ANGIOGRAM LEFT  11/25/2020    IR LOWER EXTREMITY ANGIOGRAM LEFT  12/16/2020    OPTICAL TRACKING SYSTEM BRONCHOSCOPY N/A 07/14/2022    Procedure: BRONCHOSCOPY, USING OPTICAL TRACKING SYSTEM, .  Ion or veran system, fiducial placement;  Surgeon: Rosendo Dave MD;  Location: [facility] OR    OTHER SURGICAL HISTORY      tubal ligation    OTHER SURGICAL HISTORY      spine fusion    PAIN STELLATE GANGLION BLOCK RIGHT Right 1992    x5, [facility]'s       Prior to Admission Medications   Prior to  Admission Medications   Prescriptions Last Dose Informant Patient Reported? Taking?   DULoxetine (CYMBALTA) 60 MG capsule 1/18/2025 Morning Self Yes Yes   Sig: Take 60 mg by mouth every morning.   albuterol (PROAIR HFA/PROVENTIL HFA/VENTOLIN HFA) 108 (90 Base) MCG/ACT inhaler  Self Yes No   Sig: Inhale 2 puffs into the lungs every 6 hours as needed for shortness of breath, wheezing or cough   aspirin (ASA) 81 MG EC tablet 1/18/2025 Morning Self Yes Yes   Sig: Take 81 mg by mouth every morning.   atorvastatin (LIPITOR) 40 MG tablet 1/17/2025 Evening Self Yes Yes   Sig: Take 40 mg by mouth every evening.   budeson-glycopyrrol-formoterol (BREZTRI AEROSPHERE) 160-9-4.8 MCG/ACT AERO inhaler 1/18/2025 Morning Self Yes Yes   Sig: Inhale 2 puffs into the lungs 2 times daily   carBAMazepine (TEGRETOL XR) 100 MG 12 hr tablet 1/18/2025 Morning  Yes Yes   Sig: Take 100 mg by mouth 2 times daily.   diltiazem (CARDIZEM SR) 120 MG CP12 12 hr SR capsule 1/18/2025 Morning Self Yes Yes   Sig: Take 120 mg by mouth 2 times daily   furosemide (LASIX) 20 MG tablet More than a month  Yes Yes   Sig: Take 20 mg by mouth as needed.   ipratropium - albuterol 0.5 mg/2.5 mg/3 mL (DUONEB) 0.5-2.5 (3) MG/3ML neb solution 1/18/2025 Morning  No Yes   Sig: Take 1 vial (3 mLs) by nebulization every 6 hours as needed for shortness of breath, wheezing or cough.   losartan (COZAAR) 50 MG tablet 1/17/2025 Evening  Yes Yes   Sig: Take 50 mg by mouth every evening.   metFORMIN (GLUCOPHAGE XR) 500 MG 24 hr tablet 1/18/2025 Morning  No Yes   Sig: Take 1 tablet (500 mg) by mouth 2 times daily (with meals)   montelukast (SINGULAIR) 10 MG tablet 1/17/2025 Bedtime  No Yes   Sig: Take 1 tablet (10 mg) by mouth at bedtime.   pantoprazole (PROTONIX) 40 MG EC tablet 1/18/2025 Morning  No Yes   Sig: Take 1 tablet (40 mg) by mouth every morning (before breakfast).   polyethylene glycol (MIRALAX) 17 GM/Dose powder Unknown Self Yes Yes   Sig: Take 17 g by mouth daily as  needed for constipation.   predniSONE (DELTASONE) 20 MG tablet 1/18/2025 Morning  Yes Yes   Sig: Take 40 mg by mouth every morning. For 5 days.      Facility-Administered Medications: None           Physical Exam   Vital Signs: Temp: 98.7  F (37.1  C) Temp src: Oral BP: 131/69 Pulse: 67   Resp: (!) 34 SpO2: 98 % O2 Device: Nasal cannula Oxygen Delivery: 3 LPM  Weight: 160 lbs 8 oz    Constitutional: awake, alert, mild distress, appears uncomfortable   Respiratory: Mild-mod increased work of breathing, mild-mod accessory muscle use. Bilateral crackles and wheezing.   Cardiovascular: Regular rate and rhythm, normal S1 and S2  Skin: Normal skin color, texture, turgor. No rashes and no jaundice  Musculoskeletal: No lower extremity pitting edema present.   Neurologic: Awake, alert.   Neuropsychiatric: Appropriate mood, affect and eye contact. Cooperative.    Medical Decision Making             Data     I have personally reviewed the following data over the past 24 hrs:    11.0  \   13.0   / 241     135 95 (L) 14.9 /  129 (H)   3.7 28 0.60 \     ALT: 10 AST: 27 AP: 70 TBILI: 0.2   ALB: 3.7 TOT PROTEIN: 6.7 LIPASE: N/A     Trop: 19 (H) BNP: 635     TSH: N/A T4: N/A A1C: 5.4     INR:  1.15 PTT:  25   D-dimer:  N/A Fibrinogen:  N/A       Imaging results reviewed over the past 24 hrs:   Recent Results (from the past 24 hours)   Chest XR,  PA & LAT    Narrative    EXAM: XR CHEST 2 VIEWS  LOCATION: [facility]  DATE: 1/18/2025    INDICATION: shortness of breath  COMPARISON: 10/23/2024      Impression    IMPRESSION: Fiducial marker in the right lower lobe is unchanged. No acute infiltrate or significant change.

## 2025-01-18 NOTE — ED TRIAGE NOTES
Patient arrives to triage from home with son with chief complaint of shortness of breath, cough and chest pain for the past two days.  History of COPD.  Patient 80% on RA in triage, writer placed on 4 liters NC, now 94%.  SOB worse with lying down.  Attempted albuterol inhalers at home but not helping.  Alert and oriented x4.  Noted wheezing in triage.

## 2025-01-18 NOTE — MEDICATION SCRIBE - ADMISSION MEDICATION HISTORY
Medication Scribe Admission Medication History    Admission medication history is complete. The information provided in this note is only as accurate as the sources available at the time of the update.    Information Source(s): Patient and CareEverywhere/SureScripts via in-person    Pertinent Information: Patient manages their own medications. Patient had excel spreadsheet on her phone of her current medications and time of day she takes them. Patient reports only taking the AM medications so far for 1/18/25.  Atenolol  -not on patients list, last filled 90 ds 25 mg 10/17/25 thru mail order. Notes in chart from 8/26/24 show she stopped taking, when she started Losartan 50 mg.  ZPAK  -pt was given 5ds 6 tabs, with normal zpak instructions of 2 tabs on day 1, then 1 tab daily until gone. But patient reports taking 2 tabs daily and ran out today. She started taking on 1/16/25, and was prescribed on 1/6/25. Pt no longer taking as she completed it early.  Prednisone  -pt prescribed 40 mg daily x 5 days. Patient started 1/16/25, was prescribed 1/6/25. Pt has completed 3 days supply.  Gabapentin  -pt no longer taking.  Nicotine patch  -pt reports no longer smoking, and not taking any nicotine substitutes.      Changes made to PTA medication list:  Added:   ZPAK, pt reports being prescribed back on 1/6/25, but did not start until 2 days ago on 1/16/25. Patient reports taking 500 mg daily and has run out.   Deleted:   Gabapentin 300mg and 600 mg (no longer taking)  Nicotine patch  Oxycodone (not taking)  Changed:   Miralax to PRN    Allergies reviewed with patient and updates made in EHR: yes    Medication History Completed By: Italo Ash 1/18/2025 2:00 PM    PTA Med List   Medication Sig Last Dose/Taking    aspirin (ASA) 81 MG EC tablet Take 81 mg by mouth every morning. 1/18/2025 Morning    atorvastatin (LIPITOR) 40 MG tablet Take 40 mg by mouth every evening. 1/17/2025 Evening    budeson-glycopyrrol-formoterol (BREZTRI  AEROSPHERE) 160-9-4.8 MCG/ACT AERO inhaler Inhale 2 puffs into the lungs 2 times daily 1/18/2025 Morning    carBAMazepine (TEGRETOL XR) 100 MG 12 hr tablet Take 100 mg by mouth 2 times daily. 1/18/2025 Morning    diltiazem (CARDIZEM SR) 120 MG CP12 12 hr SR capsule Take 120 mg by mouth 2 times daily 1/18/2025 Morning    DULoxetine (CYMBALTA) 60 MG capsule Take 60 mg by mouth every morning. 1/18/2025 Morning    furosemide (LASIX) 20 MG tablet Take 20 mg by mouth as needed. More than a month    ipratropium - albuterol 0.5 mg/2.5 mg/3 mL (DUONEB) 0.5-2.5 (3) MG/3ML neb solution Take 1 vial (3 mLs) by nebulization every 6 hours as needed for shortness of breath, wheezing or cough. 1/18/2025 Morning    losartan (COZAAR) 50 MG tablet Take 50 mg by mouth every evening. 1/17/2025 Evening    metFORMIN (GLUCOPHAGE XR) 500 MG 24 hr tablet Take 1 tablet (500 mg) by mouth 2 times daily (with meals) 1/18/2025 Morning    montelukast (SINGULAIR) 10 MG tablet Take 1 tablet (10 mg) by mouth at bedtime. 1/17/2025 Bedtime    pantoprazole (PROTONIX) 40 MG EC tablet Take 1 tablet (40 mg) by mouth every morning (before breakfast). 1/18/2025 Morning    polyethylene glycol (MIRALAX) 17 GM/Dose powder Take 17 g by mouth daily as needed for constipation. Unknown    predniSONE (DELTASONE) 20 MG tablet Take 40 mg by mouth every morning. For 5 days. 1/18/2025 Morning

## 2025-01-18 NOTE — ED NOTES
Bed: JNED-27  Expected date: 1/18/25  Expected time:   Means of arrival:   Comments:  Waiting Room LSANDRA

## 2025-01-19 LAB
ANION GAP SERPL CALCULATED.3IONS-SCNC: 12 MMOL/L (ref 7–15)
BUN SERPL-MCNC: 24.5 MG/DL (ref 8–23)
CALCIUM SERPL-MCNC: 9.4 MG/DL (ref 8.8–10.4)
CHLORIDE SERPL-SCNC: 100 MMOL/L (ref 98–107)
CREAT SERPL-MCNC: 0.73 MG/DL (ref 0.51–0.95)
EGFRCR SERPLBLD CKD-EPI 2021: 90 ML/MIN/1.73M2
ERYTHROCYTE [DISTWIDTH] IN BLOOD BY AUTOMATED COUNT: 13.2 % (ref 10–15)
GLUCOSE BLDC GLUCOMTR-MCNC: 134 MG/DL (ref 70–99)
GLUCOSE BLDC GLUCOMTR-MCNC: 136 MG/DL (ref 70–99)
GLUCOSE BLDC GLUCOMTR-MCNC: 142 MG/DL (ref 70–99)
GLUCOSE BLDC GLUCOMTR-MCNC: 143 MG/DL (ref 70–99)
GLUCOSE BLDC GLUCOMTR-MCNC: 178 MG/DL (ref 70–99)
GLUCOSE SERPL-MCNC: 134 MG/DL (ref 70–99)
HCO3 SERPL-SCNC: 28 MMOL/L (ref 22–29)
HCT VFR BLD AUTO: 39 % (ref 35–47)
HGB BLD-MCNC: 12.8 G/DL (ref 11.7–15.7)
MAGNESIUM SERPL-MCNC: 2.6 MG/DL (ref 1.7–2.3)
MCH RBC QN AUTO: 29 PG (ref 26.5–33)
MCHC RBC AUTO-ENTMCNC: 32.8 G/DL (ref 31.5–36.5)
MCV RBC AUTO: 88 FL (ref 78–100)
PLATELET # BLD AUTO: 232 10E3/UL (ref 150–450)
POTASSIUM SERPL-SCNC: 3.6 MMOL/L (ref 3.4–5.3)
RBC # BLD AUTO: 4.41 10E6/UL (ref 3.8–5.2)
SODIUM SERPL-SCNC: 140 MMOL/L (ref 135–145)
WBC # BLD AUTO: 6.3 10E3/UL (ref 4–11)

## 2025-01-19 PROCEDURE — 36415 COLL VENOUS BLD VENIPUNCTURE: CPT

## 2025-01-19 PROCEDURE — 94640 AIRWAY INHALATION TREATMENT: CPT | Mod: 76

## 2025-01-19 PROCEDURE — 250N000011 HC RX IP 250 OP 636

## 2025-01-19 PROCEDURE — 85018 HEMOGLOBIN: CPT

## 2025-01-19 PROCEDURE — 999N000157 HC STATISTIC RCP TIME EA 10 MIN

## 2025-01-19 PROCEDURE — 250N000013 HC RX MED GY IP 250 OP 250 PS 637: Performed by: INTERNAL MEDICINE

## 2025-01-19 PROCEDURE — 83735 ASSAY OF MAGNESIUM: CPT | Performed by: STUDENT IN AN ORGANIZED HEALTH CARE EDUCATION/TRAINING PROGRAM

## 2025-01-19 PROCEDURE — 250N000013 HC RX MED GY IP 250 OP 250 PS 637: Performed by: STUDENT IN AN ORGANIZED HEALTH CARE EDUCATION/TRAINING PROGRAM

## 2025-01-19 PROCEDURE — 272N000202 HC AEROBIKA WITH MANOMETER

## 2025-01-19 PROCEDURE — 99233 SBSQ HOSP IP/OBS HIGH 50: CPT | Mod: FS

## 2025-01-19 PROCEDURE — 80048 BASIC METABOLIC PNL TOTAL CA: CPT

## 2025-01-19 PROCEDURE — G0378 HOSPITAL OBSERVATION PER HR: HCPCS

## 2025-01-19 PROCEDURE — 99207 PR APP CREDIT; MD BILLING SHARED VISIT: CPT | Mod: FS | Performed by: INTERNAL MEDICINE

## 2025-01-19 PROCEDURE — 120N000001 HC R&B MED SURG/OB

## 2025-01-19 PROCEDURE — 82962 GLUCOSE BLOOD TEST: CPT

## 2025-01-19 PROCEDURE — 250N000011 HC RX IP 250 OP 636: Performed by: STUDENT IN AN ORGANIZED HEALTH CARE EDUCATION/TRAINING PROGRAM

## 2025-01-19 PROCEDURE — 94640 AIRWAY INHALATION TREATMENT: CPT

## 2025-01-19 PROCEDURE — 250N000009 HC RX 250: Performed by: STUDENT IN AN ORGANIZED HEALTH CARE EDUCATION/TRAINING PROGRAM

## 2025-01-19 PROCEDURE — 96376 TX/PRO/DX INJ SAME DRUG ADON: CPT

## 2025-01-19 PROCEDURE — 85041 AUTOMATED RBC COUNT: CPT

## 2025-01-19 PROCEDURE — 250N000013 HC RX MED GY IP 250 OP 250 PS 637

## 2025-01-19 PROCEDURE — 250N000012 HC RX MED GY IP 250 OP 636 PS 637

## 2025-01-19 RX ORDER — METHYLPREDNISOLONE SODIUM SUCCINATE 40 MG/ML
40 INJECTION INTRAMUSCULAR; INTRAVENOUS DAILY
Status: DISCONTINUED | OUTPATIENT
Start: 2025-01-20 | End: 2025-01-20 | Stop reason: HOSPADM

## 2025-01-19 RX ORDER — GUAIFENESIN/DEXTROMETHORPHAN 100-10MG/5
10 SYRUP ORAL EVERY 4 HOURS PRN
Status: DISCONTINUED | OUTPATIENT
Start: 2025-01-19 | End: 2025-01-20 | Stop reason: HOSPADM

## 2025-01-19 RX ORDER — METHYLPREDNISOLONE SODIUM SUCCINATE 40 MG/ML
40 INJECTION INTRAMUSCULAR; INTRAVENOUS DAILY
Status: DISCONTINUED | OUTPATIENT
Start: 2025-01-20 | End: 2025-01-19

## 2025-01-19 RX ORDER — DOXYCYCLINE 100 MG/1
100 CAPSULE ORAL EVERY 12 HOURS SCHEDULED
Status: DISCONTINUED | OUTPATIENT
Start: 2025-01-19 | End: 2025-01-20 | Stop reason: HOSPADM

## 2025-01-19 RX ORDER — HYDRALAZINE HYDROCHLORIDE 10 MG/1
10 TABLET, FILM COATED ORAL ONCE
Status: DISCONTINUED | OUTPATIENT
Start: 2025-01-19 | End: 2025-01-19

## 2025-01-19 RX ORDER — BENZONATATE 100 MG/1
100 CAPSULE ORAL 3 TIMES DAILY PRN
Status: DISCONTINUED | OUTPATIENT
Start: 2025-01-19 | End: 2025-01-20 | Stop reason: HOSPADM

## 2025-01-19 RX ORDER — METHYLPREDNISOLONE SODIUM SUCCINATE 40 MG/ML
40 INJECTION INTRAMUSCULAR; INTRAVENOUS DAILY
Status: DISCONTINUED | OUTPATIENT
Start: 2025-01-19 | End: 2025-01-19

## 2025-01-19 RX ADMIN — ATORVASTATIN CALCIUM 40 MG: 40 TABLET, FILM COATED ORAL at 21:07

## 2025-01-19 RX ADMIN — CARBAMAZEPINE 100 MG: 100 TABLET, EXTENDED RELEASE ORAL at 08:20

## 2025-01-19 RX ADMIN — LOSARTAN POTASSIUM 50 MG: 50 TABLET, FILM COATED ORAL at 21:07

## 2025-01-19 RX ADMIN — DILTIAZEM HYDROCHLORIDE 120 MG: 60 CAPSULE, EXTENDED RELEASE ORAL at 08:19

## 2025-01-19 RX ADMIN — IPRATROPIUM BROMIDE AND ALBUTEROL SULFATE 3 ML: .5; 3 SOLUTION RESPIRATORY (INHALATION) at 07:08

## 2025-01-19 RX ADMIN — DOXYCYCLINE 100 MG: 100 INJECTION, POWDER, LYOPHILIZED, FOR SOLUTION INTRAVENOUS at 08:27

## 2025-01-19 RX ADMIN — OSELTAMIVIR PHOSPHATE 75 MG: 75 CAPSULE ORAL at 21:08

## 2025-01-19 RX ADMIN — INSULIN ASPART 1 UNITS: 100 INJECTION, SOLUTION INTRAVENOUS; SUBCUTANEOUS at 17:13

## 2025-01-19 RX ADMIN — GUAIFENESIN AND DEXTROMETHORPHAN 10 ML: 100; 10 SYRUP ORAL at 12:47

## 2025-01-19 RX ADMIN — PANTOPRAZOLE SODIUM 40 MG: 40 TABLET, DELAYED RELEASE ORAL at 08:18

## 2025-01-19 RX ADMIN — IPRATROPIUM BROMIDE AND ALBUTEROL SULFATE 3 ML: .5; 3 SOLUTION RESPIRATORY (INHALATION) at 11:28

## 2025-01-19 RX ADMIN — FLUTICASONE FUROATE AND VILANTEROL TRIFENATATE 1 PUFF: 200; 25 POWDER RESPIRATORY (INHALATION) at 08:09

## 2025-01-19 RX ADMIN — IPRATROPIUM BROMIDE AND ALBUTEROL SULFATE 3 ML: .5; 3 SOLUTION RESPIRATORY (INHALATION) at 16:07

## 2025-01-19 RX ADMIN — METHYLPREDNISOLONE SODIUM SUCCINATE 37.5 MG: 125 INJECTION, POWDER, FOR SOLUTION INTRAMUSCULAR; INTRAVENOUS at 06:20

## 2025-01-19 RX ADMIN — IPRATROPIUM BROMIDE AND ALBUTEROL SULFATE 3 ML: .5; 3 SOLUTION RESPIRATORY (INHALATION) at 19:38

## 2025-01-19 RX ADMIN — DILTIAZEM HYDROCHLORIDE 120 MG: 60 CAPSULE, EXTENDED RELEASE ORAL at 21:07

## 2025-01-19 RX ADMIN — MONTELUKAST 10 MG: 10 TABLET, FILM COATED ORAL at 21:06

## 2025-01-19 RX ADMIN — OSELTAMIVIR PHOSPHATE 75 MG: 75 CAPSULE ORAL at 08:19

## 2025-01-19 RX ADMIN — DOXYCYCLINE 100 MG: 100 CAPSULE ORAL at 21:09

## 2025-01-19 RX ADMIN — DULOXETINE HYDROCHLORIDE 60 MG: 60 CAPSULE, DELAYED RELEASE ORAL at 08:18

## 2025-01-19 RX ADMIN — UMECLIDINIUM 1 PUFF: 62.5 AEROSOL, POWDER ORAL at 08:10

## 2025-01-19 RX ADMIN — ASPIRIN 81 MG: 81 TABLET, COATED ORAL at 08:18

## 2025-01-19 RX ADMIN — CARBAMAZEPINE 100 MG: 100 TABLET, EXTENDED RELEASE ORAL at 21:09

## 2025-01-19 ASSESSMENT — ACTIVITIES OF DAILY LIVING (ADL)
ADLS_ACUITY_SCORE: 41
ADLS_ACUITY_SCORE: 60
ADLS_ACUITY_SCORE: 42
ADLS_ACUITY_SCORE: 60
ADLS_ACUITY_SCORE: 42
ADLS_ACUITY_SCORE: 41
ADLS_ACUITY_SCORE: 60
ADLS_ACUITY_SCORE: 42
ADLS_ACUITY_SCORE: 42
DEPENDENT_IADLS:: LAUNDRY
ADLS_ACUITY_SCORE: 41
ADLS_ACUITY_SCORE: 41
ADLS_ACUITY_SCORE: 60
ADLS_ACUITY_SCORE: 42
ADLS_ACUITY_SCORE: 41
ADLS_ACUITY_SCORE: 42
ADLS_ACUITY_SCORE: 60
ADLS_ACUITY_SCORE: 42
ADLS_ACUITY_SCORE: 60
ADLS_ACUITY_SCORE: 42
ADLS_ACUITY_SCORE: 41
ADLS_ACUITY_SCORE: 60

## 2025-01-19 NOTE — PROGRESS NOTES
Patient admitted to room 01 at approximately 1943 via cart from emergency room.  Reason for Admission:   Report received from:   Patient was accompanied by Self.  Discharge transportation provided by:  Patient ambulated/transferred:  with one assist. self.  Patient is alert and orientated x 3.  Outpatient Observation education provided to: (patient, family, friend)  MDRO Education done if applicable (MRSA, VRE, etc)  Safety risks were identified during admission:  none.   Yellow risk/fall band applied:  No  Home meds sent home: No  Home meds sent to pharmacy:No IF YES add 1/2 sheet laminated page reminder to chart/clipboard   Detailed Belongings: clothes, bag

## 2025-01-19 NOTE — PLAN OF CARE
"PRIMARY DIAGNOSIS: \"GENERIC\" NURSING  OUTPATIENT/OBSERVATION GOALS TO BE MET BEFORE DISCHARGE:  ADLs back to baseline: No    Activity and level of assistance: Up with standby assistance.    Pain status: Pain free but have congestion    Return to near baseline physical activity: No     Discharge Planner Nurse   Safe discharge environment identified: Yes  Barriers to discharge: Yes       Entered by: Charlotte Nina RN 01/18/2025 11:02 PM     Please review provider order for any additional goals.   Nurse to notify provider when observation goals have been met and patient is ready for discharge.Goal Outcome Evaluation:                        "

## 2025-01-19 NOTE — PLAN OF CARE
Goal Outcome Evaluation:      Plan of Care Reviewed With: patient    Overall Patient Progress: no changeOverall Patient Progress: no change     Pt is A&Ox4, denies N/V/D, SOB and dizziness. Reported cough, fatigue and weakness, neuropathy in feet at baseline. Tolerating PO intake, PRN cough medicine given. Commode bedside, droplet precautions.

## 2025-01-19 NOTE — PLAN OF CARE
Problem: Gas Exchange Impaired  Goal: Optimal Gas Exchange  Outcome: Progressing   Goal Outcome Evaluation:       Neuro: A&Ox4.   Cardiac:in tele, NSR VSS.   Respiratory: 3 lit o2 via   GI/: Voiding spontaneously. No BM this shift.  Diet/appetite: Tolerating carb diet. Denies nausea.  Activity: Up with 1 x assist    Pain: Denies   Skin: No new deficits noted.  Lines: PIV SL  Drains: no  Replacements: Mg protocol

## 2025-01-19 NOTE — TREATMENT PLAN
RCAT Treatment Plan    Patient Score: 7  Patient Acuity: 4    Clinical Indication for Therapy: bronchospasm and history of bronchospasm    Therapy Ordered: Duo QID     Assessment Summary: Pt admitted with COPD exacerbation + influenza.  Pt has a history of COPD, is current smoker.  Pt has diminished lung sounds, improved from ex wheeze earlier today, strong NPC.      Pt does not want to be woken up for treatment.  Per assessment and RCAT score pt is appropriate to be changed to QID.     Pt is non compliant with CPAP at home, declines use.     Lulu Ham, RT  1/18/2025

## 2025-01-19 NOTE — PLAN OF CARE
"PRIMARY DIAGNOSIS: \"GENERIC\" NURSING  OUTPATIENT/OBSERVATION GOALS TO BE MET BEFORE DISCHARGE:  ADLs back to baseline: No    Activity and level of assistance: Up with standby assistance.    Pain status: Pain free.    Return to near baseline physical activity: No     Discharge Planner Nurse   Safe discharge environment identified: Yes  Barriers to discharge: Yes       Entered by: Charlotte Nina RN 01/19/2025 5:46 AM     Please review provider order for any additional goals.   Nurse to notify provider when observation goals have been met and patient is ready for discharge.Goal Outcome Evaluation:                        "

## 2025-01-19 NOTE — CONSULTS
Care Management Initial Consult    General Information  Assessment completed with: Iglesia Mccoy  Type of CM/SW Visit: Initial Assessment    Primary Care Provider verified and updated as needed: Yes   Readmission within the last 30 days: no previous admission in last 30 days      Reason for Consult: discharge planning  Advance Care Planning: Advance Care Planning Reviewed: present on chart          Communication Assessment  Patient's communication style: spoken language (English or Bilingual)    Hearing Difficulty or Deaf: no   Wear Glasses or Blind: yes    Cognitive  Cognitive/Neuro/Behavioral: WDL                      Living Environment:   People in home: child(akila), adult (Son, Yoel, staying with her temporarily)  Sin  Current living Arrangements: house      Able to return to prior arrangements: yes       Family/Social Support:  Care provided by: self, child(akila)  Provides care for: no one  Marital Status:   Support system: Children, Friend          Description of Support System: Supportive, Involved    Support Assessment: Adequate family and caregiver support, Adequate social supports    Current Resources:   Patient receiving home care services: No        Community Resources: None  Equipment currently used at home: none (does not use but has canes, FWW, and manual wheelchair)  Supplies currently used at home: Nebulizer tubing, Other (CPAP)    Employment/Financial:  Employment Status:       Employment/ Comments: no  history  Financial Concerns: none   Referral to Financial Worker: No       Does the patient's insurance plan have a 3 day qualifying hospital stay waiver?  Yes     Which insurance plan 3 day waiver is available? [employer] REACH    Will the waiver be used for post-acute placement? Undetermined at this time    Lifestyle & Psychosocial Needs:  Social Drivers of Health     Food Insecurity: Low Risk  (1/19/2025)    Food Insecurity     Within the past 12 months, did you  worry that your food would run out before you got money to buy more?: No     Within the past 12 months, did the food you bought just not last and you didn t have money to get more?: No   Depression: At risk (7/7/2022)    PHQ-2     PHQ-2 Score: 4   Housing Stability: Low Risk  (1/19/2025)    Housing Stability     Do you have housing? : Yes     Are you worried about losing your housing?: No   Tobacco Use: High Risk (1/6/2025)    Patient History     Smoking Tobacco Use: Every Day     Smokeless Tobacco Use: Never     Passive Exposure: Past   Financial Resource Strain: Low Risk  (1/19/2025)    Financial Resource Strain     Within the past 12 months, have you or your family members you live with been unable to get utilities (heat, electricity) when it was really needed?: No   Alcohol Use: Not on file   Transportation Needs: Low Risk  (1/19/2025)    Transportation Needs     Within the past 12 months, has lack of transportation kept you from medical appointments, getting your medicines, non-medical meetings or appointments, work, or from getting things that you need?: No   Physical Activity: Not on file   Interpersonal Safety: Low Risk  (1/19/2025)    Interpersonal Safety     Do you feel physically and emotionally safe where you currently live?: Yes     Within the past 12 months, have you been hit, slapped, kicked or otherwise physically hurt by someone?: No     Within the past 12 months, have you been humiliated or emotionally abused in other ways by your partner or ex-partner?: No   Stress: Not on file   Social Connections: Socially Integrated (10/19/2023)    [facility]    Social Connections     Frequency of Communication with Friends and Family: 0   Health Literacy: Not on file       Functional Status:  Prior to admission patient needed assistance:   Dependent ADLs:: Independent  Dependent IADLs:: Laundry       Mental Health  Status:  Mental Health Status: No Current Concerns       Chemical Dependency Status:  Chemical Dependency Status: No Current Concerns                  Discussed  Partnership in Safe Discharge Planning  document with patient/family: No    Additional Information:  CM met with patient in patient's room, introduced self and identified role.     Patient lives in a house.  Her son, Yoel, has been staying with her recently, though isn't there 100% of the time.  Patient is independent with I/ADLs, with the exception of laundry, which her son helps with as it is in the basement.  Patient does not use assistive devices, though has a couple canes, FWW, manual w/c, and walking sticks at home.  Patient uses CPAP and nebulizer, also has compression stockings.  No home oxygen.      No home care or community services.  Patient has home care in the past for stoma (now reversed) cares.  If she needs home care at discharge she'd like the same RN who  worked with her last year (unable to find previous home care agency in chart review, though patient reports she has previous home care RN's number and can contact her previous home care nurse if needed).      Family can transport.    Next Steps: Patient needing assist at this time, not at baseline.  Provider ordered PT/OT to eval and treat.  CM will continue to monitor medical progression, follow treatment team recommendations, and aid in discharge planning.      Yanelis Glass RN

## 2025-01-19 NOTE — ED NOTES
"[facility] ED Handoff Report    ED Chief Complaint: Shortness of Breath    ED Diagnosis:  (J44.1) COPD exacerbation (H)  Comment:   Plan:     (J10.1) Influenza A  Comment:   Plan:        PMH:    Past Medical History:   Diagnosis Date    Back pain     low back    COPD (chronic obstructive pulmonary disease) (H)     Depression     Diabetes mellitus (H)     Hyperlipidemia     Hypertension     PAD (peripheral artery disease)     Peripheral neuropathy     Pulmonary nodules         Code Status:  No CPR- Do NOT Intubate     Falls Risk: No Band: Not applicable    Current Living Situation/Residence: lives in a house     Elimination Status: Continent: Yes     Activity Level: 2 assist (d/t shortness of breath)    Patients Preferred Language:  English     Needed: No    Vital Signs:  /64   Pulse 68   Temp 98.7  F (37.1  C) (Oral)   Resp (!) 46   Ht 1.676 m (5' 6\")   Wt 72.8 kg (160 lb 8 oz)   SpO2 98%   BMI 25.91 kg/m       Cardiac Rhythm: Sinus Rhythm    Pain Score: 0/10    Is the Patient Confused:  No    Last Food or Drink: 01/18/25 prior to arrival     Focused Assessment:  Pt presents to ED with shortness of breath. Upon arrival, oxygen saturation at 80% on room air. Currently on 3L NC. Hx of COPD. Pt states wheezing/breathing has greatly improved since she arrived. Tachypnea but otherwise VSS. Alert and oriented.     Tests Performed: Done: Labs and Imaging    Treatments Provided:  Meds    Family Dynamics/Concerns: No    Belongings Sent with Patient: Clothing    Medications sent with patient: Insulin aspart pen, inhaler x 2    Covid: symptomatic, negative    Additional Information: Magnesium sulfate currently running. Nicotine patch on right shoulder.     RN: Laura Mcnair RN 1/18/2025 6:49 PM       "

## 2025-01-19 NOTE — ED NOTES
Pt reports that she feels much better at this time. Wheezing and breathing have improved. Nicotine patch applied. No other needs at this time.

## 2025-01-19 NOTE — PROGRESS NOTES
[facility]    Medicine Progress Note - Hospitalist Service    Date of Admission:  1/18/2025    Assessment & Plan    Iglesia Shore is a 65 year old female with PMHx of right lung cancer s/p radiation therapy, COPD, peripheral vascular disease, type 2 diabetes, hyperlipidemia, obstructive sleep apnea, tobacco use disorder, hypertension, chronic pain who was admitted on 1/18/2025 for acute hypoxic respiratory failure due to influenza A infection with COPD.  Patient states she feels improved today but continues to feel shortness of breath on 3 L of oxygen.  Patient likely discharge in the next few days if continues to improve and no new symptoms develop.     #Acute Hypoxic Respiratory Failure   #Influenza A Infection   #COPD Exacerbation  Several days of worsening SOB, increasing cough  -- CXR without acute infiltrate   -- Procal 0.08 . Afebrile, normal white count   -- Influenza A positive   -- Started on Tamiflu in the ED ; continue   -- Empiric IV doxycycline for now ; took a Zpak course outpatient   -- SoluMedrol scheduled  -- Singulair nightly    -- Amy QID RT   -- Continue PTA inhaler  -- On 3 L supp O2 ; continue PRN / wean as able   -- May need home O2 eval prior to discharge  -- Follows with pulmonology, last seen on 1/6/25     #H/o Lung Cancer   Presumed NSCLC the RLL, [id number], stage IA2   -- S/p radiation as deemed non-operable.   -- Follow up with Oncology as previously planned outpatient     #Type 2 DM    -- Holding PTA metformin while inpatient   -- Sliding scale ordered   -- Glucose checks ; hypoglycemia protocol    #Essential Hypertension   -- continue PTA losartan, diltiazem     #Hypomagnesemia  Low mag also noted on recent admissions   -- Replacement protocols     #H/O trigeminal neuralgia  -Follows up with Dr Clayton Ribeiro  -Resumed home carbamazepime and neurontin    Tobacco Use - NRT ordered   Hyperlipidemia - continue PTA statin   ADNREW - Non-compliant with CPAP   Mood -  "continue PTA Cymbalta           Diet: Moderate Consistent Carb (60 g CHO per Meal) Diet    DVT Prophylaxis: Pneumatic Compression Devices  Cunningham Catheter: Not present  Lines: None     Cardiac Monitoring: ACTIVE order. Indication: Electrolyte Imbalance (24 hours)- Magnesium <1.3 mg/ml; Potassium < =2.8 or > 5.5 mg/ml  Code Status: No CPR- Do NOT Intubate      Clinically Significant Risk Factors Present on Admission          # Hypochloremia: Lowest Cl = 95 mmol/L in last 2 days, will monitor as appropriate    # Hypomagnesemia: Lowest Mg = 1.6 mg/dL in last 2 days, will replace as needed     # Drug Induced Platelet Defect: home medication list includes an antiplatelet medication   # Hypertension: Noted on problem list           # Overweight: Estimated body mass index is 26.4 kg/m  as calculated from the following:    Height as of this encounter: 1.676 m (5' 6\").    Weight as of this encounter: 74.2 kg (163 lb 9.3 oz).       # Financial/Environmental Concerns:           Social Drivers of Health    Depression: At risk (7/7/2022)    PHQ-2     PHQ-2 Score: 4   Tobacco Use: High Risk (1/6/2025)    Patient History     Smoking Tobacco Use: Every Day     Smokeless Tobacco Use: Never     Passive Exposure: Past          Disposition Plan     Medically Ready for Discharge: Anticipated in 2-4 Days           The patient's care was discussed with the Attending Physician, Dr. Chowdary .    Luh Peterson NP  Hospitalist Service  [facility]  Securely message with [employer] (more info)  Text page via [employer] Paging/Directory   ______________________________________________________________________    Interval History   Patient continues to feel shortness of breath although she feels improved from yesterday  Wheezing bilateral  Patient states DuoNebs are helping  Patient requiring 3 L oxygen satting 93%    Physical Exam   Vital Signs: Temp: 97.7  F (36.5  C) Temp src: Oral BP: 128/61 Pulse: 62   Resp: 17 SpO2: 96 % O2 " Device: Nasal cannula Oxygen Delivery: 2 LPM  Weight: 163 lbs 9.3 oz    Constitutional: awake, alert, cooperative, no apparent distress, and appears stated age  Hematologic / Lymphatic: no cervical lymphadenopathy and no supraclavicular lymphadenopathy  Respiratory: No increased work of breathing, good air exchange, diminished to auscultation bilaterally, bilateral wheezing throughout lung fields  Cardiovascular: Normal apical impulse, regular rate and rhythm, normal S1 and S2, no S3 or S4, and no murmur noted  GI: No scars, normal bowel sounds, soft, non-distended, non-tender, no masses palpated, no hepatosplenomegally  Skin: no bruising or bleeding, normal skin color, texture, turgor, and no rashes  Musculoskeletal: There is no redness, warmth, or swelling of the joints.   Neurologic: Awake, alert, oriented to name, place and time.    Neuropsychiatric: General: normal, calm, and normal eye contact    Medical Decision Making       50 MINUTES SPENT BY ME on the date of service doing chart review, history, exam, documentation & further activities per the note.      Data     I have personally reviewed the following data over the past 24 hrs:    6.3  \   12.8   / 232     140 100 24.5 (H) /  136 (H)   3.6 28 0.73 \     ALT: 10 AST: 27 AP: 70 TBILI: 0.2   ALB: 3.7 TOT PROTEIN: 6.7 LIPASE: N/A     Trop: 17 (H) BNP: 635     TSH: N/A T4: N/A A1C: 5.4     Procal: 0.08 CRP: N/A Lactic Acid: N/A       INR:  1.15 PTT:  25   D-dimer:  N/A Fibrinogen:  N/A       Imaging results reviewed over the past 24 hrs:   Recent Results (from the past 24 hours)   Chest XR,  PA & LAT    Narrative    EXAM: XR CHEST 2 VIEWS  LOCATION: [facility]  DATE: 1/18/2025    INDICATION: shortness of breath  COMPARISON: 10/23/2024      Impression    IMPRESSION: Fiducial marker in the right lower lobe is unchanged. No acute infiltrate or significant change.

## 2025-01-19 NOTE — CARE PLAN
PRIMARY Concern: influenza A  SAFETY RISK Concerns (fall risk, behaviors, etc.): fall risk      Isolation/Type: droplet  Tests/Procedures for NEXT shift: TBD  Consults? (Pending/following, signed-off?) RT  Where is patient from? (Home, TCU, etc.): home  Other Important info for NEXT shift: coughing, PRN available  Anticipated DC date & active delays: TBD  _________________________________________________  Orientation/Cognitive: A&Ox4  Observation Goals (Met/ Not Met): inpatient status  Mobility Level/Assist Equipment: SBA  Antibiotics & Plan (IV/po, length of tx left): Doxy PO  Pain Management: denies  Tele/VS/O2: 166/74, other VSS  ABNL Lab/BG: , 134  Diet: mod carb diet  Bowel/Bladder: continent, to commode  Skin Concerns: none  Drains/Devices: PIVSL

## 2025-01-20 VITALS
HEIGHT: 66 IN | BODY MASS INDEX: 26.29 KG/M2 | SYSTOLIC BLOOD PRESSURE: 152 MMHG | HEART RATE: 77 BPM | OXYGEN SATURATION: 96 % | DIASTOLIC BLOOD PRESSURE: 68 MMHG | WEIGHT: 163.58 LBS | RESPIRATION RATE: 16 BRPM | TEMPERATURE: 97.9 F

## 2025-01-20 LAB
ANION GAP SERPL CALCULATED.3IONS-SCNC: 12 MMOL/L (ref 7–15)
BUN SERPL-MCNC: 30.7 MG/DL (ref 8–23)
CALCIUM SERPL-MCNC: 9.5 MG/DL (ref 8.8–10.4)
CHLORIDE SERPL-SCNC: 99 MMOL/L (ref 98–107)
CREAT SERPL-MCNC: 0.79 MG/DL (ref 0.51–0.95)
EGFRCR SERPLBLD CKD-EPI 2021: 82 ML/MIN/1.73M2
ERYTHROCYTE [DISTWIDTH] IN BLOOD BY AUTOMATED COUNT: 13.2 % (ref 10–15)
GLUCOSE BLDC GLUCOMTR-MCNC: 118 MG/DL (ref 70–99)
GLUCOSE BLDC GLUCOMTR-MCNC: 183 MG/DL (ref 70–99)
GLUCOSE BLDC GLUCOMTR-MCNC: 206 MG/DL (ref 70–99)
GLUCOSE SERPL-MCNC: 127 MG/DL (ref 70–99)
HCO3 SERPL-SCNC: 28 MMOL/L (ref 22–29)
HCT VFR BLD AUTO: 36.7 % (ref 35–47)
HGB BLD-MCNC: 12.1 G/DL (ref 11.7–15.7)
MAGNESIUM SERPL-MCNC: 2.3 MG/DL (ref 1.7–2.3)
MCH RBC QN AUTO: 28.7 PG (ref 26.5–33)
MCHC RBC AUTO-ENTMCNC: 33 G/DL (ref 31.5–36.5)
MCV RBC AUTO: 87 FL (ref 78–100)
PLATELET # BLD AUTO: 277 10E3/UL (ref 150–450)
POTASSIUM SERPL-SCNC: 3.2 MMOL/L (ref 3.4–5.3)
RBC # BLD AUTO: 4.22 10E6/UL (ref 3.8–5.2)
SODIUM SERPL-SCNC: 139 MMOL/L (ref 135–145)
WBC # BLD AUTO: 10.1 10E3/UL (ref 4–11)

## 2025-01-20 PROCEDURE — 999N000157 HC STATISTIC RCP TIME EA 10 MIN

## 2025-01-20 PROCEDURE — 250N000011 HC RX IP 250 OP 636

## 2025-01-20 PROCEDURE — 94640 AIRWAY INHALATION TREATMENT: CPT | Mod: 76

## 2025-01-20 PROCEDURE — 250N000013 HC RX MED GY IP 250 OP 250 PS 637

## 2025-01-20 PROCEDURE — 85027 COMPLETE CBC AUTOMATED: CPT

## 2025-01-20 PROCEDURE — 83735 ASSAY OF MAGNESIUM: CPT | Performed by: INTERNAL MEDICINE

## 2025-01-20 PROCEDURE — 36415 COLL VENOUS BLD VENIPUNCTURE: CPT

## 2025-01-20 PROCEDURE — 250N000013 HC RX MED GY IP 250 OP 250 PS 637: Performed by: INTERNAL MEDICINE

## 2025-01-20 PROCEDURE — 80048 BASIC METABOLIC PNL TOTAL CA: CPT

## 2025-01-20 PROCEDURE — 250N000009 HC RX 250: Performed by: STUDENT IN AN ORGANIZED HEALTH CARE EDUCATION/TRAINING PROGRAM

## 2025-01-20 PROCEDURE — 250N000013 HC RX MED GY IP 250 OP 250 PS 637: Performed by: STUDENT IN AN ORGANIZED HEALTH CARE EDUCATION/TRAINING PROGRAM

## 2025-01-20 PROCEDURE — 94640 AIRWAY INHALATION TREATMENT: CPT

## 2025-01-20 PROCEDURE — 99239 HOSP IP/OBS DSCHRG MGMT >30: CPT | Performed by: INTERNAL MEDICINE

## 2025-01-20 RX ORDER — PREDNISONE 20 MG/1
20 TABLET ORAL DAILY
Qty: 8 TABLET | Refills: 0 | Status: SHIPPED | OUTPATIENT
Start: 2025-01-20

## 2025-01-20 RX ORDER — POTASSIUM CHLORIDE 1500 MG/1
40 TABLET, EXTENDED RELEASE ORAL ONCE
Status: COMPLETED | OUTPATIENT
Start: 2025-01-20 | End: 2025-01-20

## 2025-01-20 RX ORDER — BENZONATATE 100 MG/1
100 CAPSULE ORAL 3 TIMES DAILY PRN
Qty: 20 CAPSULE | Refills: 0 | Status: SHIPPED | OUTPATIENT
Start: 2025-01-20

## 2025-01-20 RX ORDER — OSELTAMIVIR PHOSPHATE 75 MG/1
75 CAPSULE ORAL 2 TIMES DAILY
Qty: 6 CAPSULE | Refills: 0 | Status: SHIPPED | OUTPATIENT
Start: 2025-01-20 | End: 2025-01-23

## 2025-01-20 RX ORDER — DOXYCYCLINE 100 MG/1
100 CAPSULE ORAL EVERY 12 HOURS
Qty: 8 CAPSULE | Refills: 0 | Status: SHIPPED | OUTPATIENT
Start: 2025-01-20 | End: 2025-01-24

## 2025-01-20 RX ADMIN — POTASSIUM CHLORIDE 40 MEQ: 1500 TABLET, EXTENDED RELEASE ORAL at 09:02

## 2025-01-20 RX ADMIN — DULOXETINE HYDROCHLORIDE 60 MG: 60 CAPSULE, DELAYED RELEASE ORAL at 09:04

## 2025-01-20 RX ADMIN — UMECLIDINIUM 1 PUFF: 62.5 AEROSOL, POWDER ORAL at 09:03

## 2025-01-20 RX ADMIN — PANTOPRAZOLE SODIUM 40 MG: 40 TABLET, DELAYED RELEASE ORAL at 07:28

## 2025-01-20 RX ADMIN — DOXYCYCLINE 100 MG: 100 CAPSULE ORAL at 09:01

## 2025-01-20 RX ADMIN — CARBAMAZEPINE 100 MG: 100 TABLET, EXTENDED RELEASE ORAL at 09:03

## 2025-01-20 RX ADMIN — METHYLPREDNISOLONE SODIUM SUCCINATE 40 MG: 40 INJECTION, POWDER, FOR SOLUTION INTRAMUSCULAR; INTRAVENOUS at 07:26

## 2025-01-20 RX ADMIN — IPRATROPIUM BROMIDE AND ALBUTEROL SULFATE 3 ML: .5; 3 SOLUTION RESPIRATORY (INHALATION) at 07:55

## 2025-01-20 RX ADMIN — INSULIN ASPART 2 UNITS: 100 INJECTION, SOLUTION INTRAVENOUS; SUBCUTANEOUS at 12:51

## 2025-01-20 RX ADMIN — GUAIFENESIN AND DEXTROMETHORPHAN 10 ML: 100; 10 SYRUP ORAL at 00:39

## 2025-01-20 RX ADMIN — DILTIAZEM HYDROCHLORIDE 120 MG: 60 CAPSULE, EXTENDED RELEASE ORAL at 09:01

## 2025-01-20 RX ADMIN — FLUTICASONE FUROATE AND VILANTEROL TRIFENATATE 1 PUFF: 200; 25 POWDER RESPIRATORY (INHALATION) at 09:03

## 2025-01-20 RX ADMIN — ASPIRIN 81 MG: 81 TABLET, COATED ORAL at 09:05

## 2025-01-20 RX ADMIN — OSELTAMIVIR PHOSPHATE 75 MG: 75 CAPSULE ORAL at 09:03

## 2025-01-20 RX ADMIN — IPRATROPIUM BROMIDE AND ALBUTEROL SULFATE 3 ML: .5; 3 SOLUTION RESPIRATORY (INHALATION) at 12:35

## 2025-01-20 ASSESSMENT — ACTIVITIES OF DAILY LIVING (ADL)
ADLS_ACUITY_SCORE: 41

## 2025-01-20 NOTE — PROGRESS NOTES
Patient has been assessed for Home Oxygen needs. Oxygen readings:    *Pulse oximetry (SpO2) = 90% on room air at rest while awake.    *SpO2 improved to 91% on 1liters/minute at rest.    *SpO2 = 83% on room air during activity/with exercise.    *SpO2 improved to 92% on 3liters/minute during activity/with exercise.

## 2025-01-20 NOTE — PLAN OF CARE
Discharge paperwork printed and gave to patient. Son is picking her up at front door with home O2 on patient.

## 2025-01-20 NOTE — CARE PLAN
01/20/25 0908   Home Oxygen Assessment (RN/RT ONLY)   Does patient have oxygen at home? No   1. SpO2 on room air at rest while awake 90       Oxygen LPM at rest 1   3. SpO2 on room air during Activity/with exercise 83   4. SpO2 with oxygen during activity/with exercise 88       Oxygen LPM during activity/with exercise 3   Does patient qualify for Home O2? Yes

## 2025-01-20 NOTE — PROGRESS NOTES
Pt's O2 sat was 84% and monitor was alarming at midnight. Pt appeared Asymptomatic, denies SOB, difficult breathing. Called Short stay RT to assessed pt. Writer also changed pt's pulse ox. RT realized that pt had pull the oxygen tubing from the wall. Once oxygen tubing was reconnected Pt's O2 sat increased to 92-94 on 2L oxygen.

## 2025-01-20 NOTE — PLAN OF CARE
Goal Outcome Evaluation:                 Problem: Adult Inpatient Plan of Care  Goal: Absence of Hospital-Acquired Illness or Injury  Intervention: Identify and Manage Fall Risk  Recent Flowsheet Documentation  Taken 1/19/2025 1703 by Parveen Grier RN  Safety Promotion/Fall Prevention: nonskid shoes/slippers when out of bed  Intervention: Prevent and Manage VTE (Venous Thromboembolism) Risk  Recent Flowsheet Documentation  Taken 1/19/2025 1703 by Parveen Grier RN  VTE Prevention/Management: SCDs off (sequential compression devices)  Intervention: Prevent Infection  Recent Flowsheet Documentation  Taken 1/19/2025 1703 by Parveen Grier RN  Infection Prevention: hand hygiene promoted  A&O X 4 dines pain  no insulin was given RT worked with pt urinated on commode VSS  2 liters Oxygen O2 sats 96% resting in bed.

## 2025-01-20 NOTE — PLAN OF CARE
Physical Therapy: Orders received. Chart reviewed and discussed with care team.? Physical Therapy not indicated due to pt declined need for PT evaluation, plans discharge home this afternoon. ? Defer discharge recommendations to tx team.? Will complete orders.       Laura Celeste, PT, DPT  1/20/2025

## 2025-01-20 NOTE — PLAN OF CARE
Problem: Adult Inpatient Plan of Care  Goal: Absence of Hospital-Acquired Illness or Injury  Outcome: Progressing  Intervention: Identify and Manage Fall Risk  Recent Flowsheet Documentation  Taken 1/20/2025 0000 by Yelena Donahue RN  Safety Promotion/Fall Prevention: nonskid shoes/slippers when out of bed  Intervention: Prevent Skin Injury  Recent Flowsheet Documentation  Taken 1/20/2025 0000 by Yelena Donahue RN  Body Position: position changed independently  Intervention: Prevent and Manage VTE (Venous Thromboembolism) Risk  Recent Flowsheet Documentation  Taken 1/20/2025 0000 by Yelena Donahue RN  VTE Prevention/Management: SCDs off (sequential compression devices)  Intervention: Prevent Infection  Recent Flowsheet Documentation  Taken 1/20/2025 0000 by Yelena Donahue RN  Infection Prevention: hand hygiene promoted  Goal: Optimal Comfort and Wellbeing  Outcome: Progressing     Problem: Gas Exchange Impaired  Goal: Optimal Gas Exchange  Outcome: Progressing  Intervention: Optimize Oxygenation and Ventilation  Recent Flowsheet Documentation  Taken 1/20/2025 0000 by Yelena Donahue RN  Head of Bed (HOB) Positioning: HOB at 20-30 degrees     Problem: Comorbidity Management  Goal: Maintenance of Asthma Control  Outcome: Progressing  Intervention: Maintain Asthma Symptom Control  Recent Flowsheet Documentation  Taken 1/20/2025 0000 by Yelena Donahue RN  Medication Review/Management: medications reviewed  Goal: Maintenance of Behavioral Health Symptom Control  Outcome: Progressing  Intervention: Maintain Behavioral Health Symptom Control  Recent Flowsheet Documentation  Taken 1/20/2025 0000 by Yelena Donahue RN  Medication Review/Management: medications reviewed  Goal: Maintenance of COPD Symptom Control  Outcome: Progressing  Intervention: Maintain COPD Symptom Control  Recent Flowsheet Documentation  Taken 1/20/2025 0000 by Yelena Donahue RN  Medication  Review/Management: medications reviewed  Goal: Blood Glucose Levels Within Targeted Range  Outcome: Progressing  Intervention: Monitor and Manage Glycemia  Recent Flowsheet Documentation  Taken 1/20/2025 0000 by Yelena Donahue RN  Medication Review/Management: medications reviewed  Goal: Blood Pressure in Desired Range  Outcome: Progressing  Intervention: Maintain Blood Pressure Management  Recent Flowsheet Documentation  Taken 1/20/2025 0000 by Yelena Donahue RN  Medication Review/Management: medications reviewed   Assumed care 2300  Admitted: COPD exacerbation   Vitals: VSS, afebrile  Neuro: A/O x 4  Cardiac: NSR         Respiratory:  O2 saturation > 92% on 2L oxygen via NC, decline CPAP.   GI/:  Adequate UO via commode LBM 1/17/25 per pt. + BS,183  Diet/appetite: 60 CHO  Activity: SBA  Pain: Addressed with PRN medications, pt denies pain    Skin:  No adjustment  needed  LDA's: L-PIV,SL , Mag protocols   Plan: PT/OT consult

## 2025-01-20 NOTE — PROGRESS NOTES
Care Management Discharge Note    Discharge Date: 01/20/2025       Discharge Disposition:  home     Discharge Services:  home oxygen    Discharge DME:  home oxygen    Discharge Transportation: family or friend will provide    Private pay costs discussed: Not applicable    Does the patient's insurance plan have a 3 day qualifying hospital stay waiver?  No    PAS Confirmation Code:    Patient/family educated on Medicare website which has current facility and service quality ratings:      Education Provided on the Discharge Plan:    Persons Notified of Discharge Plans: pt  Patient/Family in Agreement with the Plan:      Handoff Referral Completed: Yes, [facility] PCP: Internal handoff referral completed    Additional Information:  Pt will discharge home w/home oxygen and no other svcs. Pt's son will be here after 4pm to transport her home.    Eber Gary RN

## 2025-01-20 NOTE — PLAN OF CARE
"  Problem: Adult Inpatient Plan of Care  Goal: Patient-Specific Goal (Individualized)  Description: You can add care plan individualizations to a care plan. Examples of Individualization might be:  \"Parent requests to be called daily at 9am for status\", \"I have a hard time hearing out of my right ear\", or \"Do not touch me to wake me up as it startles  me\".  Outcome: Adequate for Care Transition     Problem: Gas Exchange Impaired  Goal: Optimal Gas Exchange  Outcome: Adequate for Care Transition   Goal Outcome Evaluation:      Plan of Care Reviewed With: patient      VSS. Patient alert and orientated x4, on 3 liters of oxygen. Patient denies pain at this time, standby assist with activity. Patient discharging home on home oxygen, oxygen was delivered. Patient awaiting ride.           "

## 2025-01-21 DIAGNOSIS — Z09 HOSPITAL DISCHARGE FOLLOW-UP: ICD-10-CM

## 2025-01-22 ENCOUNTER — OFFICE VISIT (OUTPATIENT)
Dept: CARDIOLOGY | Facility: CLINIC | Age: 65
End: 2025-01-22
Payer: MEDICARE

## 2025-01-22 VITALS
HEART RATE: 90 BPM | HEIGHT: 66 IN | BODY MASS INDEX: 25.07 KG/M2 | RESPIRATION RATE: 14 BRPM | WEIGHT: 156 LBS | SYSTOLIC BLOOD PRESSURE: 150 MMHG | DIASTOLIC BLOOD PRESSURE: 96 MMHG

## 2025-01-22 DIAGNOSIS — E78.5 DYSLIPIDEMIA, GOAL LDL BELOW 70: ICD-10-CM

## 2025-01-22 DIAGNOSIS — I10 PRIMARY HYPERTENSION: ICD-10-CM

## 2025-01-22 DIAGNOSIS — J44.9 COPD, SEVERE (H): ICD-10-CM

## 2025-01-22 DIAGNOSIS — I25.119 CORONARY ARTERY DISEASE INVOLVING NATIVE CORONARY ARTERY OF NATIVE HEART WITH ANGINA PECTORIS: Primary | ICD-10-CM

## 2025-01-22 PROCEDURE — 99204 OFFICE O/P NEW MOD 45 MIN: CPT | Performed by: INTERNAL MEDICINE

## 2025-01-22 RX ORDER — ATORVASTATIN CALCIUM 80 MG/1
40 TABLET, FILM COATED ORAL EVERY EVENING
Qty: 90 TABLET | Refills: 3 | Status: SHIPPED | OUTPATIENT
Start: 2025-01-22

## 2025-01-22 RX ORDER — OXYCODONE HYDROCHLORIDE 5 MG/1
1-2 CAPSULE ORAL EVERY 4 HOURS PRN
COMMUNITY

## 2025-01-22 NOTE — PROGRESS NOTES
Click to link to [facility] HEART CARE NOTE    Thank you, Dr. Sandra, for asking me to see Iglesia Shore in consultation at [facility] to evaluate severe calcified coronary artery disease.      Assessment/Plan:   Severe calcified coronary artery disease, worsening shortness of breath: Patient has no chest pain.  She states that her shortness of breath has been gradually getting worse which could be caused by influenza infection, severe COPD, also possible significant coronary artery disease.  Discussed the evaluation and management of significant coronary artery disease.  After discussion, coronary CT angiogram with calcium score is requested for further evaluation.  If she has obstructive lesion, and then consider to have coronary angiogram with possible PCI.  Continue aspirin, diltiazem and atorvastatin.  Dyslipidemia: His LDL was not well-controlled, last .  Increase atorvastatin from 40 mg at bedtime to 80 mg at bedtime.  Repeat lipid profile and liver function in 2 months.  Benign essential hypertension: Continue diltiazem  mg daily, losartan 50 mg daily.  Continue to monitor her blood pressure.  Lung cancer s/p radiation therapy, severe COPD and other chronic medical conditions: No change in treatment today.    We will follow-up her coronary CT angiogram report, discuss the findings with the patient.    Thank you for the opportunity to be involved in the care of Iglesia Shore. If you have any questions, please feel free to contact me.  I will see the patient again in 2 months and as needed    Much or all of the text in this note was generated through the use of Dragon Dictate voice-to-text software. Errors in spelling or words which seem out of context are unintentional.   Sound alike errors, in particular, may have escaped editing.       History of Present Illness:   It is my pleasure to see Iglesia Shore at the [facility]  Heart Care clinic for evaluation of New Patient. Iglesia Shore is a 65 year old female with a medical history of severe COPD, type 2 diabetes, severe peripheral arterial disease status post bypass, benign essential hypertension, dyslipidemia, lung cancer status post radiation therapy.    The patient was discharged from hospital 2 days ago due to influenza infection, COPD exacerbation, acute respiratory failure.  She is referred to cardiology clinic for the evaluation and management of severe calcified coronary artery disease.    She states that she has no chest pain.  She complains of for chronic shortness of breath, gradually getting worse.  She had occasional palpitations.  She had occasional dizziness.  She has no orthopnea, PND.  She has trace ankle edema.  Her blood pressure is high, but she did not take her medication today yet.    Past Medical History:     Patient Active Problem List   Diagnosis    COPD (chronic obstructive pulmonary disease) (H)    Family history of diabetes mellitus    Foot pain    Idiopathic sensorimotor axonal neuropathy    Laryngitis, chronic    Major depressive disorder, recurrent episode, moderate (H)    Migraine without aura    Mixed hyperlipidemia    Obstructive sleep apnea    Other somatoform disorders    PVD (peripheral vascular disease)    Screening for cervical cancer    Tobacco use disorder    Vitamin B12 deficiency    Hemorrhoids    History of colonic polyps    Major depressive disorder, severe (H)    Diabetes mellitus, type 2 (H)    Altered bowel elimination due to intestinal ostomy (H)    Chronic pain    Diverticulitis of colon    DNR (do not resuscitate)    Malignant neoplasm of lower lobe of right lung (H)    Primary hypertension    Enteritis    Sigmoid stricture (H)    Syrinx of spinal cord (H)    Bradycardia    COPD exacerbation (H)    Acute hypoxemic respiratory failure (H)    Lung cancer (H)    Current nicotine use    COPD with acute exacerbation (H)    Dyspnea,  unspecified type    Influenza A       Past Surgical History:     Past Surgical History:   Procedure Laterality Date    AORTA - FEMORAL ARTERY BYPASS GRAFT      BRONCHOSCOPY RIGID OR FLEXIBLE W/TRANSENDOSCOPIC ENDOBRONCHIAL ULTRASOUND GUIDED N/A 07/14/2022    Procedure: endbronchial ultrasound, transbronchial biopsy;  Surgeon: Rosendo Dave MD;  Location: [facility] OR    HYSTEROSCOPY W/ ENDOMETRIAL ABLATION      ILEOSTOMY  08/16/2023    ILEOSTOMY REVISION  10/19/2023    IR EXTREMITY ANGIOGRAM LEFT  11/25/2020    IR EXTREMITY ANGIOGRAM LEFT  12/16/2020    IR LOWER EXTREMITY ANGIOGRAM LEFT  11/25/2020    IR LOWER EXTREMITY ANGIOGRAM LEFT  12/16/2020    OPTICAL TRACKING SYSTEM BRONCHOSCOPY N/A 07/14/2022    Procedure: BRONCHOSCOPY, USING OPTICAL TRACKING SYSTEM, .  Ion or veran system, fiducial placement;  Surgeon: Rosendo Dave MD;  Location: [facility] OR    OTHER SURGICAL HISTORY      tubal ligation    OTHER SURGICAL HISTORY      spine fusion    PAIN STELLATE GANGLION BLOCK RIGHT Right 1992    x5, [facility]       Family History:     Family History   Problem Relation Age of Onset    Diabetes Mother     Hypertension Mother     Cancer Father     Heart Disease Father     Diabetes Father        Social History:    reports that she has quit smoking. Her smoking use included cigarettes. She started smoking about 54 years ago. She has a 54.5 pack-year smoking history. She has been exposed to tobacco smoke. She has never used smokeless tobacco. She reports that she does not currently use drugs. She reports that she does not drink alcohol.    Review of Systems:   12 systems are reviewed negative except for in HPI.    Meds:     Current Outpatient Medications:     albuterol (PROAIR HFA/PROVENTIL HFA/VENTOLIN HFA) 108 (90 Base) MCG/ACT inhaler, Inhale 2 puffs into the lungs every 6 hours as needed for shortness of breath, wheezing or cough, Disp: , Rfl:     aspirin (ASA) 81 MG EC tablet, Take 81 mg by mouth every morning.,  Disp: , Rfl:     atorvastatin (LIPITOR) 80 MG tablet, Take 0.5 tablets (40 mg) by mouth every evening., Disp: 90 tablet, Rfl: 3    benzonatate (TESSALON) 100 MG capsule, Take 1 capsule (100 mg) by mouth 3 times daily as needed for cough., Disp: 20 capsule, Rfl: 0    budeson-glycopyrrol-formoterol (BREZTRI AEROSPHERE) 160-9-4.8 MCG/ACT AERO inhaler, Inhale 2 puffs into the lungs 2 times daily, Disp: , Rfl:     carBAMazepine (TEGRETOL XR) 100 MG 12 hr tablet, Take 100 mg by mouth 2 times daily., Disp: , Rfl:     diltiazem (CARDIZEM SR) 120 MG CP12 12 hr SR capsule, Take 120 mg by mouth 2 times daily, Disp: , Rfl:     doxycycline monohydrate (MONODOX) 100 MG capsule, Take 1 capsule (100 mg) by mouth every 12 hours for 4 days., Disp: 8 capsule, Rfl: 0    DULoxetine (CYMBALTA) 60 MG capsule, Take 60 mg by mouth every morning., Disp: , Rfl:     furosemide (LASIX) 20 MG tablet, Take 20 mg by mouth as needed., Disp: , Rfl:     ipratropium - albuterol 0.5 mg/2.5 mg/3 mL (DUONEB) 0.5-2.5 (3) MG/3ML neb solution, Take 1 vial (3 mLs) by nebulization every 6 hours as needed for shortness of breath, wheezing or cough., Disp: 180 mL, Rfl: 11    losartan (COZAAR) 50 MG tablet, Take 50 mg by mouth every evening., Disp: , Rfl:     metFORMIN (GLUCOPHAGE XR) 500 MG 24 hr tablet, Take 1 tablet (500 mg) by mouth 2 times daily (with meals), Disp: 60 tablet, Rfl: 0    montelukast (SINGULAIR) 10 MG tablet, Take 1 tablet (10 mg) by mouth at bedtime., Disp: 30 tablet, Rfl: 0    oseltamivir (TAMIFLU) 75 MG capsule, Take 1 capsule (75 mg) by mouth 2 times daily for 3 days., Disp: 6 capsule, Rfl: 0    oxyCODONE (OXY-IR) 5 MG capsule, Take 1-2 capsules by mouth every 4 hours as needed for severe pain., Disp: , Rfl:     pantoprazole (PROTONIX) 40 MG EC tablet, Take 1 tablet (40 mg) by mouth every morning (before breakfast)., Disp: 30 tablet, Rfl: 0    polyethylene glycol (MIRALAX) 17 GM/Dose powder, Take 17 g by mouth daily as needed for  "constipation., Disp: , Rfl:     predniSONE (DELTASONE) 20 MG tablet, Take 1 tablet (20 mg) by mouth daily. Take 2 tablets (40 mg) daily x 2 days, then 1 tab po daily x 4 days., Disp: 8 tablet, Rfl: 0     Allergies:   Bupropion    Objective:      Physical Exam  70.8 kg (156 lb)  1.676 m (5' 6\")  Body mass index is 25.18 kg/m .  BP (!) 150/96 (BP Location: Right arm, Patient Position: Sitting, Cuff Size: Adult Regular)   Pulse 90   Resp 14   Ht 1.676 m (5' 6\")   Wt 70.8 kg (156 lb)   BMI 25.18 kg/m      General Appearance:   Awake, Alert, No acute distress.   HEENT:  Pupil equal, reactive to light. No scleral icterus; the mucous membranes were moist. No oral ulcers or thrush.    Neck: No cervical bruits. No JVD. No thyromegaly. No lymph node enlargement or tenderness.   Chest: The spine was straight. The chest was symmetric.   Lungs:   Diminished breathing sounds bilaterally. No crackles. No wheezing.   Cardiovascular:   RRR, normal first and second heart sounds with no murmurs. No rubs or gallops.    Abdomen:  Soft. No tenderness. Non-distended. Bowels sounds are present   Extremities: Equal posterior tibial pulses.  Trace ankle edema.   Skin: No rashes or ulcers. Warm, Dry.   Musculoskeletal: No tenderness. No deformity.   Neurologic: Mood and affect are appropriate. No focal deficits.         EKG:  Personally reivewed  Sinus tachycardia   Right atrial enlargement   Left axis deviation   Pulmonary disease pattern   Inferior infarct , age undetermined   Abnormal ECG   When compared with ECG of 29-May-2024 17:58,   Significant changes have occurred     Cardiac Imaging Studies  Echocardiogram on May 30, 2024:  Left ventricular size, wall motion and function are normal. The ejection  fraction is 60-65%.  Normal right ventricle size and systolic function.  No hemodynamically significant valvular abnormalities on 2D or color flow  imaging.    Lab Review   Lab Results   Component Value Date     01/20/2025    CO2 " "28 01/20/2025    CO2 28 02/04/2022    BUN 30.7 01/20/2025    BUN 13 02/04/2022     Lab Results   Component Value Date    WBC 10.1 01/20/2025    HGB 12.1 01/20/2025    HCT 36.7 01/20/2025    MCV 87 01/20/2025     01/20/2025     Lab Results   Component Value Date    CHOL 216 01/11/2024    TRIG 165 01/11/2024    HDL 49 01/11/2024     01/11/2024     LDL Cholesterol Calculated   Date Value Ref Range Status   01/11/2024 134 (H) <=100 mg/dL Final     No results found for: \"TROPONINI\"  Lab Results   Component Value Date    BNP 30 09/26/2018     Lab Results   Component Value Date    TSH 2.91 05/29/2024               "

## 2025-01-22 NOTE — LETTER
1/22/2025    Jo Hamilton MD  [address] [place] [address]    RE: Iglesia Shore       Dear Colleague,     I had the pleasure of seeing Iglesia Shore in the [facility].      Click to link to [facility] HEART CARE NOTE    Thank you, Dr. Sandra, for asking me to see Iglesia Shore in consultation at [facility] to evaluate severe calcified coronary artery disease.      Assessment/Plan:   Severe calcified coronary artery disease, worsening shortness of breath: Patient has no chest pain.  She states that her shortness of breath has been gradually getting worse which could be caused by influenza infection, severe COPD, also possible significant coronary artery disease.  Discussed the evaluation and management of significant coronary artery disease.  After discussion, coronary CT angiogram with calcium score is requested for further evaluation.  If she has obstructive lesion, and then consider to have coronary angiogram with possible PCI.  Continue aspirin, diltiazem and atorvastatin.  Dyslipidemia: His LDL was not well-controlled, last .  Increase atorvastatin from 40 mg at bedtime to 80 mg at bedtime.  Repeat lipid profile and liver function in 2 months.  Benign essential hypertension: Continue diltiazem  mg daily, losartan 50 mg daily.  Continue to monitor her blood pressure.  Lung cancer s/p radiation therapy, severe COPD and other chronic medical conditions: No change in treatment today.    We will follow-up her coronary CT angiogram report, discuss the findings with the patient.    Thank you for the opportunity to be involved in the care of Iglesia Shore. If you have any questions, please feel free to contact me.  I will see the patient again in 2 months and as needed    Much or all of the text in this note was generated through the use of Dragon Dictate voice-to-text software. Errors in spelling or words which  seem out of context are unintentional.   Sound alike errors, in particular, may have escaped editing.       History of Present Illness:   It is my pleasure to see Iglesia Shore at the [facility] clinic for evaluation of New Patient. Iglesia Shore is a 65 year old female with a medical history of severe COPD, type 2 diabetes, severe peripheral arterial disease status post bypass, benign essential hypertension, dyslipidemia, lung cancer status post radiation therapy.    The patient was discharged from hospital 2 days ago due to influenza infection, COPD exacerbation, acute respiratory failure.  She is referred to cardiology clinic for the evaluation and management of severe calcified coronary artery disease.    She states that she has no chest pain.  She complains of for chronic shortness of breath, gradually getting worse.  She had occasional palpitations.  She had occasional dizziness.  She has no orthopnea, PND.  She has trace ankle edema.  Her blood pressure is high, but she did not take her medication today yet.    Past Medical History:     Patient Active Problem List   Diagnosis     COPD (chronic obstructive pulmonary disease) (H)     Family history of diabetes mellitus     Foot pain     Idiopathic sensorimotor axonal neuropathy     Laryngitis, chronic     Major depressive disorder, recurrent episode, moderate (H)     Migraine without aura     Mixed hyperlipidemia     Obstructive sleep apnea     Other somatoform disorders     PVD (peripheral vascular disease)     Screening for cervical cancer     Tobacco use disorder     Vitamin B12 deficiency     Hemorrhoids     History of colonic polyps     Major depressive disorder, severe (H)     Diabetes mellitus, type 2 (H)     Altered bowel elimination due to intestinal ostomy (H)     Chronic pain     Diverticulitis of colon     DNR (do not resuscitate)     Malignant neoplasm of lower lobe of right lung (H)     Primary hypertension     Enteritis      Sigmoid stricture (H)     Syrinx of spinal cord (H)     Bradycardia     COPD exacerbation (H)     Acute hypoxemic respiratory failure (H)     Lung cancer (H)     Current nicotine use     COPD with acute exacerbation (H)     Dyspnea, unspecified type     Influenza A       Past Surgical History:     Past Surgical History:   Procedure Laterality Date     AORTA - FEMORAL ARTERY BYPASS GRAFT       BRONCHOSCOPY RIGID OR FLEXIBLE W/TRANSENDOSCOPIC ENDOBRONCHIAL ULTRASOUND GUIDED N/A 07/14/2022    Procedure: endbronchial ultrasound, transbronchial biopsy;  Surgeon: Rosendo Dave MD;  Location: [facility] OR     HYSTEROSCOPY W/ ENDOMETRIAL ABLATION       ILEOSTOMY  08/16/2023     ILEOSTOMY REVISION  10/19/2023     IR EXTREMITY ANGIOGRAM LEFT  11/25/2020     IR EXTREMITY ANGIOGRAM LEFT  12/16/2020     IR LOWER EXTREMITY ANGIOGRAM LEFT  11/25/2020     IR LOWER EXTREMITY ANGIOGRAM LEFT  12/16/2020     OPTICAL TRACKING SYSTEM BRONCHOSCOPY N/A 07/14/2022    Procedure: BRONCHOSCOPY, USING OPTICAL TRACKING SYSTEM, .  Ion or veran system, fiducial placement;  Surgeon: Rosendo Dave MD;  Location: [facility] OR     OTHER SURGICAL HISTORY      tubal ligation     OTHER SURGICAL HISTORY      spine fusion     PAIN STELLATE GANGLION BLOCK RIGHT Right 1992    x5, [facility]       Family History:     Family History   Problem Relation Age of Onset     Diabetes Mother      Hypertension Mother      Cancer Father      Heart Disease Father      Diabetes Father        Social History:    reports that she has quit smoking. Her smoking use included cigarettes. She started smoking about 54 years ago. She has a 54.5 pack-year smoking history. She has been exposed to tobacco smoke. She has never used smokeless tobacco. She reports that she does not currently use drugs. She reports that she does not drink alcohol.    Review of Systems:   12 systems are reviewed negative except for in HPI.    Meds:     Current Outpatient Medications:      albuterol  (PROAIR HFA/PROVENTIL HFA/VENTOLIN HFA) 108 (90 Base) MCG/ACT inhaler, Inhale 2 puffs into the lungs every 6 hours as needed for shortness of breath, wheezing or cough, Disp: , Rfl:      aspirin (ASA) 81 MG EC tablet, Take 81 mg by mouth every morning., Disp: , Rfl:      atorvastatin (LIPITOR) 80 MG tablet, Take 0.5 tablets (40 mg) by mouth every evening., Disp: 90 tablet, Rfl: 3     benzonatate (TESSALON) 100 MG capsule, Take 1 capsule (100 mg) by mouth 3 times daily as needed for cough., Disp: 20 capsule, Rfl: 0     budeson-glycopyrrol-formoterol (BREZTRI AEROSPHERE) 160-9-4.8 MCG/ACT AERO inhaler, Inhale 2 puffs into the lungs 2 times daily, Disp: , Rfl:      carBAMazepine (TEGRETOL XR) 100 MG 12 hr tablet, Take 100 mg by mouth 2 times daily., Disp: , Rfl:      diltiazem (CARDIZEM SR) 120 MG CP12 12 hr SR capsule, Take 120 mg by mouth 2 times daily, Disp: , Rfl:      doxycycline monohydrate (MONODOX) 100 MG capsule, Take 1 capsule (100 mg) by mouth every 12 hours for 4 days., Disp: 8 capsule, Rfl: 0     DULoxetine (CYMBALTA) 60 MG capsule, Take 60 mg by mouth every morning., Disp: , Rfl:      furosemide (LASIX) 20 MG tablet, Take 20 mg by mouth as needed., Disp: , Rfl:      ipratropium - albuterol 0.5 mg/2.5 mg/3 mL (DUONEB) 0.5-2.5 (3) MG/3ML neb solution, Take 1 vial (3 mLs) by nebulization every 6 hours as needed for shortness of breath, wheezing or cough., Disp: 180 mL, Rfl: 11     losartan (COZAAR) 50 MG tablet, Take 50 mg by mouth every evening., Disp: , Rfl:      metFORMIN (GLUCOPHAGE XR) 500 MG 24 hr tablet, Take 1 tablet (500 mg) by mouth 2 times daily (with meals), Disp: 60 tablet, Rfl: 0     montelukast (SINGULAIR) 10 MG tablet, Take 1 tablet (10 mg) by mouth at bedtime., Disp: 30 tablet, Rfl: 0     oseltamivir (TAMIFLU) 75 MG capsule, Take 1 capsule (75 mg) by mouth 2 times daily for 3 days., Disp: 6 capsule, Rfl: 0     oxyCODONE (OXY-IR) 5 MG capsule, Take 1-2 capsules by mouth every 4 hours as  "needed for severe pain., Disp: , Rfl:      pantoprazole (PROTONIX) 40 MG EC tablet, Take 1 tablet (40 mg) by mouth every morning (before breakfast)., Disp: 30 tablet, Rfl: 0     polyethylene glycol (MIRALAX) 17 GM/Dose powder, Take 17 g by mouth daily as needed for constipation., Disp: , Rfl:      predniSONE (DELTASONE) 20 MG tablet, Take 1 tablet (20 mg) by mouth daily. Take 2 tablets (40 mg) daily x 2 days, then 1 tab po daily x 4 days., Disp: 8 tablet, Rfl: 0     Allergies:   Bupropion    Objective:      Physical Exam  70.8 kg (156 lb)  1.676 m (5' 6\")  Body mass index is 25.18 kg/m .  BP (!) 150/96 (BP Location: Right arm, Patient Position: Sitting, Cuff Size: Adult Regular)   Pulse 90   Resp 14   Ht 1.676 m (5' 6\")   Wt 70.8 kg (156 lb)   BMI 25.18 kg/m      General Appearance:   Awake, Alert, No acute distress.   HEENT:  Pupil equal, reactive to light. No scleral icterus; the mucous membranes were moist. No oral ulcers or thrush.    Neck: No cervical bruits. No JVD. No thyromegaly. No lymph node enlargement or tenderness.   Chest: The spine was straight. The chest was symmetric.   Lungs:   Diminished breathing sounds bilaterally. No crackles. No wheezing.   Cardiovascular:   RRR, normal first and second heart sounds with no murmurs. No rubs or gallops.    Abdomen:  Soft. No tenderness. Non-distended. Bowels sounds are present   Extremities: Equal posterior tibial pulses.  Trace ankle edema.   Skin: No rashes or ulcers. Warm, Dry.   Musculoskeletal: No tenderness. No deformity.   Neurologic: Mood and affect are appropriate. No focal deficits.         EKG:  Personally reivewed  Sinus tachycardia   Right atrial enlargement   Left axis deviation   Pulmonary disease pattern   Inferior infarct , age undetermined   Abnormal ECG   When compared with ECG of 29-May-2024 17:58,   Significant changes have occurred     Cardiac Imaging Studies  Echocardiogram on May 30, 2024:  Left ventricular size, wall motion and " "function are normal. The ejection  fraction is 60-65%.  Normal right ventricle size and systolic function.  No hemodynamically significant valvular abnormalities on 2D or color flow  imaging.    Lab Review   Lab Results   Component Value Date     01/20/2025    CO2 28 01/20/2025    CO2 28 02/04/2022    BUN 30.7 01/20/2025    BUN 13 02/04/2022     Lab Results   Component Value Date    WBC 10.1 01/20/2025    HGB 12.1 01/20/2025    HCT 36.7 01/20/2025    MCV 87 01/20/2025     01/20/2025     Lab Results   Component Value Date    CHOL 216 01/11/2024    TRIG 165 01/11/2024    HDL 49 01/11/2024     01/11/2024     LDL Cholesterol Calculated   Date Value Ref Range Status   01/11/2024 134 (H) <=100 mg/dL Final     No results found for: \"TROPONINI\"  Lab Results   Component Value Date    BNP 30 09/26/2018     Lab Results   Component Value Date    TSH 2.91 05/29/2024                   Thank you for allowing me to participate in the care of your patient.      Sincerely,     Juju Roberson MD     [facility]  cc:   Yelena Sandra, NP  [address] [place] [address]      "

## 2025-01-27 LAB
ATRIAL RATE - MUSE: 111 BPM
DIASTOLIC BLOOD PRESSURE - MUSE: NORMAL MMHG
INTERPRETATION ECG - MUSE: NORMAL
P AXIS - MUSE: 77 DEGREES
PR INTERVAL - MUSE: 128 MS
QRS DURATION - MUSE: 68 MS
QT - MUSE: 320 MS
QTC - MUSE: 435 MS
R AXIS - MUSE: -50 DEGREES
SYSTOLIC BLOOD PRESSURE - MUSE: NORMAL MMHG
T AXIS - MUSE: 84 DEGREES
VENTRICULAR RATE- MUSE: 111 BPM

## 2025-02-14 RX ORDER — METOPROLOL TARTRATE 1 MG/ML
5-20 INJECTION, SOLUTION INTRAVENOUS
Status: ACTIVE | OUTPATIENT
Start: 2025-02-14

## 2025-02-14 RX ORDER — DILTIAZEM HYDROCHLORIDE 5 MG/ML
10-15 INJECTION INTRAVENOUS
Status: ACTIVE | OUTPATIENT
Start: 2025-02-14

## 2025-02-14 RX ORDER — NITROGLYCERIN 0.4 MG/1
0.4 TABLET SUBLINGUAL
Status: ACTIVE | OUTPATIENT
Start: 2025-02-14

## 2025-02-19 ENCOUNTER — TELEPHONE (OUTPATIENT)
Dept: PULMONOLOGY | Facility: CLINIC | Age: 65
End: 2025-02-19
Payer: MEDICARE

## 2025-02-20 ENCOUNTER — HOSPITAL ENCOUNTER (OUTPATIENT)
Dept: CT IMAGING | Facility: CLINIC | Age: 65
End: 2025-02-20
Attending: INTERNAL MEDICINE
Payer: MEDICARE

## 2025-02-20 VITALS — DIASTOLIC BLOOD PRESSURE: 58 MMHG | SYSTOLIC BLOOD PRESSURE: 121 MMHG | HEART RATE: 75 BPM

## 2025-02-20 DIAGNOSIS — R93.1 ABNORMAL FINDINGS ON DIAGNOSTIC IMAGING OF HEART AND CORONARY CIRCULATION: ICD-10-CM

## 2025-02-20 DIAGNOSIS — I25.119 CORONARY ARTERY DISEASE INVOLVING NATIVE CORONARY ARTERY OF NATIVE HEART WITH ANGINA PECTORIS: ICD-10-CM

## 2025-02-20 LAB
BSA FOR ECHO PROCEDURE: 0 M2
CV CALCIUM SCORE AGATSTON LM: 220
CV CALCIUM SCORING AGATSON LAD: 267
CV CALCIUM SCORING AGATSTON CX: 219
CV CALCIUM SCORING AGATSTON RCA: 777
CV CALCIUM SCORING AGATSTON TOTAL: 1483

## 2025-02-20 PROCEDURE — 75574 CT ANGIO HRT W/3D IMAGE: CPT

## 2025-02-20 PROCEDURE — 250N000011 HC RX IP 250 OP 636: Performed by: INTERNAL MEDICINE

## 2025-02-20 PROCEDURE — 75580 N-INVAS EST C FFR SW ALY CTA: CPT

## 2025-02-20 PROCEDURE — 250N000013 HC RX MED GY IP 250 OP 250 PS 637: Performed by: INTERNAL MEDICINE

## 2025-02-20 RX ORDER — IOPAMIDOL 755 MG/ML
100 INJECTION, SOLUTION INTRAVASCULAR ONCE
Status: COMPLETED | OUTPATIENT
Start: 2025-02-20 | End: 2025-02-20

## 2025-02-20 RX ADMIN — NITROGLYCERIN 0.4 MG: 0.4 TABLET, ORALLY DISINTEGRATING SUBLINGUAL at 10:01

## 2025-02-20 RX ADMIN — DILTIAZEM HYDROCHLORIDE 10 MG: 5 INJECTION, SOLUTION INTRAVENOUS at 09:56

## 2025-02-20 RX ADMIN — DILTIAZEM HYDROCHLORIDE 10 MG: 5 INJECTION, SOLUTION INTRAVENOUS at 10:00

## 2025-02-20 RX ADMIN — IOPAMIDOL 100 ML: 755 INJECTION, SOLUTION INTRAVENOUS at 10:06

## 2025-02-24 ENCOUNTER — LAB REQUISITION (OUTPATIENT)
Dept: LAB | Facility: CLINIC | Age: 65
End: 2025-02-24
Payer: MEDICARE

## 2025-02-24 ENCOUNTER — PREP FOR PROCEDURE (OUTPATIENT)
Dept: CARDIOLOGY | Facility: CLINIC | Age: 65
End: 2025-02-24
Payer: MEDICARE

## 2025-02-24 ENCOUNTER — DOCUMENTATION ONLY (OUTPATIENT)
Dept: CARDIOLOGY | Facility: CLINIC | Age: 65
End: 2025-02-24

## 2025-02-24 ENCOUNTER — TRANSFERRED RECORDS (OUTPATIENT)
Dept: HEALTH INFORMATION MANAGEMENT | Facility: CLINIC | Age: 65
End: 2025-02-24

## 2025-02-24 DIAGNOSIS — R93.1 ABNORMAL COMPUTED TOMOGRAPHY ANGIOGRAPHY OF HEART: ICD-10-CM

## 2025-02-24 DIAGNOSIS — R93.1 ELEVATED CORONARY ARTERY CALCIUM SCORE: ICD-10-CM

## 2025-02-24 DIAGNOSIS — R93.1 ABNORMAL FINDINGS ON DIAGNOSTIC IMAGING OF HEART AND CORONARY CIRCULATION: ICD-10-CM

## 2025-02-24 DIAGNOSIS — I25.119 CORONARY ARTERY DISEASE INVOLVING NATIVE CORONARY ARTERY OF NATIVE HEART WITH ANGINA PECTORIS: Primary | ICD-10-CM

## 2025-02-24 DIAGNOSIS — E11.9 TYPE 2 DIABETES MELLITUS WITHOUT COMPLICATIONS (H): ICD-10-CM

## 2025-02-24 DIAGNOSIS — R06.09 DYSPNEA ON EXERTION: ICD-10-CM

## 2025-02-24 PROCEDURE — 80048 BASIC METABOLIC PNL TOTAL CA: CPT | Mod: ORL | Performed by: PHYSICIAN ASSISTANT

## 2025-02-24 RX ORDER — SODIUM CHLORIDE 9 MG/ML
INJECTION, SOLUTION INTRAVENOUS CONTINUOUS
Status: CANCELLED | OUTPATIENT
Start: 2025-02-27

## 2025-02-24 RX ORDER — DEXTROSE MONOHYDRATE 25 G/50ML
25-50 INJECTION, SOLUTION INTRAVENOUS
Status: CANCELLED | OUTPATIENT
Start: 2025-02-27

## 2025-02-24 RX ORDER — FENTANYL CITRATE 50 UG/ML
25 INJECTION, SOLUTION INTRAMUSCULAR; INTRAVENOUS
Status: CANCELLED | OUTPATIENT
Start: 2025-02-27

## 2025-02-24 RX ORDER — ASPIRIN 325 MG
325 TABLET ORAL ONCE
Status: CANCELLED | OUTPATIENT
Start: 2025-02-27 | End: 2025-02-24

## 2025-02-24 RX ORDER — LIDOCAINE 40 MG/G
CREAM TOPICAL
Status: CANCELLED | OUTPATIENT
Start: 2025-02-24

## 2025-02-24 RX ORDER — ASPIRIN 81 MG/1
243 TABLET, CHEWABLE ORAL ONCE
Status: CANCELLED | OUTPATIENT
Start: 2025-02-27

## 2025-02-24 RX ORDER — NICOTINE POLACRILEX 4 MG
15-30 LOZENGE BUCCAL
Status: CANCELLED | OUTPATIENT
Start: 2025-02-27

## 2025-02-24 NOTE — PROGRESS NOTES
Spoke with Pt regarding education, no further questions. H/p completed, BUN/Creatinine improved from last BMP. Can disregard utilizing renal protocol. -melba    From: Angelina Ashford  Sent: 2/24/2025  12:11 PM CST  To: Maria Guadalupe Winn; Kandi Salinas  Subject: RE: brown pt                                       Case type: COR POSS PCI    Procedure Physician(s): MY/RA    Procedure Date and Patient Arrival Time: Thursday 2/27, with arrival time of 0700AM    H&P: Pt will schedule with PMD - 2/24    Pre-Procedure Lab Appt: On Admission - Please place lab orders if you haven't already!    Alerts/Important Info: renal, diabetic, sleep apnea/copd has not been using cpap d/t trigeminal neuralgia. hx of aortobifemoral bypass graft/ Chronic occlusion and distal reconstitution of both superficial femoral arteries. She noted difficulty with stent placement 20 years ago with restenosis.    Interpretor Requested: ANGELA      Thank you,  Angelina  ----- Message -----  From: Maria Guadalupe Winn  Sent: 2/24/2025  10:03 AM CST  To: [facility] Procedure  Pool - [facility]  Subject: brown pt                                           Case Type: cor poss pci  Ordering Provider: BROWN  H&P: she will contact PCP  Alerts: renal, diabetic, sleep apnea/copd has not been using cpap d/t trigeminal neuralgia. hx of aortobifemoral bypass graft/ Chronic occlusion and distal reconstitution of both superficial femoral arteries. She noted difficulty with stent placement 20 years ago with restenosis.   Additional Info/Urgency: this week if possible-unavailable tomorrow.  Orders for Pre-Procedure Labs? On admission    Iglesia Shore  [address] [place] [address]  [phone] (home)     PCP:  Jo Hamilton  H&P completed by: will obtain with pmd  Admit date 02/27/25 Arrival time:  07:00 am  Anticoagulation:  NA  Previous PCI: No  Bypass Grafts: No  Renal Issues: Yes  Diabetic?: Yes Where is the patient located?  Device?: No  Type:    Ambulation status:  independent    Reason for Visit:  Telephone call to discuss pre-procedure education in preparation for: Coronary Angiogram with Possible PCI    Procedure Prep:  EKG results obtained, dated: To be completed on day of cath lab procedure  Hemogram results obtained: To be completed on day of cath lab procedure  Basic Metabolic Panel results obtained: To be completed on day of cath lab procedure  Pertinent cardiac test results: ccta  Patient Education    Your arrival time is 07:00 am.  [place] [address] - Main Entrance of the Hospital  Please plan on being at the hospital all day.  At any time, emergencies and/or urgent cases may come up which could delay the start of your procedure.    COVID Testing Instructions  *Mandatory COVID Testing: ALL Patients will need to complete a COVID test no sooner than 4 days prior to their procedure (regardless of vaccination status).    To schedule COVID testing Please call [phone]  If you want to complete this at an outside facility please call them to find out if they will have the results within the appropriate time frame and their fax number.  You will need to provide us with that information so we can send the order.  The facility completing the test will need to fax the results to [phone]  If you are running into and issues please call us.     Pre-procedure instructions  Take your temperature in the morning prior to coming in.  If your temperature is 100 F please call  [facility] [phone] and notify them.  If you do not have access to a thermometer at home, please come in for testing.  If you are running a temperature your procedure may be rescheduled.  Patient instructed to not Eat or Drink after midnight.  Patient instructed to shower the evening before or the morning of the procedure.  Patient instructed to arrange for transportation home following procedure from a responsible family member or  friend. No driving for at least 24 hours.  Patient instructed to have a responsible adult with them for 24 hours post-procedure.  Post-procedure follow up process.  Conscious sedation discussed.      Pre-procedure medication instructions.  Continue medications as scheduled, with a small amount of water on the day of the procedure unless indicated. (NO Diabetic Medications or Blood Thinners)  Pt instructed not to consume Alcohol, Tobacco, Caffeine, or Carbonated beverages 12 hours prior to procedure.  Patient instructed to take 324 mg of Aspirin the morning of procedure: Yes Where is the patient located?  Other medication: instructed to only take Pantoprazole 40 mg a.m. of the procedure.              Diabetic Medication Instructions  DO NOT take any oral diabetic medication, short-acting diabetes medications/insulin, humalog or regular insulin the morning of your test  Take   dose of long-acting insulin (Lantus, Levemir) the day of your test  Hold Oral Diabetic on the day of the procedure and for 48 hours after IV contrast given  Remember to  bring your glucometer and insulin with you to take after your test if needed               Patient states understanding of procedure and agrees to proceed.    *PATIENTS RECORDS AVAILABLE IN [facility] UNLESS OTHERWISE INDICATED*      Patient Active Problem List   Diagnosis    COPD (chronic obstructive pulmonary disease) (H)    Family history of diabetes mellitus    Foot pain    Idiopathic sensorimotor axonal neuropathy    Laryngitis, chronic    Major depressive disorder, recurrent episode, moderate (H)    Migraine without aura    Mixed hyperlipidemia    Obstructive sleep apnea    Other somatoform disorders    PVD (peripheral vascular disease)    Screening for cervical cancer    Tobacco use disorder    Vitamin B12 deficiency    Hemorrhoids    History of colonic polyps    Major depressive disorder, severe (H)    Diabetes mellitus, type 2 (H)    Altered bowel elimination due to  intestinal ostomy (H)    Chronic pain    Diverticulitis of colon    DNR (do not resuscitate)    Malignant neoplasm of lower lobe of right lung (H)    Primary hypertension    Enteritis    Sigmoid stricture (H)    Syrinx of spinal cord (H)    Bradycardia    COPD exacerbation (H)    Acute hypoxemic respiratory failure (H)    Lung cancer (H)    Current nicotine use    COPD with acute exacerbation (H)    Dyspnea, unspecified type    Influenza A       Current Outpatient Medications   Medication Sig Dispense Refill    albuterol (PROAIR HFA/PROVENTIL HFA/VENTOLIN HFA) 108 (90 Base) MCG/ACT inhaler Inhale 2 puffs into the lungs every 6 hours as needed for shortness of breath, wheezing or cough      aspirin (ASA) 81 MG EC tablet Take 81 mg by mouth every morning.      atorvastatin (LIPITOR) 80 MG tablet Take 0.5 tablets (40 mg) by mouth every evening. 90 tablet 3    benzonatate (TESSALON) 100 MG capsule Take 1 capsule (100 mg) by mouth 3 times daily as needed for cough. 20 capsule 0    budeson-glycopyrrol-formoterol (BREZTRI AEROSPHERE) 160-9-4.8 MCG/ACT AERO inhaler Inhale 2 puffs into the lungs 2 times daily      carBAMazepine (TEGRETOL XR) 100 MG 12 hr tablet Take 100 mg by mouth 2 times daily.      diltiazem (CARDIZEM SR) 120 MG CP12 12 hr SR capsule Take 120 mg by mouth 2 times daily      DULoxetine (CYMBALTA) 60 MG capsule Take 60 mg by mouth every morning.      furosemide (LASIX) 20 MG tablet Take 20 mg by mouth as needed.      ipratropium - albuterol 0.5 mg/2.5 mg/3 mL (DUONEB) 0.5-2.5 (3) MG/3ML neb solution Take 1 vial (3 mLs) by nebulization every 6 hours as needed for shortness of breath, wheezing or cough. 180 mL 11    losartan (COZAAR) 50 MG tablet Take 50 mg by mouth every evening.      metFORMIN (GLUCOPHAGE XR) 500 MG 24 hr tablet Take 1 tablet (500 mg) by mouth 2 times daily (with meals) 60 tablet 0    montelukast (SINGULAIR) 10 MG tablet Take 1 tablet (10 mg) by mouth at bedtime. 30 tablet 0    oxyCODONE  (OXY-IR) 5 MG capsule Take 1-2 capsules by mouth every 4 hours as needed for severe pain.      pantoprazole (PROTONIX) 40 MG EC tablet Take 1 tablet (40 mg) by mouth every morning (before breakfast). 30 tablet 0    polyethylene glycol (MIRALAX) 17 GM/Dose powder Take 17 g by mouth daily as needed for constipation.      predniSONE (DELTASONE) 20 MG tablet Take 1 tablet (20 mg) by mouth daily. Take 2 tablets (40 mg) daily x 2 days, then 1 tab po daily x 4 days. 8 tablet 0       Allergies   Allergen Reactions    Bupropion Itching       Maria Guadalupe Winn on 2/24/2025 at 12:16 PM

## 2025-02-25 ENCOUNTER — HOSPITAL ENCOUNTER (OUTPATIENT)
Dept: CT IMAGING | Facility: HOSPITAL | Age: 65
Discharge: HOME | End: 2025-02-25
Attending: PHYSICIAN ASSISTANT
Payer: MEDICARE

## 2025-02-25 DIAGNOSIS — C34.31 MALIGNANT NEOPLASM OF LOWER LOBE OF RIGHT LUNG (H): ICD-10-CM

## 2025-02-25 LAB
ANION GAP SERPL CALCULATED.3IONS-SCNC: 10 MMOL/L (ref 7–15)
BUN SERPL-MCNC: 8.6 MG/DL (ref 8–23)
CALCIUM SERPL-MCNC: 9.2 MG/DL (ref 8.8–10.4)
CHLORIDE SERPL-SCNC: 102 MMOL/L (ref 98–107)
CREAT SERPL-MCNC: 0.62 MG/DL (ref 0.51–0.95)
EGFRCR SERPLBLD CKD-EPI 2021: >90 ML/MIN/1.73M2
GLUCOSE SERPL-MCNC: 99 MG/DL (ref 70–99)
HCO3 SERPL-SCNC: 33 MMOL/L (ref 22–29)
POTASSIUM SERPL-SCNC: 4 MMOL/L (ref 3.4–5.3)
SODIUM SERPL-SCNC: 145 MMOL/L (ref 135–145)

## 2025-02-25 PROCEDURE — 71250 CT THORAX DX C-: CPT

## 2025-02-27 ENCOUNTER — HOSPITAL ENCOUNTER (OUTPATIENT)
Facility: HOSPITAL | Age: 65
End: 2025-02-27
Attending: INTERNAL MEDICINE | Admitting: INTERNAL MEDICINE
Payer: MEDICARE

## 2025-02-27 VITALS
DIASTOLIC BLOOD PRESSURE: 72 MMHG | OXYGEN SATURATION: 93 % | SYSTOLIC BLOOD PRESSURE: 149 MMHG | WEIGHT: 160.1 LBS | HEART RATE: 71 BPM | BODY MASS INDEX: 25.73 KG/M2 | HEIGHT: 66 IN | RESPIRATION RATE: 16 BRPM | TEMPERATURE: 98.5 F

## 2025-02-27 DIAGNOSIS — R93.1 ELEVATED CORONARY ARTERY CALCIUM SCORE: ICD-10-CM

## 2025-02-27 DIAGNOSIS — R06.09 DYSPNEA ON EXERTION: ICD-10-CM

## 2025-02-27 DIAGNOSIS — Z95.5 S/P RIGHT CORONARY ARTERY (RCA) STENT PLACEMENT: Primary | ICD-10-CM

## 2025-02-27 DIAGNOSIS — R93.1 ABNORMAL COMPUTED TOMOGRAPHY ANGIOGRAPHY OF HEART: ICD-10-CM

## 2025-02-27 DIAGNOSIS — R93.1 ABNORMAL FINDINGS ON DIAGNOSTIC IMAGING OF HEART AND CORONARY CIRCULATION: ICD-10-CM

## 2025-02-27 DIAGNOSIS — Z95.5 S/P CORONARY ARTERY STENT PLACEMENT: Primary | ICD-10-CM

## 2025-02-27 DIAGNOSIS — I25.119 CORONARY ARTERY DISEASE INVOLVING NATIVE CORONARY ARTERY OF NATIVE HEART WITH ANGINA PECTORIS: ICD-10-CM

## 2025-02-27 PROBLEM — Z98.890 STATUS POST CORONARY ANGIOGRAM: Status: ACTIVE | Noted: 2025-02-27

## 2025-02-27 LAB
ABO + RH BLD: NORMAL
ACT BLD: 312 SECONDS (ref 74–150)
ANION GAP SERPL CALCULATED.3IONS-SCNC: 8 MMOL/L (ref 7–15)
ATRIAL RATE - MUSE: 73 BPM
ATRIAL RATE - MUSE: 75 BPM
BLD GP AB SCN SERPL QL: NEGATIVE
BUN SERPL-MCNC: 8.3 MG/DL (ref 8–23)
CALCIUM SERPL-MCNC: 9.5 MG/DL (ref 8.8–10.4)
CHLORIDE SERPL-SCNC: 102 MMOL/L (ref 98–107)
CHOLEST SERPL-MCNC: 178 MG/DL
CREAT SERPL-MCNC: 0.51 MG/DL (ref 0.51–0.95)
DIASTOLIC BLOOD PRESSURE - MUSE: NORMAL MMHG
DIASTOLIC BLOOD PRESSURE - MUSE: NORMAL MMHG
EGFRCR SERPLBLD CKD-EPI 2021: >90 ML/MIN/1.73M2
ERYTHROCYTE [DISTWIDTH] IN BLOOD BY AUTOMATED COUNT: 12.9 % (ref 10–15)
FASTING STATUS PATIENT QL REPORTED: YES
FASTING STATUS PATIENT QL REPORTED: YES
GLUCOSE BLDC GLUCOMTR-MCNC: 114 MG/DL (ref 70–99)
GLUCOSE BLDC GLUCOMTR-MCNC: 130 MG/DL (ref 70–99)
GLUCOSE BLDC GLUCOMTR-MCNC: 137 MG/DL (ref 70–99)
GLUCOSE SERPL-MCNC: 117 MG/DL (ref 70–99)
HCO3 SERPL-SCNC: 35 MMOL/L (ref 22–29)
HCT VFR BLD AUTO: 40.3 % (ref 35–47)
HDLC SERPL-MCNC: 53 MG/DL
HGB BLD-MCNC: 13.5 G/DL (ref 11.7–15.7)
INTERPRETATION ECG - MUSE: NORMAL
INTERPRETATION ECG - MUSE: NORMAL
LDLC SERPL CALC-MCNC: 103 MG/DL
MCH RBC QN AUTO: 28.5 PG (ref 26.5–33)
MCHC RBC AUTO-ENTMCNC: 33.5 G/DL (ref 31.5–36.5)
MCV RBC AUTO: 85 FL (ref 78–100)
NONHDLC SERPL-MCNC: 125 MG/DL
P AXIS - MUSE: 70 DEGREES
P AXIS - MUSE: 73 DEGREES
PLATELET # BLD AUTO: 321 10E3/UL (ref 150–450)
POTASSIUM SERPL-SCNC: 4 MMOL/L (ref 3.4–5.3)
PR INTERVAL - MUSE: 136 MS
PR INTERVAL - MUSE: 136 MS
QRS DURATION - MUSE: 70 MS
QRS DURATION - MUSE: 70 MS
QT - MUSE: 394 MS
QT - MUSE: 398 MS
QTC - MUSE: 438 MS
QTC - MUSE: 439 MS
R AXIS - MUSE: -60 DEGREES
R AXIS - MUSE: -60 DEGREES
RBC # BLD AUTO: 4.73 10E6/UL (ref 3.8–5.2)
SODIUM SERPL-SCNC: 145 MMOL/L (ref 135–145)
SPECIMEN EXP DATE BLD: NORMAL
SYSTOLIC BLOOD PRESSURE - MUSE: NORMAL MMHG
SYSTOLIC BLOOD PRESSURE - MUSE: NORMAL MMHG
T AXIS - MUSE: 62 DEGREES
T AXIS - MUSE: 79 DEGREES
TRIGL SERPL-MCNC: 111 MG/DL
VENTRICULAR RATE- MUSE: 73 BPM
VENTRICULAR RATE- MUSE: 75 BPM
WBC # BLD AUTO: 6.7 10E3/UL (ref 4–11)

## 2025-02-27 PROCEDURE — C1887 CATHETER, GUIDING: HCPCS | Performed by: INTERNAL MEDICINE

## 2025-02-27 PROCEDURE — 250N000009 HC RX 250: Performed by: INTERNAL MEDICINE

## 2025-02-27 PROCEDURE — 94640 AIRWAY INHALATION TREATMENT: CPT

## 2025-02-27 PROCEDURE — 999N000054 ECG 12-LEAD WITH MUSE (LHE): Performed by: INTERNAL MEDICINE

## 2025-02-27 PROCEDURE — 84478 ASSAY OF TRIGLYCERIDES: CPT | Performed by: INTERNAL MEDICINE

## 2025-02-27 PROCEDURE — 93571 IV DOP VEL&/PRESS C FLO 1ST: CPT | Mod: 26 | Performed by: INTERNAL MEDICINE

## 2025-02-27 PROCEDURE — 250N000013 HC RX MED GY IP 250 OP 250 PS 637: Performed by: STUDENT IN AN ORGANIZED HEALTH CARE EDUCATION/TRAINING PROGRAM

## 2025-02-27 PROCEDURE — 272N000001 HC OR GENERAL SUPPLY STERILE: Performed by: INTERNAL MEDICINE

## 2025-02-27 PROCEDURE — C1894 INTRO/SHEATH, NON-LASER: HCPCS | Performed by: INTERNAL MEDICINE

## 2025-02-27 PROCEDURE — 93458 L HRT ARTERY/VENTRICLE ANGIO: CPT | Mod: 26 | Performed by: INTERNAL MEDICINE

## 2025-02-27 PROCEDURE — 93799 UNLISTED CV SVC/PROCEDURE: CPT | Performed by: INTERNAL MEDICINE

## 2025-02-27 PROCEDURE — 999N000156 HC STATISTIC RCP CONSULT EA 30 MIN

## 2025-02-27 PROCEDURE — C1725 CATH, TRANSLUMIN NON-LASER: HCPCS | Performed by: INTERNAL MEDICINE

## 2025-02-27 PROCEDURE — 999N000054 HC STATISTIC EKG NON-CHARGEABLE

## 2025-02-27 PROCEDURE — 999N000054 ECG 12-LEAD WITH MUSE (LHE): Performed by: NURSE PRACTITIONER

## 2025-02-27 PROCEDURE — C9600 PERC DRUG-EL COR STENT SING: HCPCS | Performed by: INTERNAL MEDICINE

## 2025-02-27 PROCEDURE — 92928 PRQ TCAT PLMT NTRAC ST 1 LES: CPT | Mod: RC | Performed by: INTERNAL MEDICINE

## 2025-02-27 PROCEDURE — 93458 L HRT ARTERY/VENTRICLE ANGIO: CPT | Performed by: INTERNAL MEDICINE

## 2025-02-27 PROCEDURE — 250N000013 HC RX MED GY IP 250 OP 250 PS 637: Performed by: INTERNAL MEDICINE

## 2025-02-27 PROCEDURE — C1769 GUIDE WIRE: HCPCS | Performed by: INTERNAL MEDICINE

## 2025-02-27 PROCEDURE — 250N000011 HC RX IP 250 OP 636: Performed by: INTERNAL MEDICINE

## 2025-02-27 PROCEDURE — 82962 GLUCOSE BLOOD TEST: CPT

## 2025-02-27 PROCEDURE — 258N000003 HC RX IP 258 OP 636: Performed by: INTERNAL MEDICINE

## 2025-02-27 PROCEDURE — 86900 BLOOD TYPING SEROLOGIC ABO: CPT | Performed by: INTERNAL MEDICINE

## 2025-02-27 PROCEDURE — 36415 COLL VENOUS BLD VENIPUNCTURE: CPT | Performed by: INTERNAL MEDICINE

## 2025-02-27 PROCEDURE — 85347 COAGULATION TIME ACTIVATED: CPT

## 2025-02-27 PROCEDURE — 93005 ELECTROCARDIOGRAM TRACING: CPT

## 2025-02-27 PROCEDURE — 85014 HEMATOCRIT: CPT | Performed by: INTERNAL MEDICINE

## 2025-02-27 PROCEDURE — 255N000002 HC RX 255 OP 636: Performed by: INTERNAL MEDICINE

## 2025-02-27 PROCEDURE — 93010 ELECTROCARDIOGRAM REPORT: CPT | Mod: HOP | Performed by: GENERAL ACUTE CARE HOSPITAL

## 2025-02-27 PROCEDURE — 250N000011 HC RX IP 250 OP 636: Performed by: NURSE PRACTITIONER

## 2025-02-27 PROCEDURE — 999N000157 HC STATISTIC RCP TIME EA 10 MIN

## 2025-02-27 PROCEDURE — 250N000009 HC RX 250: Performed by: NURSE PRACTITIONER

## 2025-02-27 PROCEDURE — 99153 MOD SED SAME PHYS/QHP EA: CPT | Performed by: INTERNAL MEDICINE

## 2025-02-27 PROCEDURE — 99223 1ST HOSP IP/OBS HIGH 75: CPT | Performed by: STUDENT IN AN ORGANIZED HEALTH CARE EDUCATION/TRAINING PROGRAM

## 2025-02-27 PROCEDURE — 250N000013 HC RX MED GY IP 250 OP 250 PS 637: Performed by: NURSE PRACTITIONER

## 2025-02-27 PROCEDURE — C1874 STENT, COATED/COV W/DEL SYS: HCPCS | Performed by: INTERNAL MEDICINE

## 2025-02-27 PROCEDURE — 80048 BASIC METABOLIC PNL TOTAL CA: CPT | Performed by: INTERNAL MEDICINE

## 2025-02-27 PROCEDURE — 93010 ELECTROCARDIOGRAM REPORT: CPT | Mod: HOP | Performed by: INTERNAL MEDICINE

## 2025-02-27 PROCEDURE — 82565 ASSAY OF CREATININE: CPT | Performed by: INTERNAL MEDICINE

## 2025-02-27 PROCEDURE — 99152 MOD SED SAME PHYS/QHP 5/>YRS: CPT | Performed by: INTERNAL MEDICINE

## 2025-02-27 PROCEDURE — 83718 ASSAY OF LIPOPROTEIN: CPT | Performed by: INTERNAL MEDICINE

## 2025-02-27 PROCEDURE — 93572 IV DOP VEL&/PRESS C FLO EA: CPT | Mod: 26 | Performed by: INTERNAL MEDICINE

## 2025-02-27 DEVICE — STENT COR ONYX FRONTIER 38X3MM ONYXNG30038UX: Type: IMPLANTABLE DEVICE | Status: FUNCTIONAL

## 2025-02-27 RX ORDER — IODIXANOL 320 MG/ML
INJECTION, SOLUTION INTRAVASCULAR
Status: DISCONTINUED | OUTPATIENT
Start: 2025-02-27 | End: 2025-02-27 | Stop reason: HOSPADM

## 2025-02-27 RX ORDER — DIAZEPAM 5 MG/1
5 TABLET ORAL ONCE
Status: COMPLETED | OUTPATIENT
Start: 2025-02-27 | End: 2025-02-27

## 2025-02-27 RX ORDER — ONDANSETRON 4 MG/1
4 TABLET, ORALLY DISINTEGRATING ORAL EVERY 6 HOURS PRN
Status: ACTIVE | OUTPATIENT
Start: 2025-02-27

## 2025-02-27 RX ORDER — DEXTROSE MONOHYDRATE 25 G/50ML
25-50 INJECTION, SOLUTION INTRAVENOUS
Status: ACTIVE | OUTPATIENT
Start: 2025-02-27

## 2025-02-27 RX ORDER — HYDRALAZINE HYDROCHLORIDE 20 MG/ML
10 INJECTION INTRAMUSCULAR; INTRAVENOUS EVERY 4 HOURS PRN
Status: ACTIVE | OUTPATIENT
Start: 2025-02-27

## 2025-02-27 RX ORDER — CLOPIDOGREL BISULFATE 75 MG/1
75 TABLET ORAL DAILY
Status: ACTIVE | OUTPATIENT
Start: 2025-02-28

## 2025-02-27 RX ORDER — NALOXONE HYDROCHLORIDE 0.4 MG/ML
0.4 INJECTION, SOLUTION INTRAMUSCULAR; INTRAVENOUS; SUBCUTANEOUS
Status: ACTIVE | OUTPATIENT
Start: 2025-02-27 | End: 2025-02-27

## 2025-02-27 RX ORDER — ATORVASTATIN CALCIUM 80 MG/1
80 TABLET, FILM COATED ORAL DAILY
Qty: 90 TABLET | Refills: 3 | Status: SHIPPED | OUTPATIENT
Start: 2025-02-27

## 2025-02-27 RX ORDER — ASPIRIN 81 MG/1
81 TABLET ORAL DAILY
Status: DISCONTINUED | OUTPATIENT
Start: 2025-02-28 | End: 2025-02-27

## 2025-02-27 RX ORDER — SODIUM CHLORIDE 9 MG/ML
INJECTION, SOLUTION INTRAVENOUS CONTINUOUS
Status: DISCONTINUED | OUTPATIENT
Start: 2025-02-27 | End: 2025-02-27 | Stop reason: HOSPADM

## 2025-02-27 RX ORDER — ATORVASTATIN CALCIUM 40 MG/1
40 TABLET, FILM COATED ORAL EVERY EVENING
Status: DISPENSED | OUTPATIENT
Start: 2025-02-27

## 2025-02-27 RX ORDER — DILTIAZEM HYDROCHLORIDE 120 MG/1
120 CAPSULE, COATED, EXTENDED RELEASE ORAL DAILY
Status: DISCONTINUED | OUTPATIENT
Start: 2025-02-27 | End: 2025-02-27 | Stop reason: DRUGHIGH

## 2025-02-27 RX ORDER — HEPARIN SODIUM 1000 [USP'U]/ML
INJECTION, SOLUTION INTRAVENOUS; SUBCUTANEOUS
Status: DISCONTINUED | OUTPATIENT
Start: 2025-02-27 | End: 2025-02-27 | Stop reason: HOSPADM

## 2025-02-27 RX ORDER — DEXTROSE MONOHYDRATE 25 G/50ML
25-50 INJECTION, SOLUTION INTRAVENOUS
Status: DISCONTINUED | OUTPATIENT
Start: 2025-02-27 | End: 2025-02-27 | Stop reason: HOSPADM

## 2025-02-27 RX ORDER — ASPIRIN 325 MG
325 TABLET ORAL ONCE
Status: COMPLETED | OUTPATIENT
Start: 2025-02-27 | End: 2025-02-27

## 2025-02-27 RX ORDER — CLOPIDOGREL BISULFATE 75 MG/1
TABLET ORAL
Status: DISCONTINUED | OUTPATIENT
Start: 2025-02-27 | End: 2025-02-27 | Stop reason: HOSPADM

## 2025-02-27 RX ORDER — METOPROLOL TARTRATE 1 MG/ML
5 INJECTION, SOLUTION INTRAVENOUS
Status: ACTIVE | OUTPATIENT
Start: 2025-02-27

## 2025-02-27 RX ORDER — MONTELUKAST SODIUM 10 MG/1
10 TABLET ORAL AT BEDTIME
Status: DISPENSED | OUTPATIENT
Start: 2025-02-27

## 2025-02-27 RX ORDER — IPRATROPIUM BROMIDE AND ALBUTEROL SULFATE 2.5; .5 MG/3ML; MG/3ML
3 SOLUTION RESPIRATORY (INHALATION) EVERY 6 HOURS PRN
Status: ACTIVE | OUTPATIENT
Start: 2025-02-27

## 2025-02-27 RX ORDER — NICOTINE POLACRILEX 4 MG
15-30 LOZENGE BUCCAL
Status: ACTIVE | OUTPATIENT
Start: 2025-02-27

## 2025-02-27 RX ORDER — CARBAMAZEPINE 100 MG/1
100 TABLET, EXTENDED RELEASE ORAL 2 TIMES DAILY
Status: DISPENSED | OUTPATIENT
Start: 2025-02-27

## 2025-02-27 RX ORDER — SODIUM CHLORIDE 9 MG/ML
INJECTION, SOLUTION INTRAVENOUS CONTINUOUS
Status: ACTIVE | OUTPATIENT
Start: 2025-02-27 | End: 2025-02-27

## 2025-02-27 RX ORDER — LOSARTAN POTASSIUM 50 MG/1
50 TABLET ORAL EVERY EVENING
Status: DISPENSED | OUTPATIENT
Start: 2025-02-27

## 2025-02-27 RX ORDER — FENTANYL CITRATE 50 UG/ML
25 INJECTION, SOLUTION INTRAMUSCULAR; INTRAVENOUS
Status: ACTIVE | OUTPATIENT
Start: 2025-02-27

## 2025-02-27 RX ORDER — ASPIRIN 81 MG/1
243 TABLET, CHEWABLE ORAL ONCE
Status: COMPLETED | OUTPATIENT
Start: 2025-02-27 | End: 2025-02-27

## 2025-02-27 RX ORDER — NITROGLYCERIN 0.4 MG/1
0.4 TABLET SUBLINGUAL EVERY 5 MIN PRN
Status: ACTIVE | OUTPATIENT
Start: 2025-02-27

## 2025-02-27 RX ORDER — NITROGLYCERIN 5 MG/ML
VIAL (ML) INTRAVENOUS
Status: DISCONTINUED | OUTPATIENT
Start: 2025-02-27 | End: 2025-02-27 | Stop reason: HOSPADM

## 2025-02-27 RX ORDER — NICOTINE POLACRILEX 4 MG
15-30 LOZENGE BUCCAL
Status: DISCONTINUED | OUTPATIENT
Start: 2025-02-27 | End: 2025-02-27 | Stop reason: HOSPADM

## 2025-02-27 RX ORDER — BENZONATATE 100 MG/1
100 CAPSULE ORAL 3 TIMES DAILY PRN
Status: DISPENSED | OUTPATIENT
Start: 2025-02-27

## 2025-02-27 RX ORDER — OXYCODONE HYDROCHLORIDE 5 MG/1
5 TABLET ORAL EVERY 4 HOURS PRN
Status: ACTIVE | OUTPATIENT
Start: 2025-02-27

## 2025-02-27 RX ORDER — DULOXETIN HYDROCHLORIDE 60 MG/1
60 CAPSULE, DELAYED RELEASE ORAL EVERY MORNING
Status: DISPENSED | OUTPATIENT
Start: 2025-02-27

## 2025-02-27 RX ORDER — PANTOPRAZOLE SODIUM 40 MG/1
40 TABLET, DELAYED RELEASE ORAL
Status: ACTIVE | OUTPATIENT
Start: 2025-02-28

## 2025-02-27 RX ORDER — LIDOCAINE 40 MG/G
CREAM TOPICAL
Status: DISCONTINUED | OUTPATIENT
Start: 2025-02-27 | End: 2025-02-27 | Stop reason: HOSPADM

## 2025-02-27 RX ORDER — ALBUTEROL SULFATE 90 UG/1
2 INHALANT RESPIRATORY (INHALATION) EVERY 6 HOURS PRN
Status: ACTIVE | OUTPATIENT
Start: 2025-02-27

## 2025-02-27 RX ORDER — FLUMAZENIL 0.1 MG/ML
0.2 INJECTION, SOLUTION INTRAVENOUS
Status: ACTIVE | OUTPATIENT
Start: 2025-02-27 | End: 2025-02-27

## 2025-02-27 RX ORDER — ONDANSETRON 2 MG/ML
4 INJECTION INTRAMUSCULAR; INTRAVENOUS EVERY 6 HOURS PRN
Status: ACTIVE | OUTPATIENT
Start: 2025-02-27

## 2025-02-27 RX ORDER — ATROPINE SULFATE 0.1 MG/ML
0.5 INJECTION INTRAVENOUS
Status: ACTIVE | OUTPATIENT
Start: 2025-02-27 | End: 2025-02-27

## 2025-02-27 RX ORDER — OXYCODONE HYDROCHLORIDE 5 MG/1
10 TABLET ORAL EVERY 4 HOURS PRN
Status: ACTIVE | OUTPATIENT
Start: 2025-02-27

## 2025-02-27 RX ORDER — ASPIRIN 81 MG/1
81 TABLET ORAL EVERY MORNING
Status: ACTIVE | OUTPATIENT
Start: 2025-02-28

## 2025-02-27 RX ORDER — NALOXONE HYDROCHLORIDE 0.4 MG/ML
0.2 INJECTION, SOLUTION INTRAMUSCULAR; INTRAVENOUS; SUBCUTANEOUS
Status: ACTIVE | OUTPATIENT
Start: 2025-02-27 | End: 2025-02-27

## 2025-02-27 RX ORDER — ASPIRIN 81 MG/1
81 TABLET ORAL DAILY
Qty: 30 TABLET | Refills: 3 | Status: SHIPPED | OUTPATIENT
Start: 2025-02-28

## 2025-02-27 RX ORDER — IPRATROPIUM BROMIDE AND ALBUTEROL SULFATE 2.5; .5 MG/3ML; MG/3ML
3 SOLUTION RESPIRATORY (INHALATION)
Status: DISCONTINUED | OUTPATIENT
Start: 2025-02-27 | End: 2025-02-27

## 2025-02-27 RX ORDER — DILTIAZEM HYDROCHLORIDE 60 MG/1
120 CAPSULE, EXTENDED RELEASE ORAL 2 TIMES DAILY
Status: DISPENSED | OUTPATIENT
Start: 2025-02-27

## 2025-02-27 RX ORDER — FENTANYL CITRATE 50 UG/ML
INJECTION, SOLUTION INTRAMUSCULAR; INTRAVENOUS
Status: DISCONTINUED | OUTPATIENT
Start: 2025-02-27 | End: 2025-02-27 | Stop reason: HOSPADM

## 2025-02-27 RX ORDER — ACETAMINOPHEN 325 MG/1
650 TABLET ORAL EVERY 4 HOURS PRN
Status: ACTIVE | OUTPATIENT
Start: 2025-02-27

## 2025-02-27 RX ORDER — HEPARIN SODIUM 200 [USP'U]/100ML
INJECTION, SOLUTION INTRAVENOUS
Status: DISCONTINUED | OUTPATIENT
Start: 2025-02-27 | End: 2025-02-27 | Stop reason: HOSPADM

## 2025-02-27 RX ORDER — POLYETHYLENE GLYCOL 3350 17 G/17G
17 POWDER, FOR SOLUTION ORAL DAILY PRN
Status: ACTIVE | OUTPATIENT
Start: 2025-02-27

## 2025-02-27 RX ORDER — POLYETHYLENE GLYCOL 3350 17 G/17G
17 POWDER, FOR SOLUTION ORAL DAILY PRN
Status: DISCONTINUED | OUTPATIENT
Start: 2025-02-27 | End: 2025-02-27

## 2025-02-27 RX ORDER — CLOPIDOGREL BISULFATE 75 MG/1
75 TABLET ORAL DAILY
Qty: 90 TABLET | Refills: 3 | Status: SHIPPED | OUTPATIENT
Start: 2025-02-28

## 2025-02-27 RX ADMIN — DILTIAZEM HYDROCHLORIDE 120 MG: 60 CAPSULE, EXTENDED RELEASE ORAL at 19:31

## 2025-02-27 RX ADMIN — DULOXETINE HYDROCHLORIDE 60 MG: 60 CAPSULE, DELAYED RELEASE ORAL at 15:49

## 2025-02-27 RX ADMIN — MONTELUKAST 10 MG: 10 TABLET, FILM COATED ORAL at 20:20

## 2025-02-27 RX ADMIN — IPRATROPIUM BROMIDE AND ALBUTEROL SULFATE 3 ML: .5; 3 SOLUTION RESPIRATORY (INHALATION) at 08:45

## 2025-02-27 RX ADMIN — ATORVASTATIN CALCIUM 40 MG: 40 TABLET, FILM COATED ORAL at 20:20

## 2025-02-27 RX ADMIN — DIAZEPAM 5 MG: 5 TABLET ORAL at 09:18

## 2025-02-27 RX ADMIN — HYDRALAZINE HYDROCHLORIDE 10 MG: 20 INJECTION INTRAMUSCULAR; INTRAVENOUS at 11:38

## 2025-02-27 RX ADMIN — SODIUM CHLORIDE 150 ML/HR: 0.9 INJECTION, SOLUTION INTRAVENOUS at 08:29

## 2025-02-27 RX ADMIN — DILTIAZEM HYDROCHLORIDE 120 MG: 120 CAPSULE, EXTENDED RELEASE ORAL at 09:18

## 2025-02-27 RX ADMIN — BENZONATATE 100 MG: 100 CAPSULE ORAL at 18:59

## 2025-02-27 RX ADMIN — CARBAMAZEPINE 100 MG: 100 TABLET, EXTENDED RELEASE ORAL at 19:31

## 2025-02-27 RX ADMIN — LOSARTAN POTASSIUM 50 MG: 50 TABLET, FILM COATED ORAL at 19:31

## 2025-02-27 ASSESSMENT — ACTIVITIES OF DAILY LIVING (ADL)
ADLS_ACUITY_SCORE: 57
ADLS_ACUITY_SCORE: 37
ADLS_ACUITY_SCORE: 57
ADLS_ACUITY_SCORE: 37
ADLS_ACUITY_SCORE: 57
ADLS_ACUITY_SCORE: 37
ADLS_ACUITY_SCORE: 57
ADLS_ACUITY_SCORE: 37
ADLS_ACUITY_SCORE: 57
ADLS_ACUITY_SCORE: 57
ADLS_ACUITY_SCORE: 37
ADLS_ACUITY_SCORE: 57

## 2025-02-27 ASSESSMENT — EJECTION FRACTION: EF_VALUE: .22

## 2025-02-27 NOTE — DISCHARGE INSTRUCTIONS

## 2025-02-27 NOTE — CONSULTS
[facility]  Consult Note - Hospitalist Service  Date of Admission:  2/27/2025  Consult Requested by: Cardiology  Reason for Consult: Medical comanagement    Assessment & Plan   Assessment:  Iglesia Shore is a 65 year old female with a past medical history of severe coronary artery disease, hypertension, hyperlipidemia, history of lung cancer status postradiation, COPD on home oxygen presented to the hospital for coronary angiogram and PCI and is s/p procedure, medicine was consulted postprocedure for medical comanagement.    Problem list and plan:  Severe coronary artery disease  Patient presented to the hospital for coronary angiogram and PCI  Follow-up cardiology for further recommendations  Continue with pain management as per protocol  As per coronary angiogram showed Single-vessel obstructive coronary artery disease involving the mid dominant right coronary artery with moderate calcific disease elsewhere (iFR of the LAD 0.91, iFR of the left circumflex 0.95). Normal left-sided filling pressures.  No aortic valve stenosis. Successful percutaneous coronary artery intervention of the proximal to mid right coronary artery with a 3.0 x 38 mm Resolute [place] drug-eluting stent that was postdilated with a 3.5 mm NC balloon at high atmospheres.  DAPT for minimum of 6 months.  Continue with Lipitor    History of COPD on home oxygen  Continue with 2 L nasal cannula  Continue with bronchodilators, Singulair, nebulization as needed    History of hypertension with high blood pressure  Monitor vital signs as per protocol  IV hydralazine as needed for elevated blood pressure  Continue with losartan, holding Lasix for now  Continue diltiazem    History of hyperlipidemia  Total cholesterol 178, LDL of 103  Patient currently on Lipitor, unclear why it was not ordered, will order Lipitor, continue with aspirin    History of mood disorder  Neuropathy  Continue Tegretol, Cymbalta    History of  "non-insulin-dependent type 2 diabetes mellitus with hyperglycemia  Monitor blood sugar level  Hypoglycemia protocol  Holding metformin for now  Continue sliding scale  Recent hemoglobin A1c 5.4    DVT PPx  Intermittent pneumatic compression     Clinically Significant Risk Factors Present on Admission                 # Drug Induced Platelet Defect: home medication list includes an antiplatelet medication   # Hypertension: Noted on problem list           # Overweight: Estimated body mass index is 25.84 kg/m  as calculated from the following:    Height as of this encounter: 1.676 m (5' 6\").    Weight as of this encounter: 72.6 kg (160 lb 1.6 oz).       # Financial/Environmental Concerns:           Saad J. Gondal, MD  Hospitalist Service  Securely message with [employer] (more info)  Text page via [facility] Paging/Directory   ______________________________________________________________________    Chief Complaint   Coronary artery disease    History is obtained from the patient and chart review    History of Present Illness   Iglesia Shore is a 65 year old female with a past medical history of severe coronary artery disease, hypertension, hyperlipidemia, history of lung cancer status postradiation, COPD on home oxygen presented to the hospital for coronary angiogram and PCI and is s/p procedure, medicine was consulted postprocedure for medical comanagement.      Past Medical History    Past Medical History:   Diagnosis Date    Back pain     low back    COPD (chronic obstructive pulmonary disease) (H)     Depression     Diabetes mellitus (H)     Hyperlipidemia     Hypertension     PAD (peripheral artery disease)     Peripheral neuropathy     Pulmonary nodules        Past Surgical History   Past Surgical History:   Procedure Laterality Date    AORTA - FEMORAL ARTERY BYPASS GRAFT      BRONCHOSCOPY RIGID OR FLEXIBLE W/TRANSENDOSCOPIC ENDOBRONCHIAL ULTRASOUND GUIDED N/A 07/14/2022    Procedure: endbronchial ultrasound, " transbronchial biopsy;  Surgeon: Rosendo Dave MD;  Location: [facility] OR    HYSTEROSCOPY W/ ENDOMETRIAL ABLATION      ILEOSTOMY  08/16/2023    ILEOSTOMY REVISION  10/19/2023    IR EXTREMITY ANGIOGRAM LEFT  11/25/2020    IR EXTREMITY ANGIOGRAM LEFT  12/16/2020    IR LOWER EXTREMITY ANGIOGRAM LEFT  11/25/2020    IR LOWER EXTREMITY ANGIOGRAM LEFT  12/16/2020    OPTICAL TRACKING SYSTEM BRONCHOSCOPY N/A 07/14/2022    Procedure: BRONCHOSCOPY, USING OPTICAL TRACKING SYSTEM, .  Ion or veran system, fiducial placement;  Surgeon: Rosendo Dave MD;  Location: [facility] OR    OTHER SURGICAL HISTORY      tubal ligation    OTHER SURGICAL HISTORY      spine fusion    PAIN STELLATE GANGLION BLOCK RIGHT Right 1992    x5, [facility]       Medications   I have reviewed this patient's current medications       Review of Systems    The 10 point Review of Systems is negative other than noted in the HPI      Physical Exam   Vital Signs: Temp: 98.4  F (36.9  C) Temp src: Oral BP: (!) 152/74 Pulse: 83   Resp: 22 SpO2: 95 % O2 Device: Nasal cannula Oxygen Delivery: 2 LPM  Weight: 160 lbs 1.6 oz    GENERAL: Patient was seen and examined at bedside with no acute distress  HENT:  Head is normocephalic, atraumatic.   EYES:  Eyes have anicteric sclerae without conjunctival injection   LUNGS: Bilateral air entry, coarse breath sounds bilaterally, decreased breath sounds at the bilateral bases  CARDIOVASCULAR:  Regular rate and rhythm with no murmurs, gallops or rubs.  ABDOMEN:  Normal bowel sounds. Soft; nontender   EXT: no edema    SKIN:  No acute rashes.   NEUROLOGIC:  Grossly nonfocal.      Medical Decision Making       80 MINUTES SPENT BY ME on the date of service doing chart review, history, exam, documentation & further activities per the note.      Data     I have personally reviewed the following data over the past 24 hrs:    6.7  \   13.5   / 321     145 102 8.3 /  114 (H)   4.0 35 (H) 0.51 \       Imaging results reviewed over the  past 24 hrs:   Recent Results (from the past 24 hours)   Cardiac Catheterization    Narrative    CARDIAC LEFT HEART CATHETERIZATION AND INTERVENTION REPORT    PATIENT NAME:  Iglesia Shore          MEDICAL RECORD NUMBER: [id number]  : 1960       PROCEDURE DATE: 2025        CONCLUSIONS:   Single-vessel obstructive coronary artery disease involving the mid   dominant right coronary artery with moderate calcific disease elsewhere   (iFR of the LAD 0.91, iFR of the left circumflex 0.95).  Normal left-sided filling pressures.  No aortic valve stenosis.  Successful percutaneous coronary artery intervention of the proximal to   mid right coronary artery with a 3.0 x 38 mm Resolute [employer]   drug-eluting stent that was postdilated with a 3.5 mm NC balloon at high   atmospheres.    RECOMMENDATIONS:   Usual postprocedural cares, please see orders.  DAPT for minimum of 6 months.   Aggressive risk factor modification for secondary prevention.    PROCEDURE PERFORMED:   Selective right and left coronary angiography  Left heart catheterization  iFR of the left anterior descending artery  iFR of the left circumflex artery  PCI of the right coronary artery    PRE-OP DIAGNOSIS:  Dyspnea on exertion  Abnormal coronary CTA    POST-OP DIAGNOSIS:   Dyspnea on exertion  Abnormal coronary CTA    PROCEDURALISTS: Rufino Longo MD      DIAGNOSTIC PROCEDURAL DETAILS:   Signed, informed consent was obtained. The patient was placed on the table   and prepped and draped in the usual fashion. Time out and site   verification performed.   Access: Right radial artery  Catheters: JL 3.5, JR4  Hemostasis: TR band    PROCEDURAL MEDICATIONS:  Conscious sedation - midazolam and fentanyl, please see procedure log for   dosing.   Local anesthesia - 1% lidocaine   Procedural anticoagulation - 75 units/kg of heparin and intermittent   boluses to maintain ACT above 250-300.  Patient was loaded with 600 mg of   Plavix at the end  the procedure.    CONTRAST VOLUME: 150 mL Visipaque  BLOOD LOSS: minimal   FLUIDS: None   SPECIMENS: None  COMPLICATIONS: None    FINDINGS:  Left Heart Catheterization    LVEDP 16 mmHg  No significant gradient across the aortic valve.    Coronary Angiography:  LEFT MAIN: Caliber vessel that trifurcates into left anterior descending,   ramus intermedius, and left circumflex arteries.  The left main has mild   luminal irregularities.  LEFT ANTERIOR DESCENDING: Caliber vessel that gives rise to a large   diagonal branch and multiple septal perforators.  The LAD wraps around the   apex distally.  The proximal LAD has mild disease followed by 60% calcific   stenosis in the midportion.  Distally the LAD has mild disease.  The large   diagonal branch has a 50% proximal stenosis followed by mild disease   distally.  iFR of the left anterior descending artery was 0.91.  LEFT CIRCUMFLEX: Nondominant vessel that gives rise to OM1 branch, a large   bifurcating OM 2 branch, and is likely occluded distally.  The OM1 branch   has mild disease.  The OM 2 branch has mild to moderate disease.  iFR of   the left circumflex into the OM 2 branch was 0.95.  RIGHT CORONARY ARTERY: Large dominant vessel that gives rise to right   posterior descending artery and posterolateral system.  The proximal right   coronary artery has moderate disease followed by 80% stenosis in the   midportion.  Distally the right coronary artery has mild disease.  RAMUS INTERMEDIUS: Small vessel with mild disease.      INTERVENTIONAL DETAILS:   Lesion # 1 : 60% stenosis of the mid LAD  Lesion # 2 : 60% stenosis of the mid left circumflex  The left main coronary was engaged using a JL 3.5 diagnostic catheter that   provide adequate coaptation and support.  The LAD and left circumflex   lesions were traversed with a Comet 2 wire and iFR was performed per   standard protocol.    Lesion # 3 : 80% stenosis of the mid dominant right coronary artery  The right coronary  was engaged using an AR 1 guide catheter that provide   adequate coaptation and support.  The right coronary lesion was traversed   with a run-through wire and was predilated with a 2.5 mm balloon.  The   lesion was treated with a 3.0 x 38 mm Resolute [place] drug-eluting   stent that was postdilated with a 3.5 mm NC balloon at high atmospheres.    Preintervention the lesion length was 34 mm with MIKE-3 flow.    Postintervention there is 0% residual stenosis within the treated segments   with MIKE-3 flow.  Of note, postintervention there was plaque shift into   the RV marginal branch which had MIKE-3 flow distally and patient was   asymptomatic - we elected to treat this medically.    Please do not hesitate to contact me if you have any questions or   concerns. I was present for the procedure in its entirety. Moderate   conscious sedation at level 3-4 with fentanyl and midazolam was   administered, and I spent continuous face to face time with the patient   which encompassed the duration of the procedure.  Please refer to the   sedation flow sheet for additional information.  I have reviewed and agree   with the documentation provided in the RN sedation flow sheet as outlined.   I concluded sedation and recovery was monitored by the trained independent   observer. I attest that I have ordered all medications administered,   equipment used, and tests performed during the procedure.    Rufino Longo MD  Interventional Cardiology

## 2025-02-27 NOTE — INTERVAL H&P NOTE
"I have reviewed the surgical (or preoperative) H&P that is linked to this encounter, and examined the patient. There are no significant changes    Clinical Conditions Present on Arrival:  Clinically Significant Risk Factors Present on Admission                  # Drug Induced Platelet Defect: home medication list includes an antiplatelet medication      # Overweight: Estimated body mass index is 25.18 kg/m  as calculated from the following:    Height as of this encounter: 1.676 m (5' 6\").    Weight as of this encounter: 70.8 kg (156 lb).       "

## 2025-02-27 NOTE — PHARMACY-ADMISSION MEDICATION HISTORY
Pharmacist Admission Medication History    Admission medication history is complete. The information provided in this note is only as accurate as the sources available at the time of the update.    Information Source(s): Patient and CareEverywhere/SureScripts via in-person    Pertinent Information: N/A    Changes made to PTA medication list:  Added: None  Deleted: Benzonatate, prednisone  Changed: None    Allergies reviewed with patient and updates made in EHR: yes    Medication History Completed By: FILIPE FULLER [facility] 2/27/2025 11:45 AM    PTA Med List   Medication Sig Note Last Dose/Taking    albuterol (PROAIR HFA/PROVENTIL HFA/VENTOLIN HFA) 108 (90 Base) MCG/ACT inhaler Inhale 2 puffs into the lungs every 6 hours as needed for shortness of breath, wheezing or cough  2/27/2025    aspirin (ASA) 81 MG EC tablet Take 81 mg by mouth every morning. 2/27/2025: 4 tabs as instructed pre procedure   2/27/2025 Morning    atorvastatin (LIPITOR) 80 MG tablet Take 0.5 tablets (40 mg) by mouth every evening.  2/26/2025 Evening    budeson-glycopyrrol-formoterol (BREZTRI AEROSPHERE) 160-9-4.8 MCG/ACT AERO inhaler Inhale 2 puffs into the lungs 2 times daily  2/26/2025 Evening    carBAMazepine (TEGRETOL XR) 100 MG 12 hr tablet Take 100 mg by mouth 2 times daily.  2/26/2025 Evening    diltiazem (CARDIZEM SR) 120 MG CP12 12 hr SR capsule Take 120 mg by mouth 2 times daily  2/26/2025 Evening    DULoxetine (CYMBALTA) 60 MG capsule Take 60 mg by mouth every morning.  2/26/2025 Morning    furosemide (LASIX) 20 MG tablet Take 20 mg by mouth daily as needed.  More than a month    ipratropium - albuterol 0.5 mg/2.5 mg/3 mL (DUONEB) 0.5-2.5 (3) MG/3ML neb solution Take 1 vial (3 mLs) by nebulization every 6 hours as needed for shortness of breath, wheezing or cough.  2/26/2025    losartan (COZAAR) 50 MG tablet Take 50 mg by mouth every evening.  2/26/2025 Evening    metFORMIN (GLUCOPHAGE XR) 500 MG 24 hr tablet Take 1 tablet (500 mg) by  mouth 2 times daily (with meals)  2/25/2025 Evening    montelukast (SINGULAIR) 10 MG tablet Take 1 tablet (10 mg) by mouth at bedtime.  2/26/2025 Evening    oxyCODONE (OXY-IR) 5 MG capsule Take 1-2 capsules by mouth every 4 hours as needed for severe pain.  Past Month    pantoprazole (PROTONIX) 40 MG EC tablet Take 1 tablet (40 mg) by mouth every morning (before breakfast).  2/26/2025 Morning    polyethylene glycol (MIRALAX) 17 GM/Dose powder Take 17 g by mouth daily as needed for constipation.  Past Week

## 2025-02-27 NOTE — PROGRESS NOTES
Patient is kept comfortable during post-procedure stay. Continues to be on O2 while in bed, very diminished BS, neb offered but pt declined. VSS after hydralazine for hypertension. Had initial chest pressure early in recovery post stent, which has slowly improved. Pt has slept most of recovery. Now Denies pain. Right radial access site remains dry & free from signs of bleeding. Tolerated food and fluids.  was able to speak with patient post procedure. Report called to P3 nurse.Transferred to  in stable condition with personal belongings.

## 2025-02-27 NOTE — H&P
H/P was performed on 2/24/2025 at [facility] by Ana Lilia Azevedo PA-C and is available in EPIC for review as well as copied and pasted below for convenience.       [facility] SHIVANI OCAMPO  Outside Information  Documentation Only  2/27/2025  Iglesia Shore - 65 y.o. Female; born [birth year]Progress note - 02/24/2025, generated on Feb. 27, 2025February 27, 2025   Allergies    No Known Allergies  Results    Results  Component Value Reference Range Notes   Basic Metabolic Profile (Not yet reviewed by provider)  Interpretation:  Performing Lab:  Notes/Report:   Sodium 145 135-145 mmol/L     Potassium 4.0 3.4-5.3 mmol/L     Chloride 102  mmol/L     Carbon Dioxide (CO2) 33 22-29 mmol/L     Anion Gap 10 7-15 mmol/L     Urea Nitrogen 8.6 8.0-23.0 mg/dL     Creatinine 0.62 0.51-0.95 mg/dL     GFR Estimate >90 >60 mL/min/1.73m2 eGFR calculated using 2021 CKD-EPI equation.   Calcium 9.2 8.8-10.4 mg/dL     Glucose 99 70-99 mg/dL     CELLDYN-HGB (Not yet reviewed by provider)  Interpretation:13.9  Performing Lab:  Notes/Report:     REASON FOR VISIT    Preop DOS 02/27, Dr Roberson [facility] [place] [facility], Fax: ?  Medications  Reconcile with Patient's ChartMedications  Medication SIG (Take, Route, Frequency, Duration) Notes Start Date End Date Status   oxyCODONE HCl 5 MG 1-2 tablets Orally every 4 hours if needed for pain   04/18/2024   Active   Gabapentin 600 MG 2 tablets Orally 3 times a day   07/18/2024   Not-Taking   Pantoprazole Sodium 40 MG 1 tablet Orally Once a day   08/26/2024   Active   Nicotine 21 MG/24HR 1 patch to skin Transdermal Once a day   08/26/2024   Not-Taking   Oseltamivir Phosphate 75 MG 1 capsule Orally Twice a day x 3 days   01/22/2025   Active   Losartan Potassium 50 MG 1 tablet Orally Once a day   08/26/2024   Active   Ipratropium-Albuterol 0.5-2.5 (3) MG/3ML 3 mL by nebulizer 4 times a day/PRN   03/18/2020   Active   MiraLax 17 GM/SCOOP 1 scoop mixed with 8 ounces of fluid Orally Once  a day as needed       Active   metFORMIN HCl  MG 1 tablet Orally 2 times daily   05/31/2024   Active   Montelukast Sodium 10 MG 1 tablet Orally Once a day   08/26/2024   Active   dilTIAZem HCl  MG TAKE 1 CAPSULE TWICE DAILY for 90       Active   carBAMazepine  MG 1 tablet Orally Twice a day   08/22/2024   Active   Furosemide 20 MG 1 tablet Orally once or twice per week for water retention   05/03/2024   Active   DULoxetine HCl 60 MG TAKE 1 CAPSULE EVERY MORNING for 90 days       Active   Breztri Aerosphere 160-9-4.8 MCG/ACT 2 puffs Inhalation Twice a day for 90 days [place] for the [place]. Please dispense quantity needed for 3 inhalers 05/01/2024   Active   Aspirin 81 MG 1 tab(s) Orally once a day       Active   Albuterol Sulfate  (90 Base) MCG/ACT 1-2 puffs as needed Inhalation every 4 hrs for 30 days       Active   Benzonatate 100 MG 1 capsule as needed Orally Three times a day for cough   01/20/2025   Active   Atorvastatin Calcium 80 MG 1 tablet Orally Once a day   01/22/2025   Active     Social History    Tobacco Use:  Social History  Social History Observation Description Date   Details (start date - stop date) Current Smoker NA - NA     Alcohol Screen  Social History  Question Answer Notes   Did you have a drink containing alcohol in the past year? No     Points 0     Interpretation Negative       Tobacco Control (Standard)  Social History  Question Answer Notes   Tobacco use: Current smoker     How often do you smoke cigarettes? Some days, but not every day       Vital Signs    Vital Signs  Blood pressure systolic 140 mm Hg 02/24/2025   Blood pressure diastolic 70 mm Hg 02/24/2025   Weight 164.1 lbs 02/24/2025   Oximetry 89 02/24/2025     Encounters    Encounters  Encounter Location Date Provider Diagnosis   KRISTIIFEANYI SHIVANI [address] [id number] 02/24/2025 Ana Lilia Azevedo Encounter for other preprocedural examination Z01.818 ; Coronary  artery calcification seen on CAT scan I25.10 ; Type 2 diabetes mellitus without complications E11.9 ; Pure hypercholesterolemia E78.00 ; ANDREW (obstructive sleep apnea) G47.33 ; Other chronic pain G89.29 ; PAD (peripheral artery disease) I73.9 ; Chronic obstructive pulmonary disease, unspecified COPD type J44.9 ; Trigeminal neuralgia of right side of face G50.0 ; Aortic valve sclerosis I35.8 ; Primary hypertension I10 and Malignant neoplasm of lower lobe of right lung C34.31     Assessments    Assessments  Encounter Date Diagnosis (ICD Code) Assessment Notes Treatment Notes Treatment Clinical Notes Section Notes   02/24/2025 Encounter for other preprocedural examination (ICD-10 - Z01.818)   Medically cleared for surgery with local or general anesthesia per anesthesiologist and surgeon recommendations. Comorbid conditions and/or medications that increase the risk of surgery were addressed. Do not take any aspirin or other nonsteroidal anti-inflammatory drugs for 1 week prior to surgery. You can take specified medications with a sip of water the morning of the surgery, otherwise do not eat or drink anything the morning of surgery.       02/24/2025 Coronary artery calcification seen on CAT scan (ICD-10 - I25.10)           02/24/2025 Type 2 diabetes mellitus without complications (ICD-10 - E11.9)   Well controlled.       02/24/2025 Pure hypercholesterolemia (ICD-10 - E78.00)   Stable.       02/24/2025 ANDREW (obstructive sleep apnea) (ICD-10 - G47.33)   Discussed using oxygen at night.       02/24/2025 Other chronic pain (ICD-10 - G89.29)   Stable.       02/24/2025 PAD (peripheral artery disease) (ICD-10 - I73.9)   Stable.       02/24/2025 Chronic obstructive pulmonary disease, unspecified COPD type (ICD-10 - J44.9)   O2 sat decreased from baseline today. Recommend home oxygen use. Continue to follow with pulmonary.       02/24/2025 Trigeminal neuralgia of right side of face (ICD-10 - G50.0)           02/24/2025 Aortic valve  "sclerosis (ICD-10 - I35.8)   MAnaged by cardiology.       02/24/2025 Primary hypertension (ICD-10 - I10)   BP slightly elevated. OK for procedure.       02/24/2025 Malignant neoplasm of lower lobe of right lung (ICD-10 - C34.31)   Managed by oncology.       02/24/2025 Other   HypoxemiaPatient's O2 saturation is currently 88%, down from her usual 95-97%. She was recently hospitalized and discharged on January 20th with home oxygen (1L at rest, 3L with activity). Patient reports not using the prescribed oxygen consistently, citing discomfort with nasal prongs and perceived lack of benefit. Despite the low O2 saturation, patient is not currently wearing oxygen. A CT scan of the lungs is scheduled for tomorrow as part of an ongoing pulmonary workup.- Resume home oxygen use, especially at night- Await results of scheduled CT scan- Follow up with pulmonologist on April 7th   Diabetes mellitusPatient's diabetes is reported as well-controlled. She is currently taking metformin.- Stop metformin tomorrow and do not take on the day of the angiogram procedure   Hip painPatient reports using oxycodone for hip pain to fall asleep as needed. Current morphine equivalent is zero, indicating infrequent use.- Continue oxycodone as needed for hip pain   Sleep apneaPatient has sleep apnea but is not using CPAP due to face issues. She reports that the CPAP doesn't help her breathing.   Trigeminal painPatient reports trigeminal pain is \"coming on again\" but has not worsened.          Plan Of Treatment    Treatment Notes  Plan Of Treatment  Assessment Notes   Encounter for other preprocedural examination Medically cleared for surgery with local or general anesthesia per anesthesiologist and surgeon recommendations. Comorbid conditions and/or medications that increase the risk of surgery were addressed. Do not take any aspirin or other nonsteroidal anti-inflammatory drugs for 1 week prior to surgery. You can take specified medications with " "a sip of water the morning of the surgery, otherwise do not eat or drink anything the morning of surgery.   Type 2 diabetes mellitus without complications Well controlled.   Pure hypercholesterolemia Stable.   ANDREW (obstructive sleep apnea) Discussed using oxygen at night.   Other chronic pain Stable.   PAD (peripheral artery disease) Stable.   Chronic obstructive pulmonary disease, unspecified COPD type O2 sat decreased from baseline today. Recommend home oxygen use. Continue to follow with pulmonary.   Aortic valve sclerosis MAnaged by cardiology.   Primary hypertension BP slightly elevated. OK for procedure.   Malignant neoplasm of lower lobe of right lung Managed by oncology.   Other HypoxemiaPatient's O2 saturation is currently 88%, down from her usual 95-97%. She was recently hospitalized and discharged on January 20th with home oxygen (1L at rest, 3L with activity). Patient reports not using the prescribed oxygen consistently, citing discomfort with nasal prongs and perceived lack of benefit. Despite the low O2 saturation, patient is not currently wearing oxygen. A CT scan of the lungs is scheduled for tomorrow as part of an ongoing pulmonary workup.- Resume home oxygen use, especially at night- Await results of scheduled CT scan- Follow up with pulmonologist on April 7th   Diabetes mellitusPatient's diabetes is reported as well-controlled. She is currently taking metformin.- Stop metformin tomorrow and do not take on the day of the angiogram procedure   Hip painPatient reports using oxycodone for hip pain to fall asleep as needed. Current morphine equivalent is zero, indicating infrequent use.- Continue oxycodone as needed for hip pain   Sleep apneaPatient has sleep apnea but is not using CPAP due to face issues. She reports that the CPAP doesn't help her breathing.   Trigeminal painPatient reports trigeminal pain is \"coming on again\" but has not worsened.      Pending Test  Plan Of Treatment  Test Name Order " Date   Basic Metabolic Profile 2025   CELLDYN-HGB 2025     Next Appt  Plan Of Treatment  Details   Follow Up: prn, Reason:   Provider Name:Jo Hamilton, 2025 09:30:00 AM, [address], [place], [place], [id number], [phone]   Provider Name:Jo Hamilton, 2025 02:00:00 PM, [address], [place], [place], [id number], [phone]     Progress Notes    Progress Notes -  Iglesia COBB : 1960 (64 yo F) Acc No. [id number] DOS: 2025   -      Patient: Iglesia COBB  Account Number: [id number] Provider: Ana Lilia Azevedo PA-C        : 1960   Age: 65 Y   Sex: Female Date: 2025   Phone: [phone]    Address: [address], [place], [id number]   Pcp: Jo Hamilton MD   Check In: 12:00 PM CST        Subjective:   -  Chief Complaints:     -     1. Preop DOS , Dr Roberson, [facility], angeogram of heart, Fax: ?.     -  HPI:     Preoperative History and Physical:         Denies : Personal Reaction to Anesthesia or Bleeding?.         Location of Injury / Illness: CAD.         Type of Surgery: coronary angiogram.         Date of Surgery 2025.         Requesting Surgeon: Dr Roberson.         Surgical Facility: [facility].         Underlying Health Problems: DM type 2, hyperlipidemia, ANDREW, chronic pain, COPD, aortic valve scleorosis, hypertension, history of lung cancer.         Blood Transfusion Status there is no transfusion refusal, prefers family members blood. .         Assistive Devices C-Pap at home but has not used it. .         Recovery Plans: ., At home, with family.         Other Surgical Information            Greater than 4 METS (Functional Assessment) Other breathing at baseline. Limited due to COPD           Implanted Devices None           Current Opioid Use Yes           Daily MME (Actual Amount) 0           PDMP Checked (Required) Yes, no flags noted           Medical Cannabis Use No         Iglesia is a 66yo female with a PMH of COPD, sleep apnea,  DM type 2, hyperlipidemia, hypertension, lung cancer, chronic pain, and aortic valve sclerosis presenting for a pre-op visit. She will be getting her procedure as soon as possible due to her phone call with cardiology this morning. Breathing has been constant with intermittent coughing fits since hospitalization for influenza and hypoxia last month. She is using her nebulizer and inhalers twice before going to bed with improvement in her symptoms when she needs it. Does hear crackling and wheezing lately. Her chest is tight at baseline. She was prescribed O2 after her ED visit on 1/20 which she uses as needed but has not been using it consistently. Without the O2 she still feels ok and does not have it now because she does not feel much of a difference. She recently had to take oxycodone, so she could sleep due to hip and thigh pain. She denies having any issues with anesthesia. Tomorrow she has a CT of her lungs.     -  ROS:      CONSTITUTIONAL:         Negative for fever.        OPHTHALMOLOGY:         Negative for eye pain, mattering.        ENT:         Negative for cold, cough, URI symptoms.        RESPIRATORY:         Negative for chest congestion, cough, wheezing.        CARDIOVASCULAR:         Negative for chest pain, palpitations.        GASTROENTEROLOGY:         Negative for vomiting, diarrhea.        GYNECOLOGY:         Negative for current pregnancy.        UROLOGY:         Negative for dysuria.        MUSCULOSKELETAL:         Negative for leg cramps, sciatica.        NEUROLOGY:         Negative for seizures.        DERMATOLOGY:         Negative for rash, hives.        HEMATOLOGY/ONCOLOGY:         Negative for easy bruising, swollen glands.             -  Medical History: COPD, Sleep Apnea, Moderate dysplasia 4, Neg stress echo 12/17; coronary artery calcifications on imaging 2017, PAD, Peripheral neuropathy, DNR - patient does not ever want any tube feedings, intubation, or ANY AMPUTATION for any reason,  COLONOSCOPY  - repeat 5 years, Controlled Substance Agreement on file in Health Directives folder signed 3/21/2022 diagnosis of chronic pain, Right lower lung cancer, Otalgia, right ear, Ileostomy in place, Aortic valve sclerosis, Major depressive disorder, severe, Primary cancer of right lower lobe of lung, Unspecified inflammatory spondylopathy, lumbosacral region, Trigeminal neuralgia of right side of face, Constipation by delayed colonic transit, Episode of recurrent major depressive disorder, unspecified depression episode severity, DNR (do not resuscitate).     -  Surgical History: bilaterl ileo-femoral arterial bypass  or , ablation , low back AP laminectomy with insturmenation , left balloon angio left SFA for PAD , Robotic Sigmoid Colectomy with intracorporeal anastamosis, with Diverting Loop Ileostomy, extensive lysis of adhesions 23.     -  Hospitalization/Major Diagnostic Procedure: COPD exacerbation , [facility] - for prescheduled Robotic Sigmoid Colectomy with intracorporeal anastomosis with diverting loop ileostomy, extensive lysis of adhesions. 23, Acute Influenza with Acute Respiratory Failure due to Acute COPD Exacerbation 2025.     -  Family History: Father: , diagnosed with Healthy Adult. Mother: , diagnosed with Healthy Adult. Siblings: alive, diagnosed with Healthy Adult.      -  Social History:      Tobacco Control (Standard)         Tobacco use: Current smoker        How often do you smoke cigarettes? Some days, but not every day     Living Situation         Residence: In own home        Coinhabitants: Alone     Marital Status: .     Secondhand smoke exposure: home.     Level of education: 12 +.     Recreational drug use: none.     Exercise: denies.     Financial Concerns: yes.     Alcohol Screen         Did you have a drink containing alcohol in the past year? No        Points 0        Interpretation Negative      Alcohol: denies.     Occupation: Retired.     Travel outside [place]: no.     Living Situation: self.     -  Medications: Taking Albuterol Sulfate  (90 Base) MCG/ACT Aerosol Solution 1-2 puffs as needed Inhalation every 4 hrs , Taking Aspirin 81 MG Tablet Delayed Release 1 tab(s) Orally once a day , Taking Atorvastatin Calcium 80 MG Tablet 1 tablet Orally Once a day , Taking Benzonatate 100 MG Capsule 1 capsule as needed Orally Three times a day for cough , Taking Breztri Aerosphere 160-9-4.8 MCG/ACT Aerosol 2 puffs Inhalation Twice a day , Notes to Pharmacist: [place] the [place]. Please dispense quantity needed for 3 inhalers, Taking carBAMazepine  MG Tablet Extended Release 12 Hour 1 tablet Orally Twice a day , Taking dilTIAZem HCl  MG Capsule Extended Release 12 Hour TAKE 1 CAPSULE TWICE DAILY , Taking DULoxetine HCl 60 MG Capsule Delayed Release Particles TAKE 1 CAPSULE EVERY MORNING , Taking Furosemide 20 MG Tablet 1 tablet Orally once or twice per week for water retention , Taking Ipratropium-Albuterol 0.5-2.5 (3) MG/3ML Solution 3 mL by nebulizer 4 times a day/PRN , Taking Losartan Potassium 50 MG Tablet 1 tablet Orally Once a day , Taking metFORMIN HCl  MG Tablet Extended Release 24 Hour 1 tablet Orally 2 times daily , Taking MiraLax 17 GM/SCOOP Powder 1 scoop mixed with 8 ounces of fluid Orally Once a day as needed , Taking Montelukast Sodium 10 MG Tablet 1 tablet Orally Once a day , Taking Oseltamivir Phosphate 75 MG Capsule 1 capsule Orally Twice a day x 3 days , Taking oxyCODONE HCl 5 MG Tablet 1-2 tablets Orally every 4 hours if needed for pain , Taking Pantoprazole Sodium 40 MG Tablet Delayed Release 1 tablet Orally Once a day , Not-Taking Gabapentin 600 MG Tablet 2 tablets Orally 3 times a day , Not-Taking Nicotine 21 MG/24HR Patch 24 Hour 1 patch to skin Transdermal Once a day , Medication List reviewed and reconciled with the patient     -  Allergies: JULIANNA        Objective:   -  Vitals: Wt: 164.1, Pulse: 84, BP: 140/70, Arm / Cuff size: Large:left, Pulse Oximetry: RA at rest:89, Initials: ah, Percent Weight Change: 5.19%.     -     Past Orders:      -  -  Lab:GLYCOSYLATED HGB A1C     -  Collection Date 12/05/2024 09/03/2024 04/18/2024   Collection Time 01:54 PM 03:44 PM 10:24 AM   Order Date 12/05/2024 09/03/2024 04/18/2024   Result: 6.1 6.7 6.6   Glycosylated Hemoglobin A1c % 6.1  (Ref Range: 4.2 - 6.1 %) 6.7    H  (Ref Range: 4.2 - 6.1 %) 6.6    H  (Ref Range: 4.2-6.1 %)           -  Physical Examination:      GENERAL:         General Appearance: no acute distress.      HEENT:         Sclera: anicteric.         Pupils: ERRLA.         Extraocular motion intact.         Oral cavity: normal, no lesions seen.         Nose: normal, no lesions or rhinorrhea.         Pharynx: no erythema or exudate .         Tympanic membrane: normal with no perforations, retractions or bulging.      NECK:         Cervical lymph nodes: normal.      LUNGS:         Breath sounds: bilaterally clear to auscultation.      HEART:         Rate and Rhythm: rate normal, rhythm regular, S1 & S2 nl, no murmur, S3 or S4.      ABDOMEN:         Bowel sounds normal.         No tenderness, organomegaly or masses.      EXTREMITIES:         Pulses: 2+ bilaterally with no ankle edema.         Are intact.      MUSCULOSKELETAL:         Lower extremity joints: unremarkable.      NEUROLOGICAL:         Coordination: normal.      DERMATOLOGY:         Skin: no rashes or other lesions.             Assessment:   -  Assessment:       1. Encounter for other preprocedural examination - Z01.818 (Primary)   2. Coronary artery calcification seen on CAT scan - I25.10   3. Type 2 diabetes mellitus without complications - E11.9   4. Pure hypercholesterolemia - E78.00   5. ANDREW (obstructive sleep apnea) - G47.33   6. Other chronic pain - G89.29   7. PAD (peripheral artery disease) - I73.9   8. Chronic obstructive pulmonary  disease, unspecified COPD type - J44.9   9. Trigeminal neuralgia of right side of face - G50.0   10. Aortic valve sclerosis - I35.8   11. Primary hypertension - I10   12. Malignant neoplasm of lower lobe of right lung - C34.31          Plan:   -  Treatment:   -  1. Encounter for other preprocedural examination   Notes: Medically cleared for surgery with local or general anesthesia per anesthesiologist and surgeon recommendations. Comorbid conditions and/or medications that increase the risk of surgery were addressed. Do not take any aspirin or other nonsteroidal anti-inflammatory drugs for 1 week prior to surgery. You can take specified medications with a sip of water the morning of the surgery, otherwise do not eat or drink anything the morning of surgery.         2. Type 2 diabetes mellitus without complications        LAB: Basic Metabolic Profile (Collection Date & Time - 02/24/2025 01:55 PM)  -  Nu Thorpe MA 02/24/2025 01:55:37 PM > Transmitted to [facility]          LAB: CELLDYN-HGB (Collection Date & Time - 02/24/2025 01:55 PM) 13.9  -                           Value Reference Range        HGB 13.9   11.7 - 15.7 - g/dL     -  Nu Thorpe MA 02/24/2025 01:55:47 PM > Transmitted to Ana Lilia Malin PA-C 02/24/2025 02:31:57 PM > Please fax for preop.       Notes: Well controlled.         3. Pure hypercholesterolemia   Notes: Stable.         4. ANDREW (obstructive sleep apnea)   Notes: Discussed using oxygen at night.         5. Other chronic pain   Notes: Stable.         6. PAD (peripheral artery disease)   Notes: Stable.         7. Chronic obstructive pulmonary disease, unspecified COPD type   Notes: O2 sat decreased from baseline today. Recommend home oxygen use. Continue to follow with pulmonary.         8. Aortic valve sclerosis   Notes: MAnaged by cardiology.         9. Primary hypertension   Notes: BP slightly elevated. OK for procedure.         10. Malignant neoplasm of lower lobe of right lung  "  Notes: Managed by oncology.         11. Others    Notes: HypoxemiaPatient's O2 saturation is currently 88%, down from her usual 95-97%. She was recently hospitalized and discharged on January 20th with home oxygen (1L at rest, 3L with activity). Patient reports not using the prescribed oxygen consistently, citing discomfort with nasal prongs and perceived lack of benefit. Despite the low O2 saturation, patient is not currently wearing oxygen. A CT scan of the lungs is scheduled for tomorrow as part of an ongoing pulmonary workup.- Resume home oxygen use, especially at night- Await results of scheduled CT scan- Follow up with pulmonologist on April 7th  Diabetes mellitusPatient's diabetes is reported as well-controlled. She is currently taking metformin.- Stop metformin tomorrow and do not take on the day of the angiogram procedure  Hip painPatient reports using oxycodone for hip pain to fall asleep as needed. Current morphine equivalent is zero, indicating infrequent use.- Continue oxycodone as needed for hip pain  Sleep apneaPatient has sleep apnea but is not using CPAP due to face issues. She reports that the CPAP doesn't help her breathing.  Trigeminal painPatient reports trigeminal pain is \"coming on again\" but has not worsened.              -  Procedure Codes: 09127 BASIC METABOLIC PANEL, 92214 HEMOGLOBIN, 64073 VENIPUNCT, ROUTINE     -  Follow Up: prn        -                  Electronically signed by Ana Lilia Azevedo PA-C, YESENIA MOODY on 02/24/2025 at 09:48 PM CST   Sign off status: Completed         true      -  Provider: Ana Lilia Azevedo PA-C Date: 02/24/2025     Generated for Printing/Faxing/eTransmitting on: 02/27/2025 07:25 AM CST        History and Physical Notes    HPI (History of Present Illness)   History and Physical Notes -  Category Sub-Category Detail Notes Category Notes   Preoperative History and Physical Location of Injury / Illness: [place] Parekh is a 64yo female with a PMH of COPD, sleep apnea, DM " type 2, hyperlipidemia, hypertension, lung cancer, chronic pain, and aortic valve sclerosis presenting for a pre-op visit. She will be getting her procedure as soon as possible due to her phone call with cardiology this morning. Breathing has been constant with intermittent coughing fits since hospitalization for influenza and hypoxia last month. She is using her nebulizer and inhalers twice before going to bed with improvement in her symptoms when she needs it. Does hear crackling and wheezing lately. Her chest is tight at baseline. She was prescribed O2 after her ED visit on 1/20 which she uses as needed but has not been using it consistently. Without the O2 she still feels ok and does not have it now because she does not feel much of a difference. She recently had to take oxycodone, so she could sleep due to hip and thigh pain. She denies having any issues with anesthesia. Tomorrow she has a CT of her lungs.    Type of Surgery: coronary angiogram     Requesting Surgeon: Dr Roberson     Surgical Facility: [facility]     Underlying Health Problems: DM type 2, hyperlipidemia, ANDREW, chronic pain, COPD, aortic valve scleorosis, hypertension, history of lung cancer     Personal Reaction to Anesthesia or Bleeding?       Blood Transfusion Status there is no transfusion refusal, prefers family members blood.     Assistive Devices C-Pap at home but has not used it.     Recovery Plans: ., At home, with family     Other Surgical Information Greater than 4 METS (Functional Assessment): Other breathing at baseline. Limited due to COPD      Implanted Devices: None        Current Opioid Use: Yes         ?Daily MME (Actual Amount): 0         ?PDMP Checked (Required): Yes, no flags noted        Medical Cannabis Use: No       Date of Surgery 02/27/2025         Physical Examination   History and Physical Notes -  Category Sub-Category Detail Notes Section Notes   HEENT Sclera: anicteric      Pupils: ERRLA     Extraocular motion intact      Oral cavity: normal, no lesions seen     Nose: normal, no lesions or rhinorrhea     Pharynx: no erythema or exudate     Tympanic membrane: normal with no perforations, retractions or bulging    NECK Cervical lymph nodes: normal     EXTREMITIES Pulses: 2+ bilaterally with no ankle edema      Are intact    LUNGS Breath sounds: bilaterally clear to auscultation     HEART Rate and Rhythm: rate normal, rhythm regular, S1 & S2 nl, no murmur, S3 or S4     ABDOMEN No tenderness, organomegaly or masses      Bowel sounds normal    NEUROLOGICAL Coordination: normal      Reflexes:      MUSCULOSKELETAL Lower extremity joints: unremarkable      Lumbar spine:      DERMATOLOGY Skin: no rashes or other lesions     GENERAL General Appearance: no acute distress        Patient Demographics    Patient Demographics  Patient Address Patient Name Communication   [address] [address] Iglesia Shore [phone] (Home)  [phone] (Mobile)  [email or web address]     Patient Demographics  Language Race / Ethnicity Marital Status   English (Preferred) White / Not  or  unmarried     Patient Contacts    Patient Contacts  Contact Name Contact Address Communication Relationship to Patient   radha elaine [address] [place] [address] Unknown Emergency Contact   Iglesai Shore Unknown [phone] Guarantor     Document Information    Primary Care Provider Other Service Providers Document Coverage Dates   Jo Hamilton  NPI: [id number]  [phone]  [address] [place], [place] [id number] Ana Lilia Azevedo (Appointment provider)  NPI: [id number]  [phone]  [address] [place] [id number] Feb. 24, 2025February 24, 2025      Organization   [facility]  [phone]  [address]25 [address] [id number]     Encounter Providers Encounter Date    Feb. 24, 2025February 24, 2025     Legal Authenticator    Ana Lilia Azevedo  NPI:  [id number]  [phone]  [address] [place], [place] [id number]

## 2025-02-27 NOTE — TREATMENT PLAN
RCAT Treatment Plan    Patient Score: 8  Patient Acuity: 4    Clinical Indication for Therapy: bronchospasm and home regimen    Therapy Ordered: Duoneb Q6 PRN    Assessment Summary: pt s/p coronary angiogram. She is a former 1 pack/day tobacco smoker. CT chest 2/25 stable fibrosis, mod emphysema, a few scattered pulmonary nodules. RR 14-16, NPC, on 2L NC SpO2 96%. Pt uses PRN inhalers and nebs at home. Declined 1600 treatment. States she will notify staff if anything.       Silverio Dorado, RT  2/27/2025

## 2025-02-27 NOTE — PROGRESS NOTES
"Pt here for angiogram, c/o fatigue. Baseline O2 sat. 89%, states she went home with home PRN O2 after recent Influenza A, but has sats in upper 90,s. Has frequent paroxysmal productive cough, very diminished breath sounds.Can lie flat for \"awhile\" Lives with son quinton, present, plans on leaving, awaiting 's phone call. Labs noted, checklist complete.  "

## 2025-02-27 NOTE — PRE-PROCEDURE
GENERAL PRE-PROCEDURE:   Procedure:  Coronary angiogram with possible PCI  Date/Time:  2/27/2025 8:38 AM    Written consent obtained?: Yes    Risks and benefits: Risks, benefits and alternatives were discussed    Consent given by:  Patient  Patient states understanding of procedure being performed: Yes    Patient's understanding of procedure matches consent: Yes    Procedure consent matches procedure scheduled: Yes    Expected level of sedation:  Moderate  Appropriately NPO:  Yes  ASA Class:  3 (severe coronary artery disease by virtue of CT scan, HTN, dyslipidemia, Hx of Lung CA; s/p radiation, COPD, former tobacco use, Home O2, ANDREW with CPAP intolerance, DM Type II)  Mallampati  :  Grade 2- soft palate, base of uvula, tonsillar pillars, and portion of posterior pharyngeal wall visible  Lungs:  Wheezes and other (comment)  Lung exam comment:  Markedly diminished breath sounds  Heart:  Normal heart sounds and rate  History & Physical reviewed:  History and physical reviewed and updates made (see comment)  H&P Comments:  Clinically Significant Risk Factors Present on Admission    Cardiovascular : severe coronary artery disease by virtue of , HTN, dyslipidemia, PAD; s/p remote aortobifem bypass in 2006, s/p Lt SFA angioplasty in 2020, former tobacco use, DM Type II    Fluid & Electrolyte Disorders : Not present on admission    Gastroenterology : Not present on admission    Hematology/Oncology : Not present on admission    Nephrology : Not present on admission    Neurology : Not present on admission    Pulmonology : Hx of Lung CA; s/p radiation, COPD; on home O2, former tobacco use, ANDREW; unable to tolerate CPAP    Statement of review:  I have reviewed the lab findings, diagnostic data, medications, and the plan for sedation    Patient is a DNR/DNI and understands this will be placed on hold for today's procedure

## 2025-02-27 NOTE — Clinical Note
The first balloon was inserted into the right coronary artery.Max pressure = 8 rambo. Total duration = 6 seconds.     Max pressure = 8 rambo. Total duration = 10 seconds.    Balloon reinflated a second time: Max pressure = 8 rambo. Total duration = 10 seconds.

## 2025-02-27 NOTE — Clinical Note
The first balloon was inserted into the right coronary artery and middle right coronary artery.Max pressure = 12 rambo. Total duration = 10 seconds.     Max pressure = 16 rambo. Total duration = 11 seconds.    Balloon reinflated a second time: Max pressure = 16 rambo. Total duration = 11 seconds.

## 2025-02-27 NOTE — Clinical Note
5/17/2018      Amairani Jones      This is to certify that the above named patient had an appointment at this office for professional attention on 05/17/18.      Please excuse him/her:    (X)    FROM:   (X)    DUE TO:    xx    Work      Injury       School  xx    Illness       Gym      Other       Other           Comments: No work on 05/17 and 05/18.  May return back to work on 05/21/18.      Thank you,       SIGNATURE:____________________________________________________                                                               Linden Alford M.D.        Linden Alford MD  [address] [place] [address]  Telephone:  [phone]  Fax:  [phone]           FFR measurements completed.

## 2025-02-28 VITALS
HEIGHT: 66 IN | RESPIRATION RATE: 18 BRPM | SYSTOLIC BLOOD PRESSURE: 139 MMHG | HEART RATE: 74 BPM | OXYGEN SATURATION: 95 % | BODY MASS INDEX: 25.65 KG/M2 | WEIGHT: 159.6 LBS | DIASTOLIC BLOOD PRESSURE: 66 MMHG | TEMPERATURE: 98.2 F

## 2025-02-28 LAB
ANION GAP SERPL CALCULATED.3IONS-SCNC: 9 MMOL/L (ref 7–15)
ATRIAL RATE - MUSE: 69 BPM
BUN SERPL-MCNC: 12.3 MG/DL (ref 8–23)
CALCIUM SERPL-MCNC: 9.5 MG/DL (ref 8.8–10.4)
CHLORIDE SERPL-SCNC: 103 MMOL/L (ref 98–107)
CREAT SERPL-MCNC: 0.51 MG/DL (ref 0.51–0.95)
DIASTOLIC BLOOD PRESSURE - MUSE: NORMAL MMHG
EGFRCR SERPLBLD CKD-EPI 2021: >90 ML/MIN/1.73M2
ERYTHROCYTE [DISTWIDTH] IN BLOOD BY AUTOMATED COUNT: 13.4 % (ref 10–15)
GLUCOSE BLDC GLUCOMTR-MCNC: 97 MG/DL (ref 70–99)
GLUCOSE SERPL-MCNC: 117 MG/DL (ref 70–99)
HCO3 SERPL-SCNC: 30 MMOL/L (ref 22–29)
HCT VFR BLD AUTO: 37.3 % (ref 35–47)
HGB BLD-MCNC: 12.4 G/DL (ref 11.7–15.7)
INTERPRETATION ECG - MUSE: NORMAL
MAGNESIUM SERPL-MCNC: 1.7 MG/DL (ref 1.7–2.3)
MCH RBC QN AUTO: 28.2 PG (ref 26.5–33)
MCHC RBC AUTO-ENTMCNC: 33.2 G/DL (ref 31.5–36.5)
MCV RBC AUTO: 85 FL (ref 78–100)
P AXIS - MUSE: 59 DEGREES
PHOSPHATE SERPL-MCNC: 4.6 MG/DL (ref 2.5–4.5)
PLATELET # BLD AUTO: 307 10E3/UL (ref 150–450)
POTASSIUM SERPL-SCNC: 3.6 MMOL/L (ref 3.4–5.3)
PR INTERVAL - MUSE: 148 MS
QRS DURATION - MUSE: 68 MS
QT - MUSE: 530 MS
QTC - MUSE: 567 MS
R AXIS - MUSE: -46 DEGREES
RBC # BLD AUTO: 4.4 10E6/UL (ref 3.8–5.2)
SODIUM SERPL-SCNC: 142 MMOL/L (ref 135–145)
SYSTOLIC BLOOD PRESSURE - MUSE: NORMAL MMHG
T AXIS - MUSE: 76 DEGREES
VENTRICULAR RATE- MUSE: 69 BPM
WBC # BLD AUTO: 7.8 10E3/UL (ref 4–11)

## 2025-02-28 PROCEDURE — 93010 ELECTROCARDIOGRAM REPORT: CPT | Mod: HOP | Performed by: INTERNAL MEDICINE

## 2025-02-28 PROCEDURE — 82962 GLUCOSE BLOOD TEST: CPT

## 2025-02-28 PROCEDURE — 99214 OFFICE O/P EST MOD 30 MIN: CPT | Performed by: NURSE PRACTITIONER

## 2025-02-28 PROCEDURE — 85014 HEMATOCRIT: CPT | Performed by: STUDENT IN AN ORGANIZED HEALTH CARE EDUCATION/TRAINING PROGRAM

## 2025-02-28 PROCEDURE — 250N000013 HC RX MED GY IP 250 OP 250 PS 637: Performed by: NURSE PRACTITIONER

## 2025-02-28 PROCEDURE — 93005 ELECTROCARDIOGRAM TRACING: CPT

## 2025-02-28 PROCEDURE — 83735 ASSAY OF MAGNESIUM: CPT | Performed by: STUDENT IN AN ORGANIZED HEALTH CARE EDUCATION/TRAINING PROGRAM

## 2025-02-28 PROCEDURE — 80048 BASIC METABOLIC PNL TOTAL CA: CPT | Performed by: NURSE PRACTITIONER

## 2025-02-28 PROCEDURE — 99214 OFFICE O/P EST MOD 30 MIN: CPT | Performed by: STUDENT IN AN ORGANIZED HEALTH CARE EDUCATION/TRAINING PROGRAM

## 2025-02-28 PROCEDURE — 36415 COLL VENOUS BLD VENIPUNCTURE: CPT | Performed by: NURSE PRACTITIONER

## 2025-02-28 PROCEDURE — 84100 ASSAY OF PHOSPHORUS: CPT | Performed by: STUDENT IN AN ORGANIZED HEALTH CARE EDUCATION/TRAINING PROGRAM

## 2025-02-28 PROCEDURE — 82374 ASSAY BLOOD CARBON DIOXIDE: CPT | Performed by: NURSE PRACTITIONER

## 2025-02-28 PROCEDURE — 85048 AUTOMATED LEUKOCYTE COUNT: CPT | Performed by: STUDENT IN AN ORGANIZED HEALTH CARE EDUCATION/TRAINING PROGRAM

## 2025-02-28 RX ORDER — NALOXONE HYDROCHLORIDE 0.4 MG/ML
0.2 INJECTION, SOLUTION INTRAMUSCULAR; INTRAVENOUS; SUBCUTANEOUS
Status: DISCONTINUED | OUTPATIENT
Start: 2025-02-28 | End: 2025-02-28 | Stop reason: HOSPADM

## 2025-02-28 RX ORDER — NALOXONE HYDROCHLORIDE 0.4 MG/ML
0.4 INJECTION, SOLUTION INTRAMUSCULAR; INTRAVENOUS; SUBCUTANEOUS
Status: DISCONTINUED | OUTPATIENT
Start: 2025-02-28 | End: 2025-02-28 | Stop reason: HOSPADM

## 2025-02-28 RX ADMIN — CLOPIDOGREL BISULFATE 75 MG: 75 TABLET ORAL at 08:16

## 2025-02-28 RX ADMIN — DULOXETINE HYDROCHLORIDE 60 MG: 60 CAPSULE, DELAYED RELEASE ORAL at 08:16

## 2025-02-28 RX ADMIN — ASPIRIN 81 MG: 81 TABLET, COATED ORAL at 08:16

## 2025-02-28 RX ADMIN — PANTOPRAZOLE SODIUM 40 MG: 40 TABLET, DELAYED RELEASE ORAL at 06:18

## 2025-02-28 RX ADMIN — DILTIAZEM HYDROCHLORIDE 120 MG: 60 CAPSULE, EXTENDED RELEASE ORAL at 08:17

## 2025-02-28 RX ADMIN — CARBAMAZEPINE 100 MG: 100 TABLET, EXTENDED RELEASE ORAL at 08:16

## 2025-02-28 ASSESSMENT — ACTIVITIES OF DAILY LIVING (ADL)
ADLS_ACUITY_SCORE: 37

## 2025-02-28 NOTE — PROGRESS NOTES
"[facility]    Medicine Progress Note - Hospitalist Service    Date of Admission:  2/27/2025    Assessment & Plan   Iglesia Shore is a 65 year old female with a past medical history of severe coronary artery disease, hypertension, hyperlipidemia, history of lung cancer status postradiation, COPD on home oxygen presented to the hospital for coronary angiogram and PCI and is s/p procedure, medicine was consulted postprocedure for medical comanagement.    Severe coronary artery disease  - Presented to the hospital for coronary angiogram and PCI  - Showed Single-vessel obstructive CAD involving the mid dominant RCA with moderate calcific disease s/p TASNEEM  - Mild dec in sensation 4th and 5th finger post angio, improving  - On DAPT  - Continue with Lipitor     Hx COPD on home oxygen  - Continue with 2 L nasal cannula  - Continue with bronchodilators, Singulair, nebulization as needed     Hypertension  - Continue with losartan, lasix, diltiazem     Hyperlipidemia  - Total cholesterol 178, LDL of 103  - Lipitor 40 to 80mg     Mood disorder  Neuropathy  - Continue Tegretol, Cymbalta     Type 2 DM  - PTA Metformin  - Hb A1c 5.4          Diet: Low Saturated Fat Na <2400 mg    DVT Prophylaxis: discharge home  Cunningham Catheter: Not present  Lines: None     Cardiac Monitoring: ACTIVE order. Indication: Post- PCI/Angiogram (24 hours)  Code Status: Full Code      Clinically Significant Risk Factors Present on Admission                 # Drug Induced Platelet Defect: home medication list includes an antiplatelet medication   # Hypertension: Noted on problem list           # Overweight: Estimated body mass index is 25.76 kg/m  as calculated from the following:    Height as of this encounter: 1.676 m (5' 6\").    Weight as of this encounter: 72.4 kg (159 lb 9.6 oz).       # Financial/Environmental Concerns:           Social Drivers of Health    Depression: At risk (7/7/2022)    PHQ-2     PHQ-2 Score: 4   Tobacco " Use: Medium Risk (1/22/2025)    Patient History     Smoking Tobacco Use: Former     Smokeless Tobacco Use: Never     Passive Exposure: Past          Disposition Plan     Medically Ready for Discharge: Ready Now             Rosy Gunderson MD  Hospitalist Service  [facility]  Securely message with [employer] (more info)  Text page via [facility] Paging/Directory   ______________________________________________________________________    Interval History   Patient was examined at bedside, new to me today.  Has mild decrease in sensation in 4th and 5th fingers, improving  Plan to discharge home, denies any other complaints    Physical Exam   Vital Signs: Temp: 98.2  F (36.8  C) Temp src: Oral BP: (!) 187/87 Pulse: 71   Resp: 18 SpO2: 95 % O2 Device: Nasal cannula Oxygen Delivery: 2 LPM  Weight: 159 lbs 9.6 oz    General: no acute distress  Lungs: mild wheezing b/l  Cardio:  Regular rate and rhythm with no murmurs, gallops or rubs.  Abd:  Normal bowel sounds. Soft; nontender   Ext: no edema    Skin:  No acute rashes.   Neuro:  Grossly nonfocal    Medical Decision Making       35 MINUTES SPENT BY ME on the date of service doing chart review, history, exam, documentation & further activities per the note.      Data         Imaging results reviewed over the past 24 hrs:   No results found for this or any previous visit (from the past 24 hours).

## 2025-02-28 NOTE — PLAN OF CARE
Problem: Cardiac Catheterization (Diagnostic/Interventional)  Goal: Absence of Bleeding  Outcome: Progressing  Goal: Absence of Contrast-Induced Injury  Outcome: Progressing  Goal: Stable Heart Rate and Rhythm  Outcome: Progressing  Goal: Absence of Embolism Signs and Symptoms  Outcome: Progressing  Goal: Anesthesia/Sedation Recovery  Outcome: Progressing  Intervention: Optimize Anesthesia Recovery  Recent Flowsheet Documentation  Taken 2/27/2025 1936 by Giana Valle RN  Safety Promotion/Fall Prevention:   activity supervised   mobility aid in reach   nonskid shoes/slippers when out of bed   safety round/check completed   clutter free environment maintained  Taken 2/27/2025 1530 by Giana Valle RN  Safety Promotion/Fall Prevention:   activity supervised   mobility aid in reach   nonskid shoes/slippers when out of bed   safety round/check completed   clutter free environment maintained  Goal: Optimal Pain Control and Function  Outcome: Progressing  Goal: Absence of Vascular Access Complication  Intervention: Prevent and Manage Access Complications  Recent Flowsheet Documentation  Taken 2/27/2025 1936 by Giana Valle RN  Activity Management: activity adjusted per tolerance  Head of Bed (HOB) Positioning: HOB at 30-45 degrees  Taken 2/27/2025 1530 by Giana Valle RN  Activity Management: activity adjusted per tolerance  Head of Bed (HOB) Positioning: HOB at 30-45 degrees   Goal Outcome Evaluation:       Pt is alert and oriented x 4. Lung sounds diminished, with occasional expiratory wheezes. On O2 at 2 LPM per nasal cannula. Pt uses O2 at home as needed. Pt was also coughing tonight, Tessalon given with some relief. HR in the 70's, NSR. Pt c/o tenderness in the post radial angiogram site, ice pack applied 20 minutes on , 20 minutes off, with some relief. Bruising noted, no hematoma noted in the post angio site.Pt refused to take Tylenol when offered. SBA with transfer.SCD  in place.

## 2025-02-28 NOTE — PROGRESS NOTES
Brief CV progress note:    Late entry    Iglesia Shore is a 65 year old WF with past medical history of HTN, dyslipidemia, Lung CA; s/p radiation, COPD; on home O2, former tobacco use, ANDREW with CPAP intolerance and DM Type II who presented for coronary angiography in evaluation of severe coronary artery disease by virtue of CT scan. Coronary angiogram demonstrated single vessel obstructive CAD which was successfully intervened upon with TASNEEM x1 to the prox-mRCA. Patient was noted to have iFR negative LAD and Lcx. Patient discussed with Dr. Longo. Will continue to monitor overnight due to frailty of patient; patient felt safer staying overnight due to her multiple medical problems and recent procedure.     Continue DAPT and intensive statin therapy. Cardiac Rehab as outpatient.   Hospitalist consultation for non-cardiac medical issues; assistance appreciated  Likely discharge in AM with appropriate cardiology follow up

## 2025-02-28 NOTE — PLAN OF CARE
0700-   Problem: Adult Inpatient Plan of Care  Goal: Plan of Care Review  Description: The Plan of Care Review/Shift note should be completed every shift.  The Outcome Evaluation is a brief statement about your assessment that the patient is improving, declining, or no change.  This information will be displayed automatically on your shift  note.  Outcome: Progressing   Goal Outcome Evaluation:    Pt denies pain.  Angio site is bruised and tender, last 2 fingers on right hand or numb and tingling. Notified Yamileth ARIAS NP while in room. No new treatment.   B/p 187/87   gave morning b/p meds. Possibly discharging later in afternoon. Rechecked b/p 149/71.  Hospitalist saw pt rechecked b/p again 139/66 Went over discharge instructions.  Answered questions pt had. Pt stated they had all there belongings.  Pt taken out by w/c, son waiting in front of hospital for pickup.

## 2025-02-28 NOTE — PROGRESS NOTES
Pt. Has numbness in fingers  small and ring finger. Appears slightly swollen. Ice applied.   To area, has old bruising on site.   Refusing tylenol for discomfort.

## 2025-02-28 NOTE — DISCHARGE SUMMARY
[facility]  CARDIOLOGY DISCHARGE SUMMARY     Primary Care Physician: Jo Hamilton  Admission Date: 2/27/2025   Discharge Provider: Emily Cowan CNP Discharge Date: 2/28/2025   Diet: resume previous home diet   Code Status: Full Code   Activity:     No forceful arm movements or driving for 24 hours    OK to remove dressing after 24 hours and leave open to air. If minor oozing, OK to apply a Band-Aid and remove after 12 hours.     Ok to shower day after procedure. Avoid tub baths, swimming pools, hot tubs or soaking the wrist for 3 days.     Do not operate a lawnmower, motorcycle, chainsaw or all-terrain vehicle for 48 hours    No lifting more than 5-10 pounds and avoid excessive bending (flexion/extension) wrist movement with affected arm for 5 days.    No vigorous exercise using the affected arm for 5 days after discharge.    OK to  return to work in 72 hours if no complications and no heavy lifting.        Condition at Discharge: Stable      BRIEF HPI:   Iglesia Shore is a 65 year old WF with past medical history of HTN, dyslipidemia, Lung CA; s/p radiation, COPD; on home O2, former tobacco use, ANDREW with CPAP intolerance and DM Type II who presented for coronary angiography in evaluation of severe coronary artery disease by virtue of CT scan. Coronary angiogram demonstrated single vessel obstructive CAD which was successfully intervened upon with TASNEEM x1 to the prox-mRCA. Patient was noted to have iFR negative LAD and Lcx. Patient was monitored overnight due to frailty of patient; patient felt safer staying overnight due to her multiple medical problems and recent procedure. Patient states that her breathing has already improved since PCI. Rt radial site with mild ecchymosis present. No hematoma. Slight sensation disturbance in 4th and 5th finger which is resolving. Patient given activity restrictions and reportable s/s. Patient verbalized understanding. She is discharged in  stable condition to home with appropriate cardiology follow up.      PRINCIPAL & ACTIVE DISCHARGE DIAGNOSES     Active Problems:    S/P right coronary artery (RCA) stent placement    Dyspnea on exertion    Status post coronary angiogram    Abnormal computed tomography angiography of heart    Abnormal findings on diagnostic imaging of heart and coronary circulation    Elevated coronary artery calcium score    Coronary artery disease involving native coronary artery of native heart with angina pectoris      SIGNIFICANT FINDINGS (Imaging, labs):     CORONARY ANGIOGRAM  CONCLUSIONS:   Single-vessel obstructive coronary artery disease involving the mid dominant right coronary artery with moderate calcific disease elsewhere (iFR of the LAD 0.91, iFR of the left circumflex 0.95).  Normal left-sided filling pressures.  No aortic valve stenosis.  Successful percutaneous coronary artery intervention of the proximal to mid right coronary artery with a 3.0 x 38 mm Resolute [place] Starr drug-eluting stent that was postdilated with a 3.5 mm NC balloon at high atmospheres.    EKG (personally reviewed and interpreted) 2/28/2025@05:43:16:  SR, left axis deviation, prolonged QT, previously noted inferior and anterior infarct HR 69 bpm,  ms, QRSd 30 ms, QTc 567 ms    LABS or IMAGING REQUIRING FOLLOW-UP AFTER DISCHARGE:     N/A    PROCEDURES ( this hospitalization only)      Procedure(s):  Coronary Angiogram  Percutaneous Coronary Intervention  Instantaneous Wave-Free Ratio  Left Heart Catheterization  Percutaneous Coronary Intervention Stent    RECOMMENDATIONS TO OUTPATIENT PROVIDER FOR F/U VISIT     Follow-up with cardiology DANIELA in 7 to 10 days  Follow-up with cardiologist in 6-weeks  Call if worsening numbness or tingling in right upper extremity 4th and 5th digits following radial access for angiography    DISPOSITION     Home    HOSPITAL COURSE BY DIAGNOSIS:      # Coronary artery disease; s/p mRCA PCI with TASNEEM x2I; POD  "#1  - continue DAPT with ASA and Plavix  -continue intensive statin therapy  -continue to advocate for heart healthy lifestyle  -unable to prescribed ß-blocker 2/2 to COPD    #HTN  -controlled/stable, on multiple agents; ACEi and CCB  -encourage low Na+ diet    #Dyslipidemia  - on moderate dose statin; intensified from 40mg to 80mg daily  - monitor for intolerance as outpatient    #DM Type II  -non-insulin dependent  -encourage strict glycemic control in the setting of CAD  -Continue to hold Metformin for 48h post procedure to reduce risk of MARY    #COPD  -on MDIs and home O2    Clinically Significant Risk Factors Present on Admission                 # Drug Induced Platelet Defect: home medication list includes an antiplatelet medication   # Hypertension: Noted on problem list           # Overweight: Estimated body mass index is 25.76 kg/m  as calculated from the following:    Height as of this encounter: 1.676 m (5' 6\").    Weight as of this encounter: 72.4 kg (159 lb 9.6 oz).       # Financial/Environmental Concerns:               Discharge Medications with Med changes:        Review of your medicines        START taking        Dose / Directions   clopidogrel 75 MG tablet  Commonly known as: PLAVIX      Dose: 75 mg  Take 1 tablet (75 mg) by mouth daily.  Quantity: 90 tablet  Refills: 3            CONTINUE these medicines which may have CHANGED, or have new prescriptions. If we are uncertain of the size of tablets/capsules you have at home, strength may be listed as something that might have changed.        Dose / Directions   aspirin 81 MG EC tablet  This may have changed:   when to take this  additional instructions      Dose: 81 mg  Take 1 tablet (81 mg) by mouth daily. Start tomorrow.  Quantity: 30 tablet  Refills: 3     atorvastatin 80 MG tablet  Commonly known as: LIPITOR  This may have changed:   how much to take  when to take this      Dose: 80 mg  Take 1 tablet (80 mg) by mouth daily.  Quantity: 90 " tablet  Refills: 3            CONTINUE these medicines which have NOT CHANGED        Dose / Directions   albuterol 108 (90 Base) MCG/ACT inhaler  Commonly known as: PROAIR HFA/PROVENTIL HFA/VENTOLIN HFA      Dose: 2 puff  Inhale 2 puffs into the lungs every 6 hours as needed for shortness of breath, wheezing or cough  Refills: 0     Breztri Aerosphere 160-9-4.8 MCG/ACT Aero inhaler  Generic drug: budeson-glycopyrrol-formoterol      Dose: 2 puff  Inhale 2 puffs into the lungs 2 times daily  Refills: 0     carBAMazepine 100 MG 12 hr tablet  Commonly known as: TEGretol XR      Dose: 100 mg  Take 100 mg by mouth 2 times daily.  Refills: 0     diltiazem 120 MG Cp12 12 hr SR capsule  Commonly known as: CARDIZEM SR      Dose: 120 mg  Take 120 mg by mouth 2 times daily  Refills: 0     DULoxetine 60 MG capsule  Commonly known as: CYMBALTA      Dose: 60 mg  Take 60 mg by mouth every morning.  Refills: 0     furosemide 20 MG tablet  Commonly known as: LASIX      Dose: 20 mg  Take 20 mg by mouth daily as needed.  Refills: 0     ipratropium - albuterol 0.5 mg/2.5 mg/3 mL 0.5-2.5 (3) MG/3ML neb solution  Commonly known as: DUONEB  Used for: COPD, severe (H)      Dose: 3 mL  Take 1 vial (3 mLs) by nebulization every 6 hours as needed for shortness of breath, wheezing or cough.  Quantity: 180 mL  Refills: 11     losartan 50 MG tablet  Commonly known as: COZAAR      Dose: 50 mg  Take 50 mg by mouth every evening.  Refills: 0     metFORMIN 500 MG 24 hr tablet  Commonly known as: GLUCOPHAGE XR  Used for: Malignant neoplasm of lung, unspecified laterality, unspecified part of lung (H), Type 2 diabetes mellitus with chronic kidney disease, without long-term current use of insulin, unspecified CKD stage (H)      Dose: 500 mg  Take 1 tablet (500 mg) by mouth 2 times daily (with meals)  Quantity: 60 tablet  Refills: 0     montelukast 10 MG tablet  Commonly known as: SINGULAIR  Used for: COPD exacerbation (H)      Dose: 10 mg  Take 1 tablet  (10 mg) by mouth at bedtime.  Quantity: 30 tablet  Refills: 0     oxyCODONE 5 MG capsule  Commonly known as: OXY-IR      Dose: 1-2 capsule  Take 1-2 capsules by mouth every 4 hours as needed for severe pain.  Refills: 0     pantoprazole 40 MG EC tablet  Commonly known as: PROTONIX  Used for: COPD exacerbation (H)      Dose: 40 mg  Take 1 tablet (40 mg) by mouth every morning (before breakfast).  Quantity: 30 tablet  Refills: 0     polyethylene glycol 17 GM/Dose powder  Commonly known as: MIRALAX      Dose: 17 g  Take 17 g by mouth daily as needed for constipation.  Refills: 0               Where to get your medicines        These medications were sent to [id number] - [place], [place] - [address], [place] [address]      Phone: [phone]   aspirin 81 MG EC tablet  atorvastatin 80 MG tablet  clopidogrel 75 MG tablet           Rationale for medication changes:      ASA and Plavix for prevention of ISR following PCI    Intensified statin dosing as medical therapy for CAD and dyslipidemia with LDL >70mg/dL      Consults     Hospitalist for management of non-cardiac medical issues       Pertinent Labs          Lab Results   Component Value Date     02/28/2025     02/27/2025     02/24/2025    Lab Results   Component Value Date    CHLORIDE 103 02/28/2025    CHLORIDE 102 02/27/2025    CHLORIDE 102 02/24/2025    CHLORIDE 102 02/04/2022    CHLORIDE 103 02/03/2022    CHLORIDE 108 10/19/2021    Lab Results   Component Value Date    BUN 12.3 02/28/2025    BUN 8.3 02/27/2025    BUN 8.6 02/24/2025    BUN 13 02/04/2022    BUN 16 02/03/2022    BUN 8 10/19/2021      Lab Results   Component Value Date    POTASSIUM 3.6 02/28/2025    POTASSIUM 4.0 02/27/2025    POTASSIUM 4.0 02/24/2025    POTASSIUM 3.7 02/04/2022    POTASSIUM 3.9 02/03/2022    POTASSIUM 3.9 10/19/2021    Lab Results   Component Value Date    CO2 30 02/28/2025    CO2 35 02/27/2025    CO2 33 02/24/2025    CO2 28  02/04/2022    CO2 23 02/03/2022    CO2 21 10/19/2021    Lab Results   Component Value Date    CR 0.51 02/28/2025    CR 0.51 02/27/2025    CR 0.62 02/24/2025        Lab Results   Component Value Date    WBC 7.8 02/28/2025    WBC 6.7 02/27/2025    WBC 10.1 01/20/2025    HGB 12.4 02/28/2025    HGB 13.5 02/27/2025    HGB 12.1 01/20/2025    HCT 37.3 02/28/2025    HCT 40.3 02/27/2025    HCT 36.7 01/20/2025    MCV 85 02/28/2025    MCV 85 02/27/2025    MCV 87 01/20/2025     02/28/2025     02/27/2025     01/20/2025     Discharge Orders     Discharge Procedure Orders   Lipid Profile   Standing Status: Future Standing Exp. Date: 02/27/26   Order Comments: Schedule Lipid profile in 2-4 weeks. Adjust date based on when you want to have the LIPID profile completed.     Order Specific Question Answer Comments   Perform labs while fasting or non-fasting? Fasting      Ambulatory Cardiac Rehab Referral   Standing Status: Future   Referral Priority: Routine: Next available opening Referral Type: Rehab Therapy Cardiac Therapy   Number of Visits Requested: 1     Follow-Up with Cardiology [facility] P DANIELA Referral   Standing Status: Future   Referral Priority: Routine: Next available opening Referral Type: Consultation   Requested Specialty: Cardiovascular Disease   Number of Visits Requested: 1     Follow-Up with Cardiology [facility]-Attending Provider   Standing Status: Future   Referral Priority: Routine: Next available opening Referral Type: Consultation   Requested Specialty: Cardiovascular Disease   Number of Visits Requested: 1     Discharge Instructions - IF on Metformin (Glucophage or Glucovance) or Metformin containing medications on day of the procedure and for 48 hours after IV contrast given- Patients with acute kidney injury or severe chronic kidney disease (stage IV or stage V; i.e., eGFR less than 30)   Order Comments: IF on Metformin (Glucophage or Glucovance) or Metformin  "containing medications , schedule a Basic Metabolic Panel at [facility] Primary [facility] in 48 - 72 hours post procedure and PRIOR TO resuming the Metformin or Metformin containing medications.  Hold Metformin (Glucophage or Glucovance) or Metformin containing medications until after the Basic Metabolic Panel on the 2nd or 3rd day following the procedure.  May resume after blood draw is complete.     Discharge Instructions - Follow up with [facility] Nurse Practitioner    Order Comments: Follow up with [facility] Nurse Practitioner at [facility] of patient preference in 7-10 days.     Discharge Instructions - Follow up with [facility] Cardiologist   Order Comments: Follow -up with the [facility] of patient preference in 2-4 weeks     Reason for your hospital stay   Order Comments: Coronary artery stenting     Activity   Order Comments: Your activity upon discharge: no lifting or strenuous exercise for 3 days. No driving for 1 day.     Order Specific Question Answer Comments   Is discharge order? Yes      Follow Up   Order Comments: Follow up with Cardiology DANIELA in 7-10 days  Follow up with Cardiologist in 6 weeks     Diet   Order Comments: Follow this diet upon discharge: Heart Healthy; low fat, low cholesterol, low sodium     Order Specific Question Answer Comments   Is discharge order? Yes      Examination     Vital Signs in last 24 hours:   Vital signs:  Temp: 98.2  F (36.8  C) Temp src: Oral BP: (!) 187/87 Pulse: 71   Resp: 18 SpO2: 95 % O2 Device: Nasal cannula Oxygen Delivery: 2 LPM Height: 167.6 cm (5' 6\") Weight: 72.4 kg (159 lb 9.6 oz)  Estimated body mass index is 25.76 kg/m  as calculated from the following:    Height as of this encounter: 1.676 m (5' 6\").    Weight as of this encounter: 72.4 kg (159 lb 9.6 oz).    RECHECK /71    General Appearance:   Alert, cooperative and in no acute distress   ENT/Mouth: membranes moist, no oral lesions or bleeding gums.      EYES:  no scleral icterus, " normal conjunctivae   Neck: Supple without lymphadenopathy.  Thyroid not visualized   Chest/Lungs:   lungs with diminished breath sounds throughout to auscultation, no rales or wheezing   Cardiovascular:   Regular. Normal first and second heart sounds with no murmurs, rubs, or gallops; the carotid, radial and posterior tibial pulses are intact, no significant edema bilaterally    Abdomen:  Soft and nontender. Bowel sounds are present in all quadrants   Extremities: no cyanosis or clubbing.  Puncture site with mild soft ecchymosis. No hematoma. No warmth/redness or drainage. Mild sensation disturbance in 4th and 5th finger. Distal PVS intact.    Skin: no xanthelasma, warm.    Neurologic: normal gait, normal  bilateral, no tremors   Psychiatric: Normal mood and affect       Please see EMR for more detailed significant labs, imaging, consultant notes etc.    30 MINUTES SPENT BY ME on the date of service doing chart review, history, exam, documentation & further activities per the note.

## 2025-03-14 ENCOUNTER — LAB (OUTPATIENT)
Dept: CARDIOLOGY | Facility: CLINIC | Age: 65
End: 2025-03-14
Payer: MEDICARE

## 2025-03-14 DIAGNOSIS — Z95.5 S/P RIGHT CORONARY ARTERY (RCA) STENT PLACEMENT: ICD-10-CM

## 2025-03-14 LAB
CHOLEST SERPL-MCNC: 149 MG/DL
FASTING STATUS PATIENT QL REPORTED: NORMAL
HDLC SERPL-MCNC: 52 MG/DL
LDLC SERPL CALC-MCNC: 69 MG/DL
NONHDLC SERPL-MCNC: 97 MG/DL
TRIGL SERPL-MCNC: 139 MG/DL

## 2025-03-14 PROCEDURE — 80061 LIPID PANEL: CPT

## 2025-03-14 PROCEDURE — 36415 COLL VENOUS BLD VENIPUNCTURE: CPT

## 2025-03-17 ENCOUNTER — OFFICE VISIT (OUTPATIENT)
Dept: CARDIOLOGY | Facility: CLINIC | Age: 65
End: 2025-03-17
Attending: INTERNAL MEDICINE
Payer: MEDICARE

## 2025-03-17 VITALS
WEIGHT: 159 LBS | BODY MASS INDEX: 25.66 KG/M2 | OXYGEN SATURATION: 92 % | SYSTOLIC BLOOD PRESSURE: 150 MMHG | HEART RATE: 74 BPM | DIASTOLIC BLOOD PRESSURE: 62 MMHG

## 2025-03-17 DIAGNOSIS — J43.2 CENTRILOBULAR EMPHYSEMA (H): ICD-10-CM

## 2025-03-17 DIAGNOSIS — E78.5 DYSLIPIDEMIA, GOAL LDL BELOW 70: ICD-10-CM

## 2025-03-17 DIAGNOSIS — Z72.0 TOBACCO ABUSE: ICD-10-CM

## 2025-03-17 DIAGNOSIS — I10 PRIMARY HYPERTENSION: ICD-10-CM

## 2025-03-17 DIAGNOSIS — I25.119 CORONARY ARTERY DISEASE INVOLVING NATIVE CORONARY ARTERY OF NATIVE HEART WITH ANGINA PECTORIS: Primary | ICD-10-CM

## 2025-03-17 PROCEDURE — 3078F DIAST BP <80 MM HG: CPT | Performed by: INTERNAL MEDICINE

## 2025-03-17 PROCEDURE — G2211 COMPLEX E/M VISIT ADD ON: HCPCS | Performed by: INTERNAL MEDICINE

## 2025-03-17 PROCEDURE — 3077F SYST BP >= 140 MM HG: CPT | Performed by: INTERNAL MEDICINE

## 2025-03-17 PROCEDURE — 99215 OFFICE O/P EST HI 40 MIN: CPT | Performed by: INTERNAL MEDICINE

## 2025-03-17 RX ORDER — LOSARTAN POTASSIUM 100 MG/1
100 TABLET ORAL EVERY EVENING
Qty: 90 TABLET | Refills: 3 | Status: SHIPPED | OUTPATIENT
Start: 2025-03-17

## 2025-03-17 RX ORDER — CLOPIDOGREL BISULFATE 75 MG/1
75 TABLET ORAL DAILY
Qty: 90 TABLET | Refills: 3 | Status: SHIPPED | OUTPATIENT
Start: 2025-03-17

## 2025-03-17 RX ORDER — DILTIAZEM HYDROCHLORIDE 120 MG/1
120 CAPSULE, EXTENDED RELEASE ORAL 2 TIMES DAILY
Qty: 90 CAPSULE | Refills: 3 | Status: SHIPPED | OUTPATIENT
Start: 2025-03-17

## 2025-03-17 RX ORDER — ASPIRIN 81 MG/1
81 TABLET ORAL DAILY
Qty: 90 TABLET | Refills: 3 | Status: SHIPPED | OUTPATIENT
Start: 2025-03-17

## 2025-03-17 RX ORDER — ATORVASTATIN CALCIUM 80 MG/1
80 TABLET, FILM COATED ORAL DAILY
Qty: 90 TABLET | Refills: 3 | Status: SHIPPED | OUTPATIENT
Start: 2025-03-17

## 2025-03-17 NOTE — LETTER
3/17/2025    Jo Hamilton MD  [address] [place] [address]    RE: Iglesia Shore       Dear Colleague,     I had the pleasure of seeing Iglesia Shore in the [facility].      Click to link to [facility] HEART CARE NOTE       Assessment/Plan:   Coronary artery disease s/p TASNEEM to proximal to mid RCA on February 27, 2025: She has no chest pain.  Still has shortness of breath on exertion, but improved after stent placement.  Discussed lifestyle modification diet control, regular exercise and weight loss.  Emphasized importance of smoking sensation.  She told me her boyfriend and her son all smoke at home.  Still encouraged her to quit smoking.  Continue aspirin, Plavix, atorvastatin, diltiazem.    Dyslipidemia: His LDL was ideally controlled after atorvastatin was increased to 80 mg at bedtime.    Benign essential hypertension: Her blood pressure is not well-controlled, continue diltiazem  mg daily, increase losartan from 50 to 100 mg daily.  Continue to monitor her blood pressure.  Lung cancer s/p radiation therapy, severe COPD and other chronic medical conditions: No change in treatment today.    Total 48 minutes were spent in this visit for face to face visit, physical exam, review of current labs/imaging studies, plan for ongoing treatment with >50% spent on counseling and coordination of care as documented in the above mentioned note.    Thank you for the opportunity to be involved in the care of Iglesia Shore. If you have any questions, please feel free to contact me.  I will see the patient again in 6 months and as needed    Much or all of the text in this note was generated through the use of Dragon Dictate voice-to-text software. Errors in spelling or words which seem out of context are unintentional.   Sound alike errors, in particular, may have escaped editing.       History of Present Illness:   It is my pleasure to see Iglesia Shore at  the [facility] clinic for evaluation of Follow Up post PCI. Iglesia Shore is a 65 year old female with a medical history of coronary artery disease status post TASNEEM to proximal to mid RCA on February 27, 2025, severe COPD, type 2 diabetes, severe peripheral arterial disease status post bypass, benign essential hypertension, dyslipidemia, lung cancer status post radiation therapy.    The patient states that she has no chest pain.  Her shortness of breath on exertion is mildly improved after PCI.  She denies palpitations, dizziness, orthopnea, PND.  She has trace ankle edema which has been stable.  She states that she did not have much ambitions, not doing exercise regularly, slept a lot.  She still smokes 1 pack a day.  Her blood pressure is higher today, has been higher at home.  No side effects from her current medications.    Past Medical History:     Patient Active Problem List   Diagnosis     COPD (chronic obstructive pulmonary disease) (H)     Family history of diabetes mellitus     Foot pain     Idiopathic sensorimotor axonal neuropathy     Laryngitis, chronic     Major depressive disorder, recurrent episode, moderate (H)     Migraine without aura     Mixed hyperlipidemia     Obstructive sleep apnea     Other somatoform disorders     PVD (peripheral vascular disease)     Screening for cervical cancer     Tobacco use disorder     Vitamin B12 deficiency     Hemorrhoids     History of colonic polyps     Major depressive disorder, severe (H)     Diabetes mellitus, type 2 (H)     Altered bowel elimination due to intestinal ostomy (H)     Chronic pain     Diverticulitis of colon     DNR (do not resuscitate)     Malignant neoplasm of lower lobe of right lung (H)     Primary hypertension     Enteritis     Sigmoid stricture (H)     Syrinx of spinal cord (H)     Bradycardia     COPD exacerbation (H)     Acute hypoxemic respiratory failure (H)     Lung cancer (H)     Current nicotine use     COPD with  acute exacerbation (H)     Dyspnea, unspecified type     Influenza A     S/P right coronary artery (RCA) stent placement     Dyspnea on exertion     Status post coronary angiogram     Abnormal computed tomography angiography of heart     Abnormal findings on diagnostic imaging of heart and coronary circulation     Elevated coronary artery calcium score     Coronary artery disease involving native coronary artery of native heart with angina pectoris       Past Surgical History:     Past Surgical History:   Procedure Laterality Date     AORTA - FEMORAL ARTERY BYPASS GRAFT       BRONCHOSCOPY RIGID OR FLEXIBLE W/TRANSENDOSCOPIC ENDOBRONCHIAL ULTRASOUND GUIDED N/A 07/14/2022    Procedure: endbronchial ultrasound, transbronchial biopsy;  Surgeon: Rosendo Dave MD;  Location: [facility] OR     CV CORONARY ANGIOGRAM N/A 2/27/2025    Procedure: Coronary Angiogram;  Surgeon: Rufino Longo MD;  Location: [facility] CV     CV INSTANTANEOUS WAVE-FREE RATIO N/A 2/27/2025    Procedure: Instantaneous Wave-Free Ratio;  Surgeon: Rufino Longo MD;  Location: [facility] CV     CV LEFT HEART CATH N/A 2/27/2025    Procedure: Left Heart Catheterization;  Surgeon: Rufino Longo MD;  Location: [facility] CV     CV PCI N/A 2/27/2025    Procedure: Percutaneous Coronary Intervention;  Surgeon: Rufino Longo MD;  Location: [facility] CV     CV PCI STENT DRUG ELUTING N/A 2/27/2025    Procedure: Percutaneous Coronary Intervention Stent;  Surgeon: Rufino Longo MD;  Location: [facility] CV     HYSTEROSCOPY W/ ENDOMETRIAL ABLATION       ILEOSTOMY  08/16/2023     ILEOSTOMY REVISION  10/19/2023     IR EXTREMITY ANGIOGRAM LEFT  11/25/2020     IR EXTREMITY ANGIOGRAM LEFT  12/16/2020     IR LOWER EXTREMITY ANGIOGRAM LEFT  11/25/2020     IR LOWER EXTREMITY ANGIOGRAM LEFT  12/16/2020     OPTICAL TRACKING SYSTEM BRONCHOSCOPY N/A 07/14/2022    Procedure: BRONCHOSCOPY, USING OPTICAL TRACKING SYSTEM, .  Ion  or veran system, fiducial placement;  Surgeon: Rosendo Daev MD;  Location: [facility] OR     OTHER SURGICAL HISTORY      tubal ligation     OTHER SURGICAL HISTORY      spine fusion     PAIN STELLATE GANGLION BLOCK RIGHT Right 1992    x5, [facility]       Family History:     Family History   Problem Relation Age of Onset     Diabetes Mother      Hypertension Mother      Cancer Father      Heart Disease Father      Diabetes Father        Social History:    reports that she has quit smoking. Her smoking use included cigarettes. She started smoking about 54 years ago. She has a 54.7 pack-year smoking history. She has been exposed to tobacco smoke. She has never used smokeless tobacco. She reports that she does not currently use drugs. She reports that she does not drink alcohol.    Review of Systems:   12 systems are reviewed negative except for in HPI.    Meds:     Current Outpatient Medications:      albuterol (PROAIR HFA/PROVENTIL HFA/VENTOLIN HFA) 108 (90 Base) MCG/ACT inhaler, Inhale 2 puffs into the lungs every 6 hours as needed for shortness of breath, wheezing or cough, Disp: , Rfl:      aspirin 81 MG EC tablet, Take 1 tablet (81 mg) by mouth daily. Start tomorrow., Disp: 90 tablet, Rfl: 3     atorvastatin (LIPITOR) 80 MG tablet, Take 1 tablet (80 mg) by mouth daily., Disp: 90 tablet, Rfl: 3     budeson-glycopyrrol-formoterol (BREZTRI AEROSPHERE) 160-9-4.8 MCG/ACT AERO inhaler, Inhale 2 puffs into the lungs 2 times daily, Disp: , Rfl:      carBAMazepine (TEGRETOL XR) 100 MG 12 hr tablet, Take 100 mg by mouth 2 times daily., Disp: , Rfl:      clopidogrel (PLAVIX) 75 MG tablet, Take 1 tablet (75 mg) by mouth daily., Disp: 90 tablet, Rfl: 3     diltiazem (CARDIZEM SR) 120 MG CP12 12 hr SR capsule, Take 1 capsule (120 mg) by mouth 2 times daily., Disp: 90 capsule, Rfl: 3     DULoxetine (CYMBALTA) 60 MG capsule, Take 60 mg by mouth every morning., Disp: , Rfl:      furosemide (LASIX) 20 MG tablet, Take 20 mg by  mouth daily as needed., Disp: , Rfl:      ipratropium - albuterol 0.5 mg/2.5 mg/3 mL (DUONEB) 0.5-2.5 (3) MG/3ML neb solution, Take 1 vial (3 mLs) by nebulization every 6 hours as needed for shortness of breath, wheezing or cough., Disp: 180 mL, Rfl: 11     losartan (COZAAR) 100 MG tablet, Take 1 tablet (100 mg) by mouth every evening., Disp: 90 tablet, Rfl: 3     metFORMIN (GLUCOPHAGE XR) 500 MG 24 hr tablet, Take 1 tablet (500 mg) by mouth 2 times daily (with meals), Disp: 60 tablet, Rfl: 0     montelukast (SINGULAIR) 10 MG tablet, Take 1 tablet (10 mg) by mouth at bedtime., Disp: 30 tablet, Rfl: 0     oxyCODONE (OXY-IR) 5 MG capsule, Take 1-2 capsules by mouth every 4 hours as needed for severe pain., Disp: , Rfl:      pantoprazole (PROTONIX) 40 MG EC tablet, Take 1 tablet (40 mg) by mouth every morning (before breakfast)., Disp: 30 tablet, Rfl: 0     polyethylene glycol (MIRALAX) 17 GM/Dose powder, Take 17 g by mouth daily as needed for constipation., Disp: , Rfl:      Allergies:   Bupropion    Objective:      Physical Exam  72.1 kg (159 lb)     Body mass index is 25.66 kg/m .  BP (!) 150/62 (BP Location: Left arm, Patient Position: Sitting, Cuff Size: Adult Large)   Pulse 74   Wt 72.1 kg (159 lb)   SpO2 92%   BMI 25.66 kg/m      General Appearance:   Awake, Alert, No acute distress.   HEENT:  Pupil equal, reactive to light. No scleral icterus; the mucous membranes were moist. No oral ulcers or thrush.    Neck: No cervical bruits. No JVD. No thyromegaly. No lymph node enlargement or tenderness.   Chest: The spine was straight. The chest was symmetric.   Lungs:   Diminished breathing sounds bilaterally. No crackles. No wheezing.   Cardiovascular:   RRR, normal first and second heart sounds with no murmurs. No rubs or gallops.    Abdomen:  Soft. No tenderness. Non-distended. Bowels sounds are present   Extremities: Equal posterior tibial pulses.  Trace ankle edema.   Skin: No rashes or ulcers. Warm, Dry.  "  Musculoskeletal: No tenderness. No deformity.   Neurologic: Mood and affect are appropriate. No focal deficits.         EKG:  Personally reivewed  Sinus rhythm   Left axis deviation   Inferior infarct (cited on or before 27-Feb-2025)   Anterior infarct (cited on or before 27-Feb-2025)   Prolonged QT   Abnormal ECG   When compared with ECG of 27-Feb-2025 11:26,   Nonspecific T wave abnormality, improved in Inferior leads   QT has lengthened     Cardiac Imaging Studies  Echocardiogram on May 30, 2024:  Left ventricular size, wall motion and function are normal. The ejection  fraction is 60-65%.  Normal right ventricle size and systolic function.  No hemodynamically significant valvular abnormalities on 2D or color flow  imaging.    Coronary angiogram on February 27, 2025:  Single-vessel obstructive coronary artery disease involving the mid dominant right coronary artery with moderate calcific disease elsewhere (iFR of the LAD 0.91, iFR of the left circumflex 0.95).  Normal left-sided filling pressures.  No aortic valve stenosis.  Successful percutaneous coronary artery intervention of the proximal to mid right coronary artery with a 3.0 x 38 mm Resolute [place] drug-eluting stent that was postdilated with a 3.5 mm NC balloon at high atmospheres.    Lab Review   Lab Results   Component Value Date     01/20/2025    CO2 28 01/20/2025    CO2 28 02/04/2022    BUN 30.7 01/20/2025    BUN 13 02/04/2022     Lab Results   Component Value Date    WBC 10.1 01/20/2025    HGB 12.1 01/20/2025    HCT 36.7 01/20/2025    MCV 87 01/20/2025     01/20/2025     Lab Results   Component Value Date    CHOL 216 01/11/2024    TRIG 165 01/11/2024    HDL 49 01/11/2024     01/11/2024     LDL Cholesterol Calculated   Date Value Ref Range Status   03/14/2025 69 <100 mg/dL Final     No results found for: \"TROPONINI\"  Lab Results   Component Value Date    BNP 30 09/26/2018     Lab Results   Component Value Date    TSH 2.91 " 05/29/2024                   Thank you for allowing me to participate in the care of your patient.      Sincerely,     Juju Roberson MD     [facility]  cc:   Juju Roberson MD  [address] [facility]  [address] [address]

## 2025-03-17 NOTE — PROGRESS NOTES
Click to link to [facility] HEART CARE NOTE       Assessment/Plan:   Coronary artery disease s/p TASNEEM to proximal to mid RCA on February 27, 2025: She has no chest pain.  Still has shortness of breath on exertion, but improved after stent placement.  Discussed lifestyle modification diet control, regular exercise and weight loss.  Emphasized importance of smoking sensation.  She told me her boyfriend and her son all smoke at home.  Still encouraged her to quit smoking.  Continue aspirin, Plavix, atorvastatin, diltiazem.    Dyslipidemia: His LDL was ideally controlled after atorvastatin was increased to 80 mg at bedtime.    Benign essential hypertension: Her blood pressure is not well-controlled, continue diltiazem  mg daily, increase losartan from 50 to 100 mg daily.  Continue to monitor her blood pressure.  Lung cancer s/p radiation therapy, severe COPD and other chronic medical conditions: No change in treatment today.    Total 48 minutes were spent in this visit for face to face visit, physical exam, review of current labs/imaging studies, plan for ongoing treatment with >50% spent on counseling and coordination of care as documented in the above mentioned note.    Thank you for the opportunity to be involved in the care of Iglesia Shore. If you have any questions, please feel free to contact me.  I will see the patient again in 6 months and as needed    Much or all of the text in this note was generated through the use of Dragon Dictate voice-to-text software. Errors in spelling or words which seem out of context are unintentional.   Sound alike errors, in particular, may have escaped editing.       History of Present Illness:   It is my pleasure to see Iglesia Shore at the [facility] clinic for evaluation of Follow Up post PCI. Iglesia Shroe is a 65 year old female with a medical history of coronary artery disease status post TASNEEM to proximal to mid RCA  on February 27, 2025, severe COPD, type 2 diabetes, severe peripheral arterial disease status post bypass, benign essential hypertension, dyslipidemia, lung cancer status post radiation therapy.    The patient states that she has no chest pain.  Her shortness of breath on exertion is mildly improved after PCI.  She denies palpitations, dizziness, orthopnea, PND.  She has trace ankle edema which has been stable.  She states that she did not have much ambitions, not doing exercise regularly, slept a lot.  She still smokes 1 pack a day.  Her blood pressure is higher today, has been higher at home.  No side effects from her current medications.    Past Medical History:     Patient Active Problem List   Diagnosis    COPD (chronic obstructive pulmonary disease) (H)    Family history of diabetes mellitus    Foot pain    Idiopathic sensorimotor axonal neuropathy    Laryngitis, chronic    Major depressive disorder, recurrent episode, moderate (H)    Migraine without aura    Mixed hyperlipidemia    Obstructive sleep apnea    Other somatoform disorders    PVD (peripheral vascular disease)    Screening for cervical cancer    Tobacco use disorder    Vitamin B12 deficiency    Hemorrhoids    History of colonic polyps    Major depressive disorder, severe (H)    Diabetes mellitus, type 2 (H)    Altered bowel elimination due to intestinal ostomy (H)    Chronic pain    Diverticulitis of colon    DNR (do not resuscitate)    Malignant neoplasm of lower lobe of right lung (H)    Primary hypertension    Enteritis    Sigmoid stricture (H)    Syrinx of spinal cord (H)    Bradycardia    COPD exacerbation (H)    Acute hypoxemic respiratory failure (H)    Lung cancer (H)    Current nicotine use    COPD with acute exacerbation (H)    Dyspnea, unspecified type    Influenza A    S/P right coronary artery (RCA) stent placement    Dyspnea on exertion    Status post coronary angiogram    Abnormal computed tomography angiography of heart    Abnormal  findings on diagnostic imaging of heart and coronary circulation    Elevated coronary artery calcium score    Coronary artery disease involving native coronary artery of native heart with angina pectoris       Past Surgical History:     Past Surgical History:   Procedure Laterality Date    AORTA - FEMORAL ARTERY BYPASS GRAFT      BRONCHOSCOPY RIGID OR FLEXIBLE W/TRANSENDOSCOPIC ENDOBRONCHIAL ULTRASOUND GUIDED N/A 07/14/2022    Procedure: endbronchial ultrasound, transbronchial biopsy;  Surgeon: Rosendo Dave MD;  Location: [facility] OR    CV CORONARY ANGIOGRAM N/A 2/27/2025    Procedure: Coronary Angiogram;  Surgeon: Rufino Longo MD;  Location: [facility] LAB CV    CV INSTANTANEOUS WAVE-FREE RATIO N/A 2/27/2025    Procedure: Instantaneous Wave-Free Ratio;  Surgeon: Rufino Longo MD;  Location: [facility] LAB CV    CV LEFT HEART CATH N/A 2/27/2025    Procedure: Left Heart Catheterization;  Surgeon: Rufino Longo MD;  Location: [facility] LAB CV    CV PCI N/A 2/27/2025    Procedure: Percutaneous Coronary Intervention;  Surgeon: Rufino Longo MD;  Location: [facility] LAB CV    CV PCI STENT DRUG ELUTING N/A 2/27/2025    Procedure: Percutaneous Coronary Intervention Stent;  Surgeon: Rufino Longo MD;  Location: [facility] CV    HYSTEROSCOPY W/ ENDOMETRIAL ABLATION      ILEOSTOMY  08/16/2023    ILEOSTOMY REVISION  10/19/2023    IR EXTREMITY ANGIOGRAM LEFT  11/25/2020    IR EXTREMITY ANGIOGRAM LEFT  12/16/2020    IR LOWER EXTREMITY ANGIOGRAM LEFT  11/25/2020    IR LOWER EXTREMITY ANGIOGRAM LEFT  12/16/2020    OPTICAL TRACKING SYSTEM BRONCHOSCOPY N/A 07/14/2022    Procedure: BRONCHOSCOPY, USING OPTICAL TRACKING SYSTEM, .  Ion or veran system, fiducial placement;  Surgeon: Rosendo Dave MD;  Location: [facility] OR    OTHER SURGICAL HISTORY      tubal ligation    OTHER SURGICAL HISTORY      spine fusion    PAIN STELLATE GANGLION BLOCK RIGHT Right 1992    x5, [facility]  History:     Family History   Problem Relation Age of Onset    Diabetes Mother     Hypertension Mother     Cancer Father     Heart Disease Father     Diabetes Father        Social History:    reports that she has quit smoking. Her smoking use included cigarettes. She started smoking about 54 years ago. She has a 54.7 pack-year smoking history. She has been exposed to tobacco smoke. She has never used smokeless tobacco. She reports that she does not currently use drugs. She reports that she does not drink alcohol.    Review of Systems:   12 systems are reviewed negative except for in HPI.    Meds:     Current Outpatient Medications:     albuterol (PROAIR HFA/PROVENTIL HFA/VENTOLIN HFA) 108 (90 Base) MCG/ACT inhaler, Inhale 2 puffs into the lungs every 6 hours as needed for shortness of breath, wheezing or cough, Disp: , Rfl:     aspirin 81 MG EC tablet, Take 1 tablet (81 mg) by mouth daily. Start tomorrow., Disp: 90 tablet, Rfl: 3    atorvastatin (LIPITOR) 80 MG tablet, Take 1 tablet (80 mg) by mouth daily., Disp: 90 tablet, Rfl: 3    budeson-glycopyrrol-formoterol (BREZTRI AEROSPHERE) 160-9-4.8 MCG/ACT AERO inhaler, Inhale 2 puffs into the lungs 2 times daily, Disp: , Rfl:     carBAMazepine (TEGRETOL XR) 100 MG 12 hr tablet, Take 100 mg by mouth 2 times daily., Disp: , Rfl:     clopidogrel (PLAVIX) 75 MG tablet, Take 1 tablet (75 mg) by mouth daily., Disp: 90 tablet, Rfl: 3    diltiazem (CARDIZEM SR) 120 MG CP12 12 hr SR capsule, Take 1 capsule (120 mg) by mouth 2 times daily., Disp: 90 capsule, Rfl: 3    DULoxetine (CYMBALTA) 60 MG capsule, Take 60 mg by mouth every morning., Disp: , Rfl:     furosemide (LASIX) 20 MG tablet, Take 20 mg by mouth daily as needed., Disp: , Rfl:     ipratropium - albuterol 0.5 mg/2.5 mg/3 mL (DUONEB) 0.5-2.5 (3) MG/3ML neb solution, Take 1 vial (3 mLs) by nebulization every 6 hours as needed for shortness of breath, wheezing or cough., Disp: 180 mL, Rfl: 11    losartan (COZAAR) 100 MG  tablet, Take 1 tablet (100 mg) by mouth every evening., Disp: 90 tablet, Rfl: 3    metFORMIN (GLUCOPHAGE XR) 500 MG 24 hr tablet, Take 1 tablet (500 mg) by mouth 2 times daily (with meals), Disp: 60 tablet, Rfl: 0    montelukast (SINGULAIR) 10 MG tablet, Take 1 tablet (10 mg) by mouth at bedtime., Disp: 30 tablet, Rfl: 0    oxyCODONE (OXY-IR) 5 MG capsule, Take 1-2 capsules by mouth every 4 hours as needed for severe pain., Disp: , Rfl:     pantoprazole (PROTONIX) 40 MG EC tablet, Take 1 tablet (40 mg) by mouth every morning (before breakfast)., Disp: 30 tablet, Rfl: 0    polyethylene glycol (MIRALAX) 17 GM/Dose powder, Take 17 g by mouth daily as needed for constipation., Disp: , Rfl:      Allergies:   Bupropion    Objective:      Physical Exam  72.1 kg (159 lb)     Body mass index is 25.66 kg/m .  BP (!) 150/62 (BP Location: Left arm, Patient Position: Sitting, Cuff Size: Adult Large)   Pulse 74   Wt 72.1 kg (159 lb)   SpO2 92%   BMI 25.66 kg/m      General Appearance:   Awake, Alert, No acute distress.   HEENT:  Pupil equal, reactive to light. No scleral icterus; the mucous membranes were moist. No oral ulcers or thrush.    Neck: No cervical bruits. No JVD. No thyromegaly. No lymph node enlargement or tenderness.   Chest: The spine was straight. The chest was symmetric.   Lungs:   Diminished breathing sounds bilaterally. No crackles. No wheezing.   Cardiovascular:   RRR, normal first and second heart sounds with no murmurs. No rubs or gallops.    Abdomen:  Soft. No tenderness. Non-distended. Bowels sounds are present   Extremities: Equal posterior tibial pulses.  Trace ankle edema.   Skin: No rashes or ulcers. Warm, Dry.   Musculoskeletal: No tenderness. No deformity.   Neurologic: Mood and affect are appropriate. No focal deficits.         EKG:  Personally reivewed  Sinus rhythm   Left axis deviation   Inferior infarct (cited on or before 27-Feb-2025)   Anterior infarct (cited on or before 27-Feb-2025)  "  Prolonged QT   Abnormal ECG   When compared with ECG of 27-Feb-2025 11:26,   Nonspecific T wave abnormality, improved in Inferior leads   QT has lengthened     Cardiac Imaging Studies  Echocardiogram on May 30, 2024:  Left ventricular size, wall motion and function are normal. The ejection  fraction is 60-65%.  Normal right ventricle size and systolic function.  No hemodynamically significant valvular abnormalities on 2D or color flow  imaging.    Coronary angiogram on February 27, 2025:  Single-vessel obstructive coronary artery disease involving the mid dominant right coronary artery with moderate calcific disease elsewhere (iFR of the LAD 0.91, iFR of the left circumflex 0.95).  Normal left-sided filling pressures.  No aortic valve stenosis.  Successful percutaneous coronary artery intervention of the proximal to mid right coronary artery with a 3.0 x 38 mm Resolute [place] drug-eluting stent that was postdilated with a 3.5 mm NC balloon at high atmospheres.    Lab Review   Lab Results   Component Value Date     01/20/2025    CO2 28 01/20/2025    CO2 28 02/04/2022    BUN 30.7 01/20/2025    BUN 13 02/04/2022     Lab Results   Component Value Date    WBC 10.1 01/20/2025    HGB 12.1 01/20/2025    HCT 36.7 01/20/2025    MCV 87 01/20/2025     01/20/2025     Lab Results   Component Value Date    CHOL 216 01/11/2024    TRIG 165 01/11/2024    HDL 49 01/11/2024     01/11/2024     LDL Cholesterol Calculated   Date Value Ref Range Status   03/14/2025 69 <100 mg/dL Final     No results found for: \"TROPONINI\"  Lab Results   Component Value Date    BNP 30 09/26/2018     Lab Results   Component Value Date    TSH 2.91 05/29/2024               "

## 2025-04-02 ENCOUNTER — HOSPITAL ENCOUNTER (OUTPATIENT)
Dept: CARDIAC REHAB | Facility: HOSPITAL | Age: 65
Discharge: HOME | End: 2025-04-02
Attending: INTERNAL MEDICINE
Payer: MEDICARE

## 2025-04-02 DIAGNOSIS — Z95.5 S/P CORONARY ARTERY STENT PLACEMENT: ICD-10-CM

## 2025-04-02 DIAGNOSIS — Z95.5 S/P RIGHT CORONARY ARTERY (RCA) STENT PLACEMENT: ICD-10-CM

## 2025-04-02 PROCEDURE — 93798 PHYS/QHP OP CAR RHAB W/ECG: CPT

## 2025-04-02 PROCEDURE — 93797 PHYS/QHP OP CAR RHAB WO ECG: CPT

## 2025-04-07 ENCOUNTER — HOSPITAL ENCOUNTER (OUTPATIENT)
Dept: CARDIAC REHAB | Facility: HOSPITAL | Age: 65
Discharge: HOME | End: 2025-04-07
Attending: INTERNAL MEDICINE
Payer: MEDICARE

## 2025-04-07 PROCEDURE — 93798 PHYS/QHP OP CAR RHAB W/ECG: CPT

## 2025-04-09 ENCOUNTER — HOSPITAL ENCOUNTER (OUTPATIENT)
Dept: CARDIAC REHAB | Facility: HOSPITAL | Age: 65
Discharge: HOME | End: 2025-04-09
Attending: INTERNAL MEDICINE
Payer: MEDICARE

## 2025-04-09 PROCEDURE — 93798 PHYS/QHP OP CAR RHAB W/ECG: CPT

## 2025-04-09 PROCEDURE — 93797 PHYS/QHP OP CAR RHAB WO ECG: CPT

## 2025-04-11 ENCOUNTER — HOSPITAL ENCOUNTER (OUTPATIENT)
Dept: CARDIAC REHAB | Facility: HOSPITAL | Age: 65
Discharge: HOME | End: 2025-04-11
Attending: INTERNAL MEDICINE
Payer: MEDICARE

## 2025-04-11 PROCEDURE — 93798 PHYS/QHP OP CAR RHAB W/ECG: CPT

## 2025-04-12 ENCOUNTER — HEALTH MAINTENANCE LETTER (OUTPATIENT)
Age: 65
End: 2025-04-12

## 2025-04-16 ENCOUNTER — HOSPITAL ENCOUNTER (OUTPATIENT)
Dept: CARDIAC REHAB | Facility: HOSPITAL | Age: 65
Discharge: HOME | End: 2025-04-16
Attending: INTERNAL MEDICINE
Payer: MEDICARE

## 2025-04-16 PROCEDURE — 93798 PHYS/QHP OP CAR RHAB W/ECG: CPT

## 2025-04-16 PROCEDURE — 93797 PHYS/QHP OP CAR RHAB WO ECG: CPT

## 2025-04-18 ENCOUNTER — HOSPITAL ENCOUNTER (OUTPATIENT)
Dept: CARDIAC REHAB | Facility: HOSPITAL | Age: 65
Discharge: HOME | End: 2025-04-18
Attending: INTERNAL MEDICINE
Payer: MEDICARE

## 2025-04-18 PROCEDURE — 93798 PHYS/QHP OP CAR RHAB W/ECG: CPT

## 2025-04-21 ENCOUNTER — HOSPITAL ENCOUNTER (OUTPATIENT)
Dept: CARDIAC REHAB | Facility: HOSPITAL | Age: 65
Discharge: HOME | End: 2025-04-21
Attending: INTERNAL MEDICINE
Payer: MEDICARE

## 2025-04-21 PROCEDURE — 93798 PHYS/QHP OP CAR RHAB W/ECG: CPT

## 2025-04-29 ENCOUNTER — LAB REQUISITION (OUTPATIENT)
Dept: LAB | Facility: CLINIC | Age: 65
End: 2025-04-29
Payer: MEDICARE

## 2025-04-29 DIAGNOSIS — I10 ESSENTIAL (PRIMARY) HYPERTENSION: ICD-10-CM

## 2025-04-29 DIAGNOSIS — I25.119 ATHEROSCLEROTIC HEART DISEASE OF NATIVE CORONARY ARTERY WITH UNSPECIFIED ANGINA PECTORIS: ICD-10-CM

## 2025-04-29 LAB
ANION GAP SERPL CALCULATED.3IONS-SCNC: 15 MMOL/L (ref 7–15)
BUN SERPL-MCNC: 17.4 MG/DL (ref 8–23)
CALCIUM SERPL-MCNC: 9.5 MG/DL (ref 8.8–10.4)
CHLORIDE SERPL-SCNC: 101 MMOL/L (ref 98–107)
CREAT SERPL-MCNC: 0.63 MG/DL (ref 0.51–0.95)
EGFRCR SERPLBLD CKD-EPI 2021: >90 ML/MIN/1.73M2
GLUCOSE SERPL-MCNC: 105 MG/DL (ref 70–99)
HCO3 SERPL-SCNC: 23 MMOL/L (ref 22–29)
POTASSIUM SERPL-SCNC: 3.8 MMOL/L (ref 3.4–5.3)
SODIUM SERPL-SCNC: 139 MMOL/L (ref 135–145)

## 2025-04-30 LAB
CHOLEST SERPL-MCNC: 178 MG/DL
FASTING STATUS PATIENT QL REPORTED: ABNORMAL
HDLC SERPL-MCNC: 53 MG/DL
LDLC SERPL CALC-MCNC: 102 MG/DL
NONHDLC SERPL-MCNC: 125 MG/DL
TRIGL SERPL-MCNC: 113 MG/DL

## 2025-05-03 ENCOUNTER — HEALTH MAINTENANCE LETTER (OUTPATIENT)
Age: 65
End: 2025-05-03

## 2025-06-12 ENCOUNTER — LAB REQUISITION (OUTPATIENT)
Dept: LAB | Facility: CLINIC | Age: 65
End: 2025-06-12
Payer: MEDICARE

## 2025-06-12 DIAGNOSIS — E11.9 TYPE 2 DIABETES MELLITUS WITHOUT COMPLICATIONS (H): ICD-10-CM

## 2025-06-12 PROCEDURE — 84443 ASSAY THYROID STIM HORMONE: CPT | Mod: ORL | Performed by: FAMILY MEDICINE

## 2025-06-12 PROCEDURE — 80048 BASIC METABOLIC PNL TOTAL CA: CPT | Mod: ORL | Performed by: FAMILY MEDICINE

## 2025-06-12 PROCEDURE — 82607 VITAMIN B-12: CPT | Mod: ORL | Performed by: FAMILY MEDICINE

## 2025-06-13 LAB
ANION GAP SERPL CALCULATED.3IONS-SCNC: 14 MMOL/L (ref 7–15)
BUN SERPL-MCNC: 21.5 MG/DL (ref 8–23)
CALCIUM SERPL-MCNC: 9.6 MG/DL (ref 8.8–10.4)
CHLORIDE SERPL-SCNC: 104 MMOL/L (ref 98–107)
CREAT SERPL-MCNC: 0.62 MG/DL (ref 0.51–0.95)
CREAT UR-MCNC: 137 MG/DL
EGFRCR SERPLBLD CKD-EPI 2021: >90 ML/MIN/1.73M2
GLUCOSE SERPL-MCNC: 149 MG/DL (ref 70–99)
HCO3 SERPL-SCNC: 23 MMOL/L (ref 22–29)
MICROALBUMIN UR-MCNC: 371 MG/L
MICROALBUMIN/CREAT UR: 270.8 MG/G CR (ref 0–25)
POTASSIUM SERPL-SCNC: 3.7 MMOL/L (ref 3.4–5.3)
SODIUM SERPL-SCNC: 141 MMOL/L (ref 135–145)
TSH SERPL DL<=0.005 MIU/L-ACNC: 2.36 UIU/ML (ref 0.3–4.2)
VIT B12 SERPL-MCNC: 194 PG/ML (ref 232–1245)

## 2025-07-08 ENCOUNTER — HOSPITAL ENCOUNTER (OUTPATIENT)
Dept: CT IMAGING | Facility: HOSPITAL | Age: 65
Discharge: HOME | End: 2025-07-08
Attending: PHYSICIAN ASSISTANT
Payer: MEDICARE

## 2025-07-08 ENCOUNTER — OFFICE VISIT (OUTPATIENT)
Dept: PULMONOLOGY | Facility: CLINIC | Age: 65
End: 2025-07-08
Attending: NURSE PRACTITIONER
Payer: MEDICARE

## 2025-07-08 ENCOUNTER — LAB REQUISITION (OUTPATIENT)
Dept: LAB | Facility: CLINIC | Age: 65
End: 2025-07-08
Payer: MEDICARE

## 2025-07-08 VITALS
SYSTOLIC BLOOD PRESSURE: 116 MMHG | WEIGHT: 151 LBS | OXYGEN SATURATION: 92 % | BODY MASS INDEX: 24.37 KG/M2 | HEART RATE: 78 BPM | DIASTOLIC BLOOD PRESSURE: 50 MMHG

## 2025-07-08 DIAGNOSIS — C34.31 MALIGNANT NEOPLASM OF LOWER LOBE OF RIGHT LUNG (H): ICD-10-CM

## 2025-07-08 DIAGNOSIS — J43.2 CENTRILOBULAR EMPHYSEMA (H): ICD-10-CM

## 2025-07-08 DIAGNOSIS — I25.10 CORONARY ARTERY DISEASE INVOLVING NATIVE CORONARY ARTERY OF NATIVE HEART WITHOUT ANGINA PECTORIS: ICD-10-CM

## 2025-07-08 DIAGNOSIS — F17.218 NICOTINE DEPENDENCE, CIGARETTES, WITH OTHER NICOTINE-INDUCED DISORDERS: ICD-10-CM

## 2025-07-08 DIAGNOSIS — J44.9 COPD, SEVERE (H): Primary | ICD-10-CM

## 2025-07-08 DIAGNOSIS — E53.8 DEFICIENCY OF OTHER SPECIFIED B GROUP VITAMINS: ICD-10-CM

## 2025-07-08 PROCEDURE — 3078F DIAST BP <80 MM HG: CPT | Performed by: NURSE PRACTITIONER

## 2025-07-08 PROCEDURE — 3074F SYST BP LT 130 MM HG: CPT | Performed by: NURSE PRACTITIONER

## 2025-07-08 PROCEDURE — 99214 OFFICE O/P EST MOD 30 MIN: CPT | Performed by: NURSE PRACTITIONER

## 2025-07-08 PROCEDURE — 71250 CT THORAX DX C-: CPT

## 2025-07-08 RX ORDER — CYANOCOBALAMIN 1000 UG/ML
INJECTION, SOLUTION INTRAMUSCULAR; SUBCUTANEOUS
COMMUNITY
Start: 2025-06-20

## 2025-07-08 RX ORDER — HYDROCHLOROTHIAZIDE 25 MG/1
TABLET ORAL
COMMUNITY
Start: 2025-07-07

## 2025-07-08 NOTE — PATIENT INSTRUCTIONS
It was a pleasure to see you in clinic today.   Here is what we discussed:    Continue Duonebs as needed.  Start these twice daily if your breathing worsens.   Continue Albuterol inhaler every 4-6 hours as needed for shortness of breath or wheezing.  Continue to work on smoking cessation.  Call my nurse, Rajiv ([phone]) with any change or worsening of your breathing.  Follow-up in 6 months.     Yelena Sandra, CNP  Pulmonary Medicine  [facility]  [phone]

## 2025-07-08 NOTE — PROGRESS NOTES
Pulmonary Clinic Follow-up          Assessment/Plan:     65 year old female with a history of COPD, presumed NSCLC in RLL s/p SBRT, ANDREW on cpap, CAD s/p TASNEEM 2/2025, GERD, PVD, DM II, HLD, HTN, chronic pain - presenting for 6 month follow-up.    COPD - GOLD E  Emphysema  Nicotine dependence  Current smoker with 50+ pack year smoking hx.  She originally presented to our clinic 10/2024 for hospital follow-up and to establish care in pulmonary clinic.    She was hospitalized twice in 2024 with COPD exacerbation and pneumonia.  Echocardiogram 5/2024 unremarkable.  Eosinophils 0.1 in 6/2024.  Imaging with moderate emphysematous changes.   Pulmonary function testing with severe airway obstruction (FEV1 45%), air-trapping, and reduced diffusion capacity defect (DLCO 67%).   Last visit she was referred to cardiology due to severe coronary artery calcifications noted on chest CT, she is now s/p TASNEEM placement.   Last chest CT in 2/2025 with stable RLL nodule, faint ground-glass infiltrates.  Since last visit, she has stopped Breztri as she states she no longer qualifies for their savings program and the company stopped sending it to her.  She notes no worsening of her breathing since then.  She believes having the stent placed may have helped her breathing somewhat.   No exacerbations.      Plan:  - continue Duonebs and Albuterol inhaler PRN.  Encouraged her to start Duonebs BID if breathing worsens.  Low threshold to restart long-acting inhaler, she declines today.   - discussed smoking cessation, she is not ready to quit.  She does have patches and lozenges at home for when she is ready.  Chantix worked for her in past but is quite expensive.   - consider referral back to sleep medicine, she is not using her cpap currently.  - chest CT today and follow-up with radiation oncology tomorrow.   - she is UTD with prevnar 20, annual influenza vaccine, RSV vaccine.  She declines COVID vaccines.   - Action plan: prednisone 40mg x5  days, + azithromycin x5 days.        Follow-up:  - 6 months    Yelena Sandra, CNP  Pulmonary Medicine  [facility]  [phone]       CC:     Follow-up     HPI:     65 year old female with a history of COPD, presumed NSCLC in RLL s/p SBRT, ANDREW on cpap, CAD s/p TASNEEM 2/2025, GERD, PVD, DM II, HLD, HTN, chronic pain - presenting for 6 month follow-up.    Construction still going on in her house.   Stopped Breztri.  No longer qualifies.  No change since stopping.   Not doing Duonebs regularly, only as needed.  Sometimes once every other week.  Sometimes few times/ day.   Uses Ventolin only when she leaves the house, not even daily.  Maybe 4 times in last month.   No COPD exacerbation.   Had stent placed, maybe some help with breathing.   Has been having upper quadrant abd pain, had recent EGD, gets results today.           Patient supplied answers from flow sheet for:  COPD Assessment Test (CAT)  2009 [employer]. All rights reserved.      8/15/2024    11:16 AM 10/3/2024    11:47 AM 1/4/2025     4:51 PM 7/4/2025     3:29 PM   COPD assessment test (CAT)   Cough 3 2 3 2   Phlegm 2 2 3 1   Chest tightness 2 3 2 2   Walk up hill 3 4 4 3   Limited activities 3 4 4 3   Leaving my home 2 2 1 2   Sleep 2 0 3 2   Energy 3 3 5 3   Total Score 20 20 25  18        Patient-reported      CAT Key:  The CAT consist of 8 items which are each scored 0-5. The total score ranges from 0-40 with higher scores representing a poorer health status. When interpreting CAT scores, the individual s disease severity should be considered.   Low impact  (1-9)  Medium impact  (10-20)  High impact  (21-30)  Very high impact  (31-40)         ROS:     A 6-system review was obtained and was negative with the exception of the symptoms endorsed in the HPI.       Medical history:       PMH:  Past Medical History:   Diagnosis Date    Back pain     low back    COPD (chronic obstructive pulmonary disease) (H)     Depression      Diabetes mellitus (H)     Hyperlipidemia     Hypertension     PAD (peripheral artery disease)     Peripheral neuropathy     Pulmonary nodules        PSH:  Past Surgical History:   Procedure Laterality Date    AORTA - FEMORAL ARTERY BYPASS GRAFT      BRONCHOSCOPY RIGID OR FLEXIBLE W/TRANSENDOSCOPIC ENDOBRONCHIAL ULTRASOUND GUIDED N/A 07/14/2022    Procedure: endbronchial ultrasound, transbronchial biopsy;  Surgeon: Rosendo Dave MD;  Location: [facility] OR    CV CORONARY ANGIOGRAM N/A 2/27/2025    Procedure: Coronary Angiogram;  Surgeon: Rufino Longo MD;  Location: [facility] CV    CV INSTANTANEOUS WAVE-FREE RATIO N/A 2/27/2025    Procedure: Instantaneous Wave-Free Ratio;  Surgeon: Rufino Longo MD;  Location: [facility] CV    CV LEFT HEART CATH N/A 2/27/2025    Procedure: Left Heart Catheterization;  Surgeon: Rufino Longo MD;  Location: [facility] CV    CV PCI N/A 2/27/2025    Procedure: Percutaneous Coronary Intervention;  Surgeon: Rufino Longo MD;  Location: [facility] CV    CV PCI STENT DRUG ELUTING N/A 2/27/2025    Procedure: Percutaneous Coronary Intervention Stent;  Surgeon: Rufino Longo MD;  Location: [facility] CV    HYSTEROSCOPY W/ ENDOMETRIAL ABLATION      ILEOSTOMY  08/16/2023    ILEOSTOMY REVISION  10/19/2023    IR EXTREMITY ANGIOGRAM LEFT  11/25/2020    IR EXTREMITY ANGIOGRAM LEFT  12/16/2020    IR LOWER EXTREMITY ANGIOGRAM LEFT  11/25/2020    IR LOWER EXTREMITY ANGIOGRAM LEFT  12/16/2020    OPTICAL TRACKING SYSTEM BRONCHOSCOPY N/A 07/14/2022    Procedure: BRONCHOSCOPY, USING OPTICAL TRACKING SYSTEM, .  Ion or veran system, fiducial placement;  Surgeon: Rosendo Dave MD;  Location: [facility] OR    OTHER SURGICAL HISTORY      tubal ligation    OTHER SURGICAL HISTORY      spine fusion    PAIN STELLATE GANGLION BLOCK RIGHT Right 1992    x5, [facility]'s       Allergies:  Allergies   Allergen Reactions    Bupropion Itching       Family Hx:  Family  "History   Problem Relation Age of Onset    Diabetes Mother     Hypertension Mother     Cancer Father     Heart Disease Father     Diabetes Father        Social Hx:  Social History     Socioeconomic History    Marital status:      Spouse name: Not on file    Number of children: Not on file    Years of education: Not on file    Highest education level: Not on file   Occupational History    Not on file   Tobacco Use    Smoking status: Former     Current packs/day: 1.00     Average packs/day: 1 pack/day for 55.0 years (55.0 ttl pk-yrs)     Types: Cigarettes     Start date: 7/17/1970     Passive exposure: Past (Parents were non smokers; rest of family were smokers)    Smokeless tobacco: Never    Tobacco comments:     \"No more than 1 PPD\"   Vaping Use    Vaping status: Never Used   Substance and Sexual Activity    Alcohol use: No    Drug use: Not Currently    Sexual activity: Not on file   Other Topics Concern    Not on file   Social History Narrative    Not on file     Social Drivers of Health     Financial Resource Strain: Low Risk  (2/27/2025)    Financial Resource Strain     Within the past 12 months, have you or your family members you live with been unable to get utilities (heat, electricity) when it was really needed?: No   Food Insecurity: Low Risk  (2/27/2025)    Food Insecurity     Within the past 12 months, did you worry that your food would run out before you got money to buy more?: No     Within the past 12 months, did the food you bought just not last and you didn t have money to get more?: No   Transportation Needs: Low Risk  (2/27/2025)    Transportation Needs     Within the past 12 months, has lack of transportation kept you from medical appointments, getting your medicines, non-medical meetings or appointments, work, or from getting things that you need?: No   Physical Activity: Not on file   Stress: Not on file   Social Connections: Socially Integrated (10/19/2023)    Received from [employer] [facility] [employer]    Social Connections     Frequency of Communication with Friends and Family: 0   Interpersonal Safety: Low Risk  (2/27/2025)    Interpersonal Safety     Do you feel physically and emotionally safe where you currently live?: Yes     Within the past 12 months, have you been hit, slapped, kicked or otherwise physically hurt by someone?: No     Within the past 12 months, have you been humiliated or emotionally abused in other ways by your partner or ex-partner?: No   Housing Stability: Low Risk  (2/27/2025)    Housing Stability     Do you have housing? : Yes     Are you worried about losing your housing?: No       Current Meds:  Current Outpatient Medications   Medication Sig Dispense Refill    albuterol (PROAIR HFA/PROVENTIL HFA/VENTOLIN HFA) 108 (90 Base) MCG/ACT inhaler Inhale 2 puffs into the lungs every 6 hours as needed for shortness of breath, wheezing or cough      aspirin 81 MG EC tablet Take 1 tablet (81 mg) by mouth daily. Start tomorrow. 90 tablet 3    atorvastatin (LIPITOR) 80 MG tablet Take 1 tablet (80 mg) by mouth daily. 90 tablet 3    clopidogrel (PLAVIX) 75 MG tablet Take 1 tablet (75 mg) by mouth daily. 90 tablet 3    cyanocobalamin (CYANOCOBALAMIN) 1000 mcg/mL injection INJECT 1 mL INTRAMUSUCULARLY ONCE DAILY FOR 7 days, then 1 ML ONCE weekly for 1 month, then 1 ML once a month*      diltiazem (CARDIZEM SR) 120 MG CP12 12 hr SR capsule Take 1 capsule (120 mg) by mouth 2 times daily. 90 capsule 3    DULoxetine (CYMBALTA) 60 MG capsule Take 60 mg by mouth every morning.      furosemide (LASIX) 20 MG tablet Take 20 mg by mouth daily as needed.      hydrochlorothiazide (HYDRODIURIL) 25 MG tablet       ipratropium - albuterol 0.5 mg/2.5 mg/3 mL (DUONEB) 0.5-2.5 (3) MG/3ML neb solution Take 1 vial (3 mLs) by nebulization every 6 hours as needed for shortness of breath, wheezing or cough. 180 mL 11    losartan (COZAAR) 100 MG tablet Take 1 tablet (100 mg) by mouth every  evening. 90 tablet 3    metFORMIN (GLUCOPHAGE XR) 500 MG 24 hr tablet Take 1 tablet (500 mg) by mouth 2 times daily (with meals) 60 tablet 0    montelukast (SINGULAIR) 10 MG tablet Take 1 tablet (10 mg) by mouth at bedtime. 30 tablet 0    pantoprazole (PROTONIX) 40 MG EC tablet Take 1 tablet (40 mg) by mouth every morning (before breakfast). 30 tablet 0    oxyCODONE (OXY-IR) 5 MG capsule Take 1-2 capsules by mouth every 4 hours as needed for severe pain.      polyethylene glycol (MIRALAX) 17 GM/Dose powder Take 17 g by mouth daily as needed for constipation. (Patient not taking: Reported on 7/8/2025)            Physical Exam:     /50 (BP Location: Left arm, Patient Position: Sitting, Cuff Size: Adult Large)   Pulse 78   Wt 68.5 kg (151 lb)   SpO2 92%   BMI 24.37 kg/m    Gen: adult female, appears in NAD  HEENT: clear conjunctivae, moist mucous membranes  CV: RRR, no M/G/R  Resp: CTAB, no focal crackles or wheezes.  Respirations even and unlabored.  On RA.  No cough.  Skin: no apparent rashes on visible skin  Ext: no cyanosis, clubbing or edema  Neuro: alert and answering questions appropriately       Data:     Labs:  reviewed    Imaging studies:  I have personally reviewed all pertinent imaging studies and PFT results unless otherwise noted.    Chest CT 2/25/25:  IMPRESSION:   1.  Stable treated right lower lobe pulmonary nodule.  2.  New faint peribronchial ground-glass infiltrates in both lungs, most severe in the right upper lobe consistent with small airways inflammatory/infectious process.  3.  Mild mediastinal lymphadenopathy most likely reactive.         Pulmonary Function Testing    10/4/24:        2022:

## 2025-07-08 NOTE — LETTER
7/8/2025      Iglesia Shore  [address]  [place] [address]      Dear Colleague,    Thank you for referring your patient, Iglesia Shore, to the [facility]. Please see a copy of my visit note below.     Pulmonary Clinic Follow-up          Assessment/Plan:     65 year old female with a history of COPD, presumed NSCLC in RLL s/p SBRT, ANDREW on cpap, CAD s/p TASNEEM 2/2025, GERD, PVD, DM II, HLD, HTN, chronic pain - presenting for 6 month follow-up.    COPD - GOLD E  Emphysema  Nicotine dependence  Current smoker with 50+ pack year smoking hx.  She originally presented to our clinic 10/2024 for hospital follow-up and to establish care in pulmonary clinic.    She was hospitalized twice in 2024 with COPD exacerbation and pneumonia.  Echocardiogram 5/2024 unremarkable.  Eosinophils 0.1 in 6/2024.  Imaging with moderate emphysematous changes.   Pulmonary function testing with severe airway obstruction (FEV1 45%), air-trapping, and reduced diffusion capacity defect (DLCO 67%).   Last visit she was referred to cardiology due to severe coronary artery calcifications noted on chest CT, she is now s/p TASNEEM placement.   Last chest CT in 2/2025 with stable RLL nodule, faint ground-glass infiltrates.  Since last visit, she has stopped Breztri as she states she no longer qualifies for their savings program and the company stopped sending it to her.  She notes no worsening of her breathing since then.  She believes having the stent placed may have helped her breathing somewhat.   No exacerbations.      Plan:  - continue Duonebs and Albuterol inhaler PRN.  Encouraged her to start Duonebs BID if breathing worsens.  Low threshold to restart long-acting inhaler, she declines today.   - discussed smoking cessation, she is not ready to quit.  She does have patches and lozenges at home for when she is ready.  Chantix worked for her in past but is quite expensive.   - consider referral back to sleep  medicine, she is not using her cpap currently.  - chest CT today and follow-up with radiation oncology tomorrow.   - she is UTD with prevnar 20, annual influenza vaccine, RSV vaccine.  She declines COVID vaccines.   - Action plan: prednisone 40mg x5 days, + azithromycin x5 days.        Follow-up:  - 6 months    Yelena Sandra CNP  Pulmonary Medicine  [facility]  [phone]       CC:     Follow-up     HPI:     65 year old female with a history of COPD, presumed NSCLC in RLL s/p SBRT, ANDREW on cpap, CAD s/p TASNEEM 2/2025, GERD, PVD, DM II, HLD, HTN, chronic pain - presenting for 6 month follow-up.    Construction still going on in her house.   Stopped Breztri.  No longer qualifies.  No change since stopping.   Not doing Duonebs regularly, only as needed.  Sometimes once every other week.  Sometimes few times/ day.   Uses Ventolin only when she leaves the house, not even daily.  Maybe 4 times in last month.   No COPD exacerbation.   Had stent placed, maybe some help with breathing.   Has been having upper quadrant abd pain, had recent EGD, gets results today.           Patient supplied answers from flow sheet for:  COPD Assessment Test (CAT)  2009 [employer]. All rights reserved.      8/15/2024    11:16 AM 10/3/2024    11:47 AM 1/4/2025     4:51 PM 7/4/2025     3:29 PM   COPD assessment test (CAT)   Cough 3 2 3 2   Phlegm 2 2 3 1   Chest tightness 2 3 2 2   Walk up hill 3 4 4 3   Limited activities 3 4 4 3   Leaving my home 2 2 1 2   Sleep 2 0 3 2   Energy 3 3 5 3   Total Score 20 20 25  18        Patient-reported      CAT Key:  The CAT consist of 8 items which are each scored 0-5. The total score ranges from 0-40 with higher scores representing a poorer health status. When interpreting CAT scores, the individual s disease severity should be considered.   Low impact  (1-9)  Medium impact  (10-20)  High impact  (21-30)  Very high impact  (31-40)         ROS:     A 6-system review was  obtained and was negative with the exception of the symptoms endorsed in the HPI.       Medical history:       PMH:  Past Medical History:   Diagnosis Date     Back pain     low back     COPD (chronic obstructive pulmonary disease) (H)      Depression      Diabetes mellitus (H)      Hyperlipidemia      Hypertension      PAD (peripheral artery disease)      Peripheral neuropathy      Pulmonary nodules        PSH:  Past Surgical History:   Procedure Laterality Date     AORTA - FEMORAL ARTERY BYPASS GRAFT       BRONCHOSCOPY RIGID OR FLEXIBLE W/TRANSENDOSCOPIC ENDOBRONCHIAL ULTRASOUND GUIDED N/A 07/14/2022    Procedure: endbronchial ultrasound, transbronchial biopsy;  Surgeon: Rosendo Dave MD;  Location: [facility] OR     CV CORONARY ANGIOGRAM N/A 2/27/2025    Procedure: Coronary Angiogram;  Surgeon: Rufino Longo MD;  Location: [facility] CV     CV INSTANTANEOUS WAVE-FREE RATIO N/A 2/27/2025    Procedure: Instantaneous Wave-Free Ratio;  Surgeon: Rufino Longo MD;  Location: [facility] CV     CV LEFT HEART CATH N/A 2/27/2025    Procedure: Left Heart Catheterization;  Surgeon: Rufino Longo MD;  Location: [facility] CV     CV PCI N/A 2/27/2025    Procedure: Percutaneous Coronary Intervention;  Surgeon: Rufino Longo MD;  Location: [facility]     CV PCI STENT DRUG ELUTING N/A 2/27/2025    Procedure: Percutaneous Coronary Intervention Stent;  Surgeon: Rufino Longo MD;  Location: [facility] CV     HYSTEROSCOPY W/ ENDOMETRIAL ABLATION       ILEOSTOMY  08/16/2023     ILEOSTOMY REVISION  10/19/2023     IR EXTREMITY ANGIOGRAM LEFT  11/25/2020     IR EXTREMITY ANGIOGRAM LEFT  12/16/2020     IR LOWER EXTREMITY ANGIOGRAM LEFT  11/25/2020     IR LOWER EXTREMITY ANGIOGRAM LEFT  12/16/2020     OPTICAL TRACKING SYSTEM BRONCHOSCOPY N/A 07/14/2022    Procedure: BRONCHOSCOPY, USING OPTICAL TRACKING SYSTEM, .  Ion or veran system, fiducial placement;  Surgeon: Rosendo Dave MD;   "Location: [facility] OR     OTHER SURGICAL HISTORY      tubal ligation     OTHER SURGICAL HISTORY      spine fusion     PAIN STELLATE GANGLION BLOCK RIGHT Right 1992    x5, [facility]'s       Allergies:  Allergies   Allergen Reactions     Bupropion Itching       Family Hx:  Family History   Problem Relation Age of Onset     Diabetes Mother      Hypertension Mother      Cancer Father      Heart Disease Father      Diabetes Father        Social Hx:  Social History     Socioeconomic History     Marital status:      Spouse name: Not on file     Number of children: Not on file     Years of education: Not on file     Highest education level: Not on file   Occupational History     Not on file   Tobacco Use     Smoking status: Former     Current packs/day: 1.00     Average packs/day: 1 pack/day for 55.0 years (55.0 ttl pk-yrs)     Types: Cigarettes     Start date: 7/17/1970     Passive exposure: Past (Parents were non smokers; rest of family were smokers)     Smokeless tobacco: Never     Tobacco comments:     \"No more than 1 PPD\"   Vaping Use     Vaping status: Never Used   Substance and Sexual Activity     Alcohol use: No     Drug use: Not Currently     Sexual activity: Not on file   Other Topics Concern     Not on file   Social History Narrative     Not on file     Social Drivers of Health     Financial Resource Strain: Low Risk  (2/27/2025)    Financial Resource Strain      Within the past 12 months, have you or your family members you live with been unable to get utilities (heat, electricity) when it was really needed?: No   Food Insecurity: Low Risk  (2/27/2025)    Food Insecurity      Within the past 12 months, did you worry that your food would run out before you got money to buy more?: No      Within the past 12 months, did the food you bought just not last and you didn t have money to get more?: No   Transportation Needs: Low Risk  (2/27/2025)    Transportation Needs      Within the past 12 months, has lack " of transportation kept you from medical appointments, getting your medicines, non-medical meetings or appointments, work, or from getting things that you need?: No   Physical Activity: Not on file   Stress: Not on file   Social Connections: Socially Integrated (10/19/2023)    Received from [employer] [facility]    Social Connections      Frequency of Communication with Friends and Family: 0   Interpersonal Safety: Low Risk  (2/27/2025)    Interpersonal Safety      Do you feel physically and emotionally safe where you currently live?: Yes      Within the past 12 months, have you been hit, slapped, kicked or otherwise physically hurt by someone?: No      Within the past 12 months, have you been humiliated or emotionally abused in other ways by your partner or ex-partner?: No   Housing Stability: Low Risk  (2/27/2025)    Housing Stability      Do you have housing? : Yes      Are you worried about losing your housing?: No       Current Meds:  Current Outpatient Medications   Medication Sig Dispense Refill     albuterol (PROAIR HFA/PROVENTIL HFA/VENTOLIN HFA) 108 (90 Base) MCG/ACT inhaler Inhale 2 puffs into the lungs every 6 hours as needed for shortness of breath, wheezing or cough       aspirin 81 MG EC tablet Take 1 tablet (81 mg) by mouth daily. Start tomorrow. 90 tablet 3     atorvastatin (LIPITOR) 80 MG tablet Take 1 tablet (80 mg) by mouth daily. 90 tablet 3     clopidogrel (PLAVIX) 75 MG tablet Take 1 tablet (75 mg) by mouth daily. 90 tablet 3     cyanocobalamin (CYANOCOBALAMIN) 1000 mcg/mL injection INJECT 1 mL INTRAMUSUCULARLY ONCE DAILY FOR 7 days, then 1 ML ONCE weekly for 1 month, then 1 ML once a month*       diltiazem (CARDIZEM SR) 120 MG CP12 12 hr SR capsule Take 1 capsule (120 mg) by mouth 2 times daily. 90 capsule 3     DULoxetine (CYMBALTA) 60 MG capsule Take 60 mg by mouth every morning.       furosemide (LASIX) 20 MG tablet Take 20 mg by mouth daily as needed.        hydrochlorothiazide (HYDRODIURIL) 25 MG tablet        ipratropium - albuterol 0.5 mg/2.5 mg/3 mL (DUONEB) 0.5-2.5 (3) MG/3ML neb solution Take 1 vial (3 mLs) by nebulization every 6 hours as needed for shortness of breath, wheezing or cough. 180 mL 11     losartan (COZAAR) 100 MG tablet Take 1 tablet (100 mg) by mouth every evening. 90 tablet 3     metFORMIN (GLUCOPHAGE XR) 500 MG 24 hr tablet Take 1 tablet (500 mg) by mouth 2 times daily (with meals) 60 tablet 0     montelukast (SINGULAIR) 10 MG tablet Take 1 tablet (10 mg) by mouth at bedtime. 30 tablet 0     pantoprazole (PROTONIX) 40 MG EC tablet Take 1 tablet (40 mg) by mouth every morning (before breakfast). 30 tablet 0     oxyCODONE (OXY-IR) 5 MG capsule Take 1-2 capsules by mouth every 4 hours as needed for severe pain.       polyethylene glycol (MIRALAX) 17 GM/Dose powder Take 17 g by mouth daily as needed for constipation. (Patient not taking: Reported on 7/8/2025)            Physical Exam:     /50 (BP Location: Left arm, Patient Position: Sitting, Cuff Size: Adult Large)   Pulse 78   Wt 68.5 kg (151 lb)   SpO2 92%   BMI 24.37 kg/m    Gen: adult female, appears in NAD  HEENT: clear conjunctivae, moist mucous membranes  CV: RRR, no M/G/R  Resp: CTAB, no focal crackles or wheezes.  Respirations even and unlabored.  On RA.  No cough.  Skin: no apparent rashes on visible skin  Ext: no cyanosis, clubbing or edema  Neuro: alert and answering questions appropriately       Data:     Labs:  reviewed    Imaging studies:  I have personally reviewed all pertinent imaging studies and PFT results unless otherwise noted.    Chest CT 2/25/25:  IMPRESSION:   1.  Stable treated right lower lobe pulmonary nodule.  2.  New faint peribronchial ground-glass infiltrates in both lungs, most severe in the right upper lobe consistent with small airways inflammatory/infectious process.  3.  Mild mediastinal lymphadenopathy most likely reactive.         Pulmonary Function  Testing    10/4/24:        2022:      Again, thank you for allowing me to participate in the care of your patient.        Sincerely,        Yelena Sandra NP    Electronically signed

## 2025-07-09 ENCOUNTER — VIRTUAL VISIT (OUTPATIENT)
Dept: RADIATION THERAPY | Facility: OUTPATIENT CENTER | Age: 65
End: 2025-07-09
Payer: MEDICARE

## 2025-07-09 DIAGNOSIS — C34.31 MALIGNANT NEOPLASM OF LOWER LOBE OF RIGHT LUNG (H): Primary | ICD-10-CM

## 2025-07-09 LAB — VIT B12 SERPL-MCNC: >4000 PG/ML (ref 232–1245)

## 2025-07-09 NOTE — PROGRESS NOTES
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     A 1.6 cm RLL nodule was discovered on low-dose screening lung CT (22), located in the paraspinal region. Staging PET/CT (22) demonstrated a SUV max 2.2  FDG uptake associated with this nodule and no other findings. She also had an unremarkable brain MRI (22). She completed mediastinal staging with bronchoscopy and EBUS (22). FNA of a 4R node and a 11R node were negative. She was medically inoperable and underwent SBRT(EOT 2022).    The patient  also underwent a reconstructive bowel surgery on 2023.  She has had a bowel obstruction for about a month with only small amounts passing through. The patient noticed some blood passing after surgery. The patient is due for a follow up.    A follow up CT of chest (2023), showed an interval increased size of the previously seen at medial right lower lobe pulmonary nodule which now measures 1.9 x 0.6 cm, previously 1 x 0.5 cm. However, a follow up PET/CT (2023) showed no evidence of disease.    Chest CT(2024) showed:  1.  Focal scarring right lower lobe superior segment along the mediastinal pleura near a metallic fiducial. No new nodules    2.  Unchanged emphysema and chronic airway wall thickening consistent with lung and airway manifestations of COPD.  3.  Moderate to severe three-vessel atheromatous coronary  calcifications.    4/19/24 CT Chest  MPRESSION:   1.  Unchanged focal scarring in the medial right lower lobe and near a fiducial marker.  2.  New areas of patchy and nodular consolidation within the anterior right upper lobe and left upper lobe suggestive of an infectious/inflammatory process.  3.  Severe coronary artery calcifications.    7/19/24 CT Chest  1. Unchanged focal fibrosis adjacent to metallic fiducial in the mediastinal pleura right lower lobe superior segment. No findings to suggest residual or recurrent tumor in the chest.  2.  Severe atheromatous coronary calcifications.  3.  Emphysema and central airway wall thickening. No superimposed acute process    10/23/24  FINDINGS:   LUNGS AND PLEURA: There are scattered 1 to 3 mm stable pulmonary nodules including right upper lobe, image 113 series 4. Posttreatment changes in the medial right lower lobe adjacent to the fiducial marker are stable in appearance. Underlying changes of   centrilobular. Stable nodular biapical pleural-parenchymal scarring.  MEDIASTINUM/AXILLAE: No significant mediastinal or hilar adenopathy.  CORONARY ARTERY CALCIFICATION: Severe.  UPPER ABDOMEN: Nonobstructing nephrolithiasis.  MUSCULOSKELETAL: No suspicious lytic or blastic lesions.                                                                 IMPRESSION:   1.  Stable findings without CT evidence of local recurrence or metastatic disease in the chest.    2/25/25 CT chest  LUNGS AND PLEURA: Stable fibrosis adjacent to the fiducial marker in the medial right lower lobe. Moderate emphysema. Bronchial wall thickening with mild new peribronchial ground-glass infiltrate bilaterally, most severe in the right upper lobe. A few   scattered diminutive pulmonary nodules are stable.  MEDIASTINUM/AXILLAE: A pretracheal lymph node has increased in size measuring 1.1 cm, most likely reactive.  CORONARY ARTERY CALCIFICATION: Severe.  UPPER ABDOMEN: Normal.  MUSCULOSKELETAL:  "Normal.  IMPRESSION:   1.  Stable treated right lower lobe pulmonary nodule.  2.  New faint peribronchial ground-glass infiltrates in both lungs, most severe in the right upper lobe consistent with small airways inflammatory/infectious process.  3.  Mild mediastinal lymphadenopathy most likely reactive.     7/8/25 CT chest  IMPRESSION:   1.  No significant change in the medial right lower lobe subpleural nodular opacity, likely represents posttreatment changes.  2.  Multiple bilateral small pulmonary nodules measure up to 6 mm in the lingula, not significantly changed as compared to 2/25/2025 exam.  3.  Upper lobes predominant emphysematous changes.  4.  Extensive atherosclerotic vascular calcification of the coronary arteries.  5.  1.3 cm hypoattenuating focus in hepatic segment II/III, not significantly changed as compared to 7/19/2024 exam, indeterminate, could represent hepatic hemangioma versus other likely benign hepatic lesions given its interval stability.      2/27/25 right coronary artery (RCA) stent placement     Today LoAnn returns after having done her CT scan yesterday. She is overall well.  She has JOHNSON but is stable. Breathing is better since she had the stent placed. She saw Pulmonary Medicine yesterday. At night using oxygen from 1 to 3L/minute.       Pre SBRT 7/22/2022   6M post SBRT (2/13/23)       12M post (8/11/23)            17M post (1/12/24)     IMPRESSION: cNED     RECOMMENDATIONS: I personally reviewed the CT scan  Smoking cessation discussed. CT scan in 6 months per her request and agree this is reasonable now that she is ~3 years out.     GHAZALA Rader  Department of Radiation Oncology  [facility]     10 minutes on the phone with the patient  9:00 to 9: 10 am  \"Provider has audio-video available, but the patient preferred audio-only (or did not consent to video visit).\"      "

## 2025-07-09 NOTE — LETTER
2025      Iglesia Shore   [address]  [place] [address]      Dear Colleague,    Thank you for referring your patient, Iglesia Shore, to the [facility]. Please see a copy of my visit note below.    Follow-up Note by Phone  2025    Iglesia Shore  MRN: [id number]  : 1960    Radiation Oncologist: Kamron Ruvalcaba MD  Provider: GHAZALA Rader  LCV: 3/7/25    [place] home, provider in clinic    DISEASE TREATED: Presumed NSCLC of the RLL, [id number], stage IA2    INTERVAL SINCE COMPLETION OF RADIATION THERAPY:  2 years 11 mo (completed 22)     TYPE OF RADIATION THERAPY DELIVERED: 50 Gy in 5 fractions, SBRT, Rx=82%             Iglesia Shore is a 65-year-old woman with a history of COPD who was found to have a right lower lobe nodule highly concerning for lung cancer.     A 1.6 cm RLL nodule was discovered on low-dose screening lung CT (22), located in the paraspinal region. Staging PET/CT (22) demonstrated a SUV max 2.2  FDG uptake associated with this nodule and no other findings. She also had an unremarkable brain MRI (22). She completed mediastinal staging with bronchoscopy and EBUS (22). FNA of a 4R node and a 11R node were negative. She was medically inoperable and underwent SBRT(EOT 2022).    The patient  also underwent a reconstructive bowel surgery on 2023.  She has had a bowel obstruction for about a month with only small amounts passing through. The patient noticed some blood passing after surgery. The patient is due for a follow up.    A follow up CT of chest (2023), showed an interval increased size of the previously seen at medial right lower lobe pulmonary nodule which now measures 1.9 x 0.6 cm, previously 1 x 0.5 cm. However, a follow up PET/CT (2023) showed no evidence of disease.    Chest CT(2024) showed:  1.  Focal scarring right lower lobe superior segment along the mediastinal  pleura near a metallic fiducial. No new nodules    2.  Unchanged emphysema and chronic airway wall thickening consistent with lung and airway manifestations of COPD.  3.  Moderate to severe three-vessel atheromatous coronary calcifications.    4/19/24 CT Chest  MPRESSION:   1.  Unchanged focal scarring in the medial right lower lobe and near a fiducial marker.  2.  New areas of patchy and nodular consolidation within the anterior right upper lobe and left upper lobe suggestive of an infectious/inflammatory process.  3.  Severe coronary artery calcifications.    7/19/24 CT Chest  1. Unchanged focal fibrosis adjacent to metallic fiducial in the mediastinal pleura right lower lobe superior segment. No findings to suggest residual or recurrent tumor in the chest.  2.  Severe atheromatous coronary calcifications.  3.  Emphysema and central airway wall thickening. No superimposed acute process    10/23/24  FINDINGS:   LUNGS AND PLEURA: There are scattered 1 to 3 mm stable pulmonary nodules including right upper lobe, image 113 series 4. Posttreatment changes in the medial right lower lobe adjacent to the fiducial marker are stable in appearance. Underlying changes of   centrilobular. Stable nodular biapical pleural-parenchymal scarring.  MEDIASTINUM/AXILLAE: No significant mediastinal or hilar adenopathy.  CORONARY ARTERY CALCIFICATION: Severe.  UPPER ABDOMEN: Nonobstructing nephrolithiasis.  MUSCULOSKELETAL: No suspicious lytic or blastic lesions.                                                                 IMPRESSION:   1.  Stable findings without CT evidence of local recurrence or metastatic disease in the chest.    2/25/25 CT chest  LUNGS AND PLEURA: Stable fibrosis adjacent to the fiducial marker in the medial right lower lobe. Moderate emphysema. Bronchial wall thickening with mild new peribronchial ground-glass infiltrate bilaterally, most severe in the right upper lobe. A few   scattered diminutive pulmonary  nodules are stable.  MEDIASTINUM/AXILLAE: A pretracheal lymph node has increased in size measuring 1.1 cm, most likely reactive.  CORONARY ARTERY CALCIFICATION: Severe.  UPPER ABDOMEN: Normal.  MUSCULOSKELETAL: Normal.  IMPRESSION:   1.  Stable treated right lower lobe pulmonary nodule.  2.  New faint peribronchial ground-glass infiltrates in both lungs, most severe in the right upper lobe consistent with small airways inflammatory/infectious process.  3.  Mild mediastinal lymphadenopathy most likely reactive.     7/8/25 CT chest  IMPRESSION:   1.  No significant change in the medial right lower lobe subpleural nodular opacity, likely represents posttreatment changes.  2.  Multiple bilateral small pulmonary nodules measure up to 6 mm in the lingula, not significantly changed as compared to 2/25/2025 exam.  3.  Upper lobes predominant emphysematous changes.  4.  Extensive atherosclerotic vascular calcification of the coronary arteries.  5.  1.3 cm hypoattenuating focus in hepatic segment II/III, not significantly changed as compared to 7/19/2024 exam, indeterminate, could represent hepatic hemangioma versus other likely benign hepatic lesions given its interval stability.      2/27/25 right coronary artery (RCA) stent placement     Today Iglesia returns after having done her CT scan yesterday. She is overall well.  She has JOHNSON but is stable. Breathing is better since she had the stent placed. She saw Pulmonary Medicine yesterday. At night using oxygen from 1 to 3L/minute.       Pre SBRT 7/22/2022   6M post SBRT (2/13/23)       12M post (8/11/23)            17M post (1/12/24)     IMPRESSION: cNED     RECOMMENDATIONS: I personally reviewed the CT scan  Smoking cessation discussed. CT scan in 6 months per her request and agree this is reasonable now that she is ~3 years out.     GHAZALA Rader  Department of Radiation Oncology  [facility]     10 minutes on the phone with the patient  9:00  "to 9: 10 am  \"Provider has audio-video available, but the patient preferred audio-only (or did not consent to video visit).\"        Again, thank you for allowing me to participate in the care of your patient.        Sincerely,        Macarena Martinez PA-C    Electronically signed"

## 2025-08-16 ENCOUNTER — HEALTH MAINTENANCE LETTER (OUTPATIENT)
Age: 65
End: 2025-08-16

## (undated) DEVICE — CATH BALLOON NC EMERGE 3.50X20MM H7493926720350

## (undated) DEVICE — ENDO VALVE BX EVIS MAJ-210

## (undated) DEVICE — CUSTOM PACK CORONARY SAN5BCRHEA

## (undated) DEVICE — CATH ANGIO JUDKINS JL4 6FRX100CM INFINITI 534620T

## (undated) DEVICE — ENDO VALVE SUCTION BRONCH EVIS MAJ-209

## (undated) DEVICE — LABEL MEDICATION SYSTEM 3303-P

## (undated) DEVICE — CATH LAUNCHER 6FR AR 1.0 LA6AR10

## (undated) DEVICE — LUBRICANT INST KIT ENDO-LUBE 220-90

## (undated) DEVICE — GUIDEWIRE VASCULAR COMET II 185CML PRESSURE H74939359110

## (undated) DEVICE — SYR 10ML LL W/O NDL 302995

## (undated) DEVICE — TUBING SUCTION 10'X3/16" N510

## (undated) DEVICE — GUIDEWIRE VASC 0.014INX180CM RUNTHROUGH 25-1011

## (undated) DEVICE — ENDO VALVE SUCTION ULTRASOUND BRONCH MAJ-1414

## (undated) DEVICE — PITCHER STERILE 1000ML  SSK9004A

## (undated) DEVICE — ELECTRODE DEFIB CADENCE 22550R

## (undated) DEVICE — ENDO NDL BX ASPIRATION EBUS 21GA VIZISHOT NA-201SX-4021

## (undated) DEVICE — SYR ANGIOGRAPHY MULTIUSE KIT ACIST 014612

## (undated) DEVICE — SLEEVE TR BAND RADIAL COMPRESSION DEVICE 24CM TRB24-REG

## (undated) DEVICE — Device

## (undated) DEVICE — CATH BALLOON EMERGE 2.5X20MM H7493918920250

## (undated) DEVICE — GUIDEWIRE VASC 0.014IN DIA 180CML HYDRO-TRACK CTD STR TIP IN

## (undated) DEVICE — CATH ANGIO JUDKINS JL3.5 6FRX100CM INFINITI 534618T

## (undated) DEVICE — BAG INSTRUMENT IV VISION ION 490127

## (undated) DEVICE — SYR 30ML SLIP TIP W/O NDL 302833

## (undated) DEVICE — CATH GUIDING 6FR AL .75 LA6AL75

## (undated) DEVICE — VALVE HEMOSTASIS .096" COPILOT MECH 1003331

## (undated) DEVICE — ADAPTER PROBE/SUCTION VISION ION 490101

## (undated) DEVICE — CATH ANGIO JUDKINS R4 6FRX100CM INFINITI 534621T

## (undated) DEVICE — KIT HAND CONTROL ACIST 014644 AR-P54

## (undated) DEVICE — SHTH INTRO 0.021IN ID 6FR DIA

## (undated) DEVICE — MANIFOLD KIT ANGIO AUTOMATED 014613

## (undated) DEVICE — SOL NACL 0.9% IRRIG 1000ML BOTTLE 2F7124

## (undated) DEVICE — BONE WAX 2.5GM W31G

## (undated) DEVICE — DEVICE INFLATION SYR W/ HEMOSTASIS VALVE 12IN EXT IN4904

## (undated) RX ORDER — LIDOCAINE HYDROCHLORIDE 10 MG/ML
INJECTION, SOLUTION EPIDURAL; INFILTRATION; INTRACAUDAL; PERINEURAL
Status: DISPENSED
Start: 2025-02-27

## (undated) RX ORDER — PROPOFOL 10 MG/ML
INJECTION, EMULSION INTRAVENOUS
Status: DISPENSED
Start: 2022-07-14

## (undated) RX ORDER — ONDANSETRON 2 MG/ML
INJECTION INTRAMUSCULAR; INTRAVENOUS
Status: DISPENSED
Start: 2022-07-14

## (undated) RX ORDER — FENTANYL CITRATE 50 UG/ML
INJECTION, SOLUTION INTRAMUSCULAR; INTRAVENOUS
Status: DISPENSED
Start: 2022-07-14

## (undated) RX ORDER — ALBUTEROL SULFATE 90 UG/1
AEROSOL, METERED RESPIRATORY (INHALATION)
Status: DISPENSED
Start: 2022-07-14

## (undated) RX ORDER — HEPARIN SODIUM 1000 [USP'U]/ML
INJECTION, SOLUTION INTRAVENOUS; SUBCUTANEOUS
Status: DISPENSED
Start: 2025-02-27

## (undated) RX ORDER — HYDRALAZINE HYDROCHLORIDE 20 MG/ML
INJECTION INTRAMUSCULAR; INTRAVENOUS
Status: DISPENSED
Start: 2022-07-14

## (undated) RX ORDER — DEXAMETHASONE SODIUM PHOSPHATE 4 MG/ML
INJECTION, SOLUTION INTRA-ARTICULAR; INTRALESIONAL; INTRAMUSCULAR; INTRAVENOUS; SOFT TISSUE
Status: DISPENSED
Start: 2022-07-14

## (undated) RX ORDER — EPHEDRINE SULFATE 50 MG/ML
INJECTION, SOLUTION INTRAMUSCULAR; INTRAVENOUS; SUBCUTANEOUS
Status: DISPENSED
Start: 2022-07-14

## (undated) RX ORDER — LIDOCAINE HYDROCHLORIDE 20 MG/ML
INJECTION, SOLUTION EPIDURAL; INFILTRATION; INTRACAUDAL; PERINEURAL
Status: DISPENSED
Start: 2022-07-14

## (undated) RX ORDER — HYDRALAZINE HYDROCHLORIDE 20 MG/ML
INJECTION INTRAMUSCULAR; INTRAVENOUS
Status: DISPENSED
Start: 2025-02-27

## (undated) RX ORDER — DILTIAZEM HYDROCHLORIDE 120 MG/1
CAPSULE, COATED, EXTENDED RELEASE ORAL
Status: DISPENSED
Start: 2025-02-27

## (undated) RX ORDER — DIAZEPAM 5 MG/1
TABLET ORAL
Status: DISPENSED
Start: 2025-02-27

## (undated) RX ORDER — FENTANYL CITRATE 50 UG/ML
INJECTION, SOLUTION INTRAMUSCULAR; INTRAVENOUS
Status: DISPENSED
Start: 2025-02-27

## (undated) RX ORDER — IPRATROPIUM BROMIDE AND ALBUTEROL SULFATE 2.5; .5 MG/3ML; MG/3ML
SOLUTION RESPIRATORY (INHALATION)
Status: DISPENSED
Start: 2025-02-27

## (undated) RX ORDER — CLOPIDOGREL 300 MG/1
TABLET, FILM COATED ORAL
Status: DISPENSED
Start: 2025-02-27

## (undated) RX ORDER — FENTANYL CITRATE-0.9 % NACL/PF 10 MCG/ML
PLASTIC BAG, INJECTION (ML) INTRAVENOUS
Status: DISPENSED
Start: 2022-07-14